# Patient Record
Sex: FEMALE | Race: BLACK OR AFRICAN AMERICAN | NOT HISPANIC OR LATINO
[De-identification: names, ages, dates, MRNs, and addresses within clinical notes are randomized per-mention and may not be internally consistent; named-entity substitution may affect disease eponyms.]

---

## 2017-08-31 ENCOUNTER — APPOINTMENT (OUTPATIENT)
Dept: SPINE | Facility: CLINIC | Age: 72
End: 2017-08-31
Payer: MEDICARE

## 2017-08-31 VITALS — BODY MASS INDEX: 33.27 KG/M2 | WEIGHT: 203 LBS

## 2017-08-31 VITALS — DIASTOLIC BLOOD PRESSURE: 84 MMHG | SYSTOLIC BLOOD PRESSURE: 124 MMHG | HEIGHT: 65.5 IN

## 2017-08-31 DIAGNOSIS — M54.16 RADICULOPATHY, LUMBAR REGION: ICD-10-CM

## 2017-08-31 PROCEDURE — 99213 OFFICE O/P EST LOW 20 MIN: CPT

## 2017-09-06 ENCOUNTER — APPOINTMENT (OUTPATIENT)
Dept: RADIOLOGY | Facility: CLINIC | Age: 72
End: 2017-09-06
Payer: MEDICARE

## 2017-09-06 ENCOUNTER — APPOINTMENT (OUTPATIENT)
Dept: MRI IMAGING | Facility: CLINIC | Age: 72
End: 2017-09-06
Payer: MEDICARE

## 2017-09-06 ENCOUNTER — APPOINTMENT (OUTPATIENT)
Dept: CT IMAGING | Facility: CLINIC | Age: 72
End: 2017-09-06
Payer: MEDICARE

## 2017-09-06 ENCOUNTER — OUTPATIENT (OUTPATIENT)
Dept: OUTPATIENT SERVICES | Facility: HOSPITAL | Age: 72
LOS: 1 days | End: 2017-09-06
Payer: MEDICARE

## 2017-09-06 DIAGNOSIS — Z90.710 ACQUIRED ABSENCE OF BOTH CERVIX AND UTERUS: Chronic | ICD-10-CM

## 2017-09-06 DIAGNOSIS — Z82.8 FAMILY HISTORY OF OTHER DISABILITIES AND CHRONIC DISEASES LEADING TO DISABLEMENT, NOT ELSEWHERE CLASSIFIED: Chronic | ICD-10-CM

## 2017-09-06 DIAGNOSIS — Z96.653 PRESENCE OF ARTIFICIAL KNEE JOINT, BILATERAL: Chronic | ICD-10-CM

## 2017-09-06 DIAGNOSIS — Z00.8 ENCOUNTER FOR OTHER GENERAL EXAMINATION: ICD-10-CM

## 2017-09-06 DIAGNOSIS — M54.16 RADICULOPATHY, LUMBAR REGION: ICD-10-CM

## 2017-09-06 DIAGNOSIS — Z98.89 OTHER SPECIFIED POSTPROCEDURAL STATES: Chronic | ICD-10-CM

## 2017-09-06 PROCEDURE — 72148 MRI LUMBAR SPINE W/O DYE: CPT | Mod: 26

## 2017-09-06 PROCEDURE — 72082 X-RAY EXAM ENTIRE SPI 2/3 VW: CPT | Mod: 26

## 2017-09-06 PROCEDURE — 72082 X-RAY EXAM ENTIRE SPI 2/3 VW: CPT

## 2017-09-06 PROCEDURE — 72131 CT LUMBAR SPINE W/O DYE: CPT

## 2017-09-06 PROCEDURE — 72131 CT LUMBAR SPINE W/O DYE: CPT | Mod: 26

## 2017-09-06 PROCEDURE — 72148 MRI LUMBAR SPINE W/O DYE: CPT

## 2017-09-11 DIAGNOSIS — M51.27 OTHER INTERVERTEBRAL DISC DISPLACEMENT, LUMBOSACRAL REGION: ICD-10-CM

## 2017-09-11 DIAGNOSIS — M51.36 OTHER INTERVERTEBRAL DISC DEGENERATION, LUMBAR REGION: ICD-10-CM

## 2017-09-11 DIAGNOSIS — M51.26 OTHER INTERVERTEBRAL DISC DISPLACEMENT, LUMBAR REGION: ICD-10-CM

## 2017-09-11 DIAGNOSIS — M43.16 SPONDYLOLISTHESIS, LUMBAR REGION: ICD-10-CM

## 2017-09-28 ENCOUNTER — APPOINTMENT (OUTPATIENT)
Dept: SPINE | Facility: CLINIC | Age: 72
End: 2017-09-28
Payer: MEDICARE

## 2017-09-28 VITALS
DIASTOLIC BLOOD PRESSURE: 84 MMHG | SYSTOLIC BLOOD PRESSURE: 122 MMHG | BODY MASS INDEX: 33.42 KG/M2 | HEIGHT: 65.5 IN | WEIGHT: 203 LBS

## 2017-09-28 DIAGNOSIS — M48.00 SPINAL STENOSIS, SITE UNSPECIFIED: ICD-10-CM

## 2017-09-28 PROCEDURE — 99213 OFFICE O/P EST LOW 20 MIN: CPT

## 2017-10-10 ENCOUNTER — RX RENEWAL (OUTPATIENT)
Age: 72
End: 2017-10-10

## 2017-10-27 ENCOUNTER — RX RENEWAL (OUTPATIENT)
Age: 72
End: 2017-10-27

## 2017-11-15 ENCOUNTER — OUTPATIENT (OUTPATIENT)
Dept: OUTPATIENT SERVICES | Facility: HOSPITAL | Age: 72
LOS: 1 days | End: 2017-11-15
Payer: MEDICARE

## 2017-11-15 VITALS
SYSTOLIC BLOOD PRESSURE: 143 MMHG | OXYGEN SATURATION: 97 % | HEART RATE: 65 BPM | TEMPERATURE: 98 F | RESPIRATION RATE: 18 BRPM | HEIGHT: 65 IN | DIASTOLIC BLOOD PRESSURE: 84 MMHG | WEIGHT: 207.01 LBS

## 2017-11-15 DIAGNOSIS — M48.00 SPINAL STENOSIS, SITE UNSPECIFIED: ICD-10-CM

## 2017-11-15 DIAGNOSIS — I10 ESSENTIAL (PRIMARY) HYPERTENSION: ICD-10-CM

## 2017-11-15 DIAGNOSIS — Z53.1 PROCEDURE AND TREATMENT NOT CARRIED OUT BECAUSE OF PATIENT'S DECISION FOR REASONS OF BELIEF AND GROUP PRESSURE: ICD-10-CM

## 2017-11-15 DIAGNOSIS — Z90.710 ACQUIRED ABSENCE OF BOTH CERVIX AND UTERUS: Chronic | ICD-10-CM

## 2017-11-15 DIAGNOSIS — Z01.818 ENCOUNTER FOR OTHER PREPROCEDURAL EXAMINATION: ICD-10-CM

## 2017-11-15 DIAGNOSIS — Z82.8 FAMILY HISTORY OF OTHER DISABILITIES AND CHRONIC DISEASES LEADING TO DISABLEMENT, NOT ELSEWHERE CLASSIFIED: Chronic | ICD-10-CM

## 2017-11-15 DIAGNOSIS — G47.33 OBSTRUCTIVE SLEEP APNEA (ADULT) (PEDIATRIC): ICD-10-CM

## 2017-11-15 DIAGNOSIS — Z96.653 PRESENCE OF ARTIFICIAL KNEE JOINT, BILATERAL: Chronic | ICD-10-CM

## 2017-11-15 DIAGNOSIS — Z98.89 OTHER SPECIFIED POSTPROCEDURAL STATES: Chronic | ICD-10-CM

## 2017-11-15 DIAGNOSIS — E11.9 TYPE 2 DIABETES MELLITUS WITHOUT COMPLICATIONS: ICD-10-CM

## 2017-11-15 LAB
ANION GAP SERPL CALC-SCNC: 19 MMOL/L — HIGH (ref 5–17)
BLD GP AB SCN SERPL QL: NEGATIVE — SIGNIFICANT CHANGE UP
BUN SERPL-MCNC: 14 MG/DL — SIGNIFICANT CHANGE UP (ref 7–23)
CALCIUM SERPL-MCNC: 10.2 MG/DL — SIGNIFICANT CHANGE UP (ref 8.4–10.5)
CHLORIDE SERPL-SCNC: 99 MMOL/L — SIGNIFICANT CHANGE UP (ref 96–108)
CO2 SERPL-SCNC: 18 MMOL/L — LOW (ref 22–31)
CREAT SERPL-MCNC: 0.83 MG/DL — SIGNIFICANT CHANGE UP (ref 0.5–1.3)
GLUCOSE SERPL-MCNC: 62 MG/DL — LOW (ref 70–99)
HBA1C BLD-MCNC: 6.4 % — HIGH (ref 4–5.6)
HCT VFR BLD CALC: 37 % — SIGNIFICANT CHANGE UP (ref 34.5–45)
HGB BLD-MCNC: 12.1 G/DL — SIGNIFICANT CHANGE UP (ref 11.5–15.5)
MCHC RBC-ENTMCNC: 28.9 PG — SIGNIFICANT CHANGE UP (ref 27–34)
MCHC RBC-ENTMCNC: 32.7 GM/DL — SIGNIFICANT CHANGE UP (ref 32–36)
MCV RBC AUTO: 88.5 FL — SIGNIFICANT CHANGE UP (ref 80–100)
PLATELET # BLD AUTO: 323 K/UL — SIGNIFICANT CHANGE UP (ref 150–400)
POTASSIUM SERPL-MCNC: 5.3 MMOL/L — SIGNIFICANT CHANGE UP (ref 3.5–5.3)
POTASSIUM SERPL-SCNC: 5.3 MMOL/L — SIGNIFICANT CHANGE UP (ref 3.5–5.3)
RBC # BLD: 4.18 M/UL — SIGNIFICANT CHANGE UP (ref 3.8–5.2)
RBC # FLD: 14.7 % — HIGH (ref 10.3–14.5)
RH IG SCN BLD-IMP: POSITIVE — SIGNIFICANT CHANGE UP
SODIUM SERPL-SCNC: 136 MMOL/L — SIGNIFICANT CHANGE UP (ref 135–145)
WBC # BLD: 5.45 K/UL — SIGNIFICANT CHANGE UP (ref 3.8–10.5)
WBC # FLD AUTO: 5.45 K/UL — SIGNIFICANT CHANGE UP (ref 3.8–10.5)

## 2017-11-15 PROCEDURE — 80048 BASIC METABOLIC PNL TOTAL CA: CPT

## 2017-11-15 PROCEDURE — 86901 BLOOD TYPING SEROLOGIC RH(D): CPT

## 2017-11-15 PROCEDURE — 86900 BLOOD TYPING SEROLOGIC ABO: CPT

## 2017-11-15 PROCEDURE — 86850 RBC ANTIBODY SCREEN: CPT

## 2017-11-15 PROCEDURE — 83036 HEMOGLOBIN GLYCOSYLATED A1C: CPT

## 2017-11-15 PROCEDURE — G0463: CPT

## 2017-11-15 PROCEDURE — 85027 COMPLETE CBC AUTOMATED: CPT

## 2017-11-15 RX ORDER — SODIUM CHLORIDE 9 MG/ML
3 INJECTION INTRAMUSCULAR; INTRAVENOUS; SUBCUTANEOUS EVERY 8 HOURS
Qty: 0 | Refills: 0 | Status: DISCONTINUED | OUTPATIENT
Start: 2017-11-22 | End: 2017-11-22

## 2017-11-15 RX ORDER — CEFAZOLIN SODIUM 1 G
2000 VIAL (EA) INJECTION ONCE
Qty: 0 | Refills: 0 | Status: DISCONTINUED | OUTPATIENT
Start: 2017-11-22 | End: 2017-11-23

## 2017-11-15 NOTE — H&P PST ADULT - PSH
FH: cholecystectomy    H/O right inguinal hernia repair    H/O total knee replacement, bilateral    H/O: hysterectomy

## 2017-11-15 NOTE — H&P PST ADULT - PROBLEM SELECTOR PLAN 2
Pt. instructed to self administer Humalog 75/25 Four (4) Units subcutaneous night prior to procedure  No insulin or metformin morning of procedure  Stat fingerstick glucose upon arrival to [facility]

## 2017-11-15 NOTE — H&P PST ADULT - HISTORY OF PRESENT ILLNESS
71 year old female presents for Stage I L1-L4 Lumbar Lateral Fusion. Pt. reports a history of progressively worsening lower back pain radiating down her left lower extremity X 2 years.

## 2017-11-15 NOTE — H&P PST ADULT - NSANTHOSAYNRD_GEN_A_CORE
No. BRUCE screening performed.  STOP BANG Legend: 0-2 = LOW Risk; 3-4 = INTERMEDIATE Risk; 5-8 = HIGH Risk

## 2017-11-15 NOTE — H&P PST ADULT - PMH
Diabetes    Diverticulosis    Hypertension    Hypothyroid    Osteoarthritis    Pancreatitis, chronic  last episode > 10 years ago  Spinal stenosis Diabetes    Diverticulosis    Hypertension    Hypothyroid    MRSA (methicillin resistant Staphylococcus aureus) infection  1999, infected knee replacement, required multiple revisions and long term IV antibiotics via central line  Osteoarthritis    Pancreatitis, chronic  last episode > 10 years ago  Spinal stenosis

## 2017-11-15 NOTE — H&P PST ADULT - PROBLEM SELECTOR PLAN 5
Pt. Jehova's Witness, Health Care Proxy on chart. Pt. given documents regarding blood products to bring home and review with her elders.  Pt. reports notifying Dr. Balderas that she is a Jehova's witness and states a conversation occurred regarding blood products. Pt. Jehova's Witness, Health Care Proxy on chart. Pt. given Informed consent for blood avoidance, blood refusal and blood management documents, instructed to review with her elders, and bring with her day of procedure.  Pt. reports notifying Dr. Balderas that she is a Jehova's witness and states a conversation occurred regarding blood products.

## 2017-11-22 ENCOUNTER — APPOINTMENT (OUTPATIENT)
Dept: SPINE | Facility: HOSPITAL | Age: 72
End: 2017-11-22

## 2017-11-22 ENCOUNTER — TRANSCRIPTION ENCOUNTER (OUTPATIENT)
Age: 72
End: 2017-11-22

## 2017-11-22 ENCOUNTER — INPATIENT (INPATIENT)
Facility: HOSPITAL | Age: 72
LOS: 5 days | Discharge: NURSING, REHAB OR LONG-TERM CARE | DRG: 460 | End: 2017-11-28
Attending: NEUROLOGICAL SURGERY | Admitting: NEUROLOGICAL SURGERY
Payer: MEDICARE

## 2017-11-22 VITALS
HEIGHT: 65 IN | HEART RATE: 82 BPM | DIASTOLIC BLOOD PRESSURE: 88 MMHG | WEIGHT: 207.01 LBS | SYSTOLIC BLOOD PRESSURE: 136 MMHG | RESPIRATION RATE: 18 BRPM | TEMPERATURE: 98 F | OXYGEN SATURATION: 98 %

## 2017-11-22 DIAGNOSIS — Z90.710 ACQUIRED ABSENCE OF BOTH CERVIX AND UTERUS: Chronic | ICD-10-CM

## 2017-11-22 DIAGNOSIS — M48.00 SPINAL STENOSIS, SITE UNSPECIFIED: ICD-10-CM

## 2017-11-22 DIAGNOSIS — Z96.653 PRESENCE OF ARTIFICIAL KNEE JOINT, BILATERAL: Chronic | ICD-10-CM

## 2017-11-22 DIAGNOSIS — Z98.89 OTHER SPECIFIED POSTPROCEDURAL STATES: Chronic | ICD-10-CM

## 2017-11-22 DIAGNOSIS — Z82.8 FAMILY HISTORY OF OTHER DISABILITIES AND CHRONIC DISEASES LEADING TO DISABLEMENT, NOT ELSEWHERE CLASSIFIED: Chronic | ICD-10-CM

## 2017-11-22 LAB
ANION GAP SERPL CALC-SCNC: 16 MMOL/L — SIGNIFICANT CHANGE UP (ref 5–17)
BASOPHILS # BLD AUTO: 0 K/UL — SIGNIFICANT CHANGE UP (ref 0–0.2)
BASOPHILS NFR BLD AUTO: 0.4 % — SIGNIFICANT CHANGE UP (ref 0–2)
CHLORIDE SERPL-SCNC: 98 MMOL/L — SIGNIFICANT CHANGE UP (ref 96–108)
CO2 SERPL-SCNC: 24 MMOL/L — SIGNIFICANT CHANGE UP (ref 22–31)
EOSINOPHIL # BLD AUTO: 0.1 K/UL — SIGNIFICANT CHANGE UP (ref 0–0.5)
EOSINOPHIL NFR BLD AUTO: 0.6 % — SIGNIFICANT CHANGE UP (ref 0–6)
GLUCOSE BLDC GLUCOMTR-MCNC: 120 MG/DL — HIGH (ref 70–99)
GLUCOSE BLDC GLUCOMTR-MCNC: 201 MG/DL — HIGH (ref 70–99)
HCT VFR BLD CALC: 30.7 % — LOW (ref 34.5–45)
HGB BLD-MCNC: 9.9 G/DL — LOW (ref 11.5–15.5)
LYMPHOCYTES # BLD AUTO: 2.7 K/UL — SIGNIFICANT CHANGE UP (ref 1–3.3)
LYMPHOCYTES # BLD AUTO: 27 % — SIGNIFICANT CHANGE UP (ref 13–44)
MCHC RBC-ENTMCNC: 29.5 PG — SIGNIFICANT CHANGE UP (ref 27–34)
MCHC RBC-ENTMCNC: 32.3 GM/DL — SIGNIFICANT CHANGE UP (ref 32–36)
MCV RBC AUTO: 91.2 FL — SIGNIFICANT CHANGE UP (ref 80–100)
MONOCYTES # BLD AUTO: 0.7 K/UL — SIGNIFICANT CHANGE UP (ref 0–0.9)
MONOCYTES NFR BLD AUTO: 7.4 % — SIGNIFICANT CHANGE UP (ref 2–14)
NEUTROPHILS # BLD AUTO: 6.4 K/UL — SIGNIFICANT CHANGE UP (ref 1.8–7.4)
NEUTROPHILS NFR BLD AUTO: 64.6 % — SIGNIFICANT CHANGE UP (ref 43–77)
PLATELET # BLD AUTO: 242 K/UL — SIGNIFICANT CHANGE UP (ref 150–400)
POTASSIUM SERPL-MCNC: 4 MMOL/L — SIGNIFICANT CHANGE UP (ref 3.5–5.3)
POTASSIUM SERPL-SCNC: 4 MMOL/L — SIGNIFICANT CHANGE UP (ref 3.5–5.3)
RBC # BLD: 3.37 M/UL — LOW (ref 3.8–5.2)
RBC # FLD: 13.2 % — SIGNIFICANT CHANGE UP (ref 10.3–14.5)
SODIUM SERPL-SCNC: 138 MMOL/L — SIGNIFICANT CHANGE UP (ref 135–145)
WBC # BLD: 9.9 K/UL — SIGNIFICANT CHANGE UP (ref 3.8–10.5)
WBC # FLD AUTO: 9.9 K/UL — SIGNIFICANT CHANGE UP (ref 3.8–10.5)

## 2017-11-22 PROCEDURE — 22633 ARTHRD CMBN 1NTRSPC LUMBAR: CPT | Mod: GC

## 2017-11-22 PROCEDURE — 22585 ARTHRD ANT NTRBD MIN DSC EA: CPT | Mod: GC

## 2017-11-22 PROCEDURE — 22558 ARTHRD ANT NTRBD MIN DSC LUM: CPT | Mod: 59,GC

## 2017-11-22 PROCEDURE — 22842 INSERT SPINE FIXATION DEVICE: CPT | Mod: GC

## 2017-11-22 PROCEDURE — 22614 ARTHRD PST TQ 1NTRSPC EA ADD: CPT | Mod: GC

## 2017-11-22 PROCEDURE — 63047 LAM FACETEC & FORAMOT LUMBAR: CPT | Mod: 59,GC

## 2017-11-22 PROCEDURE — 22853 INSJ BIOMECHANICAL DEVICE: CPT | Mod: 59,GC

## 2017-11-22 RX ORDER — HYDROMORPHONE HYDROCHLORIDE 2 MG/ML
0.4 INJECTION INTRAMUSCULAR; INTRAVENOUS; SUBCUTANEOUS
Qty: 0 | Refills: 0 | Status: DISCONTINUED | OUTPATIENT
Start: 2017-11-22 | End: 2017-11-23

## 2017-11-22 RX ORDER — ACETAMINOPHEN 500 MG
650 TABLET ORAL EVERY 6 HOURS
Qty: 0 | Refills: 0 | Status: DISCONTINUED | OUTPATIENT
Start: 2017-11-22 | End: 2017-11-28

## 2017-11-22 RX ORDER — NALOXONE HYDROCHLORIDE 4 MG/.1ML
0.1 SPRAY NASAL
Qty: 0 | Refills: 0 | Status: DISCONTINUED | OUTPATIENT
Start: 2017-11-22 | End: 2017-11-23

## 2017-11-22 RX ORDER — HYDROMORPHONE HYDROCHLORIDE 2 MG/ML
0.4 INJECTION INTRAMUSCULAR; INTRAVENOUS; SUBCUTANEOUS
Qty: 0 | Refills: 0 | Status: DISCONTINUED | OUTPATIENT
Start: 2017-11-22 | End: 2017-11-22

## 2017-11-22 RX ORDER — LIDOCAINE HCL 20 MG/ML
0.2 VIAL (ML) INJECTION ONCE
Qty: 0 | Refills: 0 | Status: DISCONTINUED | OUTPATIENT
Start: 2017-11-22 | End: 2017-11-22

## 2017-11-22 RX ORDER — INSULIN LISPRO 100/ML
VIAL (ML) SUBCUTANEOUS
Qty: 0 | Refills: 0 | Status: DISCONTINUED | OUTPATIENT
Start: 2017-11-22 | End: 2017-11-23

## 2017-11-22 RX ORDER — DOCUSATE SODIUM 100 MG
100 CAPSULE ORAL THREE TIMES A DAY
Qty: 0 | Refills: 0 | Status: DISCONTINUED | OUTPATIENT
Start: 2017-11-22 | End: 2017-11-28

## 2017-11-22 RX ORDER — METOPROLOL TARTRATE 50 MG
50 TABLET ORAL
Qty: 0 | Refills: 0 | Status: DISCONTINUED | OUTPATIENT
Start: 2017-11-22 | End: 2017-11-28

## 2017-11-22 RX ORDER — ONDANSETRON 8 MG/1
4 TABLET, FILM COATED ORAL EVERY 6 HOURS
Qty: 0 | Refills: 0 | Status: DISCONTINUED | OUTPATIENT
Start: 2017-11-22 | End: 2017-11-28

## 2017-11-22 RX ORDER — SODIUM CHLORIDE 9 MG/ML
1000 INJECTION, SOLUTION INTRAVENOUS
Qty: 0 | Refills: 0 | Status: DISCONTINUED | OUTPATIENT
Start: 2017-11-22 | End: 2017-11-28

## 2017-11-22 RX ORDER — DEXTROSE 50 % IN WATER 50 %
25 SYRINGE (ML) INTRAVENOUS ONCE
Qty: 0 | Refills: 0 | Status: DISCONTINUED | OUTPATIENT
Start: 2017-11-22 | End: 2017-11-28

## 2017-11-22 RX ORDER — SENNA PLUS 8.6 MG/1
2 TABLET ORAL AT BEDTIME
Qty: 0 | Refills: 0 | Status: DISCONTINUED | OUTPATIENT
Start: 2017-11-22 | End: 2017-11-28

## 2017-11-22 RX ORDER — DEXTROSE 50 % IN WATER 50 %
12.5 SYRINGE (ML) INTRAVENOUS ONCE
Qty: 0 | Refills: 0 | Status: DISCONTINUED | OUTPATIENT
Start: 2017-11-22 | End: 2017-11-28

## 2017-11-22 RX ORDER — HYDROMORPHONE HYDROCHLORIDE 2 MG/ML
30 INJECTION INTRAMUSCULAR; INTRAVENOUS; SUBCUTANEOUS
Qty: 0 | Refills: 0 | Status: DISCONTINUED | OUTPATIENT
Start: 2017-11-22 | End: 2017-11-23

## 2017-11-22 RX ORDER — ACETAMINOPHEN 500 MG
650 TABLET ORAL EVERY 6 HOURS
Qty: 0 | Refills: 0 | Status: DISCONTINUED | OUTPATIENT
Start: 2017-11-22 | End: 2017-11-26

## 2017-11-22 RX ORDER — ONDANSETRON 8 MG/1
4 TABLET, FILM COATED ORAL EVERY 6 HOURS
Qty: 0 | Refills: 0 | Status: DISCONTINUED | OUTPATIENT
Start: 2017-11-22 | End: 2017-11-23

## 2017-11-22 RX ORDER — AMLODIPINE BESYLATE 2.5 MG/1
10 TABLET ORAL AT BEDTIME
Qty: 0 | Refills: 0 | Status: DISCONTINUED | OUTPATIENT
Start: 2017-11-22 | End: 2017-11-28

## 2017-11-22 RX ORDER — ONDANSETRON 8 MG/1
4 TABLET, FILM COATED ORAL ONCE
Qty: 0 | Refills: 0 | Status: DISCONTINUED | OUTPATIENT
Start: 2017-11-22 | End: 2017-11-22

## 2017-11-22 RX ORDER — HYDROMORPHONE HYDROCHLORIDE 2 MG/ML
0.8 INJECTION INTRAMUSCULAR; INTRAVENOUS; SUBCUTANEOUS
Qty: 0 | Refills: 0 | Status: DISCONTINUED | OUTPATIENT
Start: 2017-11-22 | End: 2017-11-22

## 2017-11-22 RX ORDER — GLUCAGON INJECTION, SOLUTION 0.5 MG/.1ML
1 INJECTION, SOLUTION SUBCUTANEOUS ONCE
Qty: 0 | Refills: 0 | Status: DISCONTINUED | OUTPATIENT
Start: 2017-11-22 | End: 2017-11-28

## 2017-11-22 RX ORDER — DEXTROSE 50 % IN WATER 50 %
1 SYRINGE (ML) INTRAVENOUS ONCE
Qty: 0 | Refills: 0 | Status: DISCONTINUED | OUTPATIENT
Start: 2017-11-22 | End: 2017-11-28

## 2017-11-22 RX ORDER — DEXTROSE MONOHYDRATE, SODIUM CHLORIDE, AND POTASSIUM CHLORIDE 50; .745; 4.5 G/1000ML; G/1000ML; G/1000ML
1000 INJECTION, SOLUTION INTRAVENOUS
Qty: 0 | Refills: 0 | Status: DISCONTINUED | OUTPATIENT
Start: 2017-11-22 | End: 2017-11-24

## 2017-11-22 RX ORDER — VANCOMYCIN HCL 1 G
1500 VIAL (EA) INTRAVENOUS EVERY 12 HOURS
Qty: 0 | Refills: 0 | Status: COMPLETED | OUTPATIENT
Start: 2017-11-22 | End: 2017-11-23

## 2017-11-22 RX ORDER — PANTOPRAZOLE SODIUM 20 MG/1
40 TABLET, DELAYED RELEASE ORAL DAILY
Qty: 0 | Refills: 0 | Status: DISCONTINUED | OUTPATIENT
Start: 2017-11-22 | End: 2017-11-28

## 2017-11-22 RX ORDER — LEVOTHYROXINE SODIUM 125 MCG
150 TABLET ORAL DAILY
Qty: 0 | Refills: 0 | Status: DISCONTINUED | OUTPATIENT
Start: 2017-11-22 | End: 2017-11-28

## 2017-11-22 RX ADMIN — SODIUM CHLORIDE 3 MILLILITER(S): 9 INJECTION INTRAMUSCULAR; INTRAVENOUS; SUBCUTANEOUS at 06:11

## 2017-11-22 RX ADMIN — AMLODIPINE BESYLATE 10 MILLIGRAM(S): 2.5 TABLET ORAL at 21:29

## 2017-11-22 RX ADMIN — HYDROMORPHONE HYDROCHLORIDE 30 MILLILITER(S): 2 INJECTION INTRAMUSCULAR; INTRAVENOUS; SUBCUTANEOUS at 22:26

## 2017-11-22 RX ADMIN — Medication 50 MILLIGRAM(S): at 18:41

## 2017-11-22 RX ADMIN — HYDROMORPHONE HYDROCHLORIDE 0.4 MILLIGRAM(S): 2 INJECTION INTRAMUSCULAR; INTRAVENOUS; SUBCUTANEOUS at 18:51

## 2017-11-22 RX ADMIN — ONDANSETRON 4 MILLIGRAM(S): 8 TABLET, FILM COATED ORAL at 17:51

## 2017-11-22 RX ADMIN — HYDROMORPHONE HYDROCHLORIDE 0.4 MILLIGRAM(S): 2 INJECTION INTRAMUSCULAR; INTRAVENOUS; SUBCUTANEOUS at 17:08

## 2017-11-22 RX ADMIN — Medication 20 MILLIGRAM(S): at 23:38

## 2017-11-22 RX ADMIN — Medication 300 MILLIGRAM(S): at 21:20

## 2017-11-22 RX ADMIN — DEXTROSE MONOHYDRATE, SODIUM CHLORIDE, AND POTASSIUM CHLORIDE 90 MILLILITER(S): 50; .745; 4.5 INJECTION, SOLUTION INTRAVENOUS at 17:08

## 2017-11-22 RX ADMIN — DEXTROSE MONOHYDRATE, SODIUM CHLORIDE, AND POTASSIUM CHLORIDE 90 MILLILITER(S): 50; .745; 4.5 INJECTION, SOLUTION INTRAVENOUS at 22:28

## 2017-11-22 RX ADMIN — HYDROMORPHONE HYDROCHLORIDE 30 MILLILITER(S): 2 INJECTION INTRAMUSCULAR; INTRAVENOUS; SUBCUTANEOUS at 21:42

## 2017-11-22 RX ADMIN — HYDROMORPHONE HYDROCHLORIDE 30 MILLILITER(S): 2 INJECTION INTRAMUSCULAR; INTRAVENOUS; SUBCUTANEOUS at 16:51

## 2017-11-22 RX ADMIN — Medication 100 MILLIGRAM(S): at 21:31

## 2017-11-22 RX ADMIN — Medication 2: at 18:34

## 2017-11-22 NOTE — PATIENT PROFILE ADULT. - PMH
Diabetes    Diverticulosis    Hypertension    Hypothyroid    MRSA (methicillin resistant Staphylococcus aureus) infection  1999, infected knee replacement, required multiple revisions and long term IV antibiotics via central line  Osteoarthritis    Pancreatitis, chronic  last episode > 10 years ago  Spinal stenosis

## 2017-11-23 LAB
ANION GAP SERPL CALC-SCNC: 16 MMOL/L — SIGNIFICANT CHANGE UP (ref 5–17)
BUN SERPL-MCNC: 7 MG/DL — SIGNIFICANT CHANGE UP (ref 7–23)
CALCIUM SERPL-MCNC: 8.8 MG/DL — SIGNIFICANT CHANGE UP (ref 8.4–10.5)
CHLORIDE SERPL-SCNC: 90 MMOL/L — LOW (ref 96–108)
CO2 SERPL-SCNC: 26 MMOL/L — SIGNIFICANT CHANGE UP (ref 22–31)
CREAT SERPL-MCNC: 0.65 MG/DL — SIGNIFICANT CHANGE UP (ref 0.5–1.3)
GLUCOSE BLDC GLUCOMTR-MCNC: 160 MG/DL — HIGH (ref 70–99)
GLUCOSE BLDC GLUCOMTR-MCNC: 166 MG/DL — HIGH (ref 70–99)
GLUCOSE BLDC GLUCOMTR-MCNC: 176 MG/DL — HIGH (ref 70–99)
GLUCOSE BLDC GLUCOMTR-MCNC: 214 MG/DL — HIGH (ref 70–99)
GLUCOSE BLDC GLUCOMTR-MCNC: 225 MG/DL — HIGH (ref 70–99)
GLUCOSE BLDC GLUCOMTR-MCNC: 271 MG/DL — HIGH (ref 70–99)
GLUCOSE SERPL-MCNC: 253 MG/DL — HIGH (ref 70–99)
HCT VFR BLD CALC: 34.1 % — LOW (ref 34.5–45)
HGB BLD-MCNC: 11.5 G/DL — SIGNIFICANT CHANGE UP (ref 11.5–15.5)
MCHC RBC-ENTMCNC: 30.8 PG — SIGNIFICANT CHANGE UP (ref 27–34)
MCHC RBC-ENTMCNC: 33.7 GM/DL — SIGNIFICANT CHANGE UP (ref 32–36)
MCV RBC AUTO: 91.4 FL — SIGNIFICANT CHANGE UP (ref 80–100)
PLATELET # BLD AUTO: 254 K/UL — SIGNIFICANT CHANGE UP (ref 150–400)
POTASSIUM SERPL-MCNC: 4.1 MMOL/L — SIGNIFICANT CHANGE UP (ref 3.5–5.3)
POTASSIUM SERPL-SCNC: 4.1 MMOL/L — SIGNIFICANT CHANGE UP (ref 3.5–5.3)
RBC # BLD: 3.74 M/UL — LOW (ref 3.8–5.2)
RBC # FLD: 13.3 % — SIGNIFICANT CHANGE UP (ref 10.3–14.5)
SODIUM SERPL-SCNC: 132 MMOL/L — LOW (ref 135–145)
WBC # BLD: 8.1 K/UL — SIGNIFICANT CHANGE UP (ref 3.8–10.5)
WBC # FLD AUTO: 8.1 K/UL — SIGNIFICANT CHANGE UP (ref 3.8–10.5)

## 2017-11-23 RX ORDER — INSULIN LISPRO 100/ML
VIAL (ML) SUBCUTANEOUS AT BEDTIME
Qty: 0 | Refills: 0 | Status: DISCONTINUED | OUTPATIENT
Start: 2017-11-23 | End: 2017-11-28

## 2017-11-23 RX ORDER — METOCLOPRAMIDE HCL 10 MG
10 TABLET ORAL ONCE
Qty: 0 | Refills: 0 | Status: COMPLETED | OUTPATIENT
Start: 2017-11-23 | End: 2017-11-23

## 2017-11-23 RX ORDER — OXYCODONE AND ACETAMINOPHEN 5; 325 MG/1; MG/1
1 TABLET ORAL EVERY 4 HOURS
Qty: 0 | Refills: 0 | Status: DISCONTINUED | OUTPATIENT
Start: 2017-11-23 | End: 2017-11-26

## 2017-11-23 RX ORDER — INSULIN LISPRO 100/ML
VIAL (ML) SUBCUTANEOUS AT BEDTIME
Qty: 0 | Refills: 0 | Status: DISCONTINUED | OUTPATIENT
Start: 2017-11-23 | End: 2017-11-23

## 2017-11-23 RX ORDER — ENOXAPARIN SODIUM 100 MG/ML
40 INJECTION SUBCUTANEOUS
Qty: 0 | Refills: 0 | Status: DISCONTINUED | OUTPATIENT
Start: 2017-11-23 | End: 2017-11-28

## 2017-11-23 RX ORDER — POLYETHYLENE GLYCOL 3350 17 G/17G
17 POWDER, FOR SOLUTION ORAL
Qty: 0 | Refills: 0 | Status: DISCONTINUED | OUTPATIENT
Start: 2017-11-23 | End: 2017-11-28

## 2017-11-23 RX ORDER — INSULIN LISPRO 100/ML
VIAL (ML) SUBCUTANEOUS
Qty: 0 | Refills: 0 | Status: DISCONTINUED | OUTPATIENT
Start: 2017-11-23 | End: 2017-11-28

## 2017-11-23 RX ORDER — ACETAMINOPHEN 500 MG
1000 TABLET ORAL ONCE
Qty: 0 | Refills: 0 | Status: COMPLETED | OUTPATIENT
Start: 2017-11-23 | End: 2017-11-23

## 2017-11-23 RX ORDER — OXYCODONE AND ACETAMINOPHEN 5; 325 MG/1; MG/1
2 TABLET ORAL EVERY 4 HOURS
Qty: 0 | Refills: 0 | Status: DISCONTINUED | OUTPATIENT
Start: 2017-11-23 | End: 2017-11-26

## 2017-11-23 RX ADMIN — Medication 50 MILLIGRAM(S): at 17:45

## 2017-11-23 RX ADMIN — HYDROMORPHONE HYDROCHLORIDE 30 MILLILITER(S): 2 INJECTION INTRAMUSCULAR; INTRAVENOUS; SUBCUTANEOUS at 12:25

## 2017-11-23 RX ADMIN — AMLODIPINE BESYLATE 10 MILLIGRAM(S): 2.5 TABLET ORAL at 21:47

## 2017-11-23 RX ADMIN — HYDROMORPHONE HYDROCHLORIDE 30 MILLILITER(S): 2 INJECTION INTRAMUSCULAR; INTRAVENOUS; SUBCUTANEOUS at 05:43

## 2017-11-23 RX ADMIN — Medication 100 MILLIGRAM(S): at 21:47

## 2017-11-23 RX ADMIN — ONDANSETRON 4 MILLIGRAM(S): 8 TABLET, FILM COATED ORAL at 00:50

## 2017-11-23 RX ADMIN — Medication 50 MILLIGRAM(S): at 05:48

## 2017-11-23 RX ADMIN — OXYCODONE AND ACETAMINOPHEN 2 TABLET(S): 5; 325 TABLET ORAL at 14:33

## 2017-11-23 RX ADMIN — Medication 2: at 17:45

## 2017-11-23 RX ADMIN — HYDROMORPHONE HYDROCHLORIDE 30 MILLILITER(S): 2 INJECTION INTRAMUSCULAR; INTRAVENOUS; SUBCUTANEOUS at 07:31

## 2017-11-23 RX ADMIN — Medication 2: at 08:31

## 2017-11-23 RX ADMIN — Medication 400 MILLIGRAM(S): at 23:26

## 2017-11-23 RX ADMIN — Medication 300 MILLIGRAM(S): at 08:31

## 2017-11-23 RX ADMIN — Medication 100 MILLIGRAM(S): at 05:48

## 2017-11-23 RX ADMIN — ENOXAPARIN SODIUM 40 MILLIGRAM(S): 100 INJECTION SUBCUTANEOUS at 17:45

## 2017-11-23 RX ADMIN — Medication 10 MILLIGRAM(S): at 05:54

## 2017-11-23 RX ADMIN — DEXTROSE MONOHYDRATE, SODIUM CHLORIDE, AND POTASSIUM CHLORIDE 50 MILLILITER(S): 50; .745; 4.5 INJECTION, SOLUTION INTRAVENOUS at 20:24

## 2017-11-23 RX ADMIN — POLYETHYLENE GLYCOL 3350 17 GRAM(S): 17 POWDER, FOR SOLUTION ORAL at 17:45

## 2017-11-23 RX ADMIN — Medication 150 MICROGRAM(S): at 05:48

## 2017-11-23 RX ADMIN — OXYCODONE AND ACETAMINOPHEN 2 TABLET(S): 5; 325 TABLET ORAL at 20:50

## 2017-11-23 RX ADMIN — Medication 1: at 00:59

## 2017-11-23 RX ADMIN — HYDROMORPHONE HYDROCHLORIDE 30 MILLILITER(S): 2 INJECTION INTRAMUSCULAR; INTRAVENOUS; SUBCUTANEOUS at 02:04

## 2017-11-23 RX ADMIN — OXYCODONE AND ACETAMINOPHEN 2 TABLET(S): 5; 325 TABLET ORAL at 20:19

## 2017-11-23 RX ADMIN — Medication 20 MILLIGRAM(S): at 21:47

## 2017-11-23 RX ADMIN — PANTOPRAZOLE SODIUM 40 MILLIGRAM(S): 20 TABLET, DELAYED RELEASE ORAL at 12:23

## 2017-11-23 RX ADMIN — PANTOPRAZOLE SODIUM 40 MILLIGRAM(S): 20 TABLET, DELAYED RELEASE ORAL at 05:49

## 2017-11-23 RX ADMIN — ONDANSETRON 4 MILLIGRAM(S): 8 TABLET, FILM COATED ORAL at 18:09

## 2017-11-23 RX ADMIN — Medication 100 MILLIGRAM(S): at 13:34

## 2017-11-23 RX ADMIN — Medication 2: at 13:33

## 2017-11-23 RX ADMIN — DEXTROSE MONOHYDRATE, SODIUM CHLORIDE, AND POTASSIUM CHLORIDE 90 MILLILITER(S): 50; .745; 4.5 INJECTION, SOLUTION INTRAVENOUS at 05:45

## 2017-11-23 RX ADMIN — Medication 1000 MILLIGRAM(S): at 23:56

## 2017-11-23 RX ADMIN — OXYCODONE AND ACETAMINOPHEN 2 TABLET(S): 5; 325 TABLET ORAL at 15:06

## 2017-11-23 NOTE — PROGRESS NOTE ADULT - ASSESSMENT
71 year old female with PMHx of OA, hypothyroidism, HTN, Diabetes, p/w history of progressively worsening lower back pain radiating down her left lower extremity X 2 years.    POD #1, S/P L1-L5 XLIF, L5-S1 PLIF, L1-S1  screw and john fixation    Neuro: neuro checks, vital signs q 4hours  	maintain HMVS, record output  	continue dilaudid PCA- will d/c in am    Cardiac: HTN, continue amlodipine, enalapril and metoprolol; goal SBP<160    GI: CCD diet    Renal: maintain mujica while on PCA  	continue IVF until  tolerating PO adequately    Endo:  Insulin sliding scale, CCD diet    DVT ppx: will start SQL tonight    Heme/Onc: H/H stable, [employer]    PT/OT-p, encourage OOB 71 year old female with PMHx of OA, hypothyroidism, HTN, Diabetes, p/w history of progressively worsening lower back pain radiating down her left lower extremity X 2 years.    POD #1, S/P L1-L5 XLIF, L5-S1 PLIF, L1-S1  screw and john fixation    Neuro: neuro checks, vital signs q 4hours  	maintain HMVs record output  	continue dilaudid PCA    Cardiac: HTN, continue amlodipine, enalapril and metoprolol; goal SBP<160    GI: CCD diet    Renal: maintain mujica while on PCA  	continue IVF until  tolerating PO adequately  	Na 132, on NS IVF, will rpt in am    Endo:  Insulin sliding scale, CCD diet; continue synthroid    DVT ppx: will start SQL tonight    Heme/Onc: H/H stable, [employer]    PT/OT-p, encourage OOB 71 year old female with PMHx of OA, hypothyroidism, HTN, Diabetes, p/w history of progressively worsening lower back pain radiating down her left lower extremity X 2 years.    POD #1, S/P L1-L5 XLIF, L5-S1 PLIF, L1-S1  screw and john fixation    Neuro: neuro checks, vital signs q 4hours  	maintain HMVs record output  	continue dilaudid PCA    Cardiac: HTN, continue amlodipine, enalapril and metoprolol; goal SBP<160    GI: CCD diet    Renal: maintain mujica while on PCA  	continue IVF until  tolerating PO adequately  	Na 132, on NS IVF, will rpt in am    Endo:  Insulin sliding scale, CCD diet; continue synthroid; Endocrine consult called    DVT ppx: will start SQL tonight    Heme/Onc: H/H stable, Jew    PT/OT-p, encourage OOB

## 2017-11-23 NOTE — PROGRESS NOTE ADULT - SUBJECTIVE AND OBJECTIVE BOX
71 year old female p/w history of progressively worsening lower back pain radiating down her left lower extremity X 2 years.    POD #1, S/P L1-L5 XLIF, L5-S1 PLIF, L1-S1  screw and john fixation  	    FAMILY HISTORY:  No pertinent family history in first degree relatives  PAST MEDICAL & SURGICAL HISTORY:  MRSA (methicillin resistant Staphylococcus aureus) infection: 1999, infected knee replacement, required multiple revisions and long term IV antibiotics via central line  Spinal stenosis  Osteoarthritis  Diverticulosis  Pancreatitis, chronic: last episode &gt; 10 years ago  Hypothyroid  Hypertension  Diabetes  H/O right inguinal hernia repair  H/O total knee replacement, bilateral  FH: cholecystectomy  H/O: hysterectomy    SUBJECTIVE: Patient lying in bed, sleepy.  Complains of pain.    CHIEF COMPLAINT: presented w/ LBP    OVERNIGHT EVENTS: transferred from PACU to floor and placed on PCA      Vital Signs Last 24 Hrs  T(C): 37.7 (23 Nov 2017 05:42), Max: 37.7 (23 Nov 2017 05:42)  T(F): 99.9 (23 Nov 2017 05:42), Max: 99.9 (23 Nov 2017 05:42)  HR: 98 (23 Nov 2017 05:42) (72 - 98)  BP: 160/84 (23 Nov 2017 05:42) (131/81 - 179/91)  BP(mean): 118 (22 Nov 2017 21:30) (102 - 126)  RR: 18 (23 Nov 2017 05:42) (14 - 18)  SpO2: 94% (23 Nov 2017 05:42) (90% - 100%)    DRAINS:    PHYSICAL EXAM:    Constitutional: No Acute Distress     Neurological: AOx3, Following Commands, Moving all Extremities     Motor exam:          Upper extremity                         Delt     Bicep     Tricep    HG                                                 R         5/5        5/5        5/5       5/5                                               L          5/5        5/5        5/5       5/5          Lower extremity                        HF         KF        KE       DF         PF                                                  R        3/5        4/5        4/5       4/5         5/5                                               L         4/5        5/5       4/5       5/5          5/5                                                 Sensation: [x] intact to light touch  [] decreased:     Pulmonary: Clear to Auscultation, No rales, No rhonchi, No wheezes     Cardiovascular: S1, S2, Regular rate and rhythm     Gastrointestinal: Soft, Non-tender, Non-distended     Extremities: No calf tenderness     Incision: clean and dry, +HMV (r) -160cc; +HMV (l) -200cc    LABS:                        11.5   8.1   )-----------( 254      ( 23 Nov 2017 06:22 )             34.1    11-23    132<L>  |  90<L>  |  7   ----------------------------<  253<H>  4.1   |  26  |  0.65    Ca    8.8      23 Nov 2017 06:22        MEDICATIONS:  Antibiotics:  ceFAZolin   IVPB 2000 milliGRAM(s) IV Intermittent once    Neuro:  acetaminophen   Tablet 650 milliGRAM(s) Oral every 6 hours PRN For Temp greater than 38 C (100.4 F)  acetaminophen   Tablet. 650 milliGRAM(s) Oral every 6 hours PRN Mild Pain (1 - 3)  diazepam  Injectable 2 milliGRAM(s) IV Push every 6 hours PRN spasm  HYDROmorphone PCA (1 mG/mL) 30 milliLiter(s) PCA Continuous PCA Continuous  HYDROmorphone PCA (1 mG/mL) Rescue Clinician Bolus 0.4 milliGRAM(s) IV Push every 15 minutes PRN for Pain Scale GREATER THAN 6  ondansetron Injectable 4 milliGRAM(s) IV Push every 6 hours PRN Nausea  ondansetron Injectable 4 milliGRAM(s) IV Push every 6 hours PRN Nausea and/or Vomiting    Cardiac:  amLODIPine   Tablet 10 milliGRAM(s) Oral at bedtime  enalapril 20 milliGRAM(s) Oral at bedtime  metoprolol     tartrate 50 milliGRAM(s) Oral two times a day    Pulm:    GI/:  docusate sodium 100 milliGRAM(s) Oral three times a day  pantoprazole    Tablet 40 milliGRAM(s) Oral daily  senna 2 Tablet(s) Oral at bedtime PRN Constipation    Other:   dextrose 5%. 1000 milliLiter(s) IV Continuous <Continuous>  dextrose 50% Injectable 12.5 Gram(s) IV Push once  dextrose 50% Injectable 25 Gram(s) IV Push once  dextrose 50% Injectable 25 Gram(s) IV Push once  dextrose Gel 1 Dose(s) Oral once PRN Blood Glucose LESS THAN 70 milliGRAM(s)/deciliter  glucagon  Injectable 1 milliGRAM(s) IntraMuscular once PRN Glucose LESS THAN 70 milligrams/deciliter  insulin lispro (HumaLOG) corrective regimen sliding scale   SubCutaneous three times a day before meals  insulin lispro (HumaLOG) corrective regimen sliding scale   SubCutaneous at bedtime  levothyroxine 150 MICROGram(s) Oral daily  naloxone Injectable 0.1 milliGRAM(s) IV Push every 3 minutes PRN For ANY of the following changes in patient status:  A. RR LESS THAN 10 breaths per minute, B. Oxygen saturation LESS THAN 90%, C. Sedation score of 6  sodium chloride 0.9% with potassium chloride 20 mEq/L 1000 milliLiter(s) IV Continuous <Continuous>    DIET: [] Regular [] CCD [] Renal [] Puree [] Dysphagia [] Tube Feeds:     IMAGING:

## 2017-11-23 NOTE — PROGRESS NOTE ADULT - SUBJECTIVE AND OBJECTIVE BOX
subjective:  Vital Signs Last 24 Hrs  T(C): 37.7 (23 Nov 2017 05:42), Max: 37.7 (23 Nov 2017 05:42)  T(F): 99.9 (23 Nov 2017 05:42), Max: 99.9 (23 Nov 2017 05:42)  HR: 98 (23 Nov 2017 05:42) (72 - 98)  BP: 160/84 (23 Nov 2017 05:42) (131/81 - 179/91)  BP(mean): 118 (22 Nov 2017 21:30) (102 - 126)  RR: 18 (23 Nov 2017 05:42) (14 - 18)  SpO2: 94% (23 Nov 2017 05:42) (90% - 100%)  PHYSICAL EXAM:    Constitutional: c/o back pain    Neurological:     Motor exam:          Upper extremity                         Delt     Bicep     Tricep    HG                                                 R         5/5        5/5        5/5       5/5                                               L          5/5        5/5        5/5       5/5          Lower extremity                        HF         KF        KE       DF         PF                                                  R        5/5        5/5        5/5       5/5         5/5                                               L         5/5        5/5       5/5       5/5          5/5                                                        [x] warm well perfused; capillary refill <3 seconds     Sensation: [x] intact to light touch  [] decrease    11-22 @ 07:01  -  11-23 @ 07:00  --------------------------------------------------------  IN:    IV PiggyBack: 500 mL    sodium chloride 0.9% with potassium chloride 20 mEq/L: 1350 mL  Total IN: 1850 mL    OUT:    Accordian: 200 mL    Accordian: 160 mL    Indwelling Catheter - Urethral: 3000 mL  Total OUT: 3360 mL    Total NET: -1510 mL          Incision: Dressings clean and dry    Misc:

## 2017-11-23 NOTE — CHART NOTE - NSCHARTNOTEFT_GEN_A_CORE
Day 2 of Anesthesia Pain Management Service    SUBJECTIVE: Patient is not tolerating IV PCA very well. She does not use it often and continues to fall asleep and wake up in pain. Will dc IV PCA, and change to PO analgesics.    Pain Scale Score:	[X] Refer to charted pain scores    THERAPY:    [ ] IV PCA Morphine		[ ] 5 mg/mL	[ ] 1 mg/mL  [X] IV PCA Hydromorphone	[ ] 5 mg/mL	[X] 1 mg/mL  [ ] IV PCA Fentanyl		[ ] 50 micrograms/mL    Demand dose: 0.2 mg     Lockout: 6 minutes   Continuous Rate: 0 mg/hr  4 Hour Limit: 4 mg    MEDICATIONS  (STANDING):  amLODIPine   Tablet 10 milliGRAM(s) Oral at bedtime  dextrose 5%. 1000 milliLiter(s) (50 mL/Hr) IV Continuous <Continuous>  dextrose 50% Injectable 12.5 Gram(s) IV Push once  dextrose 50% Injectable 25 Gram(s) IV Push once  dextrose 50% Injectable 25 Gram(s) IV Push once  docusate sodium 100 milliGRAM(s) Oral three times a day  enalapril 20 milliGRAM(s) Oral at bedtime  enoxaparin Injectable 40 milliGRAM(s) SubCutaneous <User Schedule>  HYDROmorphone PCA (1 mG/mL) 30 milliLiter(s) PCA Continuous PCA Continuous  insulin lispro (HumaLOG) corrective regimen sliding scale   SubCutaneous three times a day before meals  levothyroxine 150 MICROGram(s) Oral daily  metoprolol     tartrate 50 milliGRAM(s) Oral two times a day  pantoprazole    Tablet 40 milliGRAM(s) Oral daily  sodium chloride 0.9% with potassium chloride 20 mEq/L 1000 milliLiter(s) (90 mL/Hr) IV Continuous <Continuous>    MEDICATIONS  (PRN):  acetaminophen   Tablet 650 milliGRAM(s) Oral every 6 hours PRN For Temp greater than 38 C (100.4 F)  acetaminophen   Tablet. 650 milliGRAM(s) Oral every 6 hours PRN Mild Pain (1 - 3)  dextrose Gel 1 Dose(s) Oral once PRN Blood Glucose LESS THAN 70 milliGRAM(s)/deciliter  diazepam  Injectable 2 milliGRAM(s) IV Push every 6 hours PRN spasm  glucagon  Injectable 1 milliGRAM(s) IntraMuscular once PRN Glucose LESS THAN 70 milligrams/deciliter  HYDROmorphone PCA (1 mG/mL) Rescue Clinician Bolus 0.4 milliGRAM(s) IV Push every 15 minutes PRN for Pain Scale GREATER THAN 6  naloxone Injectable 0.1 milliGRAM(s) IV Push every 3 minutes PRN For ANY of the following changes in patient status:  A. RR LESS THAN 10 breaths per minute, B. Oxygen saturation LESS THAN 90%, C. Sedation score of 6  ondansetron Injectable 4 milliGRAM(s) IV Push every 6 hours PRN Nausea  ondansetron Injectable 4 milliGRAM(s) IV Push every 6 hours PRN Nausea and/or Vomiting  senna 2 Tablet(s) Oral at bedtime PRN Constipation      OBJECTIVE:    Sedation Score:	[ X] Alert	[ ] Drowsy 	[ ] Arousable	[ ] Asleep	[ ] Unresponsive    Side Effects:	[X ] None	[ ] Nausea	[ ] Vomiting	[ ] Pruritus  		[ ] Other:    Vital Signs Last 24 Hrs  T(C): 37.7 (23 Nov 2017 05:42), Max: 37.7 (23 Nov 2017 05:42)  T(F): 99.9 (23 Nov 2017 05:42), Max: 99.9 (23 Nov 2017 05:42)  HR: 98 (23 Nov 2017 05:42) (72 - 98)  BP: 160/84 (23 Nov 2017 05:42) (131/81 - 179/91)  BP(mean): 118 (22 Nov 2017 21:30) (102 - 126)  RR: 18 (23 Nov 2017 05:42) (14 - 18)  SpO2: 94% (23 Nov 2017 05:42) (90% - 100%)    ASSESSMENT/ PLAN    Therapy to  be:               [] Continued   [ ] Discontinued   [X] Changed to PRN PO Analgesics    Documentation and Verification of current medications:   [X] Done	[ ] Not done, not eligible    Comments: Will dc IV PCA and change to PO percocet PRN

## 2017-11-23 NOTE — PHYSICAL THERAPY INITIAL EVALUATION ADULT - ADDITIONAL COMMENTS
PTA pt lived in pvt home alone, +flight to enter with HR, lives main level only, was independent ADLs.

## 2017-11-24 DIAGNOSIS — E11.9 TYPE 2 DIABETES MELLITUS WITHOUT COMPLICATIONS: ICD-10-CM

## 2017-11-24 DIAGNOSIS — I10 ESSENTIAL (PRIMARY) HYPERTENSION: ICD-10-CM

## 2017-11-24 DIAGNOSIS — R10.31 RIGHT LOWER QUADRANT PAIN: ICD-10-CM

## 2017-11-24 DIAGNOSIS — E03.9 HYPOTHYROIDISM, UNSPECIFIED: ICD-10-CM

## 2017-11-24 LAB
ALBUMIN SERPL ELPH-MCNC: 3.2 G/DL — LOW (ref 3.3–5)
ALP SERPL-CCNC: 76 U/L — SIGNIFICANT CHANGE UP (ref 40–120)
ALT FLD-CCNC: 13 U/L RC — SIGNIFICANT CHANGE UP (ref 10–45)
ANION GAP SERPL CALC-SCNC: 16 MMOL/L — SIGNIFICANT CHANGE UP (ref 5–17)
AST SERPL-CCNC: 43 U/L — HIGH (ref 10–40)
BILIRUB SERPL-MCNC: 0.6 MG/DL — SIGNIFICANT CHANGE UP (ref 0.2–1.2)
BUN SERPL-MCNC: 7 MG/DL — SIGNIFICANT CHANGE UP (ref 7–23)
CALCIUM SERPL-MCNC: 8.8 MG/DL — SIGNIFICANT CHANGE UP (ref 8.4–10.5)
CHLORIDE SERPL-SCNC: 99 MMOL/L — SIGNIFICANT CHANGE UP (ref 96–108)
CO2 SERPL-SCNC: 20 MMOL/L — LOW (ref 22–31)
CREAT SERPL-MCNC: 0.64 MG/DL — SIGNIFICANT CHANGE UP (ref 0.5–1.3)
GLUCOSE BLDC GLUCOMTR-MCNC: 136 MG/DL — HIGH (ref 70–99)
GLUCOSE BLDC GLUCOMTR-MCNC: 148 MG/DL — HIGH (ref 70–99)
GLUCOSE BLDC GLUCOMTR-MCNC: 166 MG/DL — HIGH (ref 70–99)
GLUCOSE BLDC GLUCOMTR-MCNC: 187 MG/DL — HIGH (ref 70–99)
GLUCOSE SERPL-MCNC: 152 MG/DL — HIGH (ref 70–99)
POTASSIUM SERPL-MCNC: 4.5 MMOL/L — SIGNIFICANT CHANGE UP (ref 3.5–5.3)
POTASSIUM SERPL-SCNC: 4.5 MMOL/L — SIGNIFICANT CHANGE UP (ref 3.5–5.3)
PROT SERPL-MCNC: 6.8 G/DL — SIGNIFICANT CHANGE UP (ref 6–8.3)
SODIUM SERPL-SCNC: 135 MMOL/L — SIGNIFICANT CHANGE UP (ref 135–145)

## 2017-11-24 PROCEDURE — 74177 CT ABD & PELVIS W/CONTRAST: CPT | Mod: 26

## 2017-11-24 PROCEDURE — 99222 1ST HOSP IP/OBS MODERATE 55: CPT

## 2017-11-24 PROCEDURE — 74000: CPT | Mod: 26

## 2017-11-24 PROCEDURE — 99223 1ST HOSP IP/OBS HIGH 75: CPT

## 2017-11-24 RX ORDER — DEXTROSE MONOHYDRATE, SODIUM CHLORIDE, AND POTASSIUM CHLORIDE 50; .745; 4.5 G/1000ML; G/1000ML; G/1000ML
1000 INJECTION, SOLUTION INTRAVENOUS
Qty: 0 | Refills: 0 | Status: DISCONTINUED | OUTPATIENT
Start: 2017-11-24 | End: 2017-11-25

## 2017-11-24 RX ORDER — ACETAMINOPHEN 500 MG
1000 TABLET ORAL ONCE
Qty: 0 | Refills: 0 | Status: COMPLETED | OUTPATIENT
Start: 2017-11-24 | End: 2017-11-24

## 2017-11-24 RX ORDER — HYDROMORPHONE HYDROCHLORIDE 2 MG/ML
0.5 INJECTION INTRAMUSCULAR; INTRAVENOUS; SUBCUTANEOUS
Qty: 0 | Refills: 0 | Status: DISCONTINUED | OUTPATIENT
Start: 2017-11-24 | End: 2017-11-25

## 2017-11-24 RX ORDER — MULTIVIT WITH MIN/MFOLATE/K2 340-15/3 G
300 POWDER (GRAM) ORAL ONCE
Qty: 0 | Refills: 0 | Status: COMPLETED | OUTPATIENT
Start: 2017-11-24 | End: 2017-11-24

## 2017-11-24 RX ADMIN — DEXTROSE MONOHYDRATE, SODIUM CHLORIDE, AND POTASSIUM CHLORIDE 75 MILLILITER(S): 50; .745; 4.5 INJECTION, SOLUTION INTRAVENOUS at 11:43

## 2017-11-24 RX ADMIN — HYDROMORPHONE HYDROCHLORIDE 0.5 MILLIGRAM(S): 2 INJECTION INTRAMUSCULAR; INTRAVENOUS; SUBCUTANEOUS at 15:06

## 2017-11-24 RX ADMIN — Medication 150 MICROGRAM(S): at 06:14

## 2017-11-24 RX ADMIN — OXYCODONE AND ACETAMINOPHEN 2 TABLET(S): 5; 325 TABLET ORAL at 19:31

## 2017-11-24 RX ADMIN — Medication 50 MILLIGRAM(S): at 17:17

## 2017-11-24 RX ADMIN — Medication 100 MILLIGRAM(S): at 06:14

## 2017-11-24 RX ADMIN — ONDANSETRON 4 MILLIGRAM(S): 8 TABLET, FILM COATED ORAL at 15:11

## 2017-11-24 RX ADMIN — ENOXAPARIN SODIUM 40 MILLIGRAM(S): 100 INJECTION SUBCUTANEOUS at 17:17

## 2017-11-24 RX ADMIN — DEXTROSE MONOHYDRATE, SODIUM CHLORIDE, AND POTASSIUM CHLORIDE 75 MILLILITER(S): 50; .745; 4.5 INJECTION, SOLUTION INTRAVENOUS at 21:23

## 2017-11-24 RX ADMIN — PANTOPRAZOLE SODIUM 40 MILLIGRAM(S): 20 TABLET, DELAYED RELEASE ORAL at 06:14

## 2017-11-24 RX ADMIN — POLYETHYLENE GLYCOL 3350 17 GRAM(S): 17 POWDER, FOR SOLUTION ORAL at 06:14

## 2017-11-24 RX ADMIN — Medication 1 ENEMA: at 08:56

## 2017-11-24 RX ADMIN — Medication 300 MILLILITER(S): at 21:23

## 2017-11-24 RX ADMIN — OXYCODONE AND ACETAMINOPHEN 2 TABLET(S): 5; 325 TABLET ORAL at 02:43

## 2017-11-24 RX ADMIN — Medication 20 MILLIGRAM(S): at 21:23

## 2017-11-24 RX ADMIN — OXYCODONE AND ACETAMINOPHEN 2 TABLET(S): 5; 325 TABLET ORAL at 19:03

## 2017-11-24 RX ADMIN — Medication 50 MILLIGRAM(S): at 06:14

## 2017-11-24 RX ADMIN — OXYCODONE AND ACETAMINOPHEN 2 TABLET(S): 5; 325 TABLET ORAL at 02:13

## 2017-11-24 RX ADMIN — HYDROMORPHONE HYDROCHLORIDE 0.5 MILLIGRAM(S): 2 INJECTION INTRAMUSCULAR; INTRAVENOUS; SUBCUTANEOUS at 15:20

## 2017-11-24 RX ADMIN — OXYCODONE AND ACETAMINOPHEN 2 TABLET(S): 5; 325 TABLET ORAL at 23:37

## 2017-11-24 RX ADMIN — ONDANSETRON 4 MILLIGRAM(S): 8 TABLET, FILM COATED ORAL at 06:21

## 2017-11-24 RX ADMIN — Medication 400 MILLIGRAM(S): at 11:43

## 2017-11-24 RX ADMIN — Medication 2: at 08:59

## 2017-11-24 RX ADMIN — Medication 100 MILLIGRAM(S): at 21:23

## 2017-11-24 RX ADMIN — Medication 1000 MILLIGRAM(S): at 12:00

## 2017-11-24 RX ADMIN — AMLODIPINE BESYLATE 10 MILLIGRAM(S): 2.5 TABLET ORAL at 21:23

## 2017-11-24 RX ADMIN — OXYCODONE AND ACETAMINOPHEN 2 TABLET(S): 5; 325 TABLET ORAL at 23:07

## 2017-11-24 NOTE — PROGRESS NOTE ADULT - SUBJECTIVE AND OBJECTIVE BOX
SUBJECTIVE: C/O RLQ Abd pain (Hx chronic abd pain). Vomited x1 last PM, nausea this AM. Decrease PO intake. +Flatus.    OVERNIGHT EVENTS: RLQ Abd pain    Vital Signs Last 24 Hrs  T(C): 37 (24 Nov 2017 05:11), Max: 37.7 (23 Nov 2017 11:45)  T(F): 98.6 (24 Nov 2017 05:11), Max: 99.8 (23 Nov 2017 11:45)  HR: 91 (24 Nov 2017 05:11) (88 - 97)  BP: 155/81 (24 Nov 2017 05:11) (149/79 - 167/99)  BP(mean): --  RR: 18 (24 Nov 2017 05:11) (18 - 18)  SpO2: 98% (24 Nov 2017 05:11) (94% - 100%)  IVF: [ ] IVL [X ] NS 50  DIET: [ ] Regular [X ] CCD [ ] Renal [ ] Puree [ ] Dysphagia [ ] Tube Feeds:   PCA: [ ] YES [X ] NO   DAUGHERTY: [X ] YES [ ] NO [ ] VOID   BM: [ ] YES [X ] NO     DRAINS: [ ] ISIAH (cc/24h) [X ] HMV Rt/LT (180/170cc/24h)    PHYSICAL EXAM:    Constitutional: No Acute Distress     Neurological: Appears Comfortable, but with abd pain. AOx3, Following Commands, Moving all Extremities     Motor exam:          Upper extremity                         Delt     Bicep     Tricep    HG                                                 R         5/5        5/5        5/5       5/5                                               L          5/5        5/5        5/5       5/5          Lower extremity                        HF         KF        KE       DF         PF                                                  R        5/5        5/5        5/5       5/5         5/5                                               L         5/5        5/5       5/5       5/5          5/5                                                 Sensation: [X] intact to light touch  [] decreased:     Pulmonary: Clear to Auscultation, No rales, No rhonchi, No wheezes     Cardiovascular: S1, S2, Regular rate and rhythm     Gastrointestinal: Soft, Non-tender, Non-distended     Extremities: No calf tenderness     Incision: HMV X2 active, CDI. Flat    LABS:                        11.5   8.1   )-----------( 254      ( 23 Nov 2017 06:22 )             34.1    11-23    132<L>  |  90<L>  |  7   ----------------------------<  253<H>  4.1   |  26  |  0.65    Ca    8.8      23 Nov 2017 06:22      IMAGING: < from: Xray Abdomen 1 View PORTABLE -Urgent (11.24.17 @ 05:25) >  Nonobstructive bowel gas pattern    MEDICATIONS  (STANDING):  amLODIPine   Tablet 10 milliGRAM(s) Oral at bedtime  dextrose 5%. 1000 milliLiter(s) (50 mL/Hr) IV Continuous <Continuous>  dextrose 50% Injectable 12.5 Gram(s) IV Push once  dextrose 50% Injectable 25 Gram(s) IV Push once  dextrose 50% Injectable 25 Gram(s) IV Push once  docusate sodium 100 milliGRAM(s) Oral three times a day  enalapril 20 milliGRAM(s) Oral at bedtime  enoxaparin Injectable 40 milliGRAM(s) SubCutaneous <User Schedule>  insulin lispro (HumaLOG) corrective regimen sliding scale   SubCutaneous three times a day before meals  insulin lispro (HumaLOG) corrective regimen sliding scale   SubCutaneous at bedtime  levothyroxine 150 MICROGram(s) Oral daily  metoprolol     tartrate 50 milliGRAM(s) Oral two times a day  pantoprazole    Tablet 40 milliGRAM(s) Oral daily  polyethylene glycol 3350 17 Gram(s) Oral two times a day  sodium chloride 0.9% with potassium chloride 20 mEq/L 1000 milliLiter(s) (50 mL/Hr) IV Continuous <Continuous>    MEDICATIONS  (PRN):  acetaminophen   Tablet 650 milliGRAM(s) Oral every 6 hours PRN For Temp greater than 38 C (100.4 F)  acetaminophen   Tablet. 650 milliGRAM(s) Oral every 6 hours PRN Mild Pain (1 - 3)  dextrose Gel 1 Dose(s) Oral once PRN Blood Glucose LESS THAN 70 milliGRAM(s)/deciliter  diazepam  Injectable 2 milliGRAM(s) IV Push every 6 hours PRN spasm  glucagon  Injectable 1 milliGRAM(s) IntraMuscular once PRN Glucose LESS THAN 70 milligrams/deciliter  ondansetron Injectable 4 milliGRAM(s) IV Push every 6 hours PRN Nausea and/or Vomiting  oxyCODONE    5 mG/acetaminophen 325 mG 1 Tablet(s) Oral every 4 hours PRN Moderate Pain (4 - 6)  oxyCODONE    5 mG/acetaminophen 325 mG 2 Tablet(s) Oral every 4 hours PRN Severe Pain (7 - 10)  senna 2 Tablet(s) Oral at bedtime PRN Constipation SUBJECTIVE: C/O RLQ Abd pain (Hx pancreatitis/chronic abd pain). Vomited x1 last PM, nausea this AM. Decrease PO intake. +Flatus.    OVERNIGHT EVENTS: RLQ Abd pain    Vital Signs Last 24 Hrs  T(C): 37 (24 Nov 2017 05:11), Max: 37.7 (23 Nov 2017 11:45)  T(F): 98.6 (24 Nov 2017 05:11), Max: 99.8 (23 Nov 2017 11:45)  HR: 91 (24 Nov 2017 05:11) (88 - 97)  BP: 155/81 (24 Nov 2017 05:11) (149/79 - 167/99)  BP(mean): --  RR: 18 (24 Nov 2017 05:11) (18 - 18)  SpO2: 98% (24 Nov 2017 05:11) (94% - 100%)  IVF: [ ] IVL [X ] NS 50  DIET: [ ] Regular [X ] CCD [ ] Renal [ ] Puree [ ] Dysphagia [ ] Tube Feeds:   PCA: [ ] YES [X ] NO   DAUGHERTY: [X ] YES [ ] NO [ ] VOID   BM: [ ] YES [X ] NO     DRAINS: [ ] ISIAH (cc/24h) [X ] HMV Rt/LT (180/170cc/24h)    PHYSICAL EXAM:    Constitutional: No Acute Distress     Neurological: Appears Comfortable, but with abd pain. AOx3, Following Commands, Moving all Extremities     Motor exam:          Upper extremity                         Delt     Bicep     Tricep    HG                                                 R         5/5        5/5        5/5       5/5                                               L          5/5        5/5        5/5       5/5          Lower extremity                        HF         KF        KE       DF         PF                                                  R        5/5        5/5        5/5       5/5         5/5                                               L         5/5        5/5       5/5       5/5          5/5                                                 Sensation: [X] intact to light touch  [] decreased:     Pulmonary: Clear to Auscultation, No rales, No rhonchi, No wheezes     Cardiovascular: S1, S2, Regular rate and rhythm     Gastrointestinal: Soft, Non-tender, Non-distended     Extremities: No calf tenderness     Incision: HMV X2 active, CDI. Flat    LABS:                        11.5   8.1   )-----------( 254      ( 23 Nov 2017 06:22 )             34.1    11-23    132<L>  |  90<L>  |  7   ----------------------------<  253<H>  4.1   |  26  |  0.65    Ca    8.8      23 Nov 2017 06:22      IMAGING: < from: Xray Abdomen 1 View PORTABLE -Urgent (11.24.17 @ 05:25) >  Nonobstructive bowel gas pattern    MEDICATIONS  (STANDING):  amLODIPine   Tablet 10 milliGRAM(s) Oral at bedtime  dextrose 5%. 1000 milliLiter(s) (50 mL/Hr) IV Continuous <Continuous>  dextrose 50% Injectable 12.5 Gram(s) IV Push once  dextrose 50% Injectable 25 Gram(s) IV Push once  dextrose 50% Injectable 25 Gram(s) IV Push once  docusate sodium 100 milliGRAM(s) Oral three times a day  enalapril 20 milliGRAM(s) Oral at bedtime  enoxaparin Injectable 40 milliGRAM(s) SubCutaneous <User Schedule>  insulin lispro (HumaLOG) corrective regimen sliding scale   SubCutaneous three times a day before meals  insulin lispro (HumaLOG) corrective regimen sliding scale   SubCutaneous at bedtime  levothyroxine 150 MICROGram(s) Oral daily  metoprolol     tartrate 50 milliGRAM(s) Oral two times a day  pantoprazole    Tablet 40 milliGRAM(s) Oral daily  polyethylene glycol 3350 17 Gram(s) Oral two times a day  sodium chloride 0.9% with potassium chloride 20 mEq/L 1000 milliLiter(s) (50 mL/Hr) IV Continuous <Continuous>    MEDICATIONS  (PRN):  acetaminophen   Tablet 650 milliGRAM(s) Oral every 6 hours PRN For Temp greater than 38 C (100.4 F)  acetaminophen   Tablet. 650 milliGRAM(s) Oral every 6 hours PRN Mild Pain (1 - 3)  dextrose Gel 1 Dose(s) Oral once PRN Blood Glucose LESS THAN 70 milliGRAM(s)/deciliter  diazepam  Injectable 2 milliGRAM(s) IV Push every 6 hours PRN spasm  glucagon  Injectable 1 milliGRAM(s) IntraMuscular once PRN Glucose LESS THAN 70 milligrams/deciliter  ondansetron Injectable 4 milliGRAM(s) IV Push every 6 hours PRN Nausea and/or Vomiting  oxyCODONE    5 mG/acetaminophen 325 mG 1 Tablet(s) Oral every 4 hours PRN Moderate Pain (4 - 6)  oxyCODONE    5 mG/acetaminophen 325 mG 2 Tablet(s) Oral every 4 hours PRN Severe Pain (7 - 10)  senna 2 Tablet(s) Oral at bedtime PRN Constipation

## 2017-11-24 NOTE — CONSULT NOTE ADULT - ASSESSMENT
71 year old female presents for Stage I L1-L4 Lumbar Lateral Fusion. Pt. reports a history of progressively worsening lower back pain radiating down her left lower extremity X 2 years. POD #1, S/P L1-L5 XLIF, L5-S1 PLIF, L1-S1  screw and john fixation. No noted complications during procedure. Now consulted for abdominal pain.    1)Chronic abdominal pain (IBS vs.     -f/u CTAP  -pain management consult 71 year old female presents for Stage I L1-L4 Lumbar Lateral Fusion. Pt. reports a history of progressively worsening lower back pain radiating down her left lower extremity X 2 years. POD #1, S/P L1-L5 XLIF, L5-S1 PLIF, L1-S1  screw and john fixation. No noted complications during procedure. Now consulted for abdominal pain.    1)Chronic abdominal pain most likely functional abdominal pain/ IBS    -f/u CTAP  -pain management consult  -can start amitriptyline 25mg hs now  -will need f/u with PMD and pain management for titration of TCA as outpatient   -please call back with additional questions or concerns    We will sign off 71 year old female presents for Stage I L1-L4 Lumbar Lateral Fusion. Pt. reports a history of progressively worsening lower back pain radiating down her left lower extremity X 2 years. POD #1, S/P L1-L5 XLIF, L5-S1 PLIF, L1-S1  screw and john fixation. No noted complications during procedure. Now consulted for abdominal pain.    1)Chronic abdominal pain with no change from her baseline disease     -f/u CTAP  - consider pain management consult given chronic pain issues  -will need f/u with PMD and pain management for titration of TCA as outpatient   -please call back with additional questions or concerns    We will sign off

## 2017-11-24 NOTE — CONSULT NOTE ADULT - PROBLEM SELECTOR RECOMMENDATION 9
patient with acute on chronic abdominal pain  -unable to get best history as patietn states chronic pain, unchagned, however states was able to perform ADL, currently writhign in pain in bed.   -patient with cholecystecotmy, appendectomy and hysterectomy (per patient total with ovaries removed as well), with appendix out, not appendicitis.  -per patient no workup outpatient ,but saw GI doctor, had colonoscopy, would doubt no workup performed for chronic abdominal pain with patient just being prescribed opioid therapy.  Called PMD office, unable to get in touch.  -XRay reviewed, per my eye no calcifications in pancreas for chronic pancreatitis  -DDX include ileus vs. obstruction (at risk for adhesions given multiple prior surgery), given pain relatively out of proportion of exam cannot r/o mesenteric ischemia.  -AG=16, stable.  -would forego ordered US and obtain CT abdomen/pelvis with IV contrast to asses for obstruction and signs of ischemia, though susipcion not high enough ot get CTA.   -GI called by neurosurgery, await their input as well.  -increase bowel regimen since no BM since last thursday.  -would make NPO until CT scan.

## 2017-11-24 NOTE — PROVIDER CONTACT NOTE (OTHER) - ASSESSMENT
Pt c/o RUQ pain, moaning and groining, pt unable to recall date of last BM. Hypoactive bowel sounds noted. Abd rounded, soft to touch. Pt stated she has experienced pain preop. No vomiting. Periods of nausea reported at times

## 2017-11-24 NOTE — CONSULT NOTE ADULT - SUBJECTIVE AND OBJECTIVE BOX
Chief Complaint:  Patient is a 71y old  Female who presents with a chief complaint of "I am having back surgery" (22 Nov 2017 05:41)      HPI:71 year old female presents for Stage I L1-L4 Lumbar Lateral Fusion. Pt. reports a history of progressively worsening lower back pain radiating down her left lower extremity X 2 years. POD #1, S/P L1-L5 XLIF, L5-S1 PLIF, L1-S1  screw and john fixation. No noted complications during procedure. Now consulted for abdominal pain.    Allergies:  No Known Allergies      Home Medications:    Hospital Medications:  acetaminophen   Tablet 650 milliGRAM(s) Oral every 6 hours PRN  acetaminophen   Tablet. 650 milliGRAM(s) Oral every 6 hours PRN  amLODIPine   Tablet 10 milliGRAM(s) Oral at bedtime  dextrose 5%. 1000 milliLiter(s) IV Continuous <Continuous>  dextrose 50% Injectable 12.5 Gram(s) IV Push once  dextrose 50% Injectable 25 Gram(s) IV Push once  dextrose 50% Injectable 25 Gram(s) IV Push once  dextrose Gel 1 Dose(s) Oral once PRN  diazepam  Injectable 2 milliGRAM(s) IV Push every 6 hours PRN  docusate sodium 100 milliGRAM(s) Oral three times a day  enalapril 20 milliGRAM(s) Oral at bedtime  enoxaparin Injectable 40 milliGRAM(s) SubCutaneous <User Schedule>  glucagon  Injectable 1 milliGRAM(s) IntraMuscular once PRN  insulin lispro (HumaLOG) corrective regimen sliding scale   SubCutaneous three times a day before meals  insulin lispro (HumaLOG) corrective regimen sliding scale   SubCutaneous at bedtime  levothyroxine 150 MICROGram(s) Oral daily  metoprolol     tartrate 50 milliGRAM(s) Oral two times a day  ondansetron Injectable 4 milliGRAM(s) IV Push every 6 hours PRN  oxyCODONE    5 mG/acetaminophen 325 mG 1 Tablet(s) Oral every 4 hours PRN  oxyCODONE    5 mG/acetaminophen 325 mG 2 Tablet(s) Oral every 4 hours PRN  pantoprazole    Tablet 40 milliGRAM(s) Oral daily  polyethylene glycol 3350 17 Gram(s) Oral two times a day  senna 2 Tablet(s) Oral at bedtime PRN  sodium chloride 0.9% with potassium chloride 20 mEq/L 1000 milliLiter(s) IV Continuous <Continuous>      PMHX/PSHX:  MRSA (methicillin resistant Staphylococcus aureus) infection  Spinal stenosis  Osteoarthritis  Diverticulosis  Pancreatitis, chronic  Hypothyroid  Hypertension  Diabetes  H/O right inguinal hernia repair  H/O total knee replacement, bilateral  FH: cholecystectomy  H/O: hysterectomy      Family history:  No pertinent family history in first degree relatives  No pertinent family history in first degree relatives      Social History:     ROS:     General:  No wt loss, fevers, chills, night sweats, fatigue,   Eyes:  Good vision, no reported pain  ENT:  No sore throat, pain, runny nose, dysphagia  CV:  No pain, palpitations, hypo/hypertension  Resp:  No dyspnea, cough, tachypnea, wheezing  GI:  See HPI  :  No pain, bleeding, incontinence, nocturia  Muscle:  No pain, weakness  Neuro:  No weakness, tingling, memory problems  Psych:  No fatigue, insomnia, mood problems, depression  Endocrine:  No polyuria, polydipsia, cold/heat intolerance  Heme:  No petechiae, ecchymosis, easy bruisability  Skin:  No rash, edema      PHYSICAL EXAM:     GENERAL:  Appears stated age, well-groomed, well-nourished, no distress  HEENT:  NC/AT,  conjunctivae clear and pink,  no JVD  CHEST:  Full & symmetric excursion, no increased effort, breath sounds clear  HEART:  Regular rhythm, S1, S2, no murmur/rub/S3/S4, no abdominal bruit, no edema  ABDOMEN:  Soft, non-tender, non-distended, normoactive bowel sounds,  no masses ,  EXTREMITIES:  no cyanosis,clubbing or edema  SKIN:  No rash/erythema/ecchymoses/petechiae/wounds/abscess/warm/dry  NEURO:  Alert, oriented    Vital Signs:  Vital Signs Last 24 Hrs  T(C): 36.7 (24 Nov 2017 09:31), Max: 37.4 (23 Nov 2017 13:56)  T(F): 98 (24 Nov 2017 09:31), Max: 99.3 (23 Nov 2017 13:56)  HR: 82 (24 Nov 2017 09:31) (82 - 95)  BP: 164/72 (24 Nov 2017 09:31) (149/79 - 167/99)  BP(mean): --  RR: 18 (24 Nov 2017 09:31) (18 - 18)  SpO2: 100% (24 Nov 2017 09:31) (94% - 100%)  Daily     Daily     LABS:                        11.5   8.1   )-----------( 254      ( 23 Nov 2017 06:22 )             34.1     11-24    135  |  99  |  7   ----------------------------<  152<H>  4.5   |  20<L>  |  0.64    Ca    8.8      24 Nov 2017 10:05    TPro  6.8  /  Alb  3.2<L>  /  TBili  0.6  /  DBili  x   /  AST  43<H>  /  ALT  13  /  AlkPhos  76  11-24    LIVER FUNCTIONS - ( 24 Nov 2017 10:05 )  Alb: 3.2 g/dL / Pro: 6.8 g/dL / ALK PHOS: 76 U/L / ALT: 13 U/L RC / AST: 43 U/L / GGT: x                   Imaging: Chief Complaint:  Patient is a 71y old  Female who presents with a chief complaint of "I am having back surgery" (22 Nov 2017 05:41)      HPI:71 year old female presents for Stage I L1-L4 Lumbar Lateral Fusion. Pt. reports a history of progressively worsening lower back pain radiating down her left lower extremity X 2 years. POD #1, S/P L1-L5 XLIF, L5-S1 PLIF, L1-S1  screw and john fixation. No noted complications during procedure. Now consulted for abdominal pain.    As per patient, she has had RLQ abdominal pain for many years. Constant pain only relieved with oxycodone. Deneis any association with food or defecation. Crampy sensation. Sometimes associated with nausea and vomitting when pain is severe. Denies any fevers, chills, melena, BRBpR, diarrhea, constipation. Tolerating meals ok. Last colonoscopy was 1 year ago which she reports had polyps which were removed. No prior EGD. Today she feels that her pain is no different than her chronic abdominal pain. CTAP ordered.    Allergies:  No Known Allergies      Home Medications:    Hospital Medications:  acetaminophen   Tablet 650 milliGRAM(s) Oral every 6 hours PRN  acetaminophen   Tablet. 650 milliGRAM(s) Oral every 6 hours PRN  amLODIPine   Tablet 10 milliGRAM(s) Oral at bedtime  dextrose 5%. 1000 milliLiter(s) IV Continuous <Continuous>  dextrose 50% Injectable 12.5 Gram(s) IV Push once  dextrose 50% Injectable 25 Gram(s) IV Push once  dextrose 50% Injectable 25 Gram(s) IV Push once  dextrose Gel 1 Dose(s) Oral once PRN  diazepam  Injectable 2 milliGRAM(s) IV Push every 6 hours PRN  docusate sodium 100 milliGRAM(s) Oral three times a day  enalapril 20 milliGRAM(s) Oral at bedtime  enoxaparin Injectable 40 milliGRAM(s) SubCutaneous <User Schedule>  glucagon  Injectable 1 milliGRAM(s) IntraMuscular once PRN  insulin lispro (HumaLOG) corrective regimen sliding scale   SubCutaneous three times a day before meals  insulin lispro (HumaLOG) corrective regimen sliding scale   SubCutaneous at bedtime  levothyroxine 150 MICROGram(s) Oral daily  metoprolol     tartrate 50 milliGRAM(s) Oral two times a day  ondansetron Injectable 4 milliGRAM(s) IV Push every 6 hours PRN  oxyCODONE    5 mG/acetaminophen 325 mG 1 Tablet(s) Oral every 4 hours PRN  oxyCODONE    5 mG/acetaminophen 325 mG 2 Tablet(s) Oral every 4 hours PRN  pantoprazole    Tablet 40 milliGRAM(s) Oral daily  polyethylene glycol 3350 17 Gram(s) Oral two times a day  senna 2 Tablet(s) Oral at bedtime PRN  sodium chloride 0.9% with potassium chloride 20 mEq/L 1000 milliLiter(s) IV Continuous <Continuous>      PMHX/PSHX:  MRSA (methicillin resistant Staphylococcus aureus) infection  Spinal stenosis  Osteoarthritis  Diverticulosis  Pancreatitis, chronic  Hypothyroid  Hypertension  Diabetes  H/O right inguinal hernia repair  H/O total knee replacement, bilateral  FH: cholecystectomy  H/O: hysterectomy      Family history:  No pertinent family history in first degree relatives  No pertinent family history in first degree relatives      Social History:     ROS:     General:  No wt loss, fevers, chills, night sweats, fatigue,   Eyes:  Good vision, no reported pain  ENT:  No sore throat, pain, runny nose, dysphagia  CV:  No pain, palpitations, hypo/hypertension  Resp:  No dyspnea, cough, tachypnea, wheezing  GI:  See HPI  :  No pain, bleeding, incontinence, nocturia  Muscle:  No pain, weakness  Neuro:  No weakness, tingling, memory problems  Psych:  No fatigue, insomnia, mood problems, depression  Endocrine:  No polyuria, polydipsia, cold/heat intolerance  Heme:  No petechiae, ecchymosis, easy bruisability  Skin:  No rash, edema      PHYSICAL EXAM:     GENERAL:  NAD  HEENT:  NC/AT,  conjunctivae clear and pink,  no JVD  CHEST:  no increased WOB  HEART:  Regular rhythm, S1, S2, no murmur/rub/S3/S4, no abdominal bruit, no edema  ABDOMEN:  Soft, mildly tender in RLQ, non-distended, normoactive bowel sounds,  no masses ,  EXTREMITIES:  no cyanosis,clubbing or edema  SKIN:  No rash/erythema/ecchymoses/petechiae/wounds/abscess/warm/dry  NEURO:  Alert, oriented    Vital Signs:  Vital Signs Last 24 Hrs  T(C): 36.7 (24 Nov 2017 09:31), Max: 37.4 (23 Nov 2017 13:56)  T(F): 98 (24 Nov 2017 09:31), Max: 99.3 (23 Nov 2017 13:56)  HR: 82 (24 Nov 2017 09:31) (82 - 95)  BP: 164/72 (24 Nov 2017 09:31) (149/79 - 167/99)  BP(mean): --  RR: 18 (24 Nov 2017 09:31) (18 - 18)  SpO2: 100% (24 Nov 2017 09:31) (94% - 100%)  Daily     Daily     LABS:                        11.5   8.1   )-----------( 254      ( 23 Nov 2017 06:22 )             34.1     11-24    135  |  99  |  7   ----------------------------<  152<H>  4.5   |  20<L>  |  0.64    Ca    8.8      24 Nov 2017 10:05    TPro  6.8  /  Alb  3.2<L>  /  TBili  0.6  /  DBili  x   /  AST  43<H>  /  ALT  13  /  AlkPhos  76  11-24    LIVER FUNCTIONS - ( 24 Nov 2017 10:05 )  Alb: 3.2 g/dL / Pro: 6.8 g/dL / ALK PHOS: 76 U/L / ALT: 13 U/L RC / AST: 43 U/L / GGT: x                   Imaging:  < from: Xray Abdomen 1 View PORTABLE -Urgent (11.24.17 @ 05:25) >    INTERPRETATION:  Clinical information: Abdominal pain. Evaluate for ileus.    Single supine view the abdomen was obtained. Comparison is made to CT   abdomen 9/10/2014.    Patient is status post spinal surgery with lower midline skin staples and   extensive orthopedic hardware noted at the level of the lumbar spine.   There is a nonobstructive bowel gas pattern.    Impression:    Nonobstructive bowel gas pattern    < end of copied text >

## 2017-11-24 NOTE — CONSULT NOTE ADULT - ASSESSMENT
70 yo female PMHX DM type 2, HTN ,.Hypothyroidism ,admitted for Laminectomy consulted for abdominal pain

## 2017-11-24 NOTE — CONSULT NOTE ADULT - SUBJECTIVE AND OBJECTIVE BOX
HPI:  71 year old female presents for Stage I L1-L4 Lumbar Lateral Fusion. Pt. reports a history of progressively worsening lower back pain radiating down her left lower extremity X 2 years. Patient s/p lateral fusion on 11/22, no complications of surgery.  Sicen then patient with persistent abdominal pain, medicine consulted for further evaluation.  Patient states has been having abdominal pain for "years", chronic and persistent in nature, located mostly in RLQ.  Patient states PMD aware, does not know why she is having pain and states she is treated with oxycodone 10mg q8 hours.  States did not have imaging to evaluate pain. states saw gastroenterologist  in remote past, had colonoscopy approximately 4 years ago ,which per patient was normal.  PMD's office is closed for holiday at this time.  Patient states her abdominal pain is no worse than normal, however patient states was able to perform ADL's as outpatient, currently writhing in pain.  States also having left leg pain, new since surgery, but no weakness.  No fever, chill, cghest pain, SOB.  2 episodes of bilious emesis since OR.  No recent travel or sick contacts.       PAST MEDICAL & SURGICAL HISTORY:  MRSA (methicillin resistant Staphylococcus aureus) infection: 1999, infected knee replacement, required multiple revisions and long term IV antibiotics via central line  Spinal stenosis  Osteoarthritis  Diverticulosis  Pancreatitis, chronic: last episode &gt; 10 years ago  Hypothyroid  Hypertension  Diabetes  H/O right inguinal hernia repair  H/O total knee replacement, bilateral  FH: cholecystectomy  H/O: hysterectomy      Review of Systems:   All 13 point ROS negative except for that noted in above HPI    Allergies    No Known Allergies    Intolerances        Social History: denies smoker drinker.  Jehovah Witness    FAMILY HISTORY:  No pertinent family history in first degree relatives    HOME MEDS: ASA 81mg   Enalapril 20mg  Lopressor 50mg BID  Amlodipine 10mg daily  prilosec  synthroid 150mcg daily  NPH 75/25 10 units QAH, 5 units QPM    MEDICATIONS  (STANDING):  amLODIPine   Tablet 10 milliGRAM(s) Oral at bedtime  dextrose 5%. 1000 milliLiter(s) (50 mL/Hr) IV Continuous <Continuous>  dextrose 50% Injectable 12.5 Gram(s) IV Push once  dextrose 50% Injectable 25 Gram(s) IV Push once  dextrose 50% Injectable 25 Gram(s) IV Push once  docusate sodium 100 milliGRAM(s) Oral three times a day  enalapril 20 milliGRAM(s) Oral at bedtime  enoxaparin Injectable 40 milliGRAM(s) SubCutaneous <User Schedule>  insulin lispro (HumaLOG) corrective regimen sliding scale   SubCutaneous three times a day before meals  insulin lispro (HumaLOG) corrective regimen sliding scale   SubCutaneous at bedtime  levothyroxine 150 MICROGram(s) Oral daily  metoprolol     tartrate 50 milliGRAM(s) Oral two times a day  pantoprazole    Tablet 40 milliGRAM(s) Oral daily  polyethylene glycol 3350 17 Gram(s) Oral two times a day  sodium chloride 0.9% with potassium chloride 20 mEq/L 1000 milliLiter(s) (75 mL/Hr) IV Continuous <Continuous>    MEDICATIONS  (PRN):  acetaminophen   Tablet 650 milliGRAM(s) Oral every 6 hours PRN For Temp greater than 38 C (100.4 F)  acetaminophen   Tablet. 650 milliGRAM(s) Oral every 6 hours PRN Mild Pain (1 - 3)  dextrose Gel 1 Dose(s) Oral once PRN Blood Glucose LESS THAN 70 milliGRAM(s)/deciliter  diazepam  Injectable 2 milliGRAM(s) IV Push every 6 hours PRN spasm  glucagon  Injectable 1 milliGRAM(s) IntraMuscular once PRN Glucose LESS THAN 70 milligrams/deciliter  ondansetron Injectable 4 milliGRAM(s) IV Push every 6 hours PRN Nausea and/or Vomiting  oxyCODONE    5 mG/acetaminophen 325 mG 1 Tablet(s) Oral every 4 hours PRN Moderate Pain (4 - 6)  oxyCODONE    5 mG/acetaminophen 325 mG 2 Tablet(s) Oral every 4 hours PRN Severe Pain (7 - 10)  senna 2 Tablet(s) Oral at bedtime PRN Constipation      T(C): 36.7 (11-24-17 @ 09:31), Max: 37.4 (11-23-17 @ 13:56)  HR: 82 (11-24-17 @ 09:31) (82 - 95)  BP: 164/72 (11-24-17 @ 09:31) (149/79 - 167/99)  RR: 18 (11-24-17 @ 09:31) (18 - 18)  SpO2: 100% (11-24-17 @ 09:31) (94% - 100%)  CAPILLARY BLOOD GLUCOSE      POCT Blood Glucose.: 166 mg/dL (24 Nov 2017 07:42)  POCT Blood Glucose.: 166 mg/dL (23 Nov 2017 21:49)  POCT Blood Glucose.: 160 mg/dL (23 Nov 2017 17:36)  POCT Blood Glucose.: 176 mg/dL (23 Nov 2017 12:43)    I&O's Summary    23 Nov 2017 07:01  -  24 Nov 2017 07:00  --------------------------------------------------------  IN: 2730 mL / OUT: 5200 mL / NET: -2470 mL    24 Nov 2017 07:01  -  24 Nov 2017 11:46  --------------------------------------------------------  IN: 0 mL / OUT: 480 mL / NET: -480 mL        PHYSICAL EXAM:  GENERAL: distress 2/2 pain  HEAD:  Atraumatic, Normocephalic  EYES: EOMI, PERRLA, conjunctiva and sclera clear  NECK: Supple, No JVD  CHEST/LUNG: Clear to auscultation bilaterally; No wheeze  HEART: Regular rate and rhythm; No murmurs, rubs, or gallops  ABDOMEN: Soft, Nontender, Nondistended; Bowel sounds present  EXTREMITIES:  2+ Peripheral Pulses, No clubbing, cyanosis, or edema  PSYCH: AAOx3  NEUROLOGY: non-focal  SKIN:vertical scars anterior abdomen, no erythema. Drain from fresh incision site.     LABS:                        11.5   8.1   )-----------( 254      ( 23 Nov 2017 06:22 )             34.1     11-24    135  |  99  |  7   ----------------------------<  152<H>  4.5   |  20<L>  |  0.64    Ca    8.8      24 Nov 2017 10:05    TPro  6.8  /  Alb  3.2<L>  /  TBili  0.6  /  DBili  x   /  AST  43<H>  /  ALT  13  /  AlkPhos  76  11-24              RADIOLOGY & ADDITIONAL TESTS:    Imaging Personally Reviewed:    Consultant(s) Notes Reviewed:      Care Discussed with Consultants/Other Providers: HPI:  71 year old female PMHX DM type 2, HTN, Hypothyroidism  presents for Stage I L1-L4 Lumbar Lateral Fusion. Pt. reports a history of progressively worsening lower back pain radiating down her left lower extremity X 2 years. Patient s/p lateral fusion on 11/22, no complications of surgery.  Sicen then patient with persistent abdominal pain, medicine consulted for further evaluation.  Patient states has been having abdominal pain for "years", chronic and persistent in nature, located mostly in RLQ.  Patient states PMD aware, does not know why she is having pain and states she is treated with oxycodone 10mg q8 hours.  States did not have imaging to evaluate pain. states saw gastroenterologist  in remote past, had colonoscopy approximately 4 years ago ,which per patient was normal.  PMD's office is closed for holiday at this time.  Patient states her abdominal pain is no worse than normal, however patient states was able to perform ADL's as outpatient, currently writhing in pain.  States also having left leg pain, new since surgery, but no weakness.  No fever, chill, cghest pain, SOB.  2 episodes of bilious emesis since OR.  No recent travel or sick contacts.       PAST MEDICAL & SURGICAL HISTORY:  MRSA (methicillin resistant Staphylococcus aureus) infection: 1999, infected knee replacement, required multiple revisions and long term IV antibiotics via central line  Spinal stenosis  Osteoarthritis  Diverticulosis  Pancreatitis, chronic: last episode &gt; 10 years ago  Hypothyroid  Hypertension  Diabetes  H/O right inguinal hernia repair  H/O total knee replacement, bilateral  FH: cholecystectomy  H/O: hysterectomy      Review of Systems:   All 13 point ROS negative except for that noted in above HPI    Allergies    No Known Allergies    Intolerances        Social History: denies smoker drinker.  Jehovah Witness    FAMILY HISTORY:  No pertinent family history in first degree relatives    HOME MEDS: ASA 81mg   Enalapril 20mg  Lopressor 50mg BID  Amlodipine 10mg daily  prilosec  synthroid 150mcg daily  NPH 75/25 10 units QAH, 5 units QPM    MEDICATIONS  (STANDING):  amLODIPine   Tablet 10 milliGRAM(s) Oral at bedtime  dextrose 5%. 1000 milliLiter(s) (50 mL/Hr) IV Continuous <Continuous>  dextrose 50% Injectable 12.5 Gram(s) IV Push once  dextrose 50% Injectable 25 Gram(s) IV Push once  dextrose 50% Injectable 25 Gram(s) IV Push once  docusate sodium 100 milliGRAM(s) Oral three times a day  enalapril 20 milliGRAM(s) Oral at bedtime  enoxaparin Injectable 40 milliGRAM(s) SubCutaneous <User Schedule>  insulin lispro (HumaLOG) corrective regimen sliding scale   SubCutaneous three times a day before meals  insulin lispro (HumaLOG) corrective regimen sliding scale   SubCutaneous at bedtime  levothyroxine 150 MICROGram(s) Oral daily  metoprolol     tartrate 50 milliGRAM(s) Oral two times a day  pantoprazole    Tablet 40 milliGRAM(s) Oral daily  polyethylene glycol 3350 17 Gram(s) Oral two times a day  sodium chloride 0.9% with potassium chloride 20 mEq/L 1000 milliLiter(s) (75 mL/Hr) IV Continuous <Continuous>    MEDICATIONS  (PRN):  acetaminophen   Tablet 650 milliGRAM(s) Oral every 6 hours PRN For Temp greater than 38 C (100.4 F)  acetaminophen   Tablet. 650 milliGRAM(s) Oral every 6 hours PRN Mild Pain (1 - 3)  dextrose Gel 1 Dose(s) Oral once PRN Blood Glucose LESS THAN 70 milliGRAM(s)/deciliter  diazepam  Injectable 2 milliGRAM(s) IV Push every 6 hours PRN spasm  glucagon  Injectable 1 milliGRAM(s) IntraMuscular once PRN Glucose LESS THAN 70 milligrams/deciliter  ondansetron Injectable 4 milliGRAM(s) IV Push every 6 hours PRN Nausea and/or Vomiting  oxyCODONE    5 mG/acetaminophen 325 mG 1 Tablet(s) Oral every 4 hours PRN Moderate Pain (4 - 6)  oxyCODONE    5 mG/acetaminophen 325 mG 2 Tablet(s) Oral every 4 hours PRN Severe Pain (7 - 10)  senna 2 Tablet(s) Oral at bedtime PRN Constipation      T(C): 36.7 (11-24-17 @ 09:31), Max: 37.4 (11-23-17 @ 13:56)  HR: 82 (11-24-17 @ 09:31) (82 - 95)  BP: 164/72 (11-24-17 @ 09:31) (149/79 - 167/99)  RR: 18 (11-24-17 @ 09:31) (18 - 18)  SpO2: 100% (11-24-17 @ 09:31) (94% - 100%)  CAPILLARY BLOOD GLUCOSE      POCT Blood Glucose.: 166 mg/dL (24 Nov 2017 07:42)  POCT Blood Glucose.: 166 mg/dL (23 Nov 2017 21:49)  POCT Blood Glucose.: 160 mg/dL (23 Nov 2017 17:36)  POCT Blood Glucose.: 176 mg/dL (23 Nov 2017 12:43)    I&O's Summary    23 Nov 2017 07:01  -  24 Nov 2017 07:00  --------------------------------------------------------  IN: 2730 mL / OUT: 5200 mL / NET: -2470 mL    24 Nov 2017 07:01  -  24 Nov 2017 11:46  --------------------------------------------------------  IN: 0 mL / OUT: 480 mL / NET: -480 mL        PHYSICAL EXAM:  GENERAL: distress 2/2 pain  HEAD:  Atraumatic, Normocephalic  EYES: EOMI, PERRLA, conjunctiva and sclera clear  NECK: Supple, No JVD  CHEST/LUNG: Clear to auscultation bilaterally; No wheeze  HEART: Regular rate and rhythm; No murmurs, rubs, or gallops  ABDOMEN: obese.  Soft.  diffuse TTP, worse in RLQ.  No overt masses felt.  Negative zimmerman sign .  EXTREMITIES:  2+ Peripheral Pulses, No clubbing, cyanosis, or edema  PSYCH: AAOx3  NEUROLOGY: non-focal  SKIN:vertical scars anterior abdomen, no erythema. Drain from fresh incision site.  ISIAH drain from back    LABS:                        11.5   8.1   )-----------( 254      ( 23 Nov 2017 06:22 )             34.1     11-24    135  |  99  |  7   ----------------------------<  152<H>  4.5   |  20<L>  |  0.64    Ca    8.8      24 Nov 2017 10:05    TPro  6.8  /  Alb  3.2<L>  /  TBili  0.6  /  DBili  x   /  AST  43<H>  /  ALT  13  /  AlkPhos  76  11-24              RADIOLOGY & ADDITIONAL TESTS:    Imaging Personally Reviewed:  < from: Xray Abdomen 1 View PORTABLE -Urgent (11.24.17 @ 05:25) >  Nonobstructive bowel gas pattern    < end of copied text >      Consultant(s) Notes Reviewed:      Care Discussed with Consultants/Other Providers:  Neurosurgery

## 2017-11-24 NOTE — CONSULT NOTE ADULT - PROBLEM SELECTOR RECOMMENDATION 3
patient currently on HISS, within goal, but not eating.  -patient on 75:25 at home, would add low dose lantus currently of 3 untis, continue HISS.

## 2017-11-24 NOTE — PROGRESS NOTE ADULT - ASSESSMENT
71 year old female presents for Stage I L1-L4 Lumbar Lateral Fusion. Pt. reports a history of progressively worsening lower back pain radiating down her left lower extremity X 2 years. (15 Nov 2017 08:45)    PROCEDURE: Adm 11/22Spinal fusion     POD#    PLAN:  Neuro:   Respiratory: Patient instructed to use incentive spirometer [ ] YES [ ] NO  CV:  Endocrine:   Heme/Onc:               DVT ppx: [ ] SQL [ ] SQH and Venodynes [ ] Left [ ] Right [ ] Bilateral  Renal:   ID:   GI:   Discharge planning: 71 year old female presents for Stage I L1-L4 Lumbar Lateral Fusion. Pt. reports a history of progressively worsening lower back pain radiating down her left lower extremity X 2 years. (15 Nov 2017 08:45)    PROCEDURE: Adm 11/22 Stage 1 L1-5 LLIF, L5-S1 Plif, L1-S1 Fusion   POD#2    PLAN:  Neuro: Cont HMV x 2. Enema this AM-no results yet.  NPO.  Increase IVF. Abd US at 11am today.  House -6010 and Hosp Med Cons called-FU.  ?GI if need CT Abd/Pelvis w/contrast-should have low threshold for performing CT.  Limit narcotics, Inc activity/OOB.    Respiratory: Patient instructed to use incentive spirometer [ X] YES [ ] NO                DVT ppx: [X ] SQL [ ] SQH and Venodynes [ ] Left [ ] Right [ X] Bilateral    Discharge planning: [facility] once medically cleared and drains DC.

## 2017-11-24 NOTE — CONSULT NOTE ADULT - ATTENDING COMMENTS
72 yo F presenting for elective lumbar surgery, now POD #1 with abdominal discomfort.  Patient has been having similar RLQ abdominal pain x 4 years and is chronically managed with opioids by primary GI team.  Currently - there is no current change in her chronic abdominal pain and there are no other GI sx - she denies n/v/early satiety/change in bowels/incontinence.  We had extended discussion today about long term management of chronic abdominal pain, and how opioids are not the ideal medication.  Ultimately she will consider non-opioid pain relief with SSRI/SNRI or amitriptyline as outpatient, but not at this time.  No endoscopic evaluation warranted at this time.    Impression:  1) Chronic abdominal pain managed on oxycodone by her primary doctor  2) POD #1    Plan:  1) No role for GI testing currently as this is her chronic discomfort which is managed by pain services  2) Can consider agents like amitriptyline for non-opioid pain control, but would do so in conjunction with her outpatient team given chronicity  3) GI will s/o, please call with any questions

## 2017-11-24 NOTE — PROVIDER CONTACT NOTE (OTHER) - ACTION/TREATMENT ORDERED:
MD came to assess pt. No interventions ordered at this time. Pt given pain meds as ordered. MD stated she will discuss findings with team and determine plan of care for pt's abd distress.  Pradeep avilez.

## 2017-11-25 DIAGNOSIS — Z29.9 ENCOUNTER FOR PROPHYLACTIC MEASURES, UNSPECIFIED: ICD-10-CM

## 2017-11-25 LAB
ALBUMIN SERPL ELPH-MCNC: 3.4 G/DL — SIGNIFICANT CHANGE UP (ref 3.3–5)
ALP SERPL-CCNC: 64 U/L — SIGNIFICANT CHANGE UP (ref 40–120)
ALT FLD-CCNC: 10 U/L RC — SIGNIFICANT CHANGE UP (ref 10–45)
AMYLASE P1 CFR SERPL: 74 U/L — SIGNIFICANT CHANGE UP (ref 25–125)
ANION GAP SERPL CALC-SCNC: 10 MMOL/L — SIGNIFICANT CHANGE UP (ref 5–17)
AST SERPL-CCNC: 29 U/L — SIGNIFICANT CHANGE UP (ref 10–40)
BASOPHILS # BLD AUTO: 0 K/UL — SIGNIFICANT CHANGE UP (ref 0–0.2)
BASOPHILS NFR BLD AUTO: 0.3 % — SIGNIFICANT CHANGE UP (ref 0–2)
BILIRUB SERPL-MCNC: 0.5 MG/DL — SIGNIFICANT CHANGE UP (ref 0.2–1.2)
BUN SERPL-MCNC: 11 MG/DL — SIGNIFICANT CHANGE UP (ref 7–23)
CALCIUM SERPL-MCNC: 8.9 MG/DL — SIGNIFICANT CHANGE UP (ref 8.4–10.5)
CHLORIDE SERPL-SCNC: 101 MMOL/L — SIGNIFICANT CHANGE UP (ref 96–108)
CO2 SERPL-SCNC: 28 MMOL/L — SIGNIFICANT CHANGE UP (ref 22–31)
CREAT SERPL-MCNC: 0.73 MG/DL — SIGNIFICANT CHANGE UP (ref 0.5–1.3)
EOSINOPHIL # BLD AUTO: 0.1 K/UL — SIGNIFICANT CHANGE UP (ref 0–0.5)
EOSINOPHIL NFR BLD AUTO: 0.9 % — SIGNIFICANT CHANGE UP (ref 0–6)
GLUCOSE BLDC GLUCOMTR-MCNC: 137 MG/DL — HIGH (ref 70–99)
GLUCOSE BLDC GLUCOMTR-MCNC: 155 MG/DL — HIGH (ref 70–99)
GLUCOSE BLDC GLUCOMTR-MCNC: 172 MG/DL — HIGH (ref 70–99)
GLUCOSE BLDC GLUCOMTR-MCNC: 92 MG/DL — SIGNIFICANT CHANGE UP (ref 70–99)
GLUCOSE SERPL-MCNC: 131 MG/DL — HIGH (ref 70–99)
HCT VFR BLD CALC: 29.7 % — LOW (ref 34.5–45)
HGB BLD-MCNC: 9.6 G/DL — LOW (ref 11.5–15.5)
LIDOCAIN IGE QN: 16 U/L — SIGNIFICANT CHANGE UP (ref 7–60)
LYMPHOCYTES # BLD AUTO: 1.5 K/UL — SIGNIFICANT CHANGE UP (ref 1–3.3)
LYMPHOCYTES # BLD AUTO: 21.4 % — SIGNIFICANT CHANGE UP (ref 13–44)
MCHC RBC-ENTMCNC: 29.9 PG — SIGNIFICANT CHANGE UP (ref 27–34)
MCHC RBC-ENTMCNC: 32.2 GM/DL — SIGNIFICANT CHANGE UP (ref 32–36)
MCV RBC AUTO: 92.9 FL — SIGNIFICANT CHANGE UP (ref 80–100)
MONOCYTES # BLD AUTO: 0.6 K/UL — SIGNIFICANT CHANGE UP (ref 0–0.9)
MONOCYTES NFR BLD AUTO: 8.8 % — SIGNIFICANT CHANGE UP (ref 2–14)
NEUTROPHILS # BLD AUTO: 4.7 K/UL — SIGNIFICANT CHANGE UP (ref 1.8–7.4)
NEUTROPHILS NFR BLD AUTO: 68.7 % — SIGNIFICANT CHANGE UP (ref 43–77)
PLATELET # BLD AUTO: 184 K/UL — SIGNIFICANT CHANGE UP (ref 150–400)
POTASSIUM SERPL-MCNC: 3.7 MMOL/L — SIGNIFICANT CHANGE UP (ref 3.5–5.3)
POTASSIUM SERPL-SCNC: 3.7 MMOL/L — SIGNIFICANT CHANGE UP (ref 3.5–5.3)
PROT SERPL-MCNC: 6.5 G/DL — SIGNIFICANT CHANGE UP (ref 6–8.3)
RBC # BLD: 3.19 M/UL — LOW (ref 3.8–5.2)
RBC # FLD: 13.1 % — SIGNIFICANT CHANGE UP (ref 10.3–14.5)
SODIUM SERPL-SCNC: 139 MMOL/L — SIGNIFICANT CHANGE UP (ref 135–145)
WBC # BLD: 6.9 K/UL — SIGNIFICANT CHANGE UP (ref 3.8–10.5)
WBC # FLD AUTO: 6.9 K/UL — SIGNIFICANT CHANGE UP (ref 3.8–10.5)

## 2017-11-25 PROCEDURE — 99233 SBSQ HOSP IP/OBS HIGH 50: CPT

## 2017-11-25 RX ORDER — DIAZEPAM 5 MG
5 TABLET ORAL EVERY 8 HOURS
Qty: 0 | Refills: 0 | Status: DISCONTINUED | OUTPATIENT
Start: 2017-11-25 | End: 2017-11-28

## 2017-11-25 RX ADMIN — Medication 100 MILLIGRAM(S): at 06:23

## 2017-11-25 RX ADMIN — Medication 150 MICROGRAM(S): at 06:23

## 2017-11-25 RX ADMIN — OXYCODONE AND ACETAMINOPHEN 2 TABLET(S): 5; 325 TABLET ORAL at 10:30

## 2017-11-25 RX ADMIN — Medication 50 MILLIGRAM(S): at 06:28

## 2017-11-25 RX ADMIN — OXYCODONE AND ACETAMINOPHEN 2 TABLET(S): 5; 325 TABLET ORAL at 04:00

## 2017-11-25 RX ADMIN — POLYETHYLENE GLYCOL 3350 17 GRAM(S): 17 POWDER, FOR SOLUTION ORAL at 06:23

## 2017-11-25 RX ADMIN — Medication 650 MILLIGRAM(S): at 22:40

## 2017-11-25 RX ADMIN — OXYCODONE AND ACETAMINOPHEN 2 TABLET(S): 5; 325 TABLET ORAL at 23:18

## 2017-11-25 RX ADMIN — OXYCODONE AND ACETAMINOPHEN 2 TABLET(S): 5; 325 TABLET ORAL at 14:19

## 2017-11-25 RX ADMIN — OXYCODONE AND ACETAMINOPHEN 2 TABLET(S): 5; 325 TABLET ORAL at 20:00

## 2017-11-25 RX ADMIN — OXYCODONE AND ACETAMINOPHEN 2 TABLET(S): 5; 325 TABLET ORAL at 09:55

## 2017-11-25 RX ADMIN — Medication 650 MILLIGRAM(S): at 22:17

## 2017-11-25 RX ADMIN — PANTOPRAZOLE SODIUM 40 MILLIGRAM(S): 20 TABLET, DELAYED RELEASE ORAL at 06:23

## 2017-11-25 RX ADMIN — OXYCODONE AND ACETAMINOPHEN 2 TABLET(S): 5; 325 TABLET ORAL at 23:50

## 2017-11-25 RX ADMIN — Medication 20 MILLIGRAM(S): at 22:17

## 2017-11-25 RX ADMIN — OXYCODONE AND ACETAMINOPHEN 2 TABLET(S): 5; 325 TABLET ORAL at 03:25

## 2017-11-25 RX ADMIN — Medication 50 MILLIGRAM(S): at 17:07

## 2017-11-25 RX ADMIN — POLYETHYLENE GLYCOL 3350 17 GRAM(S): 17 POWDER, FOR SOLUTION ORAL at 17:07

## 2017-11-25 RX ADMIN — Medication 2: at 13:08

## 2017-11-25 RX ADMIN — OXYCODONE AND ACETAMINOPHEN 2 TABLET(S): 5; 325 TABLET ORAL at 15:00

## 2017-11-25 RX ADMIN — Medication 100 MILLIGRAM(S): at 13:08

## 2017-11-25 RX ADMIN — AMLODIPINE BESYLATE 10 MILLIGRAM(S): 2.5 TABLET ORAL at 22:17

## 2017-11-25 RX ADMIN — OXYCODONE AND ACETAMINOPHEN 2 TABLET(S): 5; 325 TABLET ORAL at 19:30

## 2017-11-25 RX ADMIN — ENOXAPARIN SODIUM 40 MILLIGRAM(S): 100 INJECTION SUBCUTANEOUS at 17:07

## 2017-11-25 NOTE — PROGRESS NOTE ADULT - ASSESSMENT
71 year old female presents for Stage I L1-L4 Lumbar Lateral Fusion. Pt. reports a history of progressively worsening lower back pain radiating down her left lower extremity X 2 years. (15 Nov 2017 08:45)    PAST MEDICAL & SURGICAL HISTORY:  MRSA (methicillin resistant Staphylococcus aureus) infection: 1999, infected knee replacement, required multiple revisions and long term IV antibiotics via central line  Spinal stenosis  Osteoarthritis  Diverticulosis  Pancreatitis, chronic: last episode &gt; 10 years ago  Hypothyroid  Hypertension  Diabetes  H/O right inguinal hernia repair  H/O total knee replacement, bilateral  FH: cholecystectomy  H/O: hysterectomy    PROCEDURE: 11/22/17 s/p L1-L5 XLIF, L5-S1 PLIF, L1-S1 screw and john fixation, for spinal stenosis	    PLAN:  Neuro: cont PO meds,   Respiratory: patient instructed on incentive spirometer  CV: cont meds as is for HTN  Endocrine: cont CCD and HISS for type 2 DM, cont levothyroxine for hypothyroid             DVT ppx: [] SQH [x] SQL and venodynes bilaterally  GI: pain resolved today, GI saw and signed off, amylase/lipase WNL  PT/OT: JONNY upon d/c  Hospitalist Medicine following    Will discuss with Dr Andrey Caballero # [id number]

## 2017-11-25 NOTE — PROGRESS NOTE ADULT - SUBJECTIVE AND OBJECTIVE BOX
CHIEF COMPLAINT: uncomfortable in bed, pain controlled with oxycodone; needs off IV pain meds for rehab, she is aware, she reports no abdominal pain today, denies n/v  c/o persistent bilat lateral thigh numbness (this is likely due to positioning as it doesn't go below the knee)    Vital Signs Last 24 Hrs  T(C): 36.7 (26 Nov 2017 07:45), Max: 37 (25 Nov 2017 14:04)  T(F): 98.1 (26 Nov 2017 07:45), Max: 98.6 (25 Nov 2017 14:04)  HR: 79 (26 Nov 2017 07:45) (78 - 85)  BP: 130/75 (26 Nov 2017 07:45) (130/75 - 141/79)  BP(mean): --  RR: 18 (26 Nov 2017 07:45) (18 - 18)  SpO2: 93% (26 Nov 2017 07:45) (93% - 95%)    DRAINS: [] ISIAH (cc/24h) [x] HMV (75cc/24h) Left; (61cc/24h) right    PHYSICAL EXAM:    Constitutional: No Acute Distress     Neurological: AOx3, Following Commands, Moving all Extremities, pain limits her at times    Motor exam:          Upper extremity- 5/5 throughout bilat                                Lower extremity- prox 4-/5, distal 5/5 (left LE slightly weaker/poor effort due to pain)                                                                Sensation: [x] intact to light touch  [] decreased: except bilat lateral thighs    Pulmonary: Clear to Auscultation, No rales, No rhonchi, No wheezes     Cardiovascular: S1, S2, Regular rate and rhythm     Gastrointestinal: Soft, Non-tender, Non-distended, + Bowel sounds    Extremities: No calf tenderness     Incision: + staples posterior and left flank; right HMVC removed without difficulty, left HMVC remaining      LABS:                        9.6    6.9   )-----------( 184      ( 25 Nov 2017 09:00 )             29.7    11-25    139  |  101  |  11  ----------------------------<  131<H>  3.7   |  28  |  0.73    Ca    8.9      25 Nov 2017 09:00    TPro  6.5  /  Alb  3.4  /  TBili  0.5  /  DBili  x   /  AST  29  /  ALT  10  /  AlkPhos  64  11-25  Amylase, Serum Total (11.25.17 @ 09:00)    Amylase, Serum Total: 74 U/L  Lipase, Serum (11.25.17 @ 09:00)    Lipase, Serum: 16 U/L    MEDICATIONS:  Neuro:  acetaminophen   Tablet 650 milliGRAM(s) Oral every 6 hours PRN For Temp greater than 38 C (100.4 F)  acetaminophen   Tablet. 650 milliGRAM(s) Oral every 6 hours PRN Mild Pain (1 - 3)  diazepam    Tablet 5 milliGRAM(s) Oral every 8 hours PRN spasms  ondansetron Injectable 4 milliGRAM(s) IV Push every 6 hours PRN Nausea and/or Vomiting  oxyCODONE    5 mG/acetaminophen 325 mG 1 Tablet(s) Oral every 4 hours PRN Moderate Pain (4 - 6)  oxyCODONE    5 mG/acetaminophen 325 mG 2 Tablet(s) Oral every 4 hours PRN Severe Pain (7 - 10)    Cardiac:  amLODIPine   Tablet 10 milliGRAM(s) Oral at bedtime  enalapril 20 milliGRAM(s) Oral at bedtime  metoprolol     tartrate 50 milliGRAM(s) Oral two times a day    GI/:  docusate sodium 100 milliGRAM(s) Oral three times a day  pantoprazole    Tablet 40 milliGRAM(s) Oral daily  polyethylene glycol 3350 17 Gram(s) Oral two times a day  senna 2 Tablet(s) Oral at bedtime PRN Constipation    Other:   dextrose 5%. 1000 milliLiter(s) IV Continuous <Continuous>  dextrose 50% Injectable 12.5 Gram(s) IV Push once  dextrose 50% Injectable 25 Gram(s) IV Push once  dextrose 50% Injectable 25 Gram(s) IV Push once  dextrose Gel 1 Dose(s) Oral once PRN Blood Glucose LESS THAN 70 milliGRAM(s)/deciliter  enoxaparin Injectable 40 milliGRAM(s) SubCutaneous <User Schedule>  glucagon  Injectable 1 milliGRAM(s) IntraMuscular once PRN Glucose LESS THAN 70 milligrams/deciliter  insulin lispro (HumaLOG) corrective regimen sliding scale   SubCutaneous three times a day before meals  insulin lispro (HumaLOG) corrective regimen sliding scale   SubCutaneous at bedtime  levothyroxine 150 MICROGram(s) Oral daily    DIET: [] Regular [x] CCD [] Renal [] Puree [] Dysphagia [] Tube Feeds:

## 2017-11-25 NOTE — PROGRESS NOTE ADULT - SUBJECTIVE AND OBJECTIVE BOX
CHIEF COMPLAINT: comfortable in bed, patient reports no abdominal pain today, denies n/v  reports percocet working well for her pain overall, she's aware IV meds discontinued in anticipation of rehab.  c/o bilat lateral thigh numbness (this is likely due to positioning as it doesn't go below the knee)  + mujica, + IVF    Vital Signs Last 24 Hrs  T(C): 36.8 (25 Nov 2017 07:57), Max: 37.2 (24 Nov 2017 12:30)  T(F): 98.3 (25 Nov 2017 07:57), Max: 99 (24 Nov 2017 14:09)  HR: 71 (25 Nov 2017 07:57) (65 - 98)  BP: 119/75 (25 Nov 2017 07:57) (115/65 - 176/91)  BP(mean): --  RR: 18 (25 Nov 2017 07:57) (18 - 18)  SpO2: 98% (25 Nov 2017 07:57) (93% - 98%)    DRAINS: [] ISIAH (cc/24h) [x] HMV (61cc/24h) Right, (75cc/24h) Left    PHYSICAL EXAM:    Constitutional: No Acute Distress     Neurological: AOx3, Following Commands, Moving all Extremities, pain limits her at times    Motor exam:          Upper extremity- 5/5 throughout bilat                                Lower extremity- prox 4-/5, distal 5/5- left LE slightly weaker/poor effort due to pain)                                                                Sensation: [x] intact to light touch  [] decreased: except bilat lateral thighs    Pulmonary: Clear to Auscultation, No rales, No rhonchi, No wheezes     Cardiovascular: S1, S2, Regular rate and rhythm     Gastrointestinal: Soft, Non-tender, Non-distended, + Bowel sounds    Extremities: No calf tenderness     Incision: + staples, right HMVC removed, left HMVC remains, new dressing placed.      LABS:                        9.6    6.9   )-----------( 184      ( 25 Nov 2017 09:00 )             29.7    11-25    139  |  101  |  11  ----------------------------<  131<H>  3.7   |  28  |  0.73    Ca    8.9      25 Nov 2017 09:00    TPro  6.5  /  Alb  3.4  /  TBili  0.5  /  DBili  x   /  AST  29  /  ALT  10  /  AlkPhos  64  11-25  Amylase, Serum Total (11.25.17 @ 09:00)    Amylase, Serum Total: 74 U/L  Lipase, Serum (11.25.17 @ 09:00)    Lipase, Serum: 16 U/L    MEDICATIONS:  Neuro:  acetaminophen   Tablet 650 milliGRAM(s) Oral every 6 hours PRN For Temp greater than 38 C (100.4 F)  acetaminophen   Tablet. 650 milliGRAM(s) Oral every 6 hours PRN Mild Pain (1 - 3)  diazepam    Tablet 5 milliGRAM(s) Oral every 8 hours PRN spasms  ondansetron Injectable 4 milliGRAM(s) IV Push every 6 hours PRN Nausea and/or Vomiting  oxyCODONE    5 mG/acetaminophen 325 mG 1 Tablet(s) Oral every 4 hours PRN Moderate Pain (4 - 6)  oxyCODONE    5 mG/acetaminophen 325 mG 2 Tablet(s) Oral every 4 hours PRN Severe Pain (7 - 10)    Cardiac:  amLODIPine   Tablet 10 milliGRAM(s) Oral at bedtime  enalapril 20 milliGRAM(s) Oral at bedtime  metoprolol     tartrate 50 milliGRAM(s) Oral two times a day    GI/:  docusate sodium 100 milliGRAM(s) Oral three times a day  pantoprazole    Tablet 40 milliGRAM(s) Oral daily  polyethylene glycol 3350 17 Gram(s) Oral two times a day  senna 2 Tablet(s) Oral at bedtime PRN Constipation    Other:   dextrose 5%. 1000 milliLiter(s) IV Continuous <Continuous>  dextrose 50% Injectable 12.5 Gram(s) IV Push once  dextrose 50% Injectable 25 Gram(s) IV Push once  dextrose 50% Injectable 25 Gram(s) IV Push once  dextrose Gel 1 Dose(s) Oral once PRN Blood Glucose LESS THAN 70 milliGRAM(s)/deciliter  enoxaparin Injectable 40 milliGRAM(s) SubCutaneous <User Schedule>  glucagon  Injectable 1 milliGRAM(s) IntraMuscular once PRN Glucose LESS THAN 70 milligrams/deciliter  insulin lispro (HumaLOG) corrective regimen sliding scale   SubCutaneous three times a day before meals  insulin lispro (HumaLOG) corrective regimen sliding scale   SubCutaneous at bedtime  levothyroxine 150 MICROGram(s) Oral daily    DIET: [] Regular [x] CCD [] Renal [] Puree [] Dysphagia [] Tube Feeds: CHIEF COMPLAINT: uncomfortable in bed, feels she needs more oxycodone; she reports no abdominal pain today, denies n/v  c/o persistent bilat lateral thigh numbness (this is likely due to positioning as it doesn't go below the knee)    Vital Signs Last 24 Hrs  T(C): 36.7 (26 Nov 2017 07:45), Max: 37 (25 Nov 2017 14:04)  T(F): 98.1 (26 Nov 2017 07:45), Max: 98.6 (25 Nov 2017 14:04)  HR: 79 (26 Nov 2017 07:45) (78 - 85)  BP: 130/75 (26 Nov 2017 07:45) (130/75 - 141/79)  BP(mean): --  RR: 18 (26 Nov 2017 07:45) (18 - 18)  SpO2: 93% (26 Nov 2017 07:45) (93% - 95%)    DRAINS: [] ISIAH (cc/24h) [x] HMV (5cc/24h) Left    PHYSICAL EXAM:    Constitutional: No Acute Distress     Neurological: AOx3, Following Commands, Moving all Extremities, pain limits her at times    Motor exam:          Upper extremity- 5/5 throughout bilat                                Lower extremity- prox 4-/5, distal 5/5 (left LE slightly weaker/poor effort due to pain)                                                                Sensation: [x] intact to light touch  [] decreased: except bilat lateral thighs    Pulmonary: Clear to Auscultation, No rales, No rhonchi, No wheezes     Cardiovascular: S1, S2, Regular rate and rhythm     Gastrointestinal: Soft, Non-tender, Non-distended, + Bowel sounds    Extremities: No calf tenderness     Incision: + staples posterior and left flank; left HMVC removed without difficulty.       LABS:                        9.6    6.9   )-----------( 184      ( 25 Nov 2017 09:00 )             29.7    11-25    139  |  101  |  11  ----------------------------<  131<H>  3.7   |  28  |  0.73    Ca    8.9      25 Nov 2017 09:00    TPro  6.5  /  Alb  3.4  /  TBili  0.5  /  DBili  x   /  AST  29  /  ALT  10  /  AlkPhos  64  11-25  Amylase, Serum Total (11.25.17 @ 09:00)    Amylase, Serum Total: 74 U/L  Lipase, Serum (11.25.17 @ 09:00)    Lipase, Serum: 16 U/L    MEDICATIONS:  Neuro:  acetaminophen   Tablet 650 milliGRAM(s) Oral every 6 hours PRN For Temp greater than 38 C (100.4 F)  acetaminophen   Tablet. 650 milliGRAM(s) Oral every 6 hours PRN Mild Pain (1 - 3)  diazepam    Tablet 5 milliGRAM(s) Oral every 8 hours PRN spasms  ondansetron Injectable 4 milliGRAM(s) IV Push every 6 hours PRN Nausea and/or Vomiting  oxyCODONE    5 mG/acetaminophen 325 mG 1 Tablet(s) Oral every 4 hours PRN Moderate Pain (4 - 6)  oxyCODONE    5 mG/acetaminophen 325 mG 2 Tablet(s) Oral every 4 hours PRN Severe Pain (7 - 10)    Cardiac:  amLODIPine   Tablet 10 milliGRAM(s) Oral at bedtime  enalapril 20 milliGRAM(s) Oral at bedtime  metoprolol     tartrate 50 milliGRAM(s) Oral two times a day    GI/:  docusate sodium 100 milliGRAM(s) Oral three times a day  pantoprazole    Tablet 40 milliGRAM(s) Oral daily  polyethylene glycol 3350 17 Gram(s) Oral two times a day  senna 2 Tablet(s) Oral at bedtime PRN Constipation    Other:   dextrose 5%. 1000 milliLiter(s) IV Continuous <Continuous>  dextrose 50% Injectable 12.5 Gram(s) IV Push once  dextrose 50% Injectable 25 Gram(s) IV Push once  dextrose 50% Injectable 25 Gram(s) IV Push once  dextrose Gel 1 Dose(s) Oral once PRN Blood Glucose LESS THAN 70 milliGRAM(s)/deciliter  enoxaparin Injectable 40 milliGRAM(s) SubCutaneous <User Schedule>  glucagon  Injectable 1 milliGRAM(s) IntraMuscular once PRN Glucose LESS THAN 70 milligrams/deciliter  insulin lispro (HumaLOG) corrective regimen sliding scale   SubCutaneous three times a day before meals  insulin lispro (HumaLOG) corrective regimen sliding scale   SubCutaneous at bedtime  levothyroxine 150 MICROGram(s) Oral daily    DIET: [] Regular [x] CCD [] Renal [] Puree [] Dysphagia [] Tube Feeds:

## 2017-11-25 NOTE — PROGRESS NOTE ADULT - ASSESSMENT
72 yo female PMHX DM type 2, HTN ,.Hypothyroidism ,admitted for Laminectomy consulted for abdominal pain

## 2017-11-25 NOTE — PROGRESS NOTE ADULT - SUBJECTIVE AND OBJECTIVE BOX
Patient is a 71y old  Female who presents with a chief complaint of "I am having back surgery" (22 Nov 2017 05:41)        SUBJECTIVE / OVERNIGHT EVENTS:      MEDICATIONS  (STANDING):  amLODIPine   Tablet 10 milliGRAM(s) Oral at bedtime  dextrose 5%. 1000 milliLiter(s) (50 mL/Hr) IV Continuous <Continuous>  dextrose 50% Injectable 12.5 Gram(s) IV Push once  dextrose 50% Injectable 25 Gram(s) IV Push once  dextrose 50% Injectable 25 Gram(s) IV Push once  docusate sodium 100 milliGRAM(s) Oral three times a day  enalapril 20 milliGRAM(s) Oral at bedtime  enoxaparin Injectable 40 milliGRAM(s) SubCutaneous <User Schedule>  insulin lispro (HumaLOG) corrective regimen sliding scale   SubCutaneous three times a day before meals  insulin lispro (HumaLOG) corrective regimen sliding scale   SubCutaneous at bedtime  levothyroxine 150 MICROGram(s) Oral daily  metoprolol     tartrate 50 milliGRAM(s) Oral two times a day  pantoprazole    Tablet 40 milliGRAM(s) Oral daily  polyethylene glycol 3350 17 Gram(s) Oral two times a day  sodium chloride 0.9% with potassium chloride 20 mEq/L 1000 milliLiter(s) (75 mL/Hr) IV Continuous <Continuous>    MEDICATIONS  (PRN):  acetaminophen   Tablet 650 milliGRAM(s) Oral every 6 hours PRN For Temp greater than 38 C (100.4 F)  acetaminophen   Tablet. 650 milliGRAM(s) Oral every 6 hours PRN Mild Pain (1 - 3)  dextrose Gel 1 Dose(s) Oral once PRN Blood Glucose LESS THAN 70 milliGRAM(s)/deciliter  diazepam  Injectable 2 milliGRAM(s) IV Push every 6 hours PRN spasm  glucagon  Injectable 1 milliGRAM(s) IntraMuscular once PRN Glucose LESS THAN 70 milligrams/deciliter  HYDROmorphone  Injectable 0.5 milliGRAM(s) IV Push every 3 hours PRN breakthrough pain  ondansetron Injectable 4 milliGRAM(s) IV Push every 6 hours PRN Nausea and/or Vomiting  oxyCODONE    5 mG/acetaminophen 325 mG 1 Tablet(s) Oral every 4 hours PRN Moderate Pain (4 - 6)  oxyCODONE    5 mG/acetaminophen 325 mG 2 Tablet(s) Oral every 4 hours PRN Severe Pain (7 - 10)  senna 2 Tablet(s) Oral at bedtime PRN Constipation      Vital Signs Last 24 Hrs  T(C): 36.8 (25 Nov 2017 07:57), Max: 37.2 (24 Nov 2017 12:30)  T(F): 98.3 (25 Nov 2017 07:57), Max: 99 (24 Nov 2017 14:09)  HR: 71 (25 Nov 2017 07:57) (65 - 98)  BP: 119/75 (25 Nov 2017 07:57) (115/65 - 176/91)  BP(mean): --  RR: 18 (25 Nov 2017 07:57) (18 - 18)  SpO2: 98% (25 Nov 2017 07:57) (93% - 98%)  CAPILLARY BLOOD GLUCOSE      POCT Blood Glucose.: 137 mg/dL (25 Nov 2017 07:57)  POCT Blood Glucose.: 187 mg/dL (24 Nov 2017 21:03)  POCT Blood Glucose.: 148 mg/dL (24 Nov 2017 18:02)  POCT Blood Glucose.: 136 mg/dL (24 Nov 2017 12:25)    I&O's Summary    24 Nov 2017 07:01  -  25 Nov 2017 07:00  --------------------------------------------------------  IN: 2475 mL / OUT: 2636 mL / NET: -161 mL    25 Nov 2017 07:01  -  25 Nov 2017 09:43  --------------------------------------------------------  IN: 0 mL / OUT: 6 mL / NET: -6 mL        PHYSICAL EXAM:  GENERAL: distress 2/2 pain  HEAD:  Atraumatic, Normocephalic  EYES: EOMI, PERRLA, conjunctiva and sclera clear  NECK: Supple, No JVD  CHEST/LUNG: Clear to auscultation bilaterally; No wheeze  HEART: Regular rate and rhythm; No murmurs, rubs, or gallops  ABDOMEN: obese.  Soft.  diffuse TTP, worse in RLQ.  No overt masses felt.  Negative zimmerman sign .  EXTREMITIES:  2+ Peripheral Pulses, No clubbing, cyanosis, or edema  PSYCH: AAOx3  NEUROLOGY: non-focal  SKIN:vertical scars anterior abdomen, no erythema. Drain from fresh incision site.  ISIAH drain from back      LABS:                        9.6    6.9   )-----------( 184      ( 25 Nov 2017 09:00 )             29.7     11-25    139  |  101  |  11  ----------------------------<  131<H>  3.7   |  28  |  0.73    Ca    8.9      25 Nov 2017 09:00    TPro  6.5  /  Alb  3.4  /  TBili  0.5  /  DBili  x   /  AST  29  /  ALT  10  /  AlkPhos  64  11-25              RADIOLOGY & ADDITIONAL TESTS:    Imaging Personally Reviewed: CT a/p reviewed - Normal appendix. No bowel obstruction or ileus. No bowel inflammation.    Status post spinal fusion of L1-L5 with normal expected post operative   changes     Consultant(s) Notes Reviewed: GI  Care Discussed with Consultants/Other Providers: d/w Neurosurgery regarding plan of care Patient is a 71y old  Female who presents with a chief complaint of "I am having back surgery" (22 Nov 2017 05:41)        SUBJECTIVE / OVERNIGHT EVENTS: abdominal pain improved      MEDICATIONS  (STANDING):  amLODIPine   Tablet 10 milliGRAM(s) Oral at bedtime  dextrose 5%. 1000 milliLiter(s) (50 mL/Hr) IV Continuous <Continuous>  dextrose 50% Injectable 12.5 Gram(s) IV Push once  dextrose 50% Injectable 25 Gram(s) IV Push once  dextrose 50% Injectable 25 Gram(s) IV Push once  docusate sodium 100 milliGRAM(s) Oral three times a day  enalapril 20 milliGRAM(s) Oral at bedtime  enoxaparin Injectable 40 milliGRAM(s) SubCutaneous <User Schedule>  insulin lispro (HumaLOG) corrective regimen sliding scale   SubCutaneous three times a day before meals  insulin lispro (HumaLOG) corrective regimen sliding scale   SubCutaneous at bedtime  levothyroxine 150 MICROGram(s) Oral daily  metoprolol     tartrate 50 milliGRAM(s) Oral two times a day  pantoprazole    Tablet 40 milliGRAM(s) Oral daily  polyethylene glycol 3350 17 Gram(s) Oral two times a day  sodium chloride 0.9% with potassium chloride 20 mEq/L 1000 milliLiter(s) (75 mL/Hr) IV Continuous <Continuous>    MEDICATIONS  (PRN):  acetaminophen   Tablet 650 milliGRAM(s) Oral every 6 hours PRN For Temp greater than 38 C (100.4 F)  acetaminophen   Tablet. 650 milliGRAM(s) Oral every 6 hours PRN Mild Pain (1 - 3)  dextrose Gel 1 Dose(s) Oral once PRN Blood Glucose LESS THAN 70 milliGRAM(s)/deciliter  diazepam  Injectable 2 milliGRAM(s) IV Push every 6 hours PRN spasm  glucagon  Injectable 1 milliGRAM(s) IntraMuscular once PRN Glucose LESS THAN 70 milligrams/deciliter  HYDROmorphone  Injectable 0.5 milliGRAM(s) IV Push every 3 hours PRN breakthrough pain  ondansetron Injectable 4 milliGRAM(s) IV Push every 6 hours PRN Nausea and/or Vomiting  oxyCODONE    5 mG/acetaminophen 325 mG 1 Tablet(s) Oral every 4 hours PRN Moderate Pain (4 - 6)  oxyCODONE    5 mG/acetaminophen 325 mG 2 Tablet(s) Oral every 4 hours PRN Severe Pain (7 - 10)  senna 2 Tablet(s) Oral at bedtime PRN Constipation      Vital Signs Last 24 Hrs  T(C): 36.8 (25 Nov 2017 07:57), Max: 37.2 (24 Nov 2017 12:30)  T(F): 98.3 (25 Nov 2017 07:57), Max: 99 (24 Nov 2017 14:09)  HR: 71 (25 Nov 2017 07:57) (65 - 98)  BP: 119/75 (25 Nov 2017 07:57) (115/65 - 176/91)  BP(mean): --  RR: 18 (25 Nov 2017 07:57) (18 - 18)  SpO2: 98% (25 Nov 2017 07:57) (93% - 98%)  CAPILLARY BLOOD GLUCOSE      POCT Blood Glucose.: 137 mg/dL (25 Nov 2017 07:57)  POCT Blood Glucose.: 187 mg/dL (24 Nov 2017 21:03)  POCT Blood Glucose.: 148 mg/dL (24 Nov 2017 18:02)  POCT Blood Glucose.: 136 mg/dL (24 Nov 2017 12:25)    I&O's Summary    24 Nov 2017 07:01  -  25 Nov 2017 07:00  --------------------------------------------------------  IN: 2475 mL / OUT: 2636 mL / NET: -161 mL    25 Nov 2017 07:01  -  25 Nov 2017 09:43  --------------------------------------------------------  IN: 0 mL / OUT: 6 mL / NET: -6 mL        PHYSICAL EXAM:  GENERAL: distress 2/2 pain  HEAD:  Atraumatic, Normocephalic  EYES: EOMI, PERRLA, conjunctiva and sclera clear  NECK: Supple, No JVD  CHEST/LUNG: Clear to auscultation bilaterally; No wheeze  HEART: Regular rate and rhythm; No murmurs, rubs, or gallops  ABDOMEN: obese.  Soft.  diffuse TTP, worse in RLQ.  No overt masses felt.  Negative zimmerman sign .  EXTREMITIES:  2+ Peripheral Pulses, No clubbing, cyanosis, or edema  PSYCH: AAOx3  NEUROLOGY: non-focal  SKIN:vertical scars anterior abdomen, no erythema. Drain from fresh incision site.  ISIAH drain from back      LABS:                        9.6    6.9   )-----------( 184      ( 25 Nov 2017 09:00 )             29.7     11-25    139  |  101  |  11  ----------------------------<  131<H>  3.7   |  28  |  0.73    Ca    8.9      25 Nov 2017 09:00    TPro  6.5  /  Alb  3.4  /  TBili  0.5  /  DBili  x   /  AST  29  /  ALT  10  /  AlkPhos  64  11-25              RADIOLOGY & ADDITIONAL TESTS:    Imaging Personally Reviewed: CT a/p reviewed - Normal appendix. No bowel obstruction or ileus. No bowel inflammation.    Status post spinal fusion of L1-L5 with normal expected post operative   changes     Consultant(s) Notes Reviewed: GI  Care Discussed with Consultants/Other Providers: d/w Neurosurgery PA - Carola regarding plan of care

## 2017-11-25 NOTE — PROGRESS NOTE ADULT - ASSESSMENT
71 year old female presents for Stage I L1-L4 Lumbar Lateral Fusion. Pt. reports a history of progressively worsening lower back pain radiating down her left lower extremity X 2 years. (15 Nov 2017 08:45)    PAST MEDICAL & SURGICAL HISTORY:  MRSA (methicillin resistant Staphylococcus aureus) infection: 1999, infected knee replacement, required multiple revisions and long term IV antibiotics via central line  Spinal stenosis  Osteoarthritis  Diverticulosis  Pancreatitis, chronic: last episode &gt; 10 years ago  Hypothyroid  Hypertension  Diabetes  H/O right inguinal hernia repair  H/O total knee replacement, bilateral  FH: cholecystectomy  H/O: hysterectomy    PROCEDURE: 11/22/17 s/p L1-L5 XLIF, L5-S1 PLIF, L1-S1 screw and john fixation, for spinal stenosis	    PLAN:  Neuro: cont PO meds, may need to increase, no IV pain meds; maintain left HMVC and record output  Respiratory: patient instructed on incentive spirometer  CV: cont meds as is for HTN  Endocrine: cont CCD and HISS for type 2 DM, cont levothyroxine for hpothyroid             DVT ppx: [] SQH [x] SQL and venodynes bilaterally  Renal: TOV today, IVL  GI: pain resolved today, GI saw and signed off, amylase/lipase WNL  PT/OT: JONNY upon d/c  Hospitalist Medicine following    Will discuss with Dr Andrey Caballero # [id number] 71 year old female presents for Stage I L1-L4 Lumbar Lateral Fusion. Pt. reports a history of progressively worsening lower back pain radiating down her left lower extremity X 2 years. (15 Nov 2017 08:45)    PAST MEDICAL & SURGICAL HISTORY:  MRSA (methicillin resistant Staphylococcus aureus) infection: 1999, infected knee replacement, required multiple revisions and long term IV antibiotics via central line  Spinal stenosis  Osteoarthritis  Diverticulosis  Pancreatitis, chronic: last episode &gt; 10 years ago  Hypothyroid  Hypertension  Diabetes  H/O right inguinal hernia repair  H/O total knee replacement, bilateral  FH: cholecystectomy  H/O: hysterectomy    PROCEDURE: 11/22/17 s/p L1-L5 XLIF, L5-S1 PLIF, L1-S1 screw and john fixation, for spinal stenosis	    PLAN:  Neuro: cont PO meds, increase oxycodone to 10mg and 15mg prn as she has been on this for awhile, reminded her she can use tylenol also  Respiratory: patient instructed on incentive spirometer  CV: cont meds as is for HTN  Endocrine: cont CCD and HISS for type 2 DM, cont levothyroxine for hpothyroid             DVT ppx: [] SQH [x] SQL and venodynes bilaterally  GI: pain resolved today, GI saw and signed off, amylase/lipase WNL  PT/OT: JONNY upon d/c  Hospitalist Medicine following    Will discuss with Dr Andrey Caballero # [id number] 71 year old female presents for Stage I L1-L4 Lumbar Lateral Fusion. Pt. reports a history of progressively worsening lower back pain radiating down her left lower extremity X 2 years. (15 Nov 2017 08:45)    PAST MEDICAL & SURGICAL HISTORY:  MRSA (methicillin resistant Staphylococcus aureus) infection: 1999, infected knee replacement, required multiple revisions and long term IV antibiotics via central line  Spinal stenosis  Osteoarthritis  Diverticulosis  Pancreatitis, chronic: last episode &gt; 10 years ago  Hypothyroid  Hypertension  Diabetes  H/O right inguinal hernia repair  H/O total knee replacement, bilateral  FH: cholecystectomy  H/O: hysterectomy    PROCEDURE: 11/22/17 s/p L1-L5 XLIF, L5-S1 PLIF, L1-S1 screw and john fixation, for spinal stenosis	    PLAN:  Neuro: cont PO meds, increase oxycodone to 10mg and 15mg prn as she has been on this for awhile, reminded her she can use tylenol also  Respiratory: patient instructed on incentive spirometer  CV: cont meds as is for HTN  Endocrine: cont CCD and HISS for type 2 DM, cont levothyroxine for hypothyroid             DVT ppx: [] SQH [x] SQL and venodynes bilaterally  GI: pain resolved today, GI saw and signed off, amylase/lipase WNL  PT/OT: JONNY upon d/c  Hospitalist Medicine following    Will discuss with Dr Andrey Caballero # [id number]

## 2017-11-25 NOTE — PROGRESS NOTE ADULT - PROBLEM SELECTOR PLAN 1
?chronic - being given dilaudid IV prn, oxycodone prn - pain better controlled  CT a/p without acute cause of abdominal pain and AG wnl   GI consult appreciated  would try to titrate off IV pain medications- consider pain management consult

## 2017-11-26 LAB
GLUCOSE BLDC GLUCOMTR-MCNC: 115 MG/DL — HIGH (ref 70–99)
GLUCOSE BLDC GLUCOMTR-MCNC: 150 MG/DL — HIGH (ref 70–99)
GLUCOSE BLDC GLUCOMTR-MCNC: 160 MG/DL — HIGH (ref 70–99)
GLUCOSE BLDC GLUCOMTR-MCNC: 179 MG/DL — HIGH (ref 70–99)
LACTATE SERPL-SCNC: 1.2 MMOL/L — SIGNIFICANT CHANGE UP (ref 0.7–2)
TSH SERPL-MCNC: 1.9 UIU/ML — SIGNIFICANT CHANGE UP (ref 0.27–4.2)

## 2017-11-26 RX ORDER — ACETAMINOPHEN 500 MG
650 TABLET ORAL EVERY 6 HOURS
Qty: 0 | Refills: 0 | Status: DISCONTINUED | OUTPATIENT
Start: 2017-11-26 | End: 2017-11-28

## 2017-11-26 RX ORDER — ACETAMINOPHEN 500 MG
650 TABLET ORAL EVERY 6 HOURS
Qty: 0 | Refills: 0 | Status: DISCONTINUED | OUTPATIENT
Start: 2017-11-26 | End: 2017-11-26

## 2017-11-26 RX ORDER — OXYCODONE HYDROCHLORIDE 5 MG/1
15 TABLET ORAL EVERY 4 HOURS
Qty: 0 | Refills: 0 | Status: DISCONTINUED | OUTPATIENT
Start: 2017-11-26 | End: 2017-11-28

## 2017-11-26 RX ORDER — OXYCODONE HYDROCHLORIDE 5 MG/1
10 TABLET ORAL EVERY 4 HOURS
Qty: 0 | Refills: 0 | Status: DISCONTINUED | OUTPATIENT
Start: 2017-11-26 | End: 2017-11-28

## 2017-11-26 RX ADMIN — Medication 50 MILLIGRAM(S): at 17:06

## 2017-11-26 RX ADMIN — Medication 2: at 08:32

## 2017-11-26 RX ADMIN — OXYCODONE HYDROCHLORIDE 15 MILLIGRAM(S): 5 TABLET ORAL at 19:54

## 2017-11-26 RX ADMIN — Medication 100 MILLIGRAM(S): at 22:05

## 2017-11-26 RX ADMIN — OXYCODONE AND ACETAMINOPHEN 2 TABLET(S): 5; 325 TABLET ORAL at 09:00

## 2017-11-26 RX ADMIN — Medication 100 MILLIGRAM(S): at 11:54

## 2017-11-26 RX ADMIN — OXYCODONE AND ACETAMINOPHEN 2 TABLET(S): 5; 325 TABLET ORAL at 08:30

## 2017-11-26 RX ADMIN — OXYCODONE HYDROCHLORIDE 15 MILLIGRAM(S): 5 TABLET ORAL at 16:34

## 2017-11-26 RX ADMIN — ENOXAPARIN SODIUM 40 MILLIGRAM(S): 100 INJECTION SUBCUTANEOUS at 17:06

## 2017-11-26 RX ADMIN — OXYCODONE HYDROCHLORIDE 15 MILLIGRAM(S): 5 TABLET ORAL at 11:55

## 2017-11-26 RX ADMIN — POLYETHYLENE GLYCOL 3350 17 GRAM(S): 17 POWDER, FOR SOLUTION ORAL at 17:06

## 2017-11-26 RX ADMIN — Medication 5 MILLIGRAM(S): at 22:05

## 2017-11-26 RX ADMIN — Medication 50 MILLIGRAM(S): at 05:47

## 2017-11-26 RX ADMIN — OXYCODONE HYDROCHLORIDE 15 MILLIGRAM(S): 5 TABLET ORAL at 12:30

## 2017-11-26 RX ADMIN — Medication 2: at 17:07

## 2017-11-26 RX ADMIN — Medication 150 MICROGRAM(S): at 05:47

## 2017-11-26 RX ADMIN — OXYCODONE AND ACETAMINOPHEN 2 TABLET(S): 5; 325 TABLET ORAL at 04:17

## 2017-11-26 RX ADMIN — Medication 5 MILLIGRAM(S): at 07:35

## 2017-11-26 RX ADMIN — OXYCODONE HYDROCHLORIDE 15 MILLIGRAM(S): 5 TABLET ORAL at 20:30

## 2017-11-26 RX ADMIN — Medication 20 MILLIGRAM(S): at 22:05

## 2017-11-26 RX ADMIN — OXYCODONE HYDROCHLORIDE 15 MILLIGRAM(S): 5 TABLET ORAL at 15:55

## 2017-11-26 RX ADMIN — PANTOPRAZOLE SODIUM 40 MILLIGRAM(S): 20 TABLET, DELAYED RELEASE ORAL at 05:47

## 2017-11-26 RX ADMIN — OXYCODONE AND ACETAMINOPHEN 2 TABLET(S): 5; 325 TABLET ORAL at 04:50

## 2017-11-26 RX ADMIN — AMLODIPINE BESYLATE 10 MILLIGRAM(S): 2.5 TABLET ORAL at 22:05

## 2017-11-27 DIAGNOSIS — M79.89 OTHER SPECIFIED SOFT TISSUE DISORDERS: ICD-10-CM

## 2017-11-27 LAB
GLUCOSE BLDC GLUCOMTR-MCNC: 179 MG/DL — HIGH (ref 70–99)
GLUCOSE BLDC GLUCOMTR-MCNC: 192 MG/DL — HIGH (ref 70–99)
GLUCOSE BLDC GLUCOMTR-MCNC: 199 MG/DL — HIGH (ref 70–99)
GLUCOSE BLDC GLUCOMTR-MCNC: 322 MG/DL — HIGH (ref 70–99)

## 2017-11-27 PROCEDURE — 99232 SBSQ HOSP IP/OBS MODERATE 35: CPT

## 2017-11-27 RX ORDER — GABAPENTIN 400 MG/1
300 CAPSULE ORAL THREE TIMES A DAY
Qty: 0 | Refills: 0 | Status: DISCONTINUED | OUTPATIENT
Start: 2017-11-27 | End: 2017-11-28

## 2017-11-27 RX ADMIN — OXYCODONE HYDROCHLORIDE 15 MILLIGRAM(S): 5 TABLET ORAL at 08:42

## 2017-11-27 RX ADMIN — Medication 5 MILLIGRAM(S): at 06:24

## 2017-11-27 RX ADMIN — ENOXAPARIN SODIUM 40 MILLIGRAM(S): 100 INJECTION SUBCUTANEOUS at 18:07

## 2017-11-27 RX ADMIN — OXYCODONE HYDROCHLORIDE 15 MILLIGRAM(S): 5 TABLET ORAL at 21:30

## 2017-11-27 RX ADMIN — OXYCODONE HYDROCHLORIDE 15 MILLIGRAM(S): 5 TABLET ORAL at 20:54

## 2017-11-27 RX ADMIN — PANTOPRAZOLE SODIUM 40 MILLIGRAM(S): 20 TABLET, DELAYED RELEASE ORAL at 05:19

## 2017-11-27 RX ADMIN — AMLODIPINE BESYLATE 10 MILLIGRAM(S): 2.5 TABLET ORAL at 23:07

## 2017-11-27 RX ADMIN — OXYCODONE HYDROCHLORIDE 15 MILLIGRAM(S): 5 TABLET ORAL at 07:47

## 2017-11-27 RX ADMIN — Medication 2: at 13:54

## 2017-11-27 RX ADMIN — Medication 20 MILLIGRAM(S): at 23:07

## 2017-11-27 RX ADMIN — Medication 8: at 18:06

## 2017-11-27 RX ADMIN — Medication 5 MILLIGRAM(S): at 18:21

## 2017-11-27 RX ADMIN — Medication 150 MICROGRAM(S): at 05:19

## 2017-11-27 RX ADMIN — OXYCODONE HYDROCHLORIDE 15 MILLIGRAM(S): 5 TABLET ORAL at 03:38

## 2017-11-27 RX ADMIN — OXYCODONE HYDROCHLORIDE 15 MILLIGRAM(S): 5 TABLET ORAL at 00:35

## 2017-11-27 RX ADMIN — Medication 50 MILLIGRAM(S): at 05:19

## 2017-11-27 RX ADMIN — OXYCODONE HYDROCHLORIDE 15 MILLIGRAM(S): 5 TABLET ORAL at 16:10

## 2017-11-27 RX ADMIN — OXYCODONE HYDROCHLORIDE 15 MILLIGRAM(S): 5 TABLET ORAL at 16:30

## 2017-11-27 RX ADMIN — OXYCODONE HYDROCHLORIDE 15 MILLIGRAM(S): 5 TABLET ORAL at 12:48

## 2017-11-27 RX ADMIN — Medication 50 MILLIGRAM(S): at 18:06

## 2017-11-27 RX ADMIN — Medication 2: at 09:07

## 2017-11-27 RX ADMIN — OXYCODONE HYDROCHLORIDE 15 MILLIGRAM(S): 5 TABLET ORAL at 12:01

## 2017-11-27 RX ADMIN — GABAPENTIN 300 MILLIGRAM(S): 400 CAPSULE ORAL at 23:07

## 2017-11-27 RX ADMIN — POLYETHYLENE GLYCOL 3350 17 GRAM(S): 17 POWDER, FOR SOLUTION ORAL at 05:20

## 2017-11-27 RX ADMIN — OXYCODONE HYDROCHLORIDE 15 MILLIGRAM(S): 5 TABLET ORAL at 04:08

## 2017-11-27 RX ADMIN — OXYCODONE HYDROCHLORIDE 15 MILLIGRAM(S): 5 TABLET ORAL at 00:05

## 2017-11-27 RX ADMIN — Medication 100 MILLIGRAM(S): at 05:19

## 2017-11-27 NOTE — PROGRESS NOTE ADULT - PROBLEM SELECTOR PLAN 1
?chronic - being given oxycodone prn - pain better controlled  CT a/p without acute cause of abdominal pain and AG wnl   GI consult appreciated resolved on oxycodone  CT a/p without acute cause of abdominal pain and AG wnl   GI consult appreciated

## 2017-11-27 NOTE — PROGRESS NOTE ADULT - SUBJECTIVE AND OBJECTIVE BOX
CHIEF COMPLAINT: Patient seen and examined at bedside earlier this morning along with Dr Sutton and Dr. Mulligan. She complains of incisional pain as well as persistent bilat lateral thigh numbness.      Vital Signs Last 24 Hrs  T(C): 36.9 (27 Nov 2017 09:43), Max: 37.7 (26 Nov 2017 21:53)  T(F): 98.4 (27 Nov 2017 09:43), Max: 99.8 (26 Nov 2017 21:53)  HR: 73 (27 Nov 2017 09:43) (73 - 103)  BP: 120/62 (27 Nov 2017 09:43) (116/68 - 142/79)  BP(mean): --  RR: 18 (27 Nov 2017 09:43) (18 - 18)  SpO2: 96% (27 Nov 2017 09:43) (94% - 96%)      PHYSICAL EXAM:    Constitutional: No Acute Distress     Neurological: AOx3, Following Commands, Moving all Extremities, pain limits her at times    Motor exam:          Upper extremity- 5/5 throughout bilat                                Lower extremity- very pain limited: LT HF 3/5, KF 4/5, DF/PF 4/5, RT  HF 4/5, KF 4/5, DF/PF 4/5  (left LE slightly weaker/poor effort      due to pain)                                                                Sensation: decreased sensation to lateral thigh b/l     Pulmonary: Clear to Auscultation, No rales, No rhonchi, No wheezes     Cardiovascular: S1, S2, Regular rate and rhythm     Gastrointestinal: Soft, Non-tender, Non-distended, + Bowel sounds    Extremities: No calf tenderness     Incision: + staples posterior and left flank; left HMVC removed without difficulty.       LABS:                        9.6    6.9   )-----------( 184      ( 25 Nov 2017 09:00 )             29.7    11-25    139  |  101  |  11  ----------------------------<  131<H>  3.7   |  28  |  0.73    Ca    8.9      25 Nov 2017 09:00    TPro  6.5  /  Alb  3.4  /  TBili  0.5  /  DBili  x   /  AST  29  /  ALT  10  /  AlkPhos  64  11-25  Amylase, Serum Total (11.25.17 @ 09:00)    Amylase, Serum Total: 74 U/L  Lipase, Serum (11.25.17 @ 09:00)    Lipase, Serum: 16 U/L    MEDICATIONS:  Neuro:  acetaminophen   Tablet 650 milliGRAM(s) Oral every 6 hours PRN For Temp greater than 38 C (100.4 F)  acetaminophen   Tablet. 650 milliGRAM(s) Oral every 6 hours PRN Mild Pain (1 - 3)  diazepam    Tablet 5 milliGRAM(s) Oral every 8 hours PRN spasms  ondansetron Injectable 4 milliGRAM(s) IV Push every 6 hours PRN Nausea and/or Vomiting  oxyCODONE    5 mG/acetaminophen 325 mG 1 Tablet(s) Oral every 4 hours PRN Moderate Pain (4 - 6)  oxyCODONE    5 mG/acetaminophen 325 mG 2 Tablet(s) Oral every 4 hours PRN Severe Pain (7 - 10)    Cardiac:  amLODIPine   Tablet 10 milliGRAM(s) Oral at bedtime  enalapril 20 milliGRAM(s) Oral at bedtime  metoprolol     tartrate 50 milliGRAM(s) Oral two times a day    GI/:  docusate sodium 100 milliGRAM(s) Oral three times a day  pantoprazole    Tablet 40 milliGRAM(s) Oral daily  polyethylene glycol 3350 17 Gram(s) Oral two times a day  senna 2 Tablet(s) Oral at bedtime PRN Constipation    Other:   dextrose 5%. 1000 milliLiter(s) IV Continuous <Continuous>  dextrose 50% Injectable 12.5 Gram(s) IV Push once  dextrose 50% Injectable 25 Gram(s) IV Push once  dextrose 50% Injectable 25 Gram(s) IV Push once  dextrose Gel 1 Dose(s) Oral once PRN Blood Glucose LESS THAN 70 milliGRAM(s)/deciliter  enoxaparin Injectable 40 milliGRAM(s) SubCutaneous <User Schedule>  glucagon  Injectable 1 milliGRAM(s) IntraMuscular once PRN Glucose LESS THAN 70 milligrams/deciliter  insulin lispro (HumaLOG) corrective regimen sliding scale   SubCutaneous three times a day before meals  insulin lispro (HumaLOG) corrective regimen sliding scale   SubCutaneous at bedtime  levothyroxine 150 MICROGram(s) Oral daily    DIET: ccd CHIEF COMPLAINT: Patient seen and examined at bedside earlier this morning along with Dr Sutton and Dr. Mulligan. She complains of incisional pain as well as persistent bilat lateral thigh numbness.      Vital Signs Last 24 Hrs  T(C): 36.9 (27 Nov 2017 09:43), Max: 37.7 (26 Nov 2017 21:53)  T(F): 98.4 (27 Nov 2017 09:43), Max: 99.8 (26 Nov 2017 21:53)  HR: 73 (27 Nov 2017 09:43) (73 - 103)  BP: 120/62 (27 Nov 2017 09:43) (116/68 - 142/79)  BP(mean): --  RR: 18 (27 Nov 2017 09:43) (18 - 18)  SpO2: 96% (27 Nov 2017 09:43) (94% - 96%)      PHYSICAL EXAM:    Constitutional: No Acute Distress     Neurological: AOx3, Following Commands, Moving all Extremities, pain limits her at times    Motor exam:          Upper extremity- 5/5 throughout bilat                                Lower extremity- very pain limited: LT HF 3/5, KF 4/5, DF/PF 4/5, RT  HF 4/5, KF 4/5, DF/PF 4/5  (left LE slightly weaker/poor effort      due to pain)                                                                Sensation: decreased sensation to lateral thigh b/l     Pulmonary: Clear to Auscultation, No rales, No rhonchi, No wheezes     Cardiovascular: S1, S2, Regular rate and rhythm     Gastrointestinal: Soft, Non-tender, Non-distended, + Bowel sounds    Extremities: No calf tenderness     Incision: + staples; c/d/i      LABS:                        9.6    6.9   )-----------( 184      ( 25 Nov 2017 09:00 )             29.7    11-25    139  |  101  |  11  ----------------------------<  131<H>  3.7   |  28  |  0.73    Ca    8.9      25 Nov 2017 09:00    TPro  6.5  /  Alb  3.4  /  TBili  0.5  /  DBili  x   /  AST  29  /  ALT  10  /  AlkPhos  64  11-25  Amylase, Serum Total (11.25.17 @ 09:00)    Amylase, Serum Total: 74 U/L  Lipase, Serum (11.25.17 @ 09:00)    Lipase, Serum: 16 U/L    MEDICATIONS:  Neuro:  acetaminophen   Tablet 650 milliGRAM(s) Oral every 6 hours PRN For Temp greater than 38 C (100.4 F)  acetaminophen   Tablet. 650 milliGRAM(s) Oral every 6 hours PRN Mild Pain (1 - 3)  diazepam    Tablet 5 milliGRAM(s) Oral every 8 hours PRN spasms  ondansetron Injectable 4 milliGRAM(s) IV Push every 6 hours PRN Nausea and/or Vomiting  oxyCODONE    5 mG/acetaminophen 325 mG 1 Tablet(s) Oral every 4 hours PRN Moderate Pain (4 - 6)  oxyCODONE    5 mG/acetaminophen 325 mG 2 Tablet(s) Oral every 4 hours PRN Severe Pain (7 - 10)    Cardiac:  amLODIPine   Tablet 10 milliGRAM(s) Oral at bedtime  enalapril 20 milliGRAM(s) Oral at bedtime  metoprolol     tartrate 50 milliGRAM(s) Oral two times a day    GI/:  docusate sodium 100 milliGRAM(s) Oral three times a day  pantoprazole    Tablet 40 milliGRAM(s) Oral daily  polyethylene glycol 3350 17 Gram(s) Oral two times a day  senna 2 Tablet(s) Oral at bedtime PRN Constipation    Other:   dextrose 5%. 1000 milliLiter(s) IV Continuous <Continuous>  dextrose 50% Injectable 12.5 Gram(s) IV Push once  dextrose 50% Injectable 25 Gram(s) IV Push once  dextrose 50% Injectable 25 Gram(s) IV Push once  dextrose Gel 1 Dose(s) Oral once PRN Blood Glucose LESS THAN 70 milliGRAM(s)/deciliter  enoxaparin Injectable 40 milliGRAM(s) SubCutaneous <User Schedule>  glucagon  Injectable 1 milliGRAM(s) IntraMuscular once PRN Glucose LESS THAN 70 milligrams/deciliter  insulin lispro (HumaLOG) corrective regimen sliding scale   SubCutaneous three times a day before meals  insulin lispro (HumaLOG) corrective regimen sliding scale   SubCutaneous at bedtime  levothyroxine 150 MICROGram(s) Oral daily    DIET: ccd

## 2017-11-27 NOTE — PROGRESS NOTE ADULT - SUBJECTIVE AND OBJECTIVE BOX
Authored by Dr Oswald Loaiza [phone]     Patient is a 72y old  Female who presents with a chief complaint of "I am having back surgery" (22 Nov 2017 05:41)    SUBJECTIVE / OVERNIGHT EVENTS:    MEDICATIONS  (STANDING):  amLODIPine   Tablet 10 milliGRAM(s) Oral at bedtime  dextrose 5%. 1000 milliLiter(s) (50 mL/Hr) IV Continuous <Continuous>  dextrose 50% Injectable 12.5 Gram(s) IV Push once  dextrose 50% Injectable 25 Gram(s) IV Push once  dextrose 50% Injectable 25 Gram(s) IV Push once  docusate sodium 100 milliGRAM(s) Oral three times a day  enalapril 20 milliGRAM(s) Oral at bedtime  enoxaparin Injectable 40 milliGRAM(s) SubCutaneous <User Schedule>  insulin lispro (HumaLOG) corrective regimen sliding scale   SubCutaneous three times a day before meals  insulin lispro (HumaLOG) corrective regimen sliding scale   SubCutaneous at bedtime  levothyroxine 150 MICROGram(s) Oral daily  metoprolol     tartrate 50 milliGRAM(s) Oral two times a day  pantoprazole    Tablet 40 milliGRAM(s) Oral daily  polyethylene glycol 3350 17 Gram(s) Oral two times a day    MEDICATIONS  (PRN):  acetaminophen   Tablet 650 milliGRAM(s) Oral every 6 hours PRN For Temp greater than 38 C (100.4 F)  acetaminophen   Tablet. 650 milliGRAM(s) Oral every 6 hours PRN Mild Pain (1 - 3)  dextrose Gel 1 Dose(s) Oral once PRN Blood Glucose LESS THAN 70 milliGRAM(s)/deciliter  diazepam    Tablet 5 milliGRAM(s) Oral every 8 hours PRN spasms  glucagon  Injectable 1 milliGRAM(s) IntraMuscular once PRN Glucose LESS THAN 70 milligrams/deciliter  ondansetron Injectable 4 milliGRAM(s) IV Push every 6 hours PRN Nausea and/or Vomiting  oxyCODONE    IR 10 milliGRAM(s) Oral every 4 hours PRN Moderate Pain (4 - 6)  oxyCODONE    IR 15 milliGRAM(s) Oral every 4 hours PRN Severe Pain (7 - 10)  senna 2 Tablet(s) Oral at bedtime PRN Constipation      Vital Signs Last 24 Hrs  T(C): 36.9 (27 Nov 2017 09:43), Max: 37.7 (26 Nov 2017 21:53)  T(F): 98.4 (27 Nov 2017 09:43), Max: 99.8 (26 Nov 2017 21:53)  HR: 73 (27 Nov 2017 09:43) (73 - 103)  BP: 120/62 (27 Nov 2017 09:43) (116/68 - 142/79)  BP(mean): --  RR: 18 (27 Nov 2017 09:43) (18 - 18)  SpO2: 96% (27 Nov 2017 09:43) (94% - 96%)  CAPILLARY BLOOD GLUCOSE      POCT Blood Glucose.: 192 mg/dL (27 Nov 2017 12:10)  POCT Blood Glucose.: 179 mg/dL (27 Nov 2017 08:55)  POCT Blood Glucose.: 150 mg/dL (26 Nov 2017 21:50)  POCT Blood Glucose.: 179 mg/dL (26 Nov 2017 17:07)    I&O's Summary    26 Nov 2017 07:01  -  27 Nov 2017 07:00  --------------------------------------------------------  IN: 1040 mL / OUT: 2240 mL / NET: -1200 mL        PHYSICAL EXAM  GENERAL: distress 2/2 pain  HEAD:  Atraumatic, Normocephalic  EYES: EOMI, PERRLA, conjunctiva and sclera clear  NECK: Supple, No JVD  CHEST/LUNG: Clear to auscultation bilaterally; No wheeze  HEART: Regular rate and rhythm; No murmurs, rubs, or gallops  ABDOMEN: obese.  Soft.  diffuse TTP, worse in RLQ.  No overt masses felt.  Negative zimmerman sign .  EXTREMITIES:  2+ Peripheral Pulses, No clubbing, cyanosis, or edema  PSYCH: AAOx3  NEUROLOGY: non-focal  SKIN:vertical scars anterior abdomen, no erythema. Drain from fresh incision site.  ISIAH drain from back    LABS:      RADIOLOGY & ADDITIONAL TESTS:    Imaging Personally Reviewed:  Consultant(s) Notes Reviewed:    Care Discussed with Consultants/Other Providers: Authored by Dr Oswald Loaiza [phone]     Patient is a 72y old  Female who presents with a chief complaint of "I am having back surgery" (22 Nov 2017 05:41)    SUBJECTIVE / OVERNIGHT EVENTS: no ab pain, eating well, had bowel movement. ambulating w/walker. notes some leg swelling. has numbness of anterior thighs b/l. otherwise functional status is improved per pt from prior to surgery. no n/v/d.     MEDICATIONS  (STANDING):  amLODIPine   Tablet 10 milliGRAM(s) Oral at bedtime  dextrose 5%. 1000 milliLiter(s) (50 mL/Hr) IV Continuous <Continuous>  dextrose 50% Injectable 12.5 Gram(s) IV Push once  dextrose 50% Injectable 25 Gram(s) IV Push once  dextrose 50% Injectable 25 Gram(s) IV Push once  docusate sodium 100 milliGRAM(s) Oral three times a day  enalapril 20 milliGRAM(s) Oral at bedtime  enoxaparin Injectable 40 milliGRAM(s) SubCutaneous <User Schedule>  insulin lispro (HumaLOG) corrective regimen sliding scale   SubCutaneous three times a day before meals  insulin lispro (HumaLOG) corrective regimen sliding scale   SubCutaneous at bedtime  levothyroxine 150 MICROGram(s) Oral daily  metoprolol     tartrate 50 milliGRAM(s) Oral two times a day  pantoprazole    Tablet 40 milliGRAM(s) Oral daily  polyethylene glycol 3350 17 Gram(s) Oral two times a day    MEDICATIONS  (PRN):  acetaminophen   Tablet 650 milliGRAM(s) Oral every 6 hours PRN For Temp greater than 38 C (100.4 F)  acetaminophen   Tablet. 650 milliGRAM(s) Oral every 6 hours PRN Mild Pain (1 - 3)  dextrose Gel 1 Dose(s) Oral once PRN Blood Glucose LESS THAN 70 milliGRAM(s)/deciliter  diazepam    Tablet 5 milliGRAM(s) Oral every 8 hours PRN spasms  glucagon  Injectable 1 milliGRAM(s) IntraMuscular once PRN Glucose LESS THAN 70 milligrams/deciliter  ondansetron Injectable 4 milliGRAM(s) IV Push every 6 hours PRN Nausea and/or Vomiting  oxyCODONE    IR 10 milliGRAM(s) Oral every 4 hours PRN Moderate Pain (4 - 6)  oxyCODONE    IR 15 milliGRAM(s) Oral every 4 hours PRN Severe Pain (7 - 10)  senna 2 Tablet(s) Oral at bedtime PRN Constipation      Vital Signs Last 24 Hrs  T(C): 36.9 (27 Nov 2017 09:43), Max: 37.7 (26 Nov 2017 21:53)  T(F): 98.4 (27 Nov 2017 09:43), Max: 99.8 (26 Nov 2017 21:53)  HR: 73 (27 Nov 2017 09:43) (73 - 103)  BP: 120/62 (27 Nov 2017 09:43) (116/68 - 142/79)  BP(mean): --  RR: 18 (27 Nov 2017 09:43) (18 - 18)  SpO2: 96% (27 Nov 2017 09:43) (94% - 96%)  CAPILLARY BLOOD GLUCOSE      POCT Blood Glucose.: 192 mg/dL (27 Nov 2017 12:10)  POCT Blood Glucose.: 179 mg/dL (27 Nov 2017 08:55)  POCT Blood Glucose.: 150 mg/dL (26 Nov 2017 21:50)  POCT Blood Glucose.: 179 mg/dL (26 Nov 2017 17:07)    I&O's Summary    26 Nov 2017 07:01  -  27 Nov 2017 07:00  --------------------------------------------------------  IN: 1040 mL / OUT: 2240 mL / NET: -1200 mL        PHYSICAL EXAM  GENERAL: NAD  EYES: EOMI, PERRLA, conjunctiva and sclera clear  NECK: Supple, No JVD  CHEST/LUNG: Clear to auscultation bilaterally; No wheeze  HEART: Regular rate and rhythm; No murmurs, rubs, or gallops  ABDOMEN: obese.  Soft. NT ND BS+  EXTREMITIES:  2+ Peripheral Pulses,  NEUROLOGY: non-focal AOx3 sitting forward limited by pain  SKIN:vertical scars anterior abdomen, no erythema. no appreciable LE edema on my exam.   LABS:      RADIOLOGY & ADDITIONAL TESTS:    Imaging Personally Reviewed:  Consultant(s) Notes Reviewed:    Care Discussed with Consultants/Other Providers:

## 2017-11-27 NOTE — PROGRESS NOTE ADULT - ASSESSMENT
71 year old female presents for Stage I L1-L4 Lumbar Lateral Fusion. Pt. reports a history of progressively worsening lower back pain radiating down her left lower extremity X 2 years. (15 Nov 2017 08:45)    PROCEDURE: 11/22/17 s/p L1-L5 XLIF, L5-S1 PLIF, L1-S1 screw and john fixation, for spinal stenosis POD#5 	    PLAN:  -Neuro: continue oxy IR for pain; valium for muscle spams   -Encourage incentive spirometer  -CV: vasotec and lopresor for HTN  -Endocrine: cont CCD and HISS for type 2 DM, cont levothyroxine for hypothyroid             -DVT ppx: SQL and venodynes bilaterally  -GI: no more abdominal pain. GI had seen her and signed off, amylase/lipase WNL  -PT/OT: JONNY pending choices       Will discuss with Dr Andrey Caballero # [phone] 71 year old female presents for Stage I L1-L4 Lumbar Lateral Fusion. Pt. reports a history of progressively worsening lower back pain radiating down her left lower extremity X 2 years. (15 Nov 2017 08:45)    PROCEDURE: 11/22/17 s/p L1-L5 XLIF, L5-S1 PLIF, L1-S1 screw and john fixation, for spinal stenosis POD#5 	    PLAN:  -Neuro: continue oxy IR for pain; valium for muscle spams; added gabapentin   -Encourage incentive spirometer  -CV: vasotec and lopresor for HTN  -Endocrine: cont CCD and HISS for type 2 DM, cont levothyroxine for hypothyroid             -DVT ppx: SQL and venodynes bilaterally  -GI: no more abdominal pain. GI had seen her and signed off, amylase/lipase WNL  -PT/OT: JONNY pending choices       Will discuss with Dr Andrey Caballero # [phone]

## 2017-11-27 NOTE — PROGRESS NOTE ADULT - PROBLEM SELECTOR PLAN 5
dvt proph - lovenox per nsgy no swelling on my exam - no respiratory symptoms - if this is changes would do VTE w/u but right now would continue to monitor. c/w dvt ppx as below. ambulate PRN

## 2017-11-28 ENCOUNTER — TRANSCRIPTION ENCOUNTER (OUTPATIENT)
Age: 72
End: 2017-11-28

## 2017-11-28 VITALS
SYSTOLIC BLOOD PRESSURE: 126 MMHG | DIASTOLIC BLOOD PRESSURE: 79 MMHG | HEART RATE: 48 BPM | TEMPERATURE: 99 F | RESPIRATION RATE: 18 BRPM | OXYGEN SATURATION: 95 %

## 2017-11-28 DIAGNOSIS — E11.65 TYPE 2 DIABETES MELLITUS WITH HYPERGLYCEMIA: ICD-10-CM

## 2017-11-28 DIAGNOSIS — N30.00 ACUTE CYSTITIS WITHOUT HEMATURIA: ICD-10-CM

## 2017-11-28 LAB
APPEARANCE UR: CLEAR — SIGNIFICANT CHANGE UP
BILIRUB UR-MCNC: NEGATIVE — SIGNIFICANT CHANGE UP
COLOR SPEC: SIGNIFICANT CHANGE UP
DIFF PNL FLD: NEGATIVE — SIGNIFICANT CHANGE UP
GLUCOSE BLDC GLUCOMTR-MCNC: 204 MG/DL — HIGH (ref 70–99)
GLUCOSE BLDC GLUCOMTR-MCNC: 325 MG/DL — HIGH (ref 70–99)
GLUCOSE UR QL: NEGATIVE — SIGNIFICANT CHANGE UP
KETONES UR-MCNC: NEGATIVE — SIGNIFICANT CHANGE UP
LEUKOCYTE ESTERASE UR-ACNC: ABNORMAL
NITRITE UR-MCNC: NEGATIVE — SIGNIFICANT CHANGE UP
PH UR: 6.5 — SIGNIFICANT CHANGE UP (ref 5–8)
PROT UR-MCNC: NEGATIVE — SIGNIFICANT CHANGE UP
SP GR SPEC: 1.01 — LOW (ref 1.01–1.02)
UROBILINOGEN FLD QL: NEGATIVE — SIGNIFICANT CHANGE UP

## 2017-11-28 PROCEDURE — 99233 SBSQ HOSP IP/OBS HIGH 50: CPT

## 2017-11-28 RX ORDER — DIAZEPAM 5 MG
1 TABLET ORAL
Qty: 0 | Refills: 0 | COMMUNITY
Start: 2017-11-28

## 2017-11-28 RX ORDER — DOCUSATE SODIUM 100 MG
1 CAPSULE ORAL
Qty: 0 | Refills: 0 | COMMUNITY
Start: 2017-11-28

## 2017-11-28 RX ORDER — NITROFURANTOIN MACROCRYSTAL 50 MG
1 CAPSULE ORAL
Qty: 0 | Refills: 0 | COMMUNITY
Start: 2017-11-28

## 2017-11-28 RX ORDER — LEVOTHYROXINE SODIUM 125 MCG
1 TABLET ORAL
Qty: 0 | Refills: 0 | COMMUNITY

## 2017-11-28 RX ORDER — ASPIRIN/CALCIUM CARB/MAGNESIUM 324 MG
0 TABLET ORAL
Qty: 0 | Refills: 0 | COMMUNITY
Start: 2017-11-28

## 2017-11-28 RX ORDER — NITROFURANTOIN MACROCRYSTAL 50 MG
100 CAPSULE ORAL
Qty: 0 | Refills: 0 | Status: DISCONTINUED | OUTPATIENT
Start: 2017-11-28 | End: 2017-11-28

## 2017-11-28 RX ORDER — ACETAMINOPHEN 500 MG
2 TABLET ORAL
Qty: 0 | Refills: 0 | COMMUNITY
Start: 2017-11-28

## 2017-11-28 RX ORDER — ENOXAPARIN SODIUM 100 MG/ML
40 INJECTION SUBCUTANEOUS
Qty: 0 | Refills: 0 | COMMUNITY
Start: 2017-11-28

## 2017-11-28 RX ORDER — METOPROLOL TARTRATE 50 MG
1 TABLET ORAL
Qty: 0 | Refills: 0 | COMMUNITY
Start: 2017-11-28

## 2017-11-28 RX ORDER — GABAPENTIN 400 MG/1
1 CAPSULE ORAL
Qty: 0 | Refills: 0 | COMMUNITY
Start: 2017-11-28

## 2017-11-28 RX ORDER — POLYETHYLENE GLYCOL 3350 17 G/17G
17 POWDER, FOR SOLUTION ORAL
Qty: 0 | Refills: 0 | DISCHARGE
Start: 2017-11-28

## 2017-11-28 RX ORDER — AMLODIPINE BESYLATE 2.5 MG/1
1 TABLET ORAL
Qty: 0 | Refills: 0 | COMMUNITY
Start: 2017-11-28

## 2017-11-28 RX ORDER — LEVOTHYROXINE SODIUM 125 MCG
1 TABLET ORAL
Qty: 0 | Refills: 0 | COMMUNITY
Start: 2017-11-28

## 2017-11-28 RX ORDER — OXYCODONE HYDROCHLORIDE 5 MG/1
1 TABLET ORAL
Qty: 0 | Refills: 0 | COMMUNITY
Start: 2017-11-28

## 2017-11-28 RX ORDER — AMLODIPINE BESYLATE 2.5 MG/1
1 TABLET ORAL
Qty: 0 | Refills: 0 | COMMUNITY

## 2017-11-28 RX ORDER — SENNA PLUS 8.6 MG/1
2 TABLET ORAL
Qty: 0 | Refills: 0 | COMMUNITY
Start: 2017-11-28

## 2017-11-28 RX ADMIN — OXYCODONE HYDROCHLORIDE 15 MILLIGRAM(S): 5 TABLET ORAL at 05:58

## 2017-11-28 RX ADMIN — Medication 5 MILLIGRAM(S): at 07:50

## 2017-11-28 RX ADMIN — Medication 8: at 12:01

## 2017-11-28 RX ADMIN — Medication 4: at 09:02

## 2017-11-28 RX ADMIN — GABAPENTIN 300 MILLIGRAM(S): 400 CAPSULE ORAL at 05:57

## 2017-11-28 RX ADMIN — OXYCODONE HYDROCHLORIDE 15 MILLIGRAM(S): 5 TABLET ORAL at 10:45

## 2017-11-28 RX ADMIN — GABAPENTIN 300 MILLIGRAM(S): 400 CAPSULE ORAL at 13:07

## 2017-11-28 RX ADMIN — OXYCODONE HYDROCHLORIDE 15 MILLIGRAM(S): 5 TABLET ORAL at 01:55

## 2017-11-28 RX ADMIN — OXYCODONE HYDROCHLORIDE 15 MILLIGRAM(S): 5 TABLET ORAL at 10:13

## 2017-11-28 RX ADMIN — PANTOPRAZOLE SODIUM 40 MILLIGRAM(S): 20 TABLET, DELAYED RELEASE ORAL at 05:57

## 2017-11-28 RX ADMIN — OXYCODONE HYDROCHLORIDE 15 MILLIGRAM(S): 5 TABLET ORAL at 06:30

## 2017-11-28 RX ADMIN — Medication 50 MILLIGRAM(S): at 05:57

## 2017-11-28 RX ADMIN — Medication 150 MICROGRAM(S): at 05:57

## 2017-11-28 RX ADMIN — OXYCODONE HYDROCHLORIDE 15 MILLIGRAM(S): 5 TABLET ORAL at 01:25

## 2017-11-28 NOTE — DISCHARGE NOTE ADULT - NS AS DC STROKE ED MATERIALS
Stroke Education Booklet/Stroke Warning Signs and Symptoms/Call 911 for Stroke/Need for Followup After Discharge/Prescribed Medications/Risk Factors for Stroke

## 2017-11-28 NOTE — PROGRESS NOTE ADULT - PROBLEM SELECTOR PROBLEM 2
Abdominal pain, acute, right lower quadrant
Hypertension, unspecified type
Hypertension, unspecified type

## 2017-11-28 NOTE — DISCHARGE NOTE ADULT - MEDICATION SUMMARY - MEDICATIONS TO TAKE
I will START or STAY ON the medications listed below when I get home from the hospital:    acetaminophen 325 mg oral tablet  -- 2 tab(s) by mouth every 6 hours, As needed, Mild Pain (1 - 3)  -- Indication: For Mild pain    acetaminophen 325 mg oral tablet  -- 2 tab(s) by mouth every 6 hours, As needed, For Temp greater than 38 C (100.4 F)  -- Indication: For Fever    oxyCODONE 10 mg oral tablet  -- 1 tab(s) by mouth every 4 hours, As needed, Moderate Pain (4 - 6)  -- Indication: For Moderate pain    oxyCODONE 15 mg oral tablet  -- 1 tab(s) by mouth every 4 hours, As needed, Severe Pain (7 - 10)  -- Indication: For Severe painpain    enalapril 20 mg oral tablet  -- 1 tab(s) by mouth once a day (at bedtime)  -- Indication: For Hypertension, unspecified type    enoxaparin  -- 40 milligram(s) subcutaneous once a day (at bedtime)  -- Indication: For DVT prophylaxis    gabapentin 300 mg oral capsule  -- 1 cap(s) by mouth 3 times a day  -- Indication: For Neuropathic pain    diazePAM 5 mg oral tablet  -- 1 tab(s) by mouth every 8 hours, As needed, spasms  -- Indication: For Muscles spasm    metFORMIN 1000 mg oral tablet  -- 1 tab(s) by mouth 2 times a day  -- Indication: For Type 2 diabetes mellitus without complication, with long-term current use of insulin    HumaLOG Mix 75/25 KwikPen subcutaneous suspension  -- 10 unit(s) subcutaneous once a day in am  -- Indication: For Type 2 diabetes mellitus without complication, with long-term current use of insulin    HumaLOG Mix 75/25 subcutaneous suspension  -- 5 unit(s) subcutaneous once a day (at bedtime)  -- Indication: For Type 2 diabetes mellitus without complication, with long-term current use of insulin    metoprolol tartrate 50 mg oral tablet  -- 1 tab(s) by mouth 2 times a day  -- Indication: For Hypertension, unspecified type    amLODIPine 10 mg oral tablet  -- 1 tab(s) by mouth once a day (at bedtime)  -- Indication: For Hypertension, unspecified type    docusate sodium 100 mg oral capsule  -- 1 cap(s) by mouth 3 times a day  -- Indication: For Stool softener    polyethylene glycol 3350 oral powder for reconstitution  -- 17 gram(s) by mouth 2 times a day  -- Indication: For Stool softener    senna oral tablet  -- 2 tab(s) by mouth once a day (at bedtime), As needed, Constipation  -- Indication: For Stool softener    PriLOSEC OTC 20 mg oral delayed release tablet  -- 1 tab(s) by mouth once a day  -- Indication: For Gastritis    levothyroxine 150 mcg (0.15 mg) oral tablet  -- 1 tab(s) by mouth once a day  -- Indication: For Hypothyroidism, unspecified type I will START or STAY ON the medications listed below when I get home from the hospital:    acetaminophen 325 mg oral tablet  -- 2 tab(s) by mouth every 6 hours, As needed, Mild Pain (1 - 3)  -- Indication: For Mild pain    acetaminophen 325 mg oral tablet  -- 2 tab(s) by mouth every 6 hours, As needed, For Temp greater than 38 C (100.4 F)  -- Indication: For Fever    oxyCODONE 10 mg oral tablet  -- 1 tab(s) by mouth every 4 hours, As needed, Moderate Pain (4 - 6)  -- Indication: For Moderate pain    oxyCODONE 15 mg oral tablet  -- 1 tab(s) by mouth every 4 hours, As needed, Severe Pain (7 - 10)  -- Indication: For Severe painpain    aspirin  -- once a day  -- Indication: For Prophylactic measure    enalapril 20 mg oral tablet  -- 1 tab(s) by mouth once a day (at bedtime)  -- Indication: For Hypertension, unspecified type    enoxaparin  -- 40 milligram(s) subcutaneous once a day (at bedtime)  -- Indication: For DVT prophylaxis    gabapentin 300 mg oral capsule  -- 1 cap(s) by mouth 3 times a day  -- Indication: For Neuropathic pain    diazePAM 5 mg oral tablet  -- 1 tab(s) by mouth every 8 hours, As needed, spasms  -- Indication: For Muscles spasm    metFORMIN 1000 mg oral tablet  -- 1 tab(s) by mouth 2 times a day  -- Indication: For Type 2 diabetes mellitus without complication, with long-term current use of insulin    HumaLOG Mix 75/25 KwikPen subcutaneous suspension  -- 10 unit(s) subcutaneous once a day in am  -- Indication: For Type 2 diabetes mellitus without complication, with long-term current use of insulin    HumaLOG Mix 75/25 subcutaneous suspension  -- 5 unit(s) subcutaneous once a day (at bedtime)  -- Indication: For Type 2 diabetes mellitus without complication, with long-term current use of insulin    metoprolol tartrate 50 mg oral tablet  -- 1 tab(s) by mouth 2 times a day  -- Indication: For Hypertension, unspecified type    amLODIPine 10 mg oral tablet  -- 1 tab(s) by mouth once a day (at bedtime)  -- Indication: For Hypertension, unspecified type    docusate sodium 100 mg oral capsule  -- 1 cap(s) by mouth 3 times a day  -- Indication: For Stool softener    polyethylene glycol 3350 oral powder for reconstitution  -- 17 gram(s) by mouth 2 times a day  -- Indication: For Stool softener    senna oral tablet  -- 2 tab(s) by mouth once a day (at bedtime), As needed, Constipation  -- Indication: For Stool softener    PriLOSEC OTC 20 mg oral delayed release tablet  -- 1 tab(s) by mouth once a day  -- Indication: For Gastritis    levothyroxine 150 mcg (0.15 mg) oral tablet  -- 1 tab(s) by mouth once a day  -- Indication: For Hypothyroidism, unspecified type I will START or STAY ON the medications listed below when I get home from the hospital:    acetaminophen 325 mg oral tablet  -- 2 tab(s) by mouth every 6 hours, As needed, Mild Pain (1 - 3)  -- Indication: For Mild pain    acetaminophen 325 mg oral tablet  -- 2 tab(s) by mouth every 6 hours, As needed, For Temp greater than 38 C (100.4 F)  -- Indication: For Fever    oxyCODONE 10 mg oral tablet  -- 1 tab(s) by mouth every 4 hours, As needed, Moderate Pain (4 - 6)  -- Indication: For Moderate pain    oxyCODONE 15 mg oral tablet  -- 1 tab(s) by mouth every 4 hours, As needed, Severe Pain (7 - 10)  -- Indication: For Severe painpain    aspirin  -- once a day  -- Indication: For Prophylactic measure    enalapril 20 mg oral tablet  -- 1 tab(s) by mouth once a day (at bedtime)  -- Indication: For Hypertension, unspecified type    enoxaparin  -- 40 milligram(s) subcutaneous once a day (at bedtime)  -- Indication: For DVT prophylaxis    gabapentin 300 mg oral capsule  -- 1 cap(s) by mouth 3 times a day  -- Indication: For Neuropathic pain    diazePAM 5 mg oral tablet  -- 1 tab(s) by mouth every 8 hours, As needed, spasms  -- Indication: For Muscles spasm    metFORMIN 1000 mg oral tablet  -- 1 tab(s) by mouth 2 times a day  -- Indication: For Type 2 diabetes mellitus without complication, with long-term current use of insulin    HumaLOG Mix 75/25 KwikPen subcutaneous suspension  -- 10 unit(s) subcutaneous once a day in am  -- Indication: For Type 2 diabetes mellitus without complication, with long-term current use of insulin    HumaLOG Mix 75/25 subcutaneous suspension  -- 5 unit(s) subcutaneous once a day (at bedtime)  -- Indication: For Type 2 diabetes mellitus without complication, with long-term current use of insulin    metoprolol tartrate 50 mg oral tablet  -- 1 tab(s) by mouth 2 times a day  -- Indication: For Hypertension, unspecified type    amLODIPine 10 mg oral tablet  -- 1 tab(s) by mouth once a day (at bedtime)  -- Indication: For Hypertension, unspecified type    docusate sodium 100 mg oral capsule  -- 1 cap(s) by mouth 3 times a day  -- Indication: For Stool softener    polyethylene glycol 3350 oral powder for reconstitution  -- 17 gram(s) by mouth 2 times a day  -- Indication: For Stool softener    senna oral tablet  -- 2 tab(s) by mouth once a day (at bedtime), As needed, Constipation  -- Indication: For Stool softener    PriLOSEC OTC 20 mg oral delayed release tablet  -- 1 tab(s) by mouth once a day  -- Indication: For Gastritis    levothyroxine 150 mcg (0.15 mg) oral tablet  -- 1 tab(s) by mouth once a day  -- Indication: For Hypothyroidism, unspecified type    nitrofurantoin macrocrystals-monohydrate 100 mg oral capsule  -- 1 cap(s) by mouth 2 times a day (with meals) for 7 days  -- Indication: For UTI

## 2017-11-28 NOTE — DISCHARGE NOTE ADULT - PATIENT PORTAL LINK FT
“You can access the [employer] Patient Portal, offered by [facility], by registering with the following website: [email or web address]”

## 2017-11-28 NOTE — PROGRESS NOTE ADULT - PROBLEM SELECTOR PLAN 3
A1c controlled, c/w diabetic diet and resume home meds upon d/c including insulin
monitor FS  AM   c/w ISS for coverage  may need low dose basal insulin   hba1c 6.4
monitor FS - acceptable at present  c/w ISS for coverage  hba1c 6.4

## 2017-11-28 NOTE — DISCHARGE NOTE ADULT - CARE PROVIDER_API CALL
Hamzah Balderas (MD), Neurological Surgery  [address] [place] [address] [address]  [place] [address]  Phone: [phone]  Fax: [phone]

## 2017-11-28 NOTE — PROGRESS NOTE ADULT - PROBLEM SELECTOR PROBLEM 3
Type 2 diabetes mellitus with hyperglycemia, with long-term current use of insulin
Type 2 diabetes mellitus without complication, with long-term current use of insulin
Type 2 diabetes mellitus without complication, with long-term current use of insulin

## 2017-11-28 NOTE — DISCHARGE NOTE ADULT - CARE PLAN
Principal Discharge DX:	Spinal stenosis  Goal:	Lumbar fusion  Instructions for follow-up, activity and diet:	Consistent carbohydrates diet  Activity as tolerated

## 2017-11-28 NOTE — PROGRESS NOTE ADULT - SUBJECTIVE AND OBJECTIVE BOX
HPI:  71 year old female presents for Stage I L1-L4 Lumbar Lateral Fusion. Pt. reports a history of progressively worsening lower back pain radiating down her left lower extremity X 2 years. (15 Nov 2017 08:45)    Post-op day # 6 s/p LI-5 LLIF, L5-S1 PLIF, L1-S1 FUSION    Overnight event: None, LLE improving    Vital Signs Last 24 Hrs  T(C): 37.1 (28 Nov 2017 08:22), Max: 37.6 (27 Nov 2017 20:20)  T(F): 98.8 (28 Nov 2017 08:22), Max: 99.7 (27 Nov 2017 20:20)  HR: 48 (28 Nov 2017 08:22) (48 - 99)  BP: 126/79 (28 Nov 2017 08:22) (118/61 - 149/83)  BP(mean): --  RR: 18 (28 Nov 2017 08:22) (18 - 18)  SpO2: 95% (28 Nov 2017 08:22) (93% - 95%)     Stroke Core Measures    Hemoglobin A1C, Whole Blood: 6.4 % (11-15 @ 10:55)    DRAIN OUTPUT:     NEUROIMAGING:     PHYSICAL EXAM:    General: No Acute Distress     Neurological: Awake, alert oriented x3, Following Commands, PERRL, EOMI, Face Symmetrical, Speech Fluent, Moving all extremities, Muscle Strength normal in both upper extremities, LE'S : proximally 3/5, distally 5/5    Pulmonary: Clear to Auscultation, No Rales, No Rhonchi, No Wheezes     Cardiovascular: S1, S2, Regular Rate and Rhythm     Gastrointestinal: Soft, Nontender, Nondistended     Extremities: No calf tenderness     Incision: clen and intact all staples in place    MEDICATIONS:   Antibiotics:    Neuro:  acetaminophen   Tablet 650 milliGRAM(s) Oral every 6 hours PRN For Temp greater than 38 C (100.4 F)  acetaminophen   Tablet. 650 milliGRAM(s) Oral every 6 hours PRN Mild Pain (1 - 3)  diazepam    Tablet 5 milliGRAM(s) Oral every 8 hours PRN spasms  gabapentin 300 milliGRAM(s) Oral three times a day  ondansetron Injectable 4 milliGRAM(s) IV Push every 6 hours PRN Nausea and/or Vomiting  oxyCODONE    IR 10 milliGRAM(s) Oral every 4 hours PRN Moderate Pain (4 - 6)  oxyCODONE    IR 15 milliGRAM(s) Oral every 4 hours PRN Severe Pain (7 - 10)    Anticoagulation:  enoxaparin Injectable 40 milliGRAM(s) SubCutaneous <User Schedule>    Cardiology:  amLODIPine   Tablet 10 milliGRAM(s) Oral at bedtime  enalapril 20 milliGRAM(s) Oral at bedtime  metoprolol     tartrate 50 milliGRAM(s) Oral two times a day    Endo:   dextrose 50% Injectable 12.5 Gram(s) IV Push once  dextrose 50% Injectable 25 Gram(s) IV Push once  dextrose 50% Injectable 25 Gram(s) IV Push once  dextrose Gel 1 Dose(s) Oral once PRN  glucagon  Injectable 1 milliGRAM(s) IntraMuscular once PRN  insulin lispro (HumaLOG) corrective regimen sliding scale   SubCutaneous three times a day before meals  insulin lispro (HumaLOG) corrective regimen sliding scale   SubCutaneous at bedtime  levothyroxine 150 MICROGram(s) Oral daily    Pulm:    GI/:  docusate sodium 100 milliGRAM(s) Oral three times a day  pantoprazole    Tablet 40 milliGRAM(s) Oral daily  polyethylene glycol 3350 17 Gram(s) Oral two times a day  senna 2 Tablet(s) Oral at bedtime PRN    Other:  dextrose 5%. 1000 milliLiter(s) IV Continuous <Continuous> HPI:  71 year old female presents for Stage I L1-L4 Lumbar Lateral Fusion. Pt. reports a history of progressively worsening lower back pain radiating down her left lower extremity X 2 years. (15 Nov 2017 08:45)    Post-op day # 6 s/p LI-5 LLIF, L5-S1 PLIF, L1-S1 FUSION    Overnight event: None, LLE improving    Vital Signs Last 24 Hrs  T(C): 37.1 (28 Nov 2017 08:22), Max: 37.6 (27 Nov 2017 20:20)  T(F): 98.8 (28 Nov 2017 08:22), Max: 99.7 (27 Nov 2017 20:20)  HR: 48 (28 Nov 2017 08:22) (48 - 99)  BP: 126/79 (28 Nov 2017 08:22) (118/61 - 149/83)  BP(mean): --  RR: 18 (28 Nov 2017 08:22) (18 - 18)  SpO2: 95% (28 Nov 2017 08:22) (93% - 95%)     Stroke Core Measures    Hemoglobin A1C, Whole Blood: 6.4 % (11-15 @ 10:55)    DRAIN OUTPUT:     NEUROIMAGING:     PHYSICAL EXAM:    General: No Acute Distress     Neurological: Awake, alert oriented x3, Following Commands, Moving all extremities, Muscle Strength normal in both upper extremities, LE'S : proximally 3/5, distally 5/5    Pulmonary: Clear to Auscultation, No Rales, No Rhonchi, No Wheezes     Cardiovascular: S1, S2, Regular Rate and Rhythm     Gastrointestinal: Soft, Nontender, Nondistended     Extremities: No calf tenderness     Incision: clen and intact all staples in place    MEDICATIONS:   Antibiotics:    Neuro:  acetaminophen   Tablet 650 milliGRAM(s) Oral every 6 hours PRN For Temp greater than 38 C (100.4 F)  acetaminophen   Tablet. 650 milliGRAM(s) Oral every 6 hours PRN Mild Pain (1 - 3)  diazepam    Tablet 5 milliGRAM(s) Oral every 8 hours PRN spasms  gabapentin 300 milliGRAM(s) Oral three times a day  ondansetron Injectable 4 milliGRAM(s) IV Push every 6 hours PRN Nausea and/or Vomiting  oxyCODONE    IR 10 milliGRAM(s) Oral every 4 hours PRN Moderate Pain (4 - 6)  oxyCODONE    IR 15 milliGRAM(s) Oral every 4 hours PRN Severe Pain (7 - 10)    Anticoagulation:  enoxaparin Injectable 40 milliGRAM(s) SubCutaneous <User Schedule>    Cardiology:  amLODIPine   Tablet 10 milliGRAM(s) Oral at bedtime  enalapril 20 milliGRAM(s) Oral at bedtime  metoprolol     tartrate 50 milliGRAM(s) Oral two times a day    Endo:   dextrose 50% Injectable 12.5 Gram(s) IV Push once  dextrose 50% Injectable 25 Gram(s) IV Push once  dextrose 50% Injectable 25 Gram(s) IV Push once  dextrose Gel 1 Dose(s) Oral once PRN  glucagon  Injectable 1 milliGRAM(s) IntraMuscular once PRN  insulin lispro (HumaLOG) corrective regimen sliding scale   SubCutaneous three times a day before meals  insulin lispro (HumaLOG) corrective regimen sliding scale   SubCutaneous at bedtime  levothyroxine 150 MICROGram(s) Oral daily    Pulm:    GI/:  docusate sodium 100 milliGRAM(s) Oral three times a day  pantoprazole    Tablet 40 milliGRAM(s) Oral daily  polyethylene glycol 3350 17 Gram(s) Oral two times a day  senna 2 Tablet(s) Oral at bedtime PRN    Other:  dextrose 5%. 1000 milliLiter(s) IV Continuous <Continuous>

## 2017-11-28 NOTE — PROGRESS NOTE ADULT - ATTENDING COMMENTS
stable
[facility]  [phone]
no medical objection to discharge - will try to reach out to PMD today to update

## 2017-11-28 NOTE — DISCHARGE NOTE ADULT - MEDICATION SUMMARY - MEDICATIONS TO STOP TAKING
I will STOP taking the medications listed below when I get home from the hospital:    aspirin 81 mg oral tablet  -- 1 tab(s) by mouth once a day, last dose > 11/1/17 I will STOP taking the medications listed below when I get home from the hospital:    oxyCODONE-acetaminophen 10 mg-325 mg oral tablet  -- 1 tab(s) by mouth every 6 hours, As Needed

## 2017-11-28 NOTE — PROGRESS NOTE ADULT - SUBJECTIVE AND OBJECTIVE BOX
Authored by Dr Oswald Loaiza [phone]     Patient is a 72y old  Female who presents with a chief complaint of 71 year old female presents for Stage I L1-L4 Lumbar Lateral Fusion. Pt. reports a history of progressively worsening lower back pain radiating down her left lower extremity X 2 years. (2017 10:18)    SUBJECTIVE / OVERNIGHT EVENTS: c/o dysuria, b/l lower ab pain. no n/v/d. no cp/sob. LE swelling she had complained about yesterday is better today per pt.     MEDICATIONS  (STANDING):  amLODIPine   Tablet 10 milliGRAM(s) Oral at bedtime  dextrose 5%. 1000 milliLiter(s) (50 mL/Hr) IV Continuous <Continuous>  dextrose 50% Injectable 12.5 Gram(s) IV Push once  dextrose 50% Injectable 25 Gram(s) IV Push once  dextrose 50% Injectable 25 Gram(s) IV Push once  docusate sodium 100 milliGRAM(s) Oral three times a day  enalapril 20 milliGRAM(s) Oral at bedtime  enoxaparin Injectable 40 milliGRAM(s) SubCutaneous <User Schedule>  gabapentin 300 milliGRAM(s) Oral three times a day  insulin lispro (HumaLOG) corrective regimen sliding scale   SubCutaneous three times a day before meals  insulin lispro (HumaLOG) corrective regimen sliding scale   SubCutaneous at bedtime  levothyroxine 150 MICROGram(s) Oral daily  metoprolol     tartrate 50 milliGRAM(s) Oral two times a day  pantoprazole    Tablet 40 milliGRAM(s) Oral daily  polyethylene glycol 3350 17 Gram(s) Oral two times a day    MEDICATIONS  (PRN):  acetaminophen   Tablet 650 milliGRAM(s) Oral every 6 hours PRN For Temp greater than 38 C (100.4 F)  acetaminophen   Tablet. 650 milliGRAM(s) Oral every 6 hours PRN Mild Pain (1 - 3)  dextrose Gel 1 Dose(s) Oral once PRN Blood Glucose LESS THAN 70 milliGRAM(s)/deciliter  diazepam    Tablet 5 milliGRAM(s) Oral every 8 hours PRN spasms  glucagon  Injectable 1 milliGRAM(s) IntraMuscular once PRN Glucose LESS THAN 70 milligrams/deciliter  ondansetron Injectable 4 milliGRAM(s) IV Push every 6 hours PRN Nausea and/or Vomiting  oxyCODONE    IR 10 milliGRAM(s) Oral every 4 hours PRN Moderate Pain (4 - 6)  oxyCODONE    IR 15 milliGRAM(s) Oral every 4 hours PRN Severe Pain (7 - 10)  senna 2 Tablet(s) Oral at bedtime PRN Constipation      Vital Signs Last 24 Hrs  T(C): 37.1 (2017 08:22), Max: 37.6 (2017 20:20)  T(F): 98.8 (2017 08:22), Max: 99.7 (2017 20:20)  HR: 48 (2017 08:22) (48 - 99)  BP: 126/79 (2017 08:22) (118/61 - 149/83)  BP(mean): --  RR: 18 (2017 08:22) (18 - 18)  SpO2: 95% (2017 08:22) (93% - 95%)  CAPILLARY BLOOD GLUCOSE      POCT Blood Glucose.: 325 mg/dL (2017 11:41)  POCT Blood Glucose.: 204 mg/dL (2017 07:56)  POCT Blood Glucose.: 199 mg/dL (2017 21:46)  POCT Blood Glucose.: 322 mg/dL (2017 17:10)    I&O's Summary    2017 07:  -  2017 07:00  --------------------------------------------------------  IN: 1620 mL / OUT: 500 mL / NET: 1120 mL    2017 07:01  -  2017 12:26  --------------------------------------------------------  IN: 420 mL / OUT: 350 mL / NET: 70 mL        PHYSICAL EXAM  GENERAL: NAD  EYES: EOMI, PERRLA, conjunctiva and sclera clear  NECK: Supple, No JVD  CHEST/LUNG: Clear to auscultation bilaterally; No wheeze  HEART: Regular rate and rhythm; No murmurs, rubs, or gallops  ABDOMEN: obese.  Soft. mildly tender lower ab ND BS+  EXTREMITIES:  2+ Peripheral Pulses,  NEUROLOGY: non-focal AOx3 sitting forward limited by pain  SKIN:vertical scars anterior abdomen, no erythema. no appreciable LE edema    LABS:    Urinalysis Basic - ( 2017 01:42 )    Color: PL Yellow / Appearance: Clear / S.007 / pH: x  Gluc: x / Ketone: Negative  / Bili: Negative / Urobili: Negative   Blood: x / Protein: Negative / Nitrite: Negative   Leuk Esterase: Large / RBC: 0-2 /HPF / WBC 11-25 /HPF   Sq Epi: x / Non Sq Epi: OCC /HPF / Bacteria: Few /HPF          RADIOLOGY & ADDITIONAL TESTS:    Imaging Personally Reviewed:  Consultant(s) Notes Reviewed:    Care Discussed with Consultants/Other Providers: SLY Gomez re: UTI

## 2017-11-28 NOTE — DISCHARGE NOTE ADULT - HOSPITAL COURSE
Patient was admitted with lumbar spinal stenosis. She had a spinal fusion on 11/22. She c/o post surgical pain but is doing better. She was seen by physical therapy and and subacute rehab was recommended. There has not been any complications post-op. She is stable for transfer to rehab

## 2017-11-28 NOTE — PROGRESS NOTE ADULT - PROBLEM SELECTOR PLAN 2
resolved on oxycodone    possibly related to UTI?  f/u w/PMD after tx of UTI
c/w amlodipine, enalapril, metoprolol  BP acceptable
c/w amlodipine, enalapril, metoprolol  monitor bp

## 2017-11-28 NOTE — PROGRESS NOTE ADULT - PROVIDER SPECIALTY LIST ADULT
Hospitalist
Hospitalist
Neurosurgery
Hospitalist

## 2017-11-28 NOTE — DISCHARGE NOTE ADULT - NS AS ACTIVITY OBS
Showering allowed/Bathing allowed/Walking-Indoors allowed/No Heavy lifting/straining/Keep wound dry/Walking-Outdoors allowed/Do not drive or operate machinery/Stairs allowed

## 2017-11-28 NOTE — PROGRESS NOTE ADULT - PROBLEM SELECTOR PLAN 1
Pt w/dysuria, +UA. Otherwise nontoxic, VSS. Recommend Macrobid 100mg BID PO x 7d to be completed at rehab. No need to observe inpatient or delay dispo planning. Rehab or PMD should f/u w/pt re: resolution of UTI at conclusion of abx course.

## 2017-11-28 NOTE — PROGRESS NOTE ADULT - PROBLEM SELECTOR PROBLEM 1
Acute cystitis without hematuria
Abdominal pain, acute, right lower quadrant
Abdominal pain, acute, right lower quadrant
Spinal stenosis

## 2017-11-28 NOTE — PROGRESS NOTE ADULT - ASSESSMENT
Post-op day # 6 s/p LI-5 LLIF, L5-S1 PLIF, L1-S1 FUSION  Neurologically stable  Analgesic for pain  DVT prophylaxis  Will transfer to rehab today

## 2017-12-19 PROCEDURE — 83605 ASSAY OF LACTIC ACID: CPT

## 2017-12-19 PROCEDURE — 97110 THERAPEUTIC EXERCISES: CPT

## 2017-12-19 PROCEDURE — 81001 URINALYSIS AUTO W/SCOPE: CPT

## 2017-12-19 PROCEDURE — 97116 GAIT TRAINING THERAPY: CPT

## 2017-12-19 PROCEDURE — 83690 ASSAY OF LIPASE: CPT

## 2017-12-19 PROCEDURE — 80053 COMPREHEN METABOLIC PANEL: CPT

## 2017-12-19 PROCEDURE — 82962 GLUCOSE BLOOD TEST: CPT

## 2017-12-19 PROCEDURE — C1889: CPT

## 2017-12-19 PROCEDURE — 85027 COMPLETE CBC AUTOMATED: CPT

## 2017-12-19 PROCEDURE — 74177 CT ABD & PELVIS W/CONTRAST: CPT

## 2017-12-19 PROCEDURE — 82150 ASSAY OF AMYLASE: CPT

## 2017-12-19 PROCEDURE — 80048 BASIC METABOLIC PNL TOTAL CA: CPT

## 2017-12-19 PROCEDURE — 84443 ASSAY THYROID STIM HORMONE: CPT

## 2017-12-19 PROCEDURE — C1769: CPT

## 2017-12-19 PROCEDURE — 80051 ELECTROLYTE PANEL: CPT

## 2017-12-19 PROCEDURE — 76000 FLUOROSCOPY <1 HR PHYS/QHP: CPT

## 2017-12-19 PROCEDURE — C1713: CPT

## 2017-12-19 PROCEDURE — 97162 PT EVAL MOD COMPLEX 30 MIN: CPT

## 2017-12-19 PROCEDURE — 74018 RADEX ABDOMEN 1 VIEW: CPT

## 2017-12-22 ENCOUNTER — INPATIENT (INPATIENT)
Facility: HOSPITAL | Age: 72
LOS: 11 days | Discharge: NURSING, REHAB OR LONG-TERM CARE | DRG: 915 | End: 2018-01-03
Attending: STUDENT IN AN ORGANIZED HEALTH CARE EDUCATION/TRAINING PROGRAM | Admitting: INTERNAL MEDICINE
Payer: MEDICARE

## 2017-12-22 VITALS
HEART RATE: 131 BPM | SYSTOLIC BLOOD PRESSURE: 190 MMHG | DIASTOLIC BLOOD PRESSURE: 96 MMHG | RESPIRATION RATE: 20 BRPM | OXYGEN SATURATION: 100 %

## 2017-12-22 DIAGNOSIS — Z82.8 FAMILY HISTORY OF OTHER DISABILITIES AND CHRONIC DISEASES LEADING TO DISABLEMENT, NOT ELSEWHERE CLASSIFIED: Chronic | ICD-10-CM

## 2017-12-22 DIAGNOSIS — Z90.710 ACQUIRED ABSENCE OF BOTH CERVIX AND UTERUS: Chronic | ICD-10-CM

## 2017-12-22 DIAGNOSIS — T78.3XXA ANGIONEUROTIC EDEMA, INITIAL ENCOUNTER: ICD-10-CM

## 2017-12-22 DIAGNOSIS — Z98.89 OTHER SPECIFIED POSTPROCEDURAL STATES: Chronic | ICD-10-CM

## 2017-12-22 DIAGNOSIS — Z96.653 PRESENCE OF ARTIFICIAL KNEE JOINT, BILATERAL: Chronic | ICD-10-CM

## 2017-12-22 LAB
ALBUMIN SERPL ELPH-MCNC: 3.1 G/DL — LOW (ref 3.3–5)
ALBUMIN SERPL ELPH-MCNC: 3.6 G/DL — SIGNIFICANT CHANGE UP (ref 3.3–5)
ALP SERPL-CCNC: 123 U/L — HIGH (ref 40–120)
ALP SERPL-CCNC: 99 U/L — SIGNIFICANT CHANGE UP (ref 40–120)
ALT FLD-CCNC: 8 U/L RC — LOW (ref 10–45)
ALT FLD-CCNC: 8 U/L RC — LOW (ref 10–45)
ANION GAP SERPL CALC-SCNC: 11 MMOL/L — SIGNIFICANT CHANGE UP (ref 5–17)
ANION GAP SERPL CALC-SCNC: 15 MMOL/L — SIGNIFICANT CHANGE UP (ref 5–17)
APTT BLD: 30.5 SEC — SIGNIFICANT CHANGE UP (ref 27.5–37.4)
AST SERPL-CCNC: 14 U/L — SIGNIFICANT CHANGE UP (ref 10–40)
AST SERPL-CCNC: 16 U/L — SIGNIFICANT CHANGE UP (ref 10–40)
BASE EXCESS BLDV CALC-SCNC: -0.5 MMOL/L — SIGNIFICANT CHANGE UP (ref -2–2)
BASOPHILS # BLD AUTO: 0.1 K/UL — SIGNIFICANT CHANGE UP (ref 0–0.2)
BASOPHILS NFR BLD AUTO: 0.7 % — SIGNIFICANT CHANGE UP (ref 0–2)
BILIRUB SERPL-MCNC: 0.1 MG/DL — LOW (ref 0.2–1.2)
BILIRUB SERPL-MCNC: 0.2 MG/DL — SIGNIFICANT CHANGE UP (ref 0.2–1.2)
BLD GP AB SCN SERPL QL: NEGATIVE — SIGNIFICANT CHANGE UP
BUN SERPL-MCNC: 10 MG/DL — SIGNIFICANT CHANGE UP (ref 7–23)
BUN SERPL-MCNC: 11 MG/DL — SIGNIFICANT CHANGE UP (ref 7–23)
CA-I SERPL-SCNC: 1.22 MMOL/L — SIGNIFICANT CHANGE UP (ref 1.12–1.3)
CALCIUM SERPL-MCNC: 8.6 MG/DL — SIGNIFICANT CHANGE UP (ref 8.4–10.5)
CALCIUM SERPL-MCNC: 9.4 MG/DL — SIGNIFICANT CHANGE UP (ref 8.4–10.5)
CHLORIDE BLDV-SCNC: 99 MMOL/L — SIGNIFICANT CHANGE UP (ref 96–108)
CHLORIDE SERPL-SCNC: 100 MMOL/L — SIGNIFICANT CHANGE UP (ref 96–108)
CHLORIDE SERPL-SCNC: 95 MMOL/L — LOW (ref 96–108)
CO2 BLDV-SCNC: 29 MMOL/L — SIGNIFICANT CHANGE UP (ref 22–30)
CO2 SERPL-SCNC: 23 MMOL/L — SIGNIFICANT CHANGE UP (ref 22–31)
CO2 SERPL-SCNC: 25 MMOL/L — SIGNIFICANT CHANGE UP (ref 22–31)
CREAT SERPL-MCNC: 0.62 MG/DL — SIGNIFICANT CHANGE UP (ref 0.5–1.3)
CREAT SERPL-MCNC: 0.64 MG/DL — SIGNIFICANT CHANGE UP (ref 0.5–1.3)
EOSINOPHIL # BLD AUTO: 0.1 K/UL — SIGNIFICANT CHANGE UP (ref 0–0.5)
EOSINOPHIL NFR BLD AUTO: 0.8 % — SIGNIFICANT CHANGE UP (ref 0–6)
GAS PNL BLDA: SIGNIFICANT CHANGE UP
GAS PNL BLDA: SIGNIFICANT CHANGE UP
GAS PNL BLDV: 135 MMOL/L — LOW (ref 136–145)
GAS PNL BLDV: SIGNIFICANT CHANGE UP
GAS PNL BLDV: SIGNIFICANT CHANGE UP
GLUCOSE BLDC GLUCOMTR-MCNC: 133 MG/DL — HIGH (ref 70–99)
GLUCOSE BLDC GLUCOMTR-MCNC: 150 MG/DL — HIGH (ref 70–99)
GLUCOSE BLDC GLUCOMTR-MCNC: 164 MG/DL — HIGH (ref 70–99)
GLUCOSE BLDC GLUCOMTR-MCNC: 200 MG/DL — HIGH (ref 70–99)
GLUCOSE BLDV-MCNC: 197 MG/DL — HIGH (ref 70–99)
GLUCOSE SERPL-MCNC: 146 MG/DL — HIGH (ref 70–99)
GLUCOSE SERPL-MCNC: 206 MG/DL — HIGH (ref 70–99)
HCO3 BLDV-SCNC: 27 MMOL/L — SIGNIFICANT CHANGE UP (ref 21–29)
HCT VFR BLD CALC: 29.6 % — LOW (ref 34.5–45)
HCT VFR BLDA CALC: 42 % — SIGNIFICANT CHANGE UP (ref 39–50)
HGB BLD CALC-MCNC: 13.8 G/DL — SIGNIFICANT CHANGE UP (ref 11.5–15.5)
HGB BLD-MCNC: 9.3 G/DL — LOW (ref 11.5–15.5)
INR BLD: 1.11 RATIO — SIGNIFICANT CHANGE UP (ref 0.88–1.16)
LACTATE BLDV-MCNC: 1.9 MMOL/L — SIGNIFICANT CHANGE UP (ref 0.7–2)
LYMPHOCYTES # BLD AUTO: 3.5 K/UL — HIGH (ref 1–3.3)
LYMPHOCYTES # BLD AUTO: 41 % — SIGNIFICANT CHANGE UP (ref 13–44)
MAGNESIUM SERPL-MCNC: 1.8 MG/DL — SIGNIFICANT CHANGE UP (ref 1.6–2.6)
MAGNESIUM SERPL-MCNC: 1.9 MG/DL — SIGNIFICANT CHANGE UP (ref 1.6–2.6)
MCHC RBC-ENTMCNC: 27.9 PG — SIGNIFICANT CHANGE UP (ref 27–34)
MCHC RBC-ENTMCNC: 31.3 GM/DL — LOW (ref 32–36)
MCV RBC AUTO: 88.9 FL — SIGNIFICANT CHANGE UP (ref 80–100)
MONOCYTES # BLD AUTO: 0.8 K/UL — SIGNIFICANT CHANGE UP (ref 0–0.9)
MONOCYTES NFR BLD AUTO: 9.8 % — SIGNIFICANT CHANGE UP (ref 2–14)
NEUTROPHILS # BLD AUTO: 4.1 K/UL — SIGNIFICANT CHANGE UP (ref 1.8–7.4)
NEUTROPHILS NFR BLD AUTO: 47.6 % — SIGNIFICANT CHANGE UP (ref 43–77)
PCO2 BLDV: 61 MMHG — HIGH (ref 35–50)
PH BLDV: 7.27 — LOW (ref 7.35–7.45)
PHOSPHATE SERPL-MCNC: 3.6 MG/DL — SIGNIFICANT CHANGE UP (ref 2.5–4.5)
PHOSPHATE SERPL-MCNC: 3.7 MG/DL — SIGNIFICANT CHANGE UP (ref 2.5–4.5)
PLATELET # BLD AUTO: 709 K/UL — HIGH (ref 150–400)
PO2 BLDV: 33 MMHG — SIGNIFICANT CHANGE UP (ref 25–45)
POTASSIUM BLDV-SCNC: 3.9 MMOL/L — SIGNIFICANT CHANGE UP (ref 3.5–5)
POTASSIUM SERPL-MCNC: 4.1 MMOL/L — SIGNIFICANT CHANGE UP (ref 3.5–5.3)
POTASSIUM SERPL-MCNC: 4.7 MMOL/L — SIGNIFICANT CHANGE UP (ref 3.5–5.3)
POTASSIUM SERPL-SCNC: 4.1 MMOL/L — SIGNIFICANT CHANGE UP (ref 3.5–5.3)
POTASSIUM SERPL-SCNC: 4.7 MMOL/L — SIGNIFICANT CHANGE UP (ref 3.5–5.3)
PROT SERPL-MCNC: 6.8 G/DL — SIGNIFICANT CHANGE UP (ref 6–8.3)
PROT SERPL-MCNC: 7.9 G/DL — SIGNIFICANT CHANGE UP (ref 6–8.3)
PROTHROM AB SERPL-ACNC: 12 SEC — SIGNIFICANT CHANGE UP (ref 9.8–12.7)
RBC # BLD: 3.33 M/UL — LOW (ref 3.8–5.2)
RBC # FLD: 15.1 % — HIGH (ref 10.3–14.5)
RH IG SCN BLD-IMP: POSITIVE — SIGNIFICANT CHANGE UP
SAO2 % BLDV: 50 % — LOW (ref 67–88)
SODIUM SERPL-SCNC: 134 MMOL/L — LOW (ref 135–145)
SODIUM SERPL-SCNC: 135 MMOL/L — SIGNIFICANT CHANGE UP (ref 135–145)
T3 SERPL-MCNC: 78 NG/DL — LOW (ref 80–200)
T4 AB SER-ACNC: 8.8 UG/DL — SIGNIFICANT CHANGE UP (ref 4.6–12)
TSH SERPL-MCNC: 8.68 UIU/ML — HIGH (ref 0.27–4.2)
WBC # BLD: 8.5 K/UL — SIGNIFICANT CHANGE UP (ref 3.8–10.5)
WBC # FLD AUTO: 8.5 K/UL — SIGNIFICANT CHANGE UP (ref 3.8–10.5)

## 2017-12-22 PROCEDURE — 93010 ELECTROCARDIOGRAM REPORT: CPT | Mod: 59

## 2017-12-22 PROCEDURE — 31526 DX LARYNGOSCOPY W/OPER SCOPE: CPT

## 2017-12-22 PROCEDURE — 99222 1ST HOSP IP/OBS MODERATE 55: CPT | Mod: 25

## 2017-12-22 PROCEDURE — 71010: CPT | Mod: 26

## 2017-12-22 PROCEDURE — 99291 CRITICAL CARE FIRST HOUR: CPT | Mod: 25

## 2017-12-22 PROCEDURE — 36680 INSERT NEEDLE BONE CAVITY: CPT

## 2017-12-22 RX ORDER — PROPOFOL 10 MG/ML
40 INJECTION, EMULSION INTRAVENOUS ONCE
Qty: 0 | Refills: 0 | Status: COMPLETED | OUTPATIENT
Start: 2017-12-22 | End: 2017-12-22

## 2017-12-22 RX ORDER — FENTANYL CITRATE 50 UG/ML
1 INJECTION INTRAVENOUS
Qty: 5000 | Refills: 0 | Status: DISCONTINUED | OUTPATIENT
Start: 2017-12-22 | End: 2017-12-25

## 2017-12-22 RX ORDER — INSULIN LISPRO 100/ML
VIAL (ML) SUBCUTANEOUS EVERY 4 HOURS
Qty: 0 | Refills: 0 | Status: DISCONTINUED | OUTPATIENT
Start: 2017-12-22 | End: 2017-12-22

## 2017-12-22 RX ORDER — PROPOFOL 10 MG/ML
20 INJECTION, EMULSION INTRAVENOUS
Qty: 500 | Refills: 0 | Status: DISCONTINUED | OUTPATIENT
Start: 2017-12-22 | End: 2017-12-27

## 2017-12-22 RX ORDER — FENTANYL CITRATE 50 UG/ML
50 INJECTION INTRAVENOUS ONCE
Qty: 0 | Refills: 0 | Status: DISCONTINUED | OUTPATIENT
Start: 2017-12-22 | End: 2017-12-22

## 2017-12-22 RX ORDER — INSULIN LISPRO 100/ML
VIAL (ML) SUBCUTANEOUS EVERY 4 HOURS
Qty: 0 | Refills: 0 | Status: DISCONTINUED | OUTPATIENT
Start: 2017-12-22 | End: 2017-12-28

## 2017-12-22 RX ORDER — HEPARIN SODIUM 5000 [USP'U]/ML
5000 INJECTION INTRAVENOUS; SUBCUTANEOUS EVERY 8 HOURS
Qty: 0 | Refills: 0 | Status: DISCONTINUED | OUTPATIENT
Start: 2017-12-22 | End: 2017-12-28

## 2017-12-22 RX ORDER — METOPROLOL TARTRATE 50 MG
5 TABLET ORAL EVERY 6 HOURS
Qty: 0 | Refills: 0 | Status: DISCONTINUED | OUTPATIENT
Start: 2017-12-22 | End: 2017-12-22

## 2017-12-22 RX ORDER — SODIUM CHLORIDE 9 MG/ML
1000 INJECTION INTRAMUSCULAR; INTRAVENOUS; SUBCUTANEOUS ONCE
Qty: 0 | Refills: 0 | Status: COMPLETED | OUTPATIENT
Start: 2017-12-22 | End: 2017-12-22

## 2017-12-22 RX ORDER — INSULIN GLARGINE 100 [IU]/ML
20 INJECTION, SOLUTION SUBCUTANEOUS AT BEDTIME
Qty: 0 | Refills: 0 | Status: DISCONTINUED | OUTPATIENT
Start: 2017-12-22 | End: 2018-01-03

## 2017-12-22 RX ORDER — INSULIN LISPRO 100/ML
6 VIAL (ML) SUBCUTANEOUS ONCE
Qty: 0 | Refills: 0 | Status: COMPLETED | OUTPATIENT
Start: 2017-12-22 | End: 2017-12-22

## 2017-12-22 RX ORDER — FAMOTIDINE 10 MG/ML
20 INJECTION INTRAVENOUS EVERY 12 HOURS
Qty: 0 | Refills: 0 | Status: DISCONTINUED | OUTPATIENT
Start: 2017-12-22 | End: 2017-12-27

## 2017-12-22 RX ORDER — EPINEPHRINE 0.3 MG/.3ML
0.3 INJECTION INTRAMUSCULAR; SUBCUTANEOUS ONCE
Qty: 0 | Refills: 0 | Status: COMPLETED | OUTPATIENT
Start: 2017-12-22 | End: 2017-12-22

## 2017-12-22 RX ORDER — PROPOFOL 10 MG/ML
50 INJECTION, EMULSION INTRAVENOUS ONCE
Qty: 0 | Refills: 0 | Status: COMPLETED | OUTPATIENT
Start: 2017-12-22 | End: 2017-12-22

## 2017-12-22 RX ORDER — DEXAMETHASONE 0.5 MG/5ML
10 ELIXIR ORAL EVERY 8 HOURS
Qty: 0 | Refills: 0 | Status: DISCONTINUED | OUTPATIENT
Start: 2017-12-22 | End: 2017-12-27

## 2017-12-22 RX ORDER — METOPROLOL TARTRATE 50 MG
5 TABLET ORAL EVERY 6 HOURS
Qty: 0 | Refills: 0 | Status: DISCONTINUED | OUTPATIENT
Start: 2017-12-22 | End: 2017-12-23

## 2017-12-22 RX ORDER — PROPOFOL 10 MG/ML
30 INJECTION, EMULSION INTRAVENOUS ONCE
Qty: 0 | Refills: 0 | Status: COMPLETED | OUTPATIENT
Start: 2017-12-22 | End: 2017-12-22

## 2017-12-22 RX ADMIN — PROPOFOL 50 MILLIGRAM(S): 10 INJECTION, EMULSION INTRAVENOUS at 11:17

## 2017-12-22 RX ADMIN — Medication 102 MILLIGRAM(S): at 21:49

## 2017-12-22 RX ADMIN — SODIUM CHLORIDE 4000 MILLILITER(S): 9 INJECTION INTRAMUSCULAR; INTRAVENOUS; SUBCUTANEOUS at 16:50

## 2017-12-22 RX ADMIN — PROPOFOL 30 MILLIGRAM(S): 10 INJECTION, EMULSION INTRAVENOUS at 10:48

## 2017-12-22 RX ADMIN — INSULIN GLARGINE 20 UNIT(S): 100 INJECTION, SOLUTION SUBCUTANEOUS at 21:49

## 2017-12-22 RX ADMIN — Medication 6: at 13:42

## 2017-12-22 RX ADMIN — FAMOTIDINE 20 MILLIGRAM(S): 10 INJECTION INTRAVENOUS at 18:42

## 2017-12-22 RX ADMIN — EPINEPHRINE 0.3 MILLIGRAM(S): 0.3 INJECTION INTRAMUSCULAR; SUBCUTANEOUS at 10:28

## 2017-12-22 RX ADMIN — Medication 102 MILLIGRAM(S): at 14:05

## 2017-12-22 RX ADMIN — Medication 4: at 21:49

## 2017-12-22 RX ADMIN — FENTANYL CITRATE 50 MICROGRAM(S): 50 INJECTION INTRAVENOUS at 12:45

## 2017-12-22 RX ADMIN — Medication 6 UNIT(S): at 16:30

## 2017-12-22 RX ADMIN — Medication 2 MILLIGRAM(S): at 12:00

## 2017-12-22 RX ADMIN — PROPOFOL 50 MILLIGRAM(S): 10 INJECTION, EMULSION INTRAVENOUS at 10:46

## 2017-12-22 RX ADMIN — FENTANYL CITRATE 5 MICROGRAM(S)/KG/HR: 50 INJECTION INTRAVENOUS at 13:42

## 2017-12-22 RX ADMIN — PROPOFOL 12 MICROGRAM(S)/KG/MIN: 10 INJECTION, EMULSION INTRAVENOUS at 12:03

## 2017-12-22 RX ADMIN — HEPARIN SODIUM 5000 UNIT(S): 5000 INJECTION INTRAVENOUS; SUBCUTANEOUS at 13:42

## 2017-12-22 RX ADMIN — PROPOFOL 40 MILLIGRAM(S): 10 INJECTION, EMULSION INTRAVENOUS at 11:02

## 2017-12-22 RX ADMIN — FENTANYL CITRATE 50 MICROGRAM(S): 50 INJECTION INTRAVENOUS at 12:30

## 2017-12-22 RX ADMIN — HEPARIN SODIUM 5000 UNIT(S): 5000 INJECTION INTRAVENOUS; SUBCUTANEOUS at 21:49

## 2017-12-22 NOTE — H&P ADULT - ASSESSMENT
Plan:  #Neuro:  Neuro checks q 2 hrs and prn for changes  place on propofol for vent synchronization and sedation titrate to RASS of 0 to -2  s/p L1 - L4 fusion, pain control with dilauded 0.5 q 8 hrs prn     #Pulm:  nasally intubated  mechanical vent therapy to maintain ph 7.35 - 7.45; PCO2 35 - 45; SPO2 >/= 92%  HOB >/= 30 degree angle  bronchodilators with duoneb therapy q 6 hrs prn wheezing/sob    #CV:  Hold ACE inhibitors (pt has been on enalapril)  continue with Ca++ channel blocker - amlodipine 10 mg qd ngt  change Beta blocker to metoprolol 25 mg NGT q 12 hrs  ecg now and q day x 3    #GI/:  place ngt Plan:  #Neuro:  Neuro checks q 2 hrs and prn for changes  place on propofol for vent synchronization and sedation titrate to RASS of 0 to -2  s/p L1 - L4 fusion, pain control with fentanyl 25 ug q 3 hrs prn    #Pulm:  nasally intubated  mechanical vent therapy to maintain ph 7.35 - 7.45; PCO2 35 - 45; SPO2 >/= 92%  HOB >/= 30 degree angle  bronchodilators with duoneb therapy q 6 hrs prn wheezing/sob  ENT consult to eval and trend extent of angioedema    #CV:  Hold ACE inhibitors (pt has been on enalapril)  continue with Ca++ channel blocker - amlodipine 10 mg qd ngt  change Beta blocker to metoprolol 25 mg NGT q 12 hrs  ecg now and q day x 3    #GI/:  place ngt  place [place]  strict I & O's keep even    #I.D.:  due to increased secretion secondary to angioedema will place on place on zosyn and vanco for aspiration coverage    #FEN/ENDO/HEME:  Solumedrol 40 mg IV q 8 hrs  Benedryl 50 mg q 6 hrs  famotidine 20 mg q 6 hrs  consider epinephrine gtt   ISS with moderate dose sliding scale with POC glucose q 4 hrs to maintain glucose 140 to 160  due to steroid therapy - if glucoses > 180 after 3 checks place on Insulin gtt with q 1 hr POC glucose checks  can consider FFP transfusions to cover for C1 esterase deficiency  send lytes/cbc/coags/T&CM/TSH/T4/T3  change levothyroxine to 75 mg IV qd Plan:  #Neuro:  Neuro checks q 2 hrs and prn for changes  place on propofol for vent synchronization and sedation titrate to RASS of 0 to -2  s/p L1 - L4 fusion, pain control with fentanyl 25 ug q 3 hrs prn    #Pulm:  nasally intubated  mechanical vent therapy to maintain ph 7.35 - 7.45; PCO2 35 - 45; SPO2 >/= 92%  HOB >/= 30 degree angle  bronchodilators with duoneb therapy q 6 hrs prn wheezing/sob  ENT consult to eval and trend extent of angioedema    #CV:  Hold ACE inhibitors (pt has been on enalapril)  continue with Ca++ channel blocker - amlodipine 10 mg qd ngt  change Beta blocker to metoprolol 25 mg NGT q 12 hrs  ecg now and q day x 3    #GI/:  place ngt  place [place]  strict I & O's keep even    #I.D.:  due to increased secretion secondary to angioedema will place on place on zosyn and vanco for aspiration coverage    #FEN/ENDO/HEME:  Solumedrol 40 mg IV q 8 hrs  Benedryl 50 mg q 6 hrs  famotidine 20 mg q 6 hrs  consider epinephrine gtt   ISS with moderate dose sliding scale with POC glucose q 4 hrs to maintain glucose 140 to 160  due to steroid therapy - if glucoses > 180 after 3 checks place on Insulin gtt with q 1 hr POC glucose checks  can consider FFP transfusions to cover for C1 esterase deficiency  send C1 esterase level  send lytes/cbc/coags/T&CM/TSH/T4/T3  change levothyroxine to 75 mg IV qd

## 2017-12-22 NOTE — CONSULT NOTE ADULT - SUBJECTIVE AND OBJECTIVE BOX
CC: Angioedema and difficulty breathing    HPI: Patient is a 72y old Female who presents with a chief complaint of swelling of tongue (22 Dec 2017 11:16). We are asked to evaluate the patient in MICU for angioedema and possible airway obstruction. Pt recently underwent L1-L4 lumbar fusion (11/2017) which was complicated by UTI requiring antibiotic therapy who presented to ED after developing swelling and protrusion of tongue with difficulty swallowing. In ED pt was bronchoscopically nasally intubated with 7 fr tube, received 1 mg epi I.M., racemic epi Nebulizer. I.O placed in right tibial area and received atropine for secretions. Pt last was well last night and was able to eat dinner. Pt denies eating new types of food or using new medication. Pt has no known allergies.  Pt was taking ACE inhibitors (enalapril), which are currently being held. (Modifying factors. Fevers/chills. Dysphagia/odynophagia/hemoptysis/unintentional weight loss. Any other relevant history/symptoms.)    PAST MEDICAL & SURGICAL HISTORY:  MRSA (methicillin resistant Staphylococcus aureus) infection: 1999, infected knee replacement, required multiple revisions and long term IV antibiotics via central line  Spinal stenosis  Osteoarthritis  Diverticulosis  Pancreatitis, chronic: last episode &gt; 10 years ago  Hypothyroid  Hypertension  Diabetes  H/O right inguinal hernia repair  H/O total knee replacement, bilateral  FH: cholecystectomy  H/O: hysterectomy    Allergies: No Known Allergies or intolerances      MEDICATIONS  (STANDING):  dexamethasone  IVPB 10 milliGRAM(s) IV Intermittent every 8 hours  famotidine Injectable 20 milliGRAM(s) IV Push every 12 hours  fentaNYL   Infusion 1 MICROgram(s)/kG/Hr (5 mL/Hr) IV Continuous <Continuous>  heparin  Injectable 5000 Unit(s) SubCutaneous every 8 hours  insulin glargine Injectable (LANTUS) 20 Unit(s) SubCutaneous at bedtime  insulin lispro (HumaLOG) corrective regimen sliding scale   SubCutaneous every 4 hours  metoprolol    tartrate Injectable 5 milliGRAM(s) IV Push every 6 hours  propofol Infusion 20 MICROgram(s)/kG/Min (12 mL/Hr) IV Continuous <Continuous>      Social History: **??**    ROS: ENT, GI, , CV, Pulm, Neuro, Psych, MS, Heme, Endo, Constitutional; all negative except as noted in HPI    Vital Signs Last 24 Hrs  T(C): 37.5 (22 Dec 2017 12:30), Max: 38.1 (22 Dec 2017 11:06)  T(F): 99.5 (22 Dec 2017 12:30), Max: 100.5 (22 Dec 2017 11:06)  HR: 112 (22 Dec 2017 14:00) (112 - 156)  BP: 141/64 (22 Dec 2017 14:00) (120/62 - 190/96)  BP(mean): 92 (22 Dec 2017 14:00) (85 - 105)  RR: 14 (22 Dec 2017 14:00) (14 - 34)  SpO2: 100% (22 Dec 2017 14:00) (60% - 100%)                          9.3    8.5   )-----------( 709      ( 22 Dec 2017 11:16 )             29.6    12-22    135  |  95<L>  |  10  ----------------------------<  206<H>  4.1   |  25  |  0.62    Ca    9.4      22 Dec 2017 11:16  Phos  3.6     12-22  Mg     1.9     12-22    TPro  7.9  /  Alb  3.6  /  TBili  0.2  /  DBili  x   /  AST  16  /  ALT  8<L>  /  AlkPhos  123<H>  12-22   PT/INR - ( 22 Dec 2017 11:16 )   PT: 12.0 sec;   INR: 1.11 ratio         PTT - ( 22 Dec 2017 11:16 )  PTT:30.5 sec    PHYSICAL EXAM:  Gen: NAD, well-developed  Head: Normocephalic, Atraumatic  Face: no edema/erythema/fluctuance, parotid glands soft without mass  Eyes: no scleral injection  Nose: Nares bilaterally patent, no discharge  Mouth: Swollen lips and tongue  Neck: Flat, supple, no lymphadenopathy, trachea midline, no masses  Resp: breathing easily, no stridor  CV: no peripheral edema/cyanosis    Fiberoptic Indirect laryngoscopy:  (With Scope #2) CC: Angioedema and difficulty breathing    HPI: Patient is a 72y old Female who presents with a chief complaint of swelling of tongue. We are asked to evaluate the patient in MICU for angioedema and possible airway obstruction. Pt recently underwent L1-L4 lumbar fusion (11/2017) which was complicated by UTI requiring antibiotic therapy who presented to ED after developing swelling and protrusion of tongue with difficulty swallowing. In ED pt was bronchoscopically nasally intubated with 7 fr tube, received 1 mg epi I.M., racemic epi Nebulizer. I.O placed in right tibial area and received atropine for secretions. Pt last was well last night and was able to eat dinner. Pt denies eating new types of food or using new medication. Pt has no known allergies.  Pt was taking ACE inhibitors (enalapril), which are currently being held.     PAST MEDICAL & SURGICAL HISTORY:  MRSA (methicillin resistant Staphylococcus aureus) infection: 1999, infected knee replacement, required multiple revisions and long term IV antibiotics via central line  Spinal stenosis  Osteoarthritis  Diverticulosis  Pancreatitis, chronic: last episode &gt; 10 years ago  Hypothyroid  Hypertension  Diabetes  H/O right inguinal hernia repair  H/O total knee replacement, bilateral  FH: cholecystectomy  H/O: hysterectomy    Allergies: No Known Allergies or intolerances      MEDICATIONS  (STANDING):  dexamethasone  IVPB 10 milliGRAM(s) IV Intermittent every 8 hours  famotidine Injectable 20 milliGRAM(s) IV Push every 12 hours  fentaNYL   Infusion 1 MICROgram(s)/kG/Hr (5 mL/Hr) IV Continuous <Continuous>  heparin  Injectable 5000 Unit(s) SubCutaneous every 8 hours  insulin glargine Injectable (LANTUS) 20 Unit(s) SubCutaneous at bedtime  insulin lispro (HumaLOG) corrective regimen sliding scale   SubCutaneous every 4 hours  metoprolol    tartrate Injectable 5 milliGRAM(s) IV Push every 6 hours  propofol Infusion 20 MICROgram(s)/kG/Min (12 mL/Hr) IV Continuous <Continuous>      Social History: unknown     ROS: ENT- angioedema , GI, , CV, Pulm, Neuro, Psych, MS, Heme, Endo, Constitutional; all negative except as noted in HPI    Vital Signs Last 24 Hrs  T(C): 37.5 (22 Dec 2017 12:30), Max: 38.1 (22 Dec 2017 11:06)  T(F): 99.5 (22 Dec 2017 12:30), Max: 100.5 (22 Dec 2017 11:06)  HR: 112 (22 Dec 2017 14:00) (112 - 156)  BP: 141/64 (22 Dec 2017 14:00) (120/62 - 190/96)  BP(mean): 92 (22 Dec 2017 14:00) (85 - 105)  RR: 14 (22 Dec 2017 14:00) (14 - 34)  SpO2: 100% (22 Dec 2017 14:00) (60% - 100%)                          9.3    8.5   )-----------( 709      ( 22 Dec 2017 11:16 )             29.6    12-22    135  |  95<L>  |  10  ----------------------------<  206<H>  4.1   |  25  |  0.62    Ca    9.4      22 Dec 2017 11:16  Phos  3.6     12-22  Mg     1.9     12-22    TPro  7.9  /  Alb  3.6  /  TBili  0.2  /  DBili  x   /  AST  16  /  ALT  8<L>  /  AlkPhos  123<H>  12-22   PT/INR - ( 22 Dec 2017 11:16 )   PT: 12.0 sec;   INR: 1.11 ratio         PTT - ( 22 Dec 2017 11:16 )  PTT:30.5 sec    PHYSICAL EXAM:  Gen: NAD, well-developed  Head: Normocephalic, Atraumatic  Face: no edema/erythema/fluctuance, parotid glands soft without mass  Eyes: no scleral injection  Nose: right nasal intubation, no discharge  Mouth: Swollen lips and tongue  Neck: Flat, supple, no lymphadenopathy, trachea midline, no masses  Resp: breathing easily, no stridor  CV: no peripheral edema/cyanosis    Fiberoptic Indirect laryngoscopy:  (With Scope #2) un able to visualize   glideoscope: angioedema of anterior tongue and uvula, right over left pharyngeal wall b/l, Anterior VC appear normal, with cuff leak but has good ventilation.

## 2017-12-22 NOTE — ED PROVIDER NOTE - CRITICAL CARE PROVIDED
consultation with other physicians/additional history taking/direct patient care (not related to procedure)

## 2017-12-22 NOTE — H&P ADULT - MENTAL STATUS
although intubated , awake oriented x 4, shakes head yes and no appropriately to questions, follows commands, denies pain, LONG well 5/5

## 2017-12-22 NOTE — CONSULT NOTE ADULT - PROBLEM SELECTOR RECOMMENDATION 9
- decadron recommended   - as well as pepcid and benadryl which ICU team also deferring  - continue care as per MICU   - will re-eval pt once extubated

## 2017-12-22 NOTE — H&P ADULT - ATTENDING COMMENTS
1. Acute hypoxemic resp. failure due to angioedema of tongue and larynx.   Pt nasal intubated in ED by anesthesia.   Pt has been taking ACE inhibitor for many years. ENT  input appreciated. Decadron and Pepcid even though ? efficacy.   Continue currents AC vent settings.  2. DVT  prophylaxis with heparin    cc time 35 min

## 2017-12-22 NOTE — H&P ADULT - HISTORY OF PRESENT ILLNESS
72 yr old female with PMHx of DM2 on insulin therapy, HTN on ACE-I, Hypothyroidism, 72 yr old female with PMHx of DM2 on insulin therapy, HTN on ACE-I, Hypothyroidism, s/p Left knee replacement '99 c/b MRSA infection, osteoarthritis, chronic pancreatitis (last episode > 10 yrs ago), spinal stenosis, recent d/c from hospital end of Novemeber s/p L1-L4 lumbar fusion who now presents this a.m. from home BIBEMS after developing swelling and protrusion of tongue with difficulty swallowing. Received solumedrol 125 mg, bendryl 50 mg IM by EMS. In E.D. pt bronchoscopically nasally intubated with 7 fr tube, received 1 mg epi I.M., racemic epi Nebulizer. I.O placed in right tibial area and received atropine for secretions. Pt last was well last night and was able to eat dinner, last meds last night. Consult and admission to MICU for angioedema 72 yr old female with PMHx of DM2 on insulin therapy, HTN on ACE-I, Hypothyroidism, s/p Left knee replacement '99 c/b MRSA infection, osteoarthritis, chronic pancreatitis (last episode > 10 yrs ago), spinal stenosis, recent d/c from hospital end of Novemeber s/p L1-L4 lumbar fusion which was c/b by UTI requiring antibx therapy who now presents this a.m. from home BIBEMS after developing swelling and protrusion of tongue with difficulty swallowing. Received solumedrol 125 mg, bendryl 50 mg IM by EMS. In E.D. pt bronchoscopically nasally intubated with 7 fr tube, received 1 mg epi I.M., racemic epi Nebulizer. I.O placed in right tibial area and received atropine for secretions. Pt last was well last night and was able to eat dinner, last meds last night. Consult and admission to MICU for angioedema  pt denies new meds/food

## 2017-12-22 NOTE — CONSULT NOTE ADULT - ASSESSMENT
Patient is a 72y old Female who presents with significant anterior tongue and uvula, right over left pharyngeal wall b/l angioedema. with cuff leak- ICU team deferred change at this time

## 2017-12-22 NOTE — ED PROVIDER NOTE - PHYSICAL EXAMINATION
Attending note. Patient is alert and drooling from the mouth.  There is severe edema of the tongue. There is no stridor. Lungs are clear and equal bilaterally. Heart regular rate rhythm and tachycardic. Abdomen is obese with abdominal scars. There is no peripheral edema or rash. Neurologic exam is grossly intact.

## 2017-12-22 NOTE — ED PROVIDER NOTE - MEDICAL DECISION MAKING DETAILS
Attending note-acute angioedema of the tongue with potential for airway loss. Patient was prophylactically intubated by anesthesia nasally with the fiberoptic scope. Interosseus line was accessed and the right tibia. Admit to ICU. Angioedema most likely secondary to ACE inhibitor-enalapril

## 2017-12-22 NOTE — ED PROVIDER NOTE - OBJECTIVE STATEMENT
Attending note. Patient was seen in critical room B.  patient was brought in by EMS with a keel the swollen tongue. Patient is not able to provide history. EMS reports patient takes enalapril for many years. No other history is available from the patient.

## 2017-12-22 NOTE — CHART NOTE - NSCHARTNOTEFT_GEN_A_CORE
Called to ER to emergently intubate this 73 yo female with angioedema.  Upon arrival, patient with 100% O2 mask and Sat = %. Patient breathing spontaneously  and BP, HR stable. After #26-30 nasal trumpet dilators with 2% Lido jelly, awake nasal fiberoptic intubation with #7.0 ETT.  Position confirmed with fiberoptic, +ETCO2, + CO2 by capnography.  Tube secured. CXR, vent settings,  and further management by ER staff. BP and HR stable with Sat = 100%.

## 2017-12-22 NOTE — ED ADULT NURSE NOTE - OBJECTIVE STATEMENT
72 y.o F presents to the ED from [facility] with angioedema. EMS states that "patient's tongue became swollen after taking an ace inhibitor (Enalapril)" and that "the tongue started swelling 30 minutes prior to ED arrival" (patient took medication yesterday, not today). EMS states that they gave the patient 50mg Benadryl IM, and 125 mg Solumedrol IM; EMS states they "could not obtain IV access". Patient presents alert and awake, appears anxious and tongue visibly swollen and protruding out of mouth; patient drooling-blood tinged secretions. Anesthesia called STAT upon patient arrival to ED for nasal intubation and to protect airway. Patient did not eat or drink anything this morning prior to ED arrival and verified that she has no known allergies. Cardiac monitoring initiated- Sinus Tachycardia on the monitor- .

## 2017-12-23 LAB
ALBUMIN SERPL ELPH-MCNC: 3.1 G/DL — LOW (ref 3.3–5)
ALP SERPL-CCNC: 98 U/L — SIGNIFICANT CHANGE UP (ref 40–120)
ALT FLD-CCNC: 7 U/L RC — LOW (ref 10–45)
ANION GAP SERPL CALC-SCNC: 11 MMOL/L — SIGNIFICANT CHANGE UP (ref 5–17)
ANION GAP SERPL CALC-SCNC: 12 MMOL/L — SIGNIFICANT CHANGE UP (ref 5–17)
APTT BLD: 28.1 SEC — SIGNIFICANT CHANGE UP (ref 27.5–37.4)
AST SERPL-CCNC: 11 U/L — SIGNIFICANT CHANGE UP (ref 10–40)
BASOPHILS # BLD AUTO: 0 K/UL — SIGNIFICANT CHANGE UP (ref 0–0.2)
BASOPHILS NFR BLD AUTO: 0 % — SIGNIFICANT CHANGE UP (ref 0–2)
BILIRUB SERPL-MCNC: 0.1 MG/DL — LOW (ref 0.2–1.2)
BUN SERPL-MCNC: 13 MG/DL — SIGNIFICANT CHANGE UP (ref 7–23)
BUN SERPL-MCNC: 16 MG/DL — SIGNIFICANT CHANGE UP (ref 7–23)
CALCIUM SERPL-MCNC: 8.8 MG/DL — SIGNIFICANT CHANGE UP (ref 8.4–10.5)
CALCIUM SERPL-MCNC: 9 MG/DL — SIGNIFICANT CHANGE UP (ref 8.4–10.5)
CHLORIDE SERPL-SCNC: 100 MMOL/L — SIGNIFICANT CHANGE UP (ref 96–108)
CHLORIDE SERPL-SCNC: 100 MMOL/L — SIGNIFICANT CHANGE UP (ref 96–108)
CO2 SERPL-SCNC: 22 MMOL/L — SIGNIFICANT CHANGE UP (ref 22–31)
CO2 SERPL-SCNC: 24 MMOL/L — SIGNIFICANT CHANGE UP (ref 22–31)
CREAT SERPL-MCNC: 0.63 MG/DL — SIGNIFICANT CHANGE UP (ref 0.5–1.3)
CREAT SERPL-MCNC: 0.68 MG/DL — SIGNIFICANT CHANGE UP (ref 0.5–1.3)
EOSINOPHIL # BLD AUTO: 0 K/UL — SIGNIFICANT CHANGE UP (ref 0–0.5)
EOSINOPHIL NFR BLD AUTO: 0 % — SIGNIFICANT CHANGE UP (ref 0–6)
GAS PNL BLDA: SIGNIFICANT CHANGE UP
GLUCOSE BLDC GLUCOMTR-MCNC: 134 MG/DL — HIGH (ref 70–99)
GLUCOSE BLDC GLUCOMTR-MCNC: 143 MG/DL — HIGH (ref 70–99)
GLUCOSE BLDC GLUCOMTR-MCNC: 166 MG/DL — HIGH (ref 70–99)
GLUCOSE BLDC GLUCOMTR-MCNC: 171 MG/DL — HIGH (ref 70–99)
GLUCOSE BLDC GLUCOMTR-MCNC: 186 MG/DL — HIGH (ref 70–99)
GLUCOSE BLDC GLUCOMTR-MCNC: 206 MG/DL — HIGH (ref 70–99)
GLUCOSE SERPL-MCNC: 166 MG/DL — HIGH (ref 70–99)
GLUCOSE SERPL-MCNC: 172 MG/DL — HIGH (ref 70–99)
HCT VFR BLD CALC: 23.7 % — LOW (ref 34.5–45)
HCT VFR BLD CALC: 25.1 % — LOW (ref 34.5–45)
HGB BLD-MCNC: 7.8 G/DL — LOW (ref 11.5–15.5)
HGB BLD-MCNC: 8.2 G/DL — LOW (ref 11.5–15.5)
INR BLD: 1.11 RATIO — SIGNIFICANT CHANGE UP (ref 0.88–1.16)
LYMPHOCYTES # BLD AUTO: 0.7 K/UL — LOW (ref 1–3.3)
LYMPHOCYTES # BLD AUTO: 9.6 % — LOW (ref 13–44)
MAGNESIUM SERPL-MCNC: 1.9 MG/DL — SIGNIFICANT CHANGE UP (ref 1.6–2.6)
MAGNESIUM SERPL-MCNC: 2 MG/DL — SIGNIFICANT CHANGE UP (ref 1.6–2.6)
MCHC RBC-ENTMCNC: 28.5 PG — SIGNIFICANT CHANGE UP (ref 27–34)
MCHC RBC-ENTMCNC: 29.2 PG — SIGNIFICANT CHANGE UP (ref 27–34)
MCHC RBC-ENTMCNC: 32.6 GM/DL — SIGNIFICANT CHANGE UP (ref 32–36)
MCHC RBC-ENTMCNC: 33.1 GM/DL — SIGNIFICANT CHANGE UP (ref 32–36)
MCV RBC AUTO: 87.4 FL — SIGNIFICANT CHANGE UP (ref 80–100)
MCV RBC AUTO: 88.2 FL — SIGNIFICANT CHANGE UP (ref 80–100)
MONOCYTES # BLD AUTO: 0.2 K/UL — SIGNIFICANT CHANGE UP (ref 0–0.9)
MONOCYTES NFR BLD AUTO: 2.1 % — SIGNIFICANT CHANGE UP (ref 2–14)
NEUTROPHILS # BLD AUTO: 6.3 K/UL — SIGNIFICANT CHANGE UP (ref 1.8–7.4)
NEUTROPHILS NFR BLD AUTO: 88.3 % — HIGH (ref 43–77)
PHOSPHATE SERPL-MCNC: 3.6 MG/DL — SIGNIFICANT CHANGE UP (ref 2.5–4.5)
PHOSPHATE SERPL-MCNC: 4.2 MG/DL — SIGNIFICANT CHANGE UP (ref 2.5–4.5)
PLATELET # BLD AUTO: 534 K/UL — HIGH (ref 150–400)
PLATELET # BLD AUTO: 568 K/UL — HIGH (ref 150–400)
POTASSIUM SERPL-MCNC: 4.9 MMOL/L — SIGNIFICANT CHANGE UP (ref 3.5–5.3)
POTASSIUM SERPL-MCNC: 5 MMOL/L — SIGNIFICANT CHANGE UP (ref 3.5–5.3)
POTASSIUM SERPL-SCNC: 4.9 MMOL/L — SIGNIFICANT CHANGE UP (ref 3.5–5.3)
POTASSIUM SERPL-SCNC: 5 MMOL/L — SIGNIFICANT CHANGE UP (ref 3.5–5.3)
PROT SERPL-MCNC: 6.8 G/DL — SIGNIFICANT CHANGE UP (ref 6–8.3)
PROTHROM AB SERPL-ACNC: 12.1 SEC — SIGNIFICANT CHANGE UP (ref 9.8–12.7)
RBC # BLD: 2.68 M/UL — LOW (ref 3.8–5.2)
RBC # BLD: 2.87 M/UL — LOW (ref 3.8–5.2)
RBC # FLD: 14.9 % — HIGH (ref 10.3–14.5)
RBC # FLD: 14.9 % — HIGH (ref 10.3–14.5)
SODIUM SERPL-SCNC: 134 MMOL/L — LOW (ref 135–145)
SODIUM SERPL-SCNC: 135 MMOL/L — SIGNIFICANT CHANGE UP (ref 135–145)
WBC # BLD: 7.1 K/UL — SIGNIFICANT CHANGE UP (ref 3.8–10.5)
WBC # BLD: 7.7 K/UL — SIGNIFICANT CHANGE UP (ref 3.8–10.5)
WBC # FLD AUTO: 7.1 K/UL — SIGNIFICANT CHANGE UP (ref 3.8–10.5)
WBC # FLD AUTO: 7.7 K/UL — SIGNIFICANT CHANGE UP (ref 3.8–10.5)

## 2017-12-23 PROCEDURE — 71010: CPT | Mod: 26

## 2017-12-23 PROCEDURE — 99291 CRITICAL CARE FIRST HOUR: CPT

## 2017-12-23 RX ORDER — METOPROLOL TARTRATE 50 MG
5 TABLET ORAL EVERY 6 HOURS
Qty: 0 | Refills: 0 | Status: DISCONTINUED | OUTPATIENT
Start: 2017-12-23 | End: 2017-12-24

## 2017-12-23 RX ORDER — DIPHENHYDRAMINE HCL 50 MG
50 CAPSULE ORAL EVERY 6 HOURS
Qty: 0 | Refills: 0 | Status: DISCONTINUED | OUTPATIENT
Start: 2017-12-23 | End: 2017-12-23

## 2017-12-23 RX ORDER — DIPHENHYDRAMINE HCL 50 MG
25 CAPSULE ORAL EVERY 8 HOURS
Qty: 0 | Refills: 0 | Status: DISCONTINUED | OUTPATIENT
Start: 2017-12-23 | End: 2017-12-27

## 2017-12-23 RX ORDER — DIAZEPAM 5 MG
2 TABLET ORAL EVERY 8 HOURS
Qty: 0 | Refills: 0 | Status: DISCONTINUED | OUTPATIENT
Start: 2017-12-23 | End: 2017-12-28

## 2017-12-23 RX ADMIN — Medication 2 MILLIGRAM(S): at 13:03

## 2017-12-23 RX ADMIN — Medication 25 MILLIGRAM(S): at 13:03

## 2017-12-23 RX ADMIN — HEPARIN SODIUM 5000 UNIT(S): 5000 INJECTION INTRAVENOUS; SUBCUTANEOUS at 22:44

## 2017-12-23 RX ADMIN — HEPARIN SODIUM 5000 UNIT(S): 5000 INJECTION INTRAVENOUS; SUBCUTANEOUS at 05:17

## 2017-12-23 RX ADMIN — Medication 1 MILLIGRAM(S): at 16:20

## 2017-12-23 RX ADMIN — Medication 25 MILLIGRAM(S): at 22:45

## 2017-12-23 RX ADMIN — HEPARIN SODIUM 5000 UNIT(S): 5000 INJECTION INTRAVENOUS; SUBCUTANEOUS at 13:03

## 2017-12-23 RX ADMIN — FAMOTIDINE 20 MILLIGRAM(S): 10 INJECTION INTRAVENOUS at 18:13

## 2017-12-23 RX ADMIN — FAMOTIDINE 20 MILLIGRAM(S): 10 INJECTION INTRAVENOUS at 05:17

## 2017-12-23 RX ADMIN — Medication 2 MILLIGRAM(S): at 22:45

## 2017-12-23 RX ADMIN — Medication 102 MILLIGRAM(S): at 13:02

## 2017-12-23 RX ADMIN — Medication 102 MILLIGRAM(S): at 05:16

## 2017-12-23 RX ADMIN — PROPOFOL 12 MICROGRAM(S)/KG/MIN: 10 INJECTION, EMULSION INTRAVENOUS at 18:13

## 2017-12-23 RX ADMIN — FENTANYL CITRATE 5 MICROGRAM(S)/KG/HR: 50 INJECTION INTRAVENOUS at 11:46

## 2017-12-23 RX ADMIN — Medication 4: at 22:46

## 2017-12-23 RX ADMIN — Medication 102 MILLIGRAM(S): at 22:41

## 2017-12-23 RX ADMIN — Medication 4: at 09:49

## 2017-12-23 RX ADMIN — Medication 5 MILLIGRAM(S): at 06:11

## 2017-12-23 RX ADMIN — Medication: at 02:39

## 2017-12-23 RX ADMIN — Medication 6: at 18:13

## 2017-12-23 RX ADMIN — PROPOFOL 12 MICROGRAM(S)/KG/MIN: 10 INJECTION, EMULSION INTRAVENOUS at 11:47

## 2017-12-23 RX ADMIN — INSULIN GLARGINE 20 UNIT(S): 100 INJECTION, SOLUTION SUBCUTANEOUS at 22:48

## 2017-12-23 NOTE — PROGRESS NOTE ADULT - SUBJECTIVE AND OBJECTIVE BOX
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 from home BIBEMS after developing swelling and protrusion of tongue with difficulty swallowing. Received solumedrol 125 mg, bendryl 50 mg IM by EMS. In E.D. pt bronchoscopically nasally intubated with 7 fr tube, received 1 mg epi I.M., racemic epi Nebulizer. I.O placed in right tibial area and received atropine for secretions. Pt last was well last night and was able to eat dinner, last meds last night. Consult and admission to MICU for angioedema  pt denies new meds/food        24 hour events: ***        REVIEW OF SYSTEMS:    [ ] Unable to assess ROS because ________  REVIEW OF SYSTEMS  Constitutional: No fever, chills, fatigue  Neuro: No headache, numbness, weakness  Resp: No cough, wheezing, shortness of breath  CVS: No chest pain, palpitations, leg swelling  GI: No abdominal pain, nausea, vomiting, diarrhea   : No dysuria, frequency, incontinence  Skin: No itching, burning, rashes, or lesions   Msk: No joint pain or swelling  Psych: No depression, anxiety, mood swings      OBJECTIVE:  ICU Vital Signs Last 24 Hrs  T(C): 35.6 (23 Dec 2017 04:00), Max: 38.1 (22 Dec 2017 11:06)  T(F): 96.1 (23 Dec 2017 04:00), Max: 100.5 (22 Dec 2017 11:06)  HR: 68 (23 Dec 2017 05:11) (50 - 156)  BP: 175/78 (23 Dec 2017 05:00) (102/53 - 190/96)  BP(mean): 112 (23 Dec 2017 05:00) (73 - 126)  ABP: --  ABP(mean): --  RR: 15 (23 Dec 2017 05:00) (14 - 34)  SpO2: 100% (23 Dec 2017 05:11) (60% - 100%)    Mode: AC/ CMV (Assist Control/ Continuous Mandatory Ventilation), RR (machine): 14, TV (machine): 450, FiO2: 50, PEEP: 5, ITime: 1, MAP: 10, PIP: 22    12-22 @ 07:01  -  12-23 @ 06:08  --------------------------------------------------------  IN: 528.5 mL / OUT: 445 mL / NET: 83.5 mL      CAPILLARY BLOOD GLUCOSE      POCT Blood Glucose.: 143 mg/dL (23 Dec 2017 05:15)        PHYSICAL EXAM:  GENERAL: NAD, well-developed  HEAD:  Atraumatic, Normocephalic  EYES: EOMI, PERRLA, conjunctiva and sclera clear  NECK: Supple, No JVD, Thyroid not palpable, non tender, Trachea midline  CHEST/LUNG: ( )ETT in place, ( )Tracheostomy in place, ( )no chest deformity,  ( )  Normal expansion/effort/palpation,  ( )Normal percussion/auscultation,  Clear to auscultation bilaterally; No wheeze  HEART: Regular rate and rhythm; No murmurs, rubs, or gallops, ( ) No JVD, ( )Normal Pulses, ( )Edema   ABDOMEN: Soft, Nontender, Nondistended; Bowel sounds presen, ( ) No Masses, (  ) No organomegaly,  (  ) Non-tender normal BS   : Daugherty in Place, Voiding freely  Musculoskeletal/EXTREMITIES:(  ) Normal strength, movement, and tone, (  ) No focal atropy, (  ) Normal ROM, (  ) Normal digits and nails,  2+ Peripheral Pulses, No clubbing, cyanosis, or edema  PSYCH: AAOx3, (  ) Normal mood/affect/judgment/insight, (  ) Intact memory,   NEUROLOGY: non-focal, exam  SKIN: No rashes or lesions      HOSPITAL MEDICATIONS:  MEDICATIONS  (STANDING):  dexamethasone  IVPB 10 milliGRAM(s) IV Intermittent every 8 hours  famotidine Injectable 20 milliGRAM(s) IV Push every 12 hours  fentaNYL   Infusion 1 MICROgram(s)/kG/Hr (5 mL/Hr) IV Continuous <Continuous>  heparin  Injectable 5000 Unit(s) SubCutaneous every 8 hours  insulin glargine Injectable (LANTUS) 20 Unit(s) SubCutaneous at bedtime  insulin lispro (HumaLOG) corrective regimen sliding scale   SubCutaneous every 4 hours  metoprolol    tartrate Injectable 5 milliGRAM(s) IV Push every 6 hours  propofol Infusion 20 MICROgram(s)/kG/Min (12 mL/Hr) IV Continuous <Continuous>    MEDICATIONS  (PRN):      LABS:                        7.8    7.1   )-----------( 534      ( 23 Dec 2017 02:44 )             23.7       12-23    134<L>  |  100  |  13  ----------------------------<  172<H>  5.0   |  22  |  0.63    Ca    8.8      23 Dec 2017 02:44  Phos  4.2     12-23  Mg     1.9     12-23    TPro  6.8  /  Alb  3.1<L>  /  TBili  0.1<L>  /  DBili  x   /  AST  11  /  ALT  7<L>  /  AlkPhos  98  12-23          PT/INR - ( 23 Dec 2017 02:44 )   PT: 12.1 sec;   INR: 1.11 ratio         PTT - ( 23 Dec 2017 02:44 )  PTT:28.1 sec            MICROBIOLOGY:     Radiology: ***    Bedside lung ultrasound: ***    Bedside ECHO: ***    EKG:    CENTRAL LINE: Y/N          DATE INSERTED:              REMOVE: Y/N    DAUGHERTY: Y/N                        DATE INSERTED:              REMOVE: Y/N    A-LINE: Y/N                       DATE INSERTED:              REMOVE: Y/N    GLOBAL ISSUE/BEST PRACTICE:  Analgesia:  Sedation:  HOB elevation: yes  Stress ulcer prophylaxis:  VTE prophylaxis:  Glycemic control:  Nutrition:    CODE STATUS: ***  [facility] discussion: Y Patient is a 72y old  Female who presents with a chief complaint of swelling of tongue (22 Dec 2017 11:16)  72 yr old female with PMHx of DM2 on insulin therapy, HTN on ACE-I, Hypothyroidism, s/p Left knee replacement '99 c/b MRSA infection, osteoarthritis, chronic pancreatitis (last episode > 10 yrs ago), spinal stenosis, recent d/c from hospital end of Novemeber s/p L1-L4 lumbar fusion which was c/b by UTI requiring antibx therapy who presented to ED 12/22/17 from home after developing swelling and protrusion of tongue with difficulty swallowing. Received solumedrol 125 mg, bendryl 50 mg IM by EMS. In E.D. pt was nasally intubated with 7 fr tube, received 1 mg epi I.M., racemic epi Nebulizer. I.O placed in right tibial area and received atropine for secretions. Pt last was well last night and was able to eat dinner, last meds last night.       24 hour events: No acute events, remains nasally intubated and sedated        REVIEW OF SYSTEMS:    Unable to assess ROS because sedated on Fentanyl and Propofol     OBJECTIVE:  ICU Vital Signs Last 24 Hrs  T(C): 35.6 (23 Dec 2017 04:00), Max: 38.1 (22 Dec 2017 11:06)  T(F): 96.1 (23 Dec 2017 04:00), Max: 100.5 (22 Dec 2017 11:06)  HR: 68 (23 Dec 2017 05:11) (50 - 156)  BP: 175/78 (23 Dec 2017 05:00) (102/53 - 190/96)  BP(mean): 112 (23 Dec 2017 05:00) (73 - 126)  ABP: --  ABP(mean): --  RR: 15 (23 Dec 2017 05:00) (14 - 34)  SpO2: 100% (23 Dec 2017 05:11) (60% - 100%)    Mode: AC/ CMV (Assist Control/ Continuous Mandatory Ventilation), RR (machine): 14, TV (machine): 450, FiO2: 50, PEEP: 5, ITime: 1, MAP: 10, PIP: 22    12-22 @ 07:01  -  12-23 @ 06:08  --------------------------------------------------------  IN: 528.5 mL / OUT: 445 mL / NET: 83.5 ml       POCT Blood Glucose.: 143 mg/dL (23 Dec 2017 05:15)    Physical Exam:  · Constitutional	detailed exam	  · Constitutional Details	well-developed; well-groomed	  · Respiratory Distress	Passive on ventilator 	  · Eyes	EOMI; PERRL; no drainage or redness	  · ENMT	No oral lesions; no gross abnormalities	  · Neck	No bruits; no thyromegaly or nodules	  · Breasts	No masses; no nipple discharge	  · Back	No deformity or limitation of movement	  · Respiratory Details	no wheezes; rhonchi BL Upper and lower lung fields 	  · Cardiovascular	regular rate and rhythm, positive S1; positive S2		  · Gastrointestinal	Soft, non-tender, no hepatosplenomegaly, normal bowel BS 	  · Genitourinary	Normal genitalia; no lesions; no discharge	  · Rectal	deferred 	  · Extremities	No cyanosis, clubbing or edema	  · Vascular	Equal and normal pulses carotid, femoral, dorsalis pedis	  · Mental Status	Off sedation arousable and floows commands, denies pain, LONG, Remains intubated and sedated	  · Cranial Nerve	intact	  · Skin	No lesions; no rash	  · Lymph Nodes	No lymphadedenopathy	  · Musculoskeletal	No joint pain, swelling or deformity; no limitation of movement	          HOSPITAL MEDICATIONS:  MEDICATIONS  (STANDING):  dexamethasone  IVPB 10 milliGRAM(s) IV Intermittent every 8 hours  famotidine Injectable 20 milliGRAM(s) IV Push every 12 hours  fentaNYL   Infusion 1 MICROgram(s)/kG/Hr (5 mL/Hr) IV Continuous <Continuous>  heparin  Injectable 5000 Unit(s) SubCutaneous every 8 hours  insulin glargine Injectable (LANTUS) 20 Unit(s) SubCutaneous at bedtime  insulin lispro (HumaLOG) corrective regimen sliding scale   SubCutaneous every 4 hours  metoprolol    tartrate Injectable 5 milliGRAM(s) IV Push every 6 hours  propofol Infusion 20 MICROgram(s)/kG/Min (12 mL/Hr) IV Continuous <Continuous>      LABS:                        7.8    7.1   )-----------( 534      ( 23 Dec 2017 02:44 )             23.7       12-23    134<L>  |  100  |  13  ----------------------------<  172<H>  5.0   |  22  |  0.63    Ca    8.8      23 Dec 2017 02:44  Phos  4.2     12-23  Mg     1.9     12-23    TPro  6.8  /  Alb  3.1<L>  /  TBili  0.1<L>  /  DBili  x   /  AST  11  /  ALT  7<L>  /  AlkPhos  98  12-23          PT/INR - ( 23 Dec 2017 02:44 )   PT: 12.1 sec;   INR: 1.11 ratio         PTT - ( 23 Dec 2017 02:44 )  PTT:28.1 sec      CENTRAL LINE: Y/N          DATE INSERTED:              REMOVE: Y/N    DAUGHERTY: Y/N                        DATE INSERTED:              REMOVE: Y/N    A-LINE: Y/N                       DATE INSERTED:              REMOVE: Y/N    GLOBAL ISSUE/BEST PRACTICE:  Analgesia: Fentanyl   Sedation: Propofol   HOB elevation: yes  Stress ulcer prophylaxis: Pepcid  VTE prophylaxis: Heparin SQ  Glycemic control: Insulin   Nutrition: Tube feeds, tolerating well     CODE STATUS: Full Code Patient is a 72y old  Female who presents with a chief complaint of swelling of tongue (22 Dec 2017 11:16)  72 yr old female with PMHx of DM2 on insulin therapy, HTN on ACE-I, Hypothyroidism, s/p Left knee replacement '99 c/b MRSA infection, osteoarthritis, chronic pancreatitis (last episode > 10 yrs ago), spinal stenosis, recent d/c from hospital end of Novemeber s/p L1-L4 lumbar fusion which was c/b by UTI requiring antibx therapy who presented to ED 12/22/17 from home after developing swelling and protrusion of tongue with difficulty swallowing. Received solumedrol 125 mg, bendryl 50 mg IM by EMS. In E.D. pt was nasally intubated with 7 fr tube, received 1 mg epi I.M., racemic epi Nebulizer. I.O placed in right tibial area and received atropine for secretions. Pt last was well last night and was able to eat dinner, last meds last night.       24 hour events: No acute events, remains nasally intubated and sedated        REVIEW OF SYSTEMS:    Unable to assess ROS because sedated on Fentanyl and Propofol     OBJECTIVE:  ICU Vital Signs Last 24 Hrs  T(C): 35.6 (23 Dec 2017 04:00), Max: 38.1 (22 Dec 2017 11:06)  T(F): 96.1 (23 Dec 2017 04:00), Max: 100.5 (22 Dec 2017 11:06)  HR: 68 (23 Dec 2017 05:11) (50 - 156)  BP: 175/78 (23 Dec 2017 05:00) (102/53 - 190/96)  BP(mean): 112 (23 Dec 2017 05:00) (73 - 126)  ABP: --  ABP(mean): --  RR: 15 (23 Dec 2017 05:00) (14 - 34)  SpO2: 100% (23 Dec 2017 05:11) (60% - 100%)    Mode: AC/ CMV (Assist Control/ Continuous Mandatory Ventilation), RR (machine): 14, TV (machine): 450, FiO2: 50, PEEP: 5, ITime: 1, MAP: 10, PIP: 22    12-22 @ 07:01  -  12-23 @ 06:08  --------------------------------------------------------  IN: 528.5 mL / OUT: 445 mL / NET: 83.5 ml       POCT Blood Glucose.: 143 mg/dL (23 Dec 2017 05:15)    Physical Exam:  · Constitutional	detailed exam	  · Constitutional Details	well-developed; well-groomed	  · Respiratory Distress	Passive on ventilator 	  · Eyes	EOMI; PERRL; no drainage or redness	  · ENMT	No oral lesions; no gross abnormalities	  · Neck	No bruits; no thyromegaly or nodules	  · Breasts	No masses; no nipple discharge	  · Back	No deformity or limitation of movement	  · Respiratory Details	no wheezes; rhonchi BL Upper and lower lung fields 	  · Cardiovascular	regular rate and rhythm, positive S1; positive S2		  · Gastrointestinal	Soft, non-tender, no hepatosplenomegaly, normal bowel BS 	  · Genitourinary	Normal genitalia; no lesions; no discharge	  · Rectal	deferred 	  · Extremities	No cyanosis, clubbing or edema	  · Vascular	Equal and normal pulses carotid, femoral, dorsalis pedis	  · Mental Status	Off sedation arousable and floows commands, denies pain, LONG, Remains intubated and sedated	  · Cranial Nerve	intact	  · Skin	No lesions; no rash	  · Lymph Nodes	No lymphadedenopathy	  · Musculoskeletal	No joint pain, swelling or deformity; no limitation of movement	          HOSPITAL MEDICATIONS:  MEDICATIONS  (STANDING):  dexamethasone  IVPB 10 milliGRAM(s) IV Intermittent every 8 hours  famotidine Injectable 20 milliGRAM(s) IV Push every 12 hours  fentaNYL   Infusion 1 MICROgram(s)/kG/Hr (5 mL/Hr) IV Continuous <Continuous>  heparin  Injectable 5000 Unit(s) SubCutaneous every 8 hours  insulin glargine Injectable (LANTUS) 20 Unit(s) SubCutaneous at bedtime  insulin lispro (HumaLOG) corrective regimen sliding scale   SubCutaneous every 4 hours  metoprolol    tartrate Injectable 5 milliGRAM(s) IV Push every 6 hours  propofol Infusion 20 MICROgram(s)/kG/Min (12 mL/Hr) IV Continuous <Continuous>      LABS:                        7.8    7.1   )-----------( 534      ( 23 Dec 2017 02:44 )             23.7       12-23    134<L>  |  100  |  13  ----------------------------<  172<H>  5.0   |  22  |  0.63    Ca    8.8      23 Dec 2017 02:44  Phos  4.2     12-23  Mg     1.9     12-23    TPro  6.8  /  Alb  3.1<L>  /  TBili  0.1<L>  /  DBili  x   /  AST  11  /  ALT  7<L>  /  AlkPhos  98  12-23          PT/INR - ( 23 Dec 2017 02:44 )   PT: 12.1 sec;   INR: 1.11 ratio         PTT - ( 23 Dec 2017 02:44 )  PTT:28.1 sec      CENTRAL LINE: N, PIVLs         DAUGHERTY: Y                      DATE INSERTED:  12/22           REMOVE:  N      GLOBAL ISSUE/BEST PRACTICE:  Analgesia: Fentanyl   Sedation: Propofol   HOB elevation: yes  Stress ulcer prophylaxis: Pepcid  VTE prophylaxis: Heparin SQ  Glycemic control: Insulin   Nutrition: Yuan feed to be placed this am, start tube feed as tolerated    CODE STATUS: Full Code

## 2017-12-23 NOTE — PROGRESS NOTE ADULT - SUBJECTIVE AND OBJECTIVE BOX
POD/STATUS POST/ENT ISSUE: Angioedema    INTERVAL HPI: pt s/p fiberoptic nasal intubation yesterday in ED for angioedema pt seen & examined no events overnight  12/22: Fiberoptic Indirect laryngoscopy:  (With Scope #2) un able to visualize   glideoscope: angioedema of anterior tongue and uvula, right over left pharyngeal wall b/l, Anterior VC appear normal, with cuff leak but has good ventilation.     Vital Signs Last 24 Hrs  T(C): 36.7 (23 Dec 2017 16:00), Max: 37.7 (23 Dec 2017 00:00)  T(F): 98.1 (23 Dec 2017 16:00), Max: 99.9 (23 Dec 2017 00:00)  HR: 64 (23 Dec 2017 17:05) (50 - 96)  BP: 127/59 (23 Dec 2017 16:00) (102/53 - 190/89)  BP(mean): 85 (23 Dec 2017 16:00) (73 - 126)  RR: 14 (23 Dec 2017 16:00) (14 - 24)  SpO2: 96% (23 Dec 2017 17:05) (96% - 100%)    PHYSICAL EXAM:  Gen: NAD, nasally intubated sedated  Head: Normocephalic, Atraumatic  Eyes: PERRL, EOMI, no scleral injection  Nose: Nares bilaterally patent, no discharge  Mouth: normal contour of lips FOM soft Mucosa moist, tongue fits in mouth no protruding midline, oropharynx clear  Neck: Flat, supple, no lymphadenopathy, trachea midline no anterior neck swelling, no masses  Resp: breathing easily, no stridor  CV: no peripheral edema/cyanosis    LABS:                        8.2    7.7   )-----------( 568      ( 23 Dec 2017 16:37 )             25.1

## 2017-12-23 NOTE — PROGRESS NOTE ADULT - ASSESSMENT
#Neuro:  Neuro checks q 2 hrs and prn for changes  place on propofol for vent synchronization and sedation titrate to RASS of 0 to -2  s/p L1 - L4 fusion, pain control with fentanyl 25 ug q 3 hrs prn    #Pulm:  nasally intubated  mechanical vent therapy to maintain ph 7.35 - 7.45; PCO2 35 - 45; SPO2 >/= 92%  HOB >/= 30 degree angle  bronchodilators with duoneb therapy q 6 hrs prn wheezing/sob  ENT consult to eval and trend extent of angioedema    #CV:  Hold ACE inhibitors (pt has been on enalapril)  continue with Ca++ channel blocker - amlodipine 10 mg qd ngt  change Beta blocker to metoprolol 25 mg NGT q 12 hrs  ecg now and q day x 3    #GI/:  place ngt  place [place]  strict I & O's keep even    #I.D.:  due to increased secretion secondary to angioedema will place on place on zosyn and vanco for aspiration coverage    #FEN/ENDO/HEME:  Solumedrol 40 mg IV q 8 hrs  Benedryl 50 mg q 6 hrs  famotidine 20 mg q 6 hrs  consider epinephrine gtt   ISS with moderate dose sliding scale with POC glucose q 4 hrs to maintain glucose 140 to 160  due to steroid therapy - if glucoses > 180 after 3 checks place on Insulin gtt with q 1 hr POC glucose checks  can consider FFP transfusions to cover for C1 esterase deficiency  send C1 esterase level  send lytes/cbc/coags/T&CM/TSH/T4/T3  change levothyroxine to 75 mg IV qd #Neuro:  Neuro checks q 2 hrs and prn   propofol for sedation vent synchronization and fentanyl gtt for pain     #Pulm:  nasally intubated  mechanical vent   HOB >/= 30 degree angle  bronchodilators with duoneb therapy q 6 hrs prn   ENT eval and trend extent of angioedema    #CV:  Hold ACE inhibitors (pt has been on enalapril)  Ca++ channel blocker - amlodipine 10 mg qd ngt  Beta blocker to metoprolol 25 mg NGT q 12 hrs  ecg qd x 3    #GI/:  place Kaofeed  place [place]  strict I & O's keep even    #I.D.:  Low suspicion for infection, WBC 7.1, Afebrile, No ABT currently     #FEN/ENDO/HEME:  Solumedrol 40 mg IV q 8 hrs  Benedryl 50 mg q 6 hrs  famotidine 20 mg q 6 hrs  DM- and steroids strict glucise control   Daily electrolytes/cbc/coags

## 2017-12-24 LAB
ALBUMIN SERPL ELPH-MCNC: 3.6 G/DL — SIGNIFICANT CHANGE UP (ref 3.3–5)
ALP SERPL-CCNC: 108 U/L — SIGNIFICANT CHANGE UP (ref 40–120)
ALT FLD-CCNC: <5 U/L RC — LOW (ref 10–45)
ANION GAP SERPL CALC-SCNC: 12 MMOL/L — SIGNIFICANT CHANGE UP (ref 5–17)
APTT BLD: 25.1 SEC — LOW (ref 27.5–37.4)
AST SERPL-CCNC: 10 U/L — SIGNIFICANT CHANGE UP (ref 10–40)
BASOPHILS # BLD AUTO: 0 K/UL — SIGNIFICANT CHANGE UP (ref 0–0.2)
BASOPHILS NFR BLD AUTO: 0 % — SIGNIFICANT CHANGE UP (ref 0–2)
BILIRUB SERPL-MCNC: 0.1 MG/DL — LOW (ref 0.2–1.2)
BUN SERPL-MCNC: 20 MG/DL — SIGNIFICANT CHANGE UP (ref 7–23)
CALCIUM SERPL-MCNC: 8.9 MG/DL — SIGNIFICANT CHANGE UP (ref 8.4–10.5)
CHLORIDE SERPL-SCNC: 98 MMOL/L — SIGNIFICANT CHANGE UP (ref 96–108)
CO2 SERPL-SCNC: 23 MMOL/L — SIGNIFICANT CHANGE UP (ref 22–31)
CREAT SERPL-MCNC: 0.72 MG/DL — SIGNIFICANT CHANGE UP (ref 0.5–1.3)
EOSINOPHIL # BLD AUTO: 0 K/UL — SIGNIFICANT CHANGE UP (ref 0–0.5)
EOSINOPHIL NFR BLD AUTO: 0.2 % — SIGNIFICANT CHANGE UP (ref 0–6)
GAS PNL BLDA: SIGNIFICANT CHANGE UP
GLUCOSE BLDC GLUCOMTR-MCNC: 131 MG/DL — HIGH (ref 70–99)
GLUCOSE BLDC GLUCOMTR-MCNC: 170 MG/DL — HIGH (ref 70–99)
GLUCOSE BLDC GLUCOMTR-MCNC: 180 MG/DL — HIGH (ref 70–99)
GLUCOSE BLDC GLUCOMTR-MCNC: 196 MG/DL — HIGH (ref 70–99)
GLUCOSE BLDC GLUCOMTR-MCNC: 199 MG/DL — HIGH (ref 70–99)
GLUCOSE BLDC GLUCOMTR-MCNC: 220 MG/DL — HIGH (ref 70–99)
GLUCOSE SERPL-MCNC: 227 MG/DL — HIGH (ref 70–99)
HCT VFR BLD CALC: 25.2 % — LOW (ref 34.5–45)
HGB BLD-MCNC: 8.4 G/DL — LOW (ref 11.5–15.5)
INR BLD: 1.04 RATIO — SIGNIFICANT CHANGE UP (ref 0.88–1.16)
LYMPHOCYTES # BLD AUTO: 0.5 K/UL — LOW (ref 1–3.3)
LYMPHOCYTES # BLD AUTO: 6.6 % — LOW (ref 13–44)
MAGNESIUM SERPL-MCNC: 2.2 MG/DL — SIGNIFICANT CHANGE UP (ref 1.6–2.6)
MCHC RBC-ENTMCNC: 29 PG — SIGNIFICANT CHANGE UP (ref 27–34)
MCHC RBC-ENTMCNC: 33.4 GM/DL — SIGNIFICANT CHANGE UP (ref 32–36)
MCV RBC AUTO: 86.7 FL — SIGNIFICANT CHANGE UP (ref 80–100)
MONOCYTES # BLD AUTO: 0.3 K/UL — SIGNIFICANT CHANGE UP (ref 0–0.9)
MONOCYTES NFR BLD AUTO: 4.1 % — SIGNIFICANT CHANGE UP (ref 2–14)
NEUTROPHILS # BLD AUTO: 7.1 K/UL — SIGNIFICANT CHANGE UP (ref 1.8–7.4)
NEUTROPHILS NFR BLD AUTO: 89.1 % — HIGH (ref 43–77)
PHOSPHATE SERPL-MCNC: 2.4 MG/DL — LOW (ref 2.5–4.5)
PLATELET # BLD AUTO: 608 K/UL — HIGH (ref 150–400)
POTASSIUM SERPL-MCNC: 4.8 MMOL/L — SIGNIFICANT CHANGE UP (ref 3.5–5.3)
POTASSIUM SERPL-SCNC: 4.8 MMOL/L — SIGNIFICANT CHANGE UP (ref 3.5–5.3)
PROT SERPL-MCNC: 7.4 G/DL — SIGNIFICANT CHANGE UP (ref 6–8.3)
PROTHROM AB SERPL-ACNC: 11.2 SEC — SIGNIFICANT CHANGE UP (ref 9.8–12.7)
RBC # BLD: 2.91 M/UL — LOW (ref 3.8–5.2)
RBC # FLD: 15.1 % — HIGH (ref 10.3–14.5)
SODIUM SERPL-SCNC: 133 MMOL/L — LOW (ref 135–145)
WBC # BLD: 8 K/UL — SIGNIFICANT CHANGE UP (ref 3.8–10.5)
WBC # FLD AUTO: 8 K/UL — SIGNIFICANT CHANGE UP (ref 3.8–10.5)

## 2017-12-24 PROCEDURE — 95819 EEG AWAKE AND ASLEEP: CPT | Mod: 26

## 2017-12-24 PROCEDURE — 99291 CRITICAL CARE FIRST HOUR: CPT

## 2017-12-24 RX ORDER — ASPIRIN/CALCIUM CARB/MAGNESIUM 324 MG
81 TABLET ORAL DAILY
Qty: 0 | Refills: 0 | Status: DISCONTINUED | OUTPATIENT
Start: 2017-12-24 | End: 2018-01-03

## 2017-12-24 RX ORDER — DOCUSATE SODIUM 100 MG
100 CAPSULE ORAL EVERY 8 HOURS
Qty: 0 | Refills: 0 | Status: DISCONTINUED | OUTPATIENT
Start: 2017-12-24 | End: 2017-12-24

## 2017-12-24 RX ORDER — SENNA PLUS 8.6 MG/1
2 TABLET ORAL AT BEDTIME
Qty: 0 | Refills: 0 | Status: DISCONTINUED | OUTPATIENT
Start: 2017-12-24 | End: 2017-12-28

## 2017-12-24 RX ORDER — LEVOTHYROXINE SODIUM 125 MCG
75 TABLET ORAL
Qty: 0 | Refills: 0 | Status: DISCONTINUED | OUTPATIENT
Start: 2017-12-24 | End: 2017-12-29

## 2017-12-24 RX ORDER — METOPROLOL TARTRATE 50 MG
50 TABLET ORAL EVERY 12 HOURS
Qty: 0 | Refills: 0 | Status: DISCONTINUED | OUTPATIENT
Start: 2017-12-24 | End: 2017-12-28

## 2017-12-24 RX ORDER — ASPIRIN/CALCIUM CARB/MAGNESIUM 324 MG
81 TABLET ORAL DAILY
Qty: 0 | Refills: 0 | Status: DISCONTINUED | OUTPATIENT
Start: 2017-12-24 | End: 2017-12-24

## 2017-12-24 RX ORDER — AMLODIPINE BESYLATE 2.5 MG/1
10 TABLET ORAL AT BEDTIME
Qty: 0 | Refills: 0 | Status: DISCONTINUED | OUTPATIENT
Start: 2017-12-24 | End: 2017-12-28

## 2017-12-24 RX ORDER — FENTANYL CITRATE 50 UG/ML
1 INJECTION INTRAVENOUS
Qty: 0 | Refills: 0 | Status: DISCONTINUED | OUTPATIENT
Start: 2017-12-24 | End: 2017-12-28

## 2017-12-24 RX ORDER — GABAPENTIN 400 MG/1
300 CAPSULE ORAL EVERY 8 HOURS
Qty: 0 | Refills: 0 | Status: DISCONTINUED | OUTPATIENT
Start: 2017-12-24 | End: 2017-12-28

## 2017-12-24 RX ORDER — DOCUSATE SODIUM 100 MG
100 CAPSULE ORAL EVERY 8 HOURS
Qty: 0 | Refills: 0 | Status: DISCONTINUED | OUTPATIENT
Start: 2017-12-24 | End: 2017-12-28

## 2017-12-24 RX ADMIN — Medication 2 MILLIGRAM(S): at 21:47

## 2017-12-24 RX ADMIN — GABAPENTIN 300 MILLIGRAM(S): 400 CAPSULE ORAL at 14:07

## 2017-12-24 RX ADMIN — FAMOTIDINE 20 MILLIGRAM(S): 10 INJECTION INTRAVENOUS at 17:01

## 2017-12-24 RX ADMIN — HEPARIN SODIUM 5000 UNIT(S): 5000 INJECTION INTRAVENOUS; SUBCUTANEOUS at 05:43

## 2017-12-24 RX ADMIN — Medication 4: at 05:50

## 2017-12-24 RX ADMIN — Medication 102 MILLIGRAM(S): at 21:46

## 2017-12-24 RX ADMIN — Medication 4: at 01:03

## 2017-12-24 RX ADMIN — Medication 4: at 14:06

## 2017-12-24 RX ADMIN — PROPOFOL 12 MICROGRAM(S)/KG/MIN: 10 INJECTION, EMULSION INTRAVENOUS at 17:05

## 2017-12-24 RX ADMIN — SENNA PLUS 2 TABLET(S): 8.6 TABLET ORAL at 21:47

## 2017-12-24 RX ADMIN — Medication 5 MILLIGRAM(S): at 05:42

## 2017-12-24 RX ADMIN — FAMOTIDINE 20 MILLIGRAM(S): 10 INJECTION INTRAVENOUS at 05:42

## 2017-12-24 RX ADMIN — Medication 100 MILLIGRAM(S): at 16:56

## 2017-12-24 RX ADMIN — Medication 2 MILLIGRAM(S): at 05:42

## 2017-12-24 RX ADMIN — Medication 25 MILLIGRAM(S): at 14:06

## 2017-12-24 RX ADMIN — GABAPENTIN 300 MILLIGRAM(S): 400 CAPSULE ORAL at 21:56

## 2017-12-24 RX ADMIN — HEPARIN SODIUM 5000 UNIT(S): 5000 INJECTION INTRAVENOUS; SUBCUTANEOUS at 14:06

## 2017-12-24 RX ADMIN — Medication 4: at 17:04

## 2017-12-24 RX ADMIN — Medication 62.5 MILLIMOLE(S): at 05:42

## 2017-12-24 RX ADMIN — AMLODIPINE BESYLATE 10 MILLIGRAM(S): 2.5 TABLET ORAL at 22:21

## 2017-12-24 RX ADMIN — Medication 25 MILLIGRAM(S): at 21:56

## 2017-12-24 RX ADMIN — FENTANYL CITRATE 1 PATCH: 50 INJECTION INTRAVENOUS at 12:04

## 2017-12-24 RX ADMIN — PROPOFOL 12 MICROGRAM(S)/KG/MIN: 10 INJECTION, EMULSION INTRAVENOUS at 01:04

## 2017-12-24 RX ADMIN — PROPOFOL 12 MICROGRAM(S)/KG/MIN: 10 INJECTION, EMULSION INTRAVENOUS at 11:18

## 2017-12-24 RX ADMIN — Medication 50 MILLIGRAM(S): at 12:04

## 2017-12-24 RX ADMIN — Medication 25 MILLIGRAM(S): at 05:42

## 2017-12-24 RX ADMIN — Medication 75 MICROGRAM(S): at 11:18

## 2017-12-24 RX ADMIN — Medication 5 MILLIGRAM(S): at 01:04

## 2017-12-24 RX ADMIN — INSULIN GLARGINE 20 UNIT(S): 100 INJECTION, SOLUTION SUBCUTANEOUS at 21:46

## 2017-12-24 RX ADMIN — Medication 2 MILLIGRAM(S): at 14:06

## 2017-12-24 RX ADMIN — Medication 102 MILLIGRAM(S): at 05:41

## 2017-12-24 RX ADMIN — HEPARIN SODIUM 5000 UNIT(S): 5000 INJECTION INTRAVENOUS; SUBCUTANEOUS at 21:46

## 2017-12-24 RX ADMIN — Medication 100 MILLIGRAM(S): at 21:54

## 2017-12-24 RX ADMIN — Medication 81 MILLIGRAM(S): at 12:04

## 2017-12-24 RX ADMIN — Medication 6: at 21:46

## 2017-12-24 RX ADMIN — Medication 102 MILLIGRAM(S): at 14:25

## 2017-12-24 NOTE — PROGRESS NOTE ADULT - ASSESSMENT
72yoF with Angioedema requiring nasal fiberoptic intubation, possible plan for extubation in AM by MICU team

## 2017-12-24 NOTE — PROGRESS NOTE ADULT - SUBJECTIVE AND OBJECTIVE BOX
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 from home BIBEMS after developing swelling and protrusion of tongue with difficulty swallowing. Admitted for angioedema requiring fiberoptic nasal intubation. Receiving decadron, benedrly, started benzos yesterday      Interval Events:  no over night events, electrolytes replenished    REVIEW OF SYSTEMS:  Constitutional: [ ] fevers [ ] chills [ ] weight loss [ ] weight gain  HEENT: [ ] dry eyes [ ] eye irritation [ ] postnasal drip [ ] nasal congestion  CV: [ ] chest pain [ ] orthopnea [ ] palpitations [ ] murmur  Resp: [ ] cough [ ] shortness of breath [ ] dyspnea [ ] wheezing [ ] sputum [ ] hemoptysis  GI: [ ] nausea [ ] vomiting [ ] diarrhea [ ] constipation [ ] abd pain [ ] dysphagia   : [ ] dysuria [ ] nocturia [ ] hematuria [ ] increased urinary frequency  Musculoskeletal: [ ] back pain [ ] myalgias [ ] arthralgias [ ] fracture  Skin: [ ] rash [ ] itch  Neurological: [ ] headache [ ] dizziness [ ] syncope [ ] weakness [ ] numbness  Psychiatric: [ ] anxiety [ ] depression  Endocrine: [ ] diabetes [ ] thyroid problem  Hematologic/Lymphatic: [ ] anemia [ ] bleeding problem  Allergic/Immunologic: [ ] itchy eyes [ ] nasal discharge [ ] hives [ ] angioedema  [ ] All other systems negative  [ ] Unable to assess ROS because ________    OBJECTIVE:  ICU Vital Signs Last 24 Hrs  T(C): 37.8 (24 Dec 2017 04:00), Max: 37.8 (24 Dec 2017 04:00)  T(F): 100 (24 Dec 2017 04:00), Max: 100 (24 Dec 2017 04:00)  HR: 84 (24 Dec 2017 04:00) (55 - 91)  BP: 156/65 (24 Dec 2017 04:00) (107/58 - 181/81)  BP(mean): 93 (24 Dec 2017 04:00) (78 - 118)  ABP: --  ABP(mean): --  RR: 17 (24 Dec 2017 04:00) (14 - 31)  SpO2: 100% (24 Dec 2017 04:00) (96% - 100%)    Mode: AC/ CMV (Assist Control/ Continuous Mandatory Ventilation), RR (machine): 14, TV (machine): 450, FiO2: 30, PEEP: 5, ITime: 1, MAP: 9, T-Low: , PIP: 22    12-22 @ 07:01  -  12-23 @ 07:00  --------------------------------------------------------  IN: 573.3 mL / OUT: 515 mL / NET: 58.3 mL    12-23 @ 07:01  -  12-24 @ 05:33  --------------------------------------------------------  IN: 1576.8 mL / OUT: 635 mL / NET: 941.8 mL      CAPILLARY BLOOD GLUCOSE      POCT Blood Glucose.: 199 mg/dL (24 Dec 2017 01:02)      PHYSICAL EXAM:  Neuro:    Pulm:    C/V:    GI/:    Lymph:    Neck:    LINES:    HOSPITAL MEDICATIONS:  MEDICATIONS  (STANDING):  dexamethasone  IVPB 10 milliGRAM(s) IV Intermittent every 8 hours  diazepam    Tablet 2 milliGRAM(s) Oral every 8 hours  diphenhydrAMINE   Injectable 25 milliGRAM(s) IV Push every 8 hours  famotidine Injectable 20 milliGRAM(s) IV Push every 12 hours  fentaNYL   Infusion 1 MICROgram(s)/kG/Hr (5 mL/Hr) IV Continuous <Continuous>  heparin  Injectable 5000 Unit(s) SubCutaneous every 8 hours  insulin glargine Injectable (LANTUS) 20 Unit(s) SubCutaneous at bedtime  insulin lispro (HumaLOG) corrective regimen sliding scale   SubCutaneous every 4 hours  metoprolol    tartrate Injectable 5 milliGRAM(s) IV Push every 6 hours  propofol Infusion 20 MICROgram(s)/kG/Min (12 mL/Hr) IV Continuous <Continuous>  sodium phosphate IVPB 15 milliMole(s) IV Intermittent once    MEDICATIONS  (PRN):      LABS:                        8.4    8.0   )-----------( 608      ( 24 Dec 2017 03:26 )             25.2     Hgb Trend: 8.4<--, 8.2<--, 7.8<--, 9.3<--, 7.9<--  12-24    133<L>  |  98  |  20  ----------------------------<  227<H>  4.8   |  23  |  0.72    Ca    8.9      24 Dec 2017 03:26  Phos  2.4     12-24  Mg     2.2     12-24    TPro  7.4  /  Alb  3.6  /  TBili  0.1<L>  /  DBili  x   /  AST  10  /  ALT  <5<L>  /  AlkPhos  108  12-24    LIVER FUNCTIONS - ( 24 Dec 2017 03:26 )  Alb: 3.6 g/dL / Pro: 7.4 g/dL / ALK PHOS: 108 U/L / ALT: <5 U/L RC / AST: 10 U/L / GGT: x           Creatinine Trend: 0.72<--, 0.68<--, 0.63<--, 0.64<--, 0.62<--, 0.73<--  PT/INR - ( 24 Dec 2017 03:26 )   PT: 11.2 sec;   INR: 1.04 ratio         PTT - ( 24 Dec 2017 03:26 )  PTT:25.1 sec    Arterial Blood Gas:  12-23 @ 02:44  7.34/45/118/24/99/-1.2  ABG lactate: --  Arterial Blood Gas:  12-22 @ 18:40  7.37/42/144/24/99/-.9  ABG lactate: --  Arterial Blood Gas:  12-22 @ 12:40  7.37/41/156/23/99/-1.6  ABG lactate: --    Venous Blood Gas:  12-22 @ 11:14  7.27/61/33/27/50  VBG Lactate: 1.9      MICROBIOLOGY:     RADIOLOGY:  [ ] Reviewed and interpreted by me    EKG:      Chriss [facility] (ext [phone]) CHIEF COMPLAINT:  Patient is a 72y old  Female who presents with a chief complaint of swelling of tongue (22 Dec 2017 11:16)    HPI:  72 yr old female with PMHx of DM2 on insulin therapy, HTN on ACE-I, Hypothyroidism, s/p Left knee replacement '99 c/b MRSA infection, osteoarthritis, chronic pancreatitis (last episode > 10 yrs ago), spinal stenosis, recent d/c from hospital end of Novemeber s/p L1-L4 lumbar fusion which was c/b by UTI requiring antibx therapy who now presents this a.m. from home BIBEMS after developing swelling and protrusion of tongue with difficulty swallowing. Admitted for angioedema requiring fiberoptic nasal intubation. Receiving decadron, benedrly, started benzos yesterday      Interval Events:  no over night events, electrolytes replenished    REVIEW OF SYSTEMS:    [xxxx ] All other systems negative  .    OBJECTIVE:  ICU Vital Signs Last 24 Hrs  T(C): 37.8 (24 Dec 2017 04:00), Max: 37.8 (24 Dec 2017 04:00)  T(F): 100 (24 Dec 2017 04:00), Max: 100 (24 Dec 2017 04:00)  HR: 84 (24 Dec 2017 04:00) (55 - 91)  BP: 156/65 (24 Dec 2017 04:00) (107/58 - 181/81)  BP(mean): 93 (24 Dec 2017 04:00) (78 - 118)  ABP: --  ABP(mean): --  RR: 17 (24 Dec 2017 04:00) (14 - 31)  SpO2: 100% (24 Dec 2017 04:00) (96% - 100%)    Mode: AC/ CMV (Assist Control/ Continuous Mandatory Ventilation), RR (machine): 14, TV (machine): 450, FiO2: 30, PEEP: 5, ITime: 1, MAP: 9, T-Low: , PIP: 22    12-22 @ 07:01  -  12-23 @ 07:00  --------------------------------------------------------  IN: 573.3 mL / OUT: 515 mL / NET: 58.3 mL    12-23 @ 07:01 - 12-24 @ 05:33  --------------------------------------------------------  IN: 1576.8 mL / OUT: 635 mL / NET: 941.8 mL      CAPILLARY BLOOD GLUCOSE      POCT Blood Glucose.: 199 mg/dL (24 Dec 2017 01:02)      PHYSICAL EXAM:  Neuro:    Pulm:    C/V:    GI/:    Lymph:    Neck:    LINES:    HOSPITAL MEDICATIONS:  MEDICATIONS  (STANDING):  dexamethasone  IVPB 10 milliGRAM(s) IV Intermittent every 8 hours  diazepam    Tablet 2 milliGRAM(s) Oral every 8 hours  diphenhydrAMINE   Injectable 25 milliGRAM(s) IV Push every 8 hours  famotidine Injectable 20 milliGRAM(s) IV Push every 12 hours  fentaNYL   Infusion 1 MICROgram(s)/kG/Hr (5 mL/Hr) IV Continuous <Continuous>  heparin  Injectable 5000 Unit(s) SubCutaneous every 8 hours  insulin glargine Injectable (LANTUS) 20 Unit(s) SubCutaneous at bedtime  insulin lispro (HumaLOG) corrective regimen sliding scale   SubCutaneous every 4 hours  metoprolol    tartrate Injectable 5 milliGRAM(s) IV Push every 6 hours  propofol Infusion 20 MICROgram(s)/kG/Min (12 mL/Hr) IV Continuous <Continuous>  sodium phosphate IVPB 15 milliMole(s) IV Intermittent once    MEDICATIONS  (PRN):      LABS:                        8.4    8.0   )-----------( 608      ( 24 Dec 2017 03:26 )             25.2     Hgb Trend: 8.4<--, 8.2<--, 7.8<--, 9.3<--, 7.9<--  12-24    133<L>  |  98  |  20  ----------------------------<  227<H>  4.8   |  23  |  0.72    Ca    8.9      24 Dec 2017 03:26  Phos  2.4     12-24  Mg     2.2     12-24    TPro  7.4  /  Alb  3.6  /  TBili  0.1<L>  /  DBili  x   /  AST  10  /  ALT  <5<L>  /  AlkPhos  108  12-24    LIVER FUNCTIONS - ( 24 Dec 2017 03:26 )  Alb: 3.6 g/dL / Pro: 7.4 g/dL / ALK PHOS: 108 U/L / ALT: <5 U/L RC / AST: 10 U/L / GGT: x           Creatinine Trend: 0.72<--, 0.68<--, 0.63<--, 0.64<--, 0.62<--, 0.73<--  PT/INR - ( 24 Dec 2017 03:26 )   PT: 11.2 sec;   INR: 1.04 ratio         PTT - ( 24 Dec 2017 03:26 )  PTT:25.1 sec    Arterial Blood Gas:  12-23 @ 02:44  7.34/45/118/24/99/-1.2  ABG lactate: --  Arterial Blood Gas:  12-22 @ 18:40  7.37/42/144/24/99/-.9  ABG lactate: --  Arterial Blood Gas:  12-22 @ 12:40  7.37/41/156/23/99/-1.6  ABG lactate: --    Venous Blood Gas:  12-22 @ 11:14  7.27/61/33/27/50  VBG Lactate: 1.9      MICROBIOLOGY:     RADIOLOGY:  [ ] Reviewed and interpreted by me    EKG:      Chriss [facility] (ext [phone]) CHIEF COMPLAINT:  Patient is a 72y old  Female who presents with a chief complaint of swelling of tongue (22 Dec 2017 11:16)    HPI:  72 yr old female with PMHx of DM2 on insulin therapy, HTN on ACE-I, Hypothyroidism, s/p Left knee replacement '99 c/b MRSA infection, osteoarthritis, chronic pancreatitis (last episode > 10 yrs ago), spinal stenosis, recent d/c from hospital end of Novemeber s/p L1-L4 lumbar fusion which was c/b by UTI requiring antibx therapy who now presents this a.m. from home BIBEMS after developing swelling and protrusion of tongue with difficulty swallowing. Admitted for angioedema requiring fiberoptic nasal intubation. Receiving decadron, benedrly, started benzos yesterday.       Interval Events:  no over night events, electrolytes replenished    REVIEW OF SYSTEMS:    [xxxx ] All other systems negative  .    OBJECTIVE:  ICU Vital Signs Last 24 Hrs  T(C): 37.8 (24 Dec 2017 04:00), Max: 37.8 (24 Dec 2017 04:00)  T(F): 100 (24 Dec 2017 04:00), Max: 100 (24 Dec 2017 04:00)  HR: 84 (24 Dec 2017 04:00) (55 - 91)  BP: 156/65 (24 Dec 2017 04:00) (107/58 - 181/81)  BP(mean): 93 (24 Dec 2017 04:00) (78 - 118)  ABP: --  ABP(mean): --  RR: 17 (24 Dec 2017 04:00) (14 - 31)  SpO2: 100% (24 Dec 2017 04:00) (96% - 100%)    Mode: AC/ CMV (Assist Control/ Continuous Mandatory Ventilation), RR (machine): 14, TV (machine): 450, FiO2: 30, PEEP: 5, ITime: 1, MAP: 9, T-Low: , PIP: 22    12-22 @ 07:01  -  12-23 @ 07:00  --------------------------------------------------------  IN: 573.3 mL / OUT: 515 mL / NET: 58.3 mL    12-23 @ 07:01  -  12-24 @ 05:33  --------------------------------------------------------  IN: 1576.8 mL / OUT: 635 mL / NET: 941.8 mL      CAPILLARY BLOOD GLUCOSE      POCT Blood Glucose.: 199 mg/dL (24 Dec 2017 01:02)      PHYSICAL EXAM:  Neuro:    Pulm:    C/V:    GI/:    Lymph:    Neck:    LINES:    HOSPITAL MEDICATIONS:  MEDICATIONS  (STANDING):  dexamethasone  IVPB 10 milliGRAM(s) IV Intermittent every 8 hours  diazepam    Tablet 2 milliGRAM(s) Oral every 8 hours  diphenhydrAMINE   Injectable 25 milliGRAM(s) IV Push every 8 hours  famotidine Injectable 20 milliGRAM(s) IV Push every 12 hours  fentaNYL   Infusion 1 MICROgram(s)/kG/Hr (5 mL/Hr) IV Continuous <Continuous>  heparin  Injectable 5000 Unit(s) SubCutaneous every 8 hours  insulin glargine Injectable (LANTUS) 20 Unit(s) SubCutaneous at bedtime  insulin lispro (HumaLOG) corrective regimen sliding scale   SubCutaneous every 4 hours  metoprolol    tartrate Injectable 5 milliGRAM(s) IV Push every 6 hours  propofol Infusion 20 MICROgram(s)/kG/Min (12 mL/Hr) IV Continuous <Continuous>  sodium phosphate IVPB 15 milliMole(s) IV Intermittent once    MEDICATIONS  (PRN):      LABS:                        8.4    8.0   )-----------( 608      ( 24 Dec 2017 03:26 )             25.2     Hgb Trend: 8.4<--, 8.2<--, 7.8<--, 9.3<--, 7.9<--  12-24    133<L>  |  98  |  20  ----------------------------<  227<H>  4.8   |  23  |  0.72    Ca    8.9      24 Dec 2017 03:26  Phos  2.4     12-24  Mg     2.2     12-24    TPro  7.4  /  Alb  3.6  /  TBili  0.1<L>  /  DBili  x   /  AST  10  /  ALT  <5<L>  /  AlkPhos  108  12-24    LIVER FUNCTIONS - ( 24 Dec 2017 03:26 )  Alb: 3.6 g/dL / Pro: 7.4 g/dL / ALK PHOS: 108 U/L / ALT: <5 U/L RC / AST: 10 U/L / GGT: x           Creatinine Trend: 0.72<--, 0.68<--, 0.63<--, 0.64<--, 0.62<--, 0.73<--  PT/INR - ( 24 Dec 2017 03:26 )   PT: 11.2 sec;   INR: 1.04 ratio         PTT - ( 24 Dec 2017 03:26 )  PTT:25.1 sec    Arterial Blood Gas:  12-23 @ 02:44  7.34/45/118/24/99/-1.2  ABG lactate: --  Arterial Blood Gas:  12-22 @ 18:40  7.37/42/144/24/99/-.9  ABG lactate: --  Arterial Blood Gas:  12-22 @ 12:40  7.37/41/156/23/99/-1.6  ABG lactate: --    Venous Blood Gas:  12-22 @ 11:14  7.27/61/33/27/50  VBG Lactate: 1.9      MICROBIOLOGY:     RADIOLOGY:  [ ] Reviewed and interpreted by me    EKG:      Chriss [facility] (ext [phone]) CHIEF COMPLAINT:  Patient is a 72y old  Female who presents with a chief complaint of swelling of tongue (22 Dec 2017 11:16)    HPI:  72 yr old female with PMHx of DM2 on insulin therapy, HTN on ACE-I, Hypothyroidism, s/p Left knee replacement '99 c/b MRSA infection, osteoarthritis, chronic pancreatitis (last episode > 10 yrs ago), spinal stenosis, recent d/c from hospital end of Novemeber s/p L1-L4 lumbar fusion which was c/b by UTI requiring antibx therapy who now presents this a.m. from home BIBEMS after developing swelling and protrusion of tongue with difficulty swallowing. Admitted for angioedema requiring fiberoptic nasal intubation. Receiving decadron, benedrly, started benzos yesterday.       Interval Events:  no over night events, electrolytes replenished    REVIEW OF SYSTEMS:    [xxxx ] All other systems negative  .    OBJECTIVE:  ICU Vital Signs Last 24 Hrs  T(C): 37.8 (24 Dec 2017 04:00), Max: 37.8 (24 Dec 2017 04:00)  T(F): 100 (24 Dec 2017 04:00), Max: 100 (24 Dec 2017 04:00)  HR: 84 (24 Dec 2017 04:00) (55 - 91)  BP: 156/65 (24 Dec 2017 04:00) (107/58 - 181/81)  BP(mean): 93 (24 Dec 2017 04:00) (78 - 118)  ABP: --  ABP(mean): --  RR: 17 (24 Dec 2017 04:00) (14 - 31)  SpO2: 100% (24 Dec 2017 04:00) (96% - 100%)    Mode: AC/ CMV (Assist Control/ Continuous Mandatory Ventilation), RR (machine): 14, TV (machine): 450, FiO2: 30, PEEP: 5, ITime: 1, MAP: 9, T-Low: , PIP: 22    12-22 @ 07:01  -  12-23 @ 07:00  --------------------------------------------------------  IN: 573.3 mL / OUT: 515 mL / NET: 58.3 mL    12-23 @ 07:01  -  12-24 @ 05:33  --------------------------------------------------------  IN: 1576.8 mL / OUT: 635 mL / NET: 941.8 mL      CAPILLARY BLOOD GLUCOSE      POCT Blood Glucose.: 199 mg/dL (24 Dec 2017 01:02)      PHYSICAL EXAM:  Neuro:  sedated, however responsive to verbal stimuli, attempts to focus on examiner, follows simple commands, LONG well 4/4. CN intact    Pulm:  orally intubated, breathsounds bilaterally with diminished breath sounds bilat to 1/4, few scattered rhonchi that clear to suction. POCUS with A line predominance, small right pleural effusion noted    C/V:  cardiac monitor with NSR without ectopy  GI/:    Lymph:    Neck:    LINES:    HOSPITAL MEDICATIONS:  MEDICATIONS  (STANDING):  dexamethasone  IVPB 10 milliGRAM(s) IV Intermittent every 8 hours  diazepam    Tablet 2 milliGRAM(s) Oral every 8 hours  diphenhydrAMINE   Injectable 25 milliGRAM(s) IV Push every 8 hours  famotidine Injectable 20 milliGRAM(s) IV Push every 12 hours  fentaNYL   Infusion 1 MICROgram(s)/kG/Hr (5 mL/Hr) IV Continuous <Continuous>  heparin  Injectable 5000 Unit(s) SubCutaneous every 8 hours  insulin glargine Injectable (LANTUS) 20 Unit(s) SubCutaneous at bedtime  insulin lispro (HumaLOG) corrective regimen sliding scale   SubCutaneous every 4 hours  metoprolol    tartrate Injectable 5 milliGRAM(s) IV Push every 6 hours  propofol Infusion 20 MICROgram(s)/kG/Min (12 mL/Hr) IV Continuous <Continuous>  sodium phosphate IVPB 15 milliMole(s) IV Intermittent once    MEDICATIONS  (PRN):      LABS:                        8.4    8.0   )-----------( 608      ( 24 Dec 2017 03:26 )             25.2     Hgb Trend: 8.4<--, 8.2<--, 7.8<--, 9.3<--, 7.9<--  12-24    133<L>  |  98  |  20  ----------------------------<  227<H>  4.8   |  23  |  0.72    Ca    8.9      24 Dec 2017 03:26  Phos  2.4     12-24  Mg     2.2     12-24    TPro  7.4  /  Alb  3.6  /  TBili  0.1<L>  /  DBili  x   /  AST  10  /  ALT  <5<L>  /  AlkPhos  108  12-24    LIVER FUNCTIONS - ( 24 Dec 2017 03:26 )  Alb: 3.6 g/dL / Pro: 7.4 g/dL / ALK PHOS: 108 U/L / ALT: <5 U/L RC / AST: 10 U/L / GGT: x           Creatinine Trend: 0.72<--, 0.68<--, 0.63<--, 0.64<--, 0.62<--, 0.73<--  PT/INR - ( 24 Dec 2017 03:26 )   PT: 11.2 sec;   INR: 1.04 ratio         PTT - ( 24 Dec 2017 03:26 )  PTT:25.1 sec    Arterial Blood Gas:  12-23 @ 02:44  7.34/45/118/24/99/-1.2  ABG lactate: --  Arterial Blood Gas:  12-22 @ 18:40  7.37/42/144/24/99/-.9  ABG lactate: --  Arterial Blood Gas:  12-22 @ 12:40  7.37/41/156/23/99/-1.6  ABG lactate: --    Venous Blood Gas:  12-22 @ 11:14  7.27/61/33/27/50  VBG Lactate: 1.9      MICROBIOLOGY:     RADIOLOGY:  [ ] Reviewed and interpreted by me    EKG:      Chriss [facility] (ext [phone]) CHIEF COMPLAINT:  Patient is a 72y old  Female who presents with a chief complaint of swelling of tongue (22 Dec 2017 11:16)    HPI:  72 yr old female with PMHx of DM2 on insulin therapy, HTN on ACE-I, Hypothyroidism, s/p Left knee replacement '99 c/b MRSA infection, osteoarthritis, chronic pancreatitis (last episode > 10 yrs ago), spinal stenosis, recent d/c from hospital end of Novemeber s/p L1-L4 lumbar fusion which was c/b by UTI requiring antibx therapy who now presents this a.m. from home BIBEMS after developing swelling and protrusion of tongue with difficulty swallowing. Admitted for angioedema requiring fiberoptic nasal intubation. Receiving decadron, benedrly, started benzos yesterday.       Interval Events:  no over night events, electrolytes replenished    REVIEW OF SYSTEMS:    [xxxx ] All other systems negative  .    OBJECTIVE:  ICU Vital Signs Last 24 Hrs  T(C): 37.8 (24 Dec 2017 04:00), Max: 37.8 (24 Dec 2017 04:00)  T(F): 100 (24 Dec 2017 04:00), Max: 100 (24 Dec 2017 04:00)  HR: 84 (24 Dec 2017 04:00) (55 - 91)  BP: 156/65 (24 Dec 2017 04:00) (107/58 - 181/81)  BP(mean): 93 (24 Dec 2017 04:00) (78 - 118)  ABP: --  ABP(mean): --  RR: 17 (24 Dec 2017 04:00) (14 - 31)  SpO2: 100% (24 Dec 2017 04:00) (96% - 100%)    Mode: AC/ CMV (Assist Control/ Continuous Mandatory Ventilation), RR (machine): 14, TV (machine): 450, FiO2: 30, PEEP: 5, ITime: 1, MAP: 9, T-Low: , PIP: 22    12-22 @ 07:01  -  12-23 @ 07:00  --------------------------------------------------------  IN: 573.3 mL / OUT: 515 mL / NET: 58.3 mL    12-23 @ 07:01 - 12-24 @ 05:33  --------------------------------------------------------  IN: 1576.8 mL / OUT: 635 mL / NET: 941.8 mL      CAPILLARY BLOOD GLUCOSE      POCT Blood Glucose.: 199 mg/dL (24 Dec 2017 01:02)      PHYSICAL EXAM:  Neuro:  sedated, however responsive to verbal stimuli, attempts to focus on examiner, follows simple commands, LONG well 4/4. CN intact    Pulm:  orally intubated, breathsounds bilaterally with diminished breath sounds bilat to 1/4, few scattered rhonchi that clear to suction. POCUS with A line predominance, small right pleural effusion noted    C/V:  cardiac monitor with NSR without ectopy, s1/12 with I/VI sys murmur noted. peripheral pulses palpable with radials 2+ bilat, dp/pt 2+/1+ bilat, digits warm to touch with good cap refill < 3 sec. lower extremities with 1+ pitting edema.POCUS with slight decreased LVFx VTI 16.9 IVC 1.35    GI/:  tongue with decreased swelling, abd obese, soft non tender + bowel sounds, mujica patent to BSD draining clear yellow urine, bladder non distended, non palpable    Lymph:  non palp    Neck:  supple, without JVD    LINES:    HOSPITAL MEDICATIONS:  MEDICATIONS  (STANDING):  dexamethasone  IVPB 10 milliGRAM(s) IV Intermittent every 8 hours  diazepam    Tablet 2 milliGRAM(s) Oral every 8 hours  diphenhydrAMINE   Injectable 25 milliGRAM(s) IV Push every 8 hours  famotidine Injectable 20 milliGRAM(s) IV Push every 12 hours  fentaNYL   Infusion 1 MICROgram(s)/kG/Hr (5 mL/Hr) IV Continuous <Continuous>  heparin  Injectable 5000 Unit(s) SubCutaneous every 8 hours  insulin glargine Injectable (LANTUS) 20 Unit(s) SubCutaneous at bedtime  insulin lispro (HumaLOG) corrective regimen sliding scale   SubCutaneous every 4 hours  metoprolol    tartrate Injectable 5 milliGRAM(s) IV Push every 6 hours  propofol Infusion 20 MICROgram(s)/kG/Min (12 mL/Hr) IV Continuous <Continuous>  sodium phosphate IVPB 15 milliMole(s) IV Intermittent once    MEDICATIONS  (PRN):      LABS:                        8.4    8.0   )-----------( 608      ( 24 Dec 2017 03:26 )             25.2     Hgb Trend: 8.4<--, 8.2<--, 7.8<--, 9.3<--, 7.9<--  12-24    133<L>  |  98  |  20  ----------------------------<  227<H>  4.8   |  23  |  0.72    Ca    8.9      24 Dec 2017 03:26  Phos  2.4     12-24  Mg     2.2     12-24    TPro  7.4  /  Alb  3.6  /  TBili  0.1<L>  /  DBili  x   /  AST  10  /  ALT  <5<L>  /  AlkPhos  108  12-24    LIVER FUNCTIONS - ( 24 Dec 2017 03:26 )  Alb: 3.6 g/dL / Pro: 7.4 g/dL / ALK PHOS: 108 U/L / ALT: <5 U/L RC / AST: 10 U/L / GGT: x           Creatinine Trend: 0.72<--, 0.68<--, 0.63<--, 0.64<--, 0.62<--, 0.73<--  PT/INR - ( 24 Dec 2017 03:26 )   PT: 11.2 sec;   INR: 1.04 ratio         PTT - ( 24 Dec 2017 03:26 )  PTT:25.1 sec    Arterial Blood Gas:  12-23 @ 02:44  7.34/45/118/24/99/-1.2  ABG lactate: --  Arterial Blood Gas:  12-22 @ 18:40  7.37/42/144/24/99/-.9  ABG lactate: --  Arterial Blood Gas:  12-22 @ 12:40  7.37/41/156/23/99/-1.6  ABG lactate: --    Venous Blood Gas:  12-22 @ 11:14  7.27/61/33/27/50  VBG Lactate: 1.9      MICROBIOLOGY:     RADIOLOGY:  [ ] Reviewed and interpreted by me    EKG:      Chriss [facility]-BC (ext [phone]) CHIEF COMPLAINT:  Patient is a 72y old  Female who presents with a chief complaint of swelling of tongue (22 Dec 2017 11:16)    HPI:  72 yr old female with PMHx of DM2 on insulin therapy, HTN on ACE-I, Hypothyroidism, s/p Left knee replacement '99 c/b MRSA infection, osteoarthritis, chronic pancreatitis (last episode > 10 yrs ago), spinal stenosis, recent d/c from hospital end of Novemeber s/p L1-L4 lumbar fusion which was c/b by UTI requiring antibx therapy who now presents this a.m. from home BIBEMS after developing swelling and protrusion of tongue with difficulty swallowing. Admitted for angioedema requiring fiberoptic nasal intubation. Receiving decadron, benedrly, started benzos yesterday.       Interval Events:  no over night events, electrolytes replenished    REVIEW OF SYSTEMS:    [xxxx ] All other systems negative  .    OBJECTIVE:  ICU Vital Signs Last 24 Hrs  T(C): 37.8 (24 Dec 2017 04:00), Max: 37.8 (24 Dec 2017 04:00)  T(F): 100 (24 Dec 2017 04:00), Max: 100 (24 Dec 2017 04:00)  HR: 84 (24 Dec 2017 04:00) (55 - 91)  BP: 156/65 (24 Dec 2017 04:00) (107/58 - 181/81)  BP(mean): 93 (24 Dec 2017 04:00) (78 - 118)  ABP: --  ABP(mean): --  RR: 17 (24 Dec 2017 04:00) (14 - 31)  SpO2: 100% (24 Dec 2017 04:00) (96% - 100%)    Mode: AC/ CMV (Assist Control/ Continuous Mandatory Ventilation), RR (machine): 14, TV (machine): 450, FiO2: 30, PEEP: 5, ITime: 1, MAP: 9, T-Low: , PIP: 22    12-22 @ 07:01  -  12-23 @ 07:00  --------------------------------------------------------  IN: 573.3 mL / OUT: 515 mL / NET: 58.3 mL    12-23 @ 07:01 - 12-24 @ 05:33  --------------------------------------------------------  IN: 1576.8 mL / OUT: 635 mL / NET: 941.8 mL      CAPILLARY BLOOD GLUCOSE      POCT Blood Glucose.: 199 mg/dL (24 Dec 2017 01:02)      PHYSICAL EXAM:  Neuro:  sedated, however responsive to verbal stimuli, attempts to focus on examiner, follows simple commands, LONG well 4/4. CN intact    Pulm:  orally intubated, breathsounds bilaterally with diminished breath sounds bilat to 1/4, few scattered rhonchi that clear to suction. POCUS with A line predominance, small right pleural effusion noted, without strider noted    C/V:  cardiac monitor with NSR without ectopy, s1/12 with I/VI sys murmur noted. peripheral pulses palpable with radials 2+ bilat, dp/pt 2+/1+ bilat, digits warm to touch with good cap refill < 3 sec. lower extremities with 1+ pitting edema.POCUS with slight decreased LVFx VTI 16.9 IVC 1.35    GI/:  tongue with decreased swelling, abd obese, soft non tender + bowel sounds, mujica patent to BSD draining clear yellow urine, bladder non distended, non palpable    Lymph:  non palp    Neck:  supple, without JVD    LINES:    HOSPITAL MEDICATIONS:  MEDICATIONS  (STANDING):  dexamethasone  IVPB 10 milliGRAM(s) IV Intermittent every 8 hours  diazepam    Tablet 2 milliGRAM(s) Oral every 8 hours  diphenhydrAMINE   Injectable 25 milliGRAM(s) IV Push every 8 hours  famotidine Injectable 20 milliGRAM(s) IV Push every 12 hours  fentaNYL   Infusion 1 MICROgram(s)/kG/Hr (5 mL/Hr) IV Continuous <Continuous>  heparin  Injectable 5000 Unit(s) SubCutaneous every 8 hours  insulin glargine Injectable (LANTUS) 20 Unit(s) SubCutaneous at bedtime  insulin lispro (HumaLOG) corrective regimen sliding scale   SubCutaneous every 4 hours  metoprolol    tartrate Injectable 5 milliGRAM(s) IV Push every 6 hours  propofol Infusion 20 MICROgram(s)/kG/Min (12 mL/Hr) IV Continuous <Continuous>  sodium phosphate IVPB 15 milliMole(s) IV Intermittent once    MEDICATIONS  (PRN):      LABS:                        8.4    8.0   )-----------( 608      ( 24 Dec 2017 03:26 )             25.2     Hgb Trend: 8.4<--, 8.2<--, 7.8<--, 9.3<--, 7.9<--  12-24    133<L>  |  98  |  20  ----------------------------<  227<H>  4.8   |  23  |  0.72    Ca    8.9      24 Dec 2017 03:26  Phos  2.4     12-24  Mg     2.2     12-24    TPro  7.4  /  Alb  3.6  /  TBili  0.1<L>  /  DBili  x   /  AST  10  /  ALT  <5<L>  /  AlkPhos  108  12-24    LIVER FUNCTIONS - ( 24 Dec 2017 03:26 )  Alb: 3.6 g/dL / Pro: 7.4 g/dL / ALK PHOS: 108 U/L / ALT: <5 U/L RC / AST: 10 U/L / GGT: x           Creatinine Trend: 0.72<--, 0.68<--, 0.63<--, 0.64<--, 0.62<--, 0.73<--  PT/INR - ( 24 Dec 2017 03:26 )   PT: 11.2 sec;   INR: 1.04 ratio         PTT - ( 24 Dec 2017 03:26 )  PTT:25.1 sec    Arterial Blood Gas:  12-23 @ 02:44  7.34/45/118/24/99/-1.2  ABG lactate: --  Arterial Blood Gas:  12-22 @ 18:40  7.37/42/144/24/99/-.9  ABG lactate: --  Arterial Blood Gas:  12-22 @ 12:40  7.37/41/156/23/99/-1.6  ABG lactate: --    Venous Blood Gas:  12-22 @ 11:14  7.27/61/33/27/50  VBG Lactate: 1.9      MICROBIOLOGY:     RADIOLOGY:  [ ] Reviewed and interpreted by me    EKG:      [facility] (ext [id number])

## 2017-12-24 NOTE — PROGRESS NOTE ADULT - ASSESSMENT
Plan:    #Neuro:  neuro checks q 4 hrs and prn for changes  decrease sedation as tolerated   f/u eeg ( pt with questionable left arm clonus yesterday )    #Pulm:  as sedation decreased will place on PSV   bronchodilators q 6 hrs prn for sob/wheezes  HOB >/= 30 degree angle  assess minimal cuff leak    #CV:  hold all ACE-I and ARBS  now with NGT will change metoprolol to home dose ( 50 mg BID ) Plan:    #Neuro:  neuro checks q 4 hrs and prn for changes  decrease sedation as tolerated   f/u eeg ( pt with questionable left arm clonus yesterday )    #Pulm:  as sedation decreased will place on PSV   bronchodilators q 6 hrs prn for sob/wheezes  HOB >/= 30 degree angle  assess minimal cuff leak    #CV:  hold all ACE-I and ARBS  now with NGT will change metoprolol to home dose ( 50 mg BID )  reinstitute amlodipine 10 mg via NGT qhs  reinstitute ASA 81 mg qd Plan:    #Neuro:  neuro checks q 4 hrs and prn for changes  decrease sedation as tolerated   f/u eeg ( pt with questionable left arm clonus yesterday )  would not start AED at present    #Pulm:  as sedation decreased will place on PSV   bronchodilators q 6 hrs prn for sob/wheezes  HOB >/= 30 degree angle  assess minimal cuff leak    #CV:  hold all ACE-I and ARBS  now with NGT will change metoprolol to home dose ( 50 mg BID )  reinstitute amlodipine 10 mg via NGT qhs  reinstitute ASA 81 mg qd    #GI/:  strict I & O's ]; keep even  continue tube feeds to goal  add bowel regimen of colace and senna    #I.D.:  WBC not elevated, no fever spikes. Will continue to monitor  if fever occurs and wbc elevated will add vanco and zosyn after culture obtaines    #FEN/HEME/ENDO:  angioedema resolving, will continue with decadron/benedryl/pepcid at present  continue with current lantus dosing and q 4 hr POC glucose with ISS,. Maintain glucose 140 - 160  trend lytes/cbc/coags. treat accordingly

## 2017-12-24 NOTE — PROGRESS NOTE ADULT - SUBJECTIVE AND OBJECTIVE BOX
POD/STATUS POST/ENT ISSUE: Angioedema-improving    INTERVAL HPI:pt s/p fiberoptic nasal intubation 12/22 in ED for angioedema  12/22: glideoscope: angioedema of anterior tongue and uvula, right over left pharyngeal wall b/l, Anterior VC appear normal, with cuff leak  has good ventilation.  pt seen & examined, pt nasally intubated/sedated no events    Vital Signs Last 24 Hrs  T(C): 37.3 (24 Dec 2017 08:00), Max: 37.8 (24 Dec 2017 04:00)  T(F): 99.2 (24 Dec 2017 08:00), Max: 100 (24 Dec 2017 04:00)  HR: 61 (24 Dec 2017 14:00) (56 - 87)  BP: 116/56 (24 Dec 2017 14:00) (112/56 - 180/79)  BP(mean): 80 (24 Dec 2017 14:00) (80 - 114)  RR: 15 (24 Dec 2017 14:00) (14 - 31)  SpO2: 98% (24 Dec 2017 14:00) (96% - 100%)    PHYSICAL EXAM:  Gen: NAD, well-developed sedated  Head: Normocephalic, Atraumatic  Eyes: PERRL, EOMI, no scleral injection  Nose: Pt nasally intubated no drainage ROCCO feed in place  Mouth: normal contour of lips, Mucosa moist, tongue midline, not protruding fits in mouth   Neck: Flat, supple, no lymphadenopathy, trachea midline, no masses no neck swelling, landmarks visible  Resp: breathing easily, no stridor      LABS:                        8.4    8.0   )-----------( 608      ( 24 Dec 2017 03:26 )             25.2

## 2017-12-24 NOTE — EEG REPORT - NS EEG TEXT BOX
[facility]  Report of Routine EEG with Video    [facility]: [address] [place] [address], Phone: [phone]  [facility]: [address] [facility], [place] [address], Phone: [phone]  Office: [address] [facility], [facility] [address], [place] [address], Phone: [phone]    Patient Name: Meche Dowling   Age: 72 y  : 1945  Patient ID: -, MRN #: MR# [id number], Location: [address]   Referring Physician: -    EEG #: [id number]  Study Date: 2017		    Technical Information:					  On Instrument: -  Placement and Labeling of Electrodes:  The EEG was performed utilizing 20 channels referential EEG connections (coronal over temporal over parasagittal montage) using all standard 10-20 electrode placements with EKG.  Recording was at a sampling rate of 256 samples per second per channel.  Time synchronized digital video recording was done simultaneously with EEG recording.  A low light infrared camera was used for low light recording.  Farrukh and seizure detection algorithms were utilized.    History:  h/o HTN, diabetes   p/w swelling of tongue, angioedema, L upper arm twitching     Medication	  Diazepam	  Gabapentin 	    Study Interpretation:    FINDINGS:  The background was continuous and disorganized. It consisted of diffuse theta and polymorphic delta frequencies. There was no discernible posterior dominant rhythm.     Sleep Background:  Drowsiness and Stage II sleep transients were not recorded.    Other Paroxysmal Activity:  None     Interictal Epileptiform Activity:   No epileptiform discharges were present.    Ictal Epileptiform Activity or Events:  No clinical events were recorded.  No seizures were recorded.    Activation Procedures:   -Hyperventilation was not performed.    -Photic stimulation was not performed.    Artifacts:  Intermittent myogenic and movement artifacts were noted.    ECG:  The heart rate on single channel ECG was predominantly between 60-90 BPM.    EEG Classification / Summary:  Abnormal EEG in unspecified state  -Mild to moderate background slowing    Clinical Impression:  Findings are consistent with mild to moderate nonspecific diffuse or multifocal cerebral dysfunction. There were no epileptiform abnormalities recorded.          Radha Rincon  Fellow in Neurophysiology/Epilepsy, [facility]      	  Darius Goel M.D.			    Attending Physician, [facility]

## 2017-12-25 LAB
ALBUMIN SERPL ELPH-MCNC: 3.3 G/DL — SIGNIFICANT CHANGE UP (ref 3.3–5)
ALP SERPL-CCNC: 104 U/L — SIGNIFICANT CHANGE UP (ref 40–120)
ALT FLD-CCNC: 7 U/L RC — LOW (ref 10–45)
ANION GAP SERPL CALC-SCNC: 15 MMOL/L — SIGNIFICANT CHANGE UP (ref 5–17)
ANION GAP SERPL CALC-SCNC: 15 MMOL/L — SIGNIFICANT CHANGE UP (ref 5–17)
APPEARANCE UR: CLEAR — SIGNIFICANT CHANGE UP
APTT BLD: 26 SEC — LOW (ref 27.5–37.4)
AST SERPL-CCNC: 9 U/L — LOW (ref 10–40)
BACTERIA # UR AUTO: ABNORMAL /HPF
BASE EXCESS BLDV CALC-SCNC: 1.8 MMOL/L — SIGNIFICANT CHANGE UP (ref -2–2)
BASE EXCESS BLDV CALC-SCNC: 2.7 MMOL/L — HIGH (ref -2–2)
BASOPHILS # BLD AUTO: 0 K/UL — SIGNIFICANT CHANGE UP (ref 0–0.2)
BASOPHILS NFR BLD AUTO: 0.1 % — SIGNIFICANT CHANGE UP (ref 0–2)
BILIRUB SERPL-MCNC: 0.1 MG/DL — LOW (ref 0.2–1.2)
BILIRUB UR-MCNC: NEGATIVE — SIGNIFICANT CHANGE UP
BUN SERPL-MCNC: 16 MG/DL — SIGNIFICANT CHANGE UP (ref 7–23)
BUN SERPL-MCNC: 20 MG/DL — SIGNIFICANT CHANGE UP (ref 7–23)
CALCIUM SERPL-MCNC: 8.6 MG/DL — SIGNIFICANT CHANGE UP (ref 8.4–10.5)
CALCIUM SERPL-MCNC: 8.9 MG/DL — SIGNIFICANT CHANGE UP (ref 8.4–10.5)
CHLORIDE SERPL-SCNC: 98 MMOL/L — SIGNIFICANT CHANGE UP (ref 96–108)
CHLORIDE SERPL-SCNC: 99 MMOL/L — SIGNIFICANT CHANGE UP (ref 96–108)
CHLORIDE UR-SCNC: 46 MMOL/L — SIGNIFICANT CHANGE UP
CO2 BLDV-SCNC: 27 MMOL/L — SIGNIFICANT CHANGE UP (ref 22–30)
CO2 BLDV-SCNC: 28 MMOL/L — SIGNIFICANT CHANGE UP (ref 22–30)
CO2 SERPL-SCNC: 22 MMOL/L — SIGNIFICANT CHANGE UP (ref 22–31)
CO2 SERPL-SCNC: 24 MMOL/L — SIGNIFICANT CHANGE UP (ref 22–31)
COLOR SPEC: SIGNIFICANT CHANGE UP
CREAT ?TM UR-MCNC: 37 MG/DL — SIGNIFICANT CHANGE UP
CREAT SERPL-MCNC: 0.56 MG/DL — SIGNIFICANT CHANGE UP (ref 0.5–1.3)
CREAT SERPL-MCNC: 0.59 MG/DL — SIGNIFICANT CHANGE UP (ref 0.5–1.3)
DIFF PNL FLD: NEGATIVE — SIGNIFICANT CHANGE UP
EOSINOPHIL # BLD AUTO: 0 K/UL — SIGNIFICANT CHANGE UP (ref 0–0.5)
EOSINOPHIL NFR BLD AUTO: 0.2 % — SIGNIFICANT CHANGE UP (ref 0–6)
EPI CELLS # UR: SIGNIFICANT CHANGE UP /HPF
GAS PNL BLDV: SIGNIFICANT CHANGE UP
GAS PNL BLDV: SIGNIFICANT CHANGE UP
GLUCOSE BLDC GLUCOMTR-MCNC: 172 MG/DL — HIGH (ref 70–99)
GLUCOSE BLDC GLUCOMTR-MCNC: 175 MG/DL — HIGH (ref 70–99)
GLUCOSE BLDC GLUCOMTR-MCNC: 190 MG/DL — HIGH (ref 70–99)
GLUCOSE BLDC GLUCOMTR-MCNC: 199 MG/DL — HIGH (ref 70–99)
GLUCOSE BLDC GLUCOMTR-MCNC: 238 MG/DL — HIGH (ref 70–99)
GLUCOSE BLDC GLUCOMTR-MCNC: 260 MG/DL — HIGH (ref 70–99)
GLUCOSE SERPL-MCNC: 227 MG/DL — HIGH (ref 70–99)
GLUCOSE SERPL-MCNC: 272 MG/DL — HIGH (ref 70–99)
GLUCOSE UR QL: NEGATIVE — SIGNIFICANT CHANGE UP
GRAM STN FLD: SIGNIFICANT CHANGE UP
HCO3 BLDV-SCNC: 25 MMOL/L — SIGNIFICANT CHANGE UP (ref 21–29)
HCO3 BLDV-SCNC: 27 MMOL/L — SIGNIFICANT CHANGE UP (ref 21–29)
HCT VFR BLD CALC: 25.6 % — LOW (ref 34.5–45)
HGB BLD-MCNC: 8.2 G/DL — LOW (ref 11.5–15.5)
INR BLD: 1.03 RATIO — SIGNIFICANT CHANGE UP (ref 0.88–1.16)
KETONES UR-MCNC: NEGATIVE — SIGNIFICANT CHANGE UP
LEUKOCYTE ESTERASE UR-ACNC: ABNORMAL
LYMPHOCYTES # BLD AUTO: 0.6 K/UL — LOW (ref 1–3.3)
LYMPHOCYTES # BLD AUTO: 5.6 % — LOW (ref 13–44)
MAGNESIUM SERPL-MCNC: 2.2 MG/DL — SIGNIFICANT CHANGE UP (ref 1.6–2.6)
MAGNESIUM SERPL-MCNC: 2.3 MG/DL — SIGNIFICANT CHANGE UP (ref 1.6–2.6)
MCHC RBC-ENTMCNC: 27.9 PG — SIGNIFICANT CHANGE UP (ref 27–34)
MCHC RBC-ENTMCNC: 32 GM/DL — SIGNIFICANT CHANGE UP (ref 32–36)
MCV RBC AUTO: 87 FL — SIGNIFICANT CHANGE UP (ref 80–100)
MONOCYTES # BLD AUTO: 0.5 K/UL — SIGNIFICANT CHANGE UP (ref 0–0.9)
MONOCYTES NFR BLD AUTO: 4.6 % — SIGNIFICANT CHANGE UP (ref 2–14)
NEUTROPHILS # BLD AUTO: 9.1 K/UL — HIGH (ref 1.8–7.4)
NEUTROPHILS NFR BLD AUTO: 89.4 % — HIGH (ref 43–77)
NITRITE UR-MCNC: NEGATIVE — SIGNIFICANT CHANGE UP
OSMOLALITY SERPL: 302 MOS/KG — HIGH (ref 275–300)
OSMOLALITY UR: 368 MOS/KG — SIGNIFICANT CHANGE UP (ref 300–900)
PCO2 BLDV: 38 MMHG — SIGNIFICANT CHANGE UP (ref 35–50)
PCO2 BLDV: 43 MMHG — SIGNIFICANT CHANGE UP (ref 35–50)
PH BLDV: 7.41 — SIGNIFICANT CHANGE UP (ref 7.35–7.45)
PH BLDV: 7.44 — SIGNIFICANT CHANGE UP (ref 7.35–7.45)
PH UR: 7 — SIGNIFICANT CHANGE UP (ref 5–8)
PHOSPHATE SERPL-MCNC: 2.3 MG/DL — LOW (ref 2.5–4.5)
PHOSPHATE SERPL-MCNC: 3.4 MG/DL — SIGNIFICANT CHANGE UP (ref 2.5–4.5)
PLATELET # BLD AUTO: 563 K/UL — HIGH (ref 150–400)
PO2 BLDV: 39 MMHG — SIGNIFICANT CHANGE UP (ref 25–45)
PO2 BLDV: 93 MMHG — HIGH (ref 25–45)
POTASSIUM SERPL-MCNC: 4.3 MMOL/L — SIGNIFICANT CHANGE UP (ref 3.5–5.3)
POTASSIUM SERPL-MCNC: 5 MMOL/L — SIGNIFICANT CHANGE UP (ref 3.5–5.3)
POTASSIUM SERPL-SCNC: 4.3 MMOL/L — SIGNIFICANT CHANGE UP (ref 3.5–5.3)
POTASSIUM SERPL-SCNC: 5 MMOL/L — SIGNIFICANT CHANGE UP (ref 3.5–5.3)
POTASSIUM UR-SCNC: 25 MMOL/L — SIGNIFICANT CHANGE UP
PROCALCITONIN SERPL-MCNC: 0.16 NG/ML — HIGH (ref 0–0.04)
PROT SERPL-MCNC: 7.3 G/DL — SIGNIFICANT CHANGE UP (ref 6–8.3)
PROT UR-MCNC: SIGNIFICANT CHANGE UP
PROTHROM AB SERPL-ACNC: 11.2 SEC — SIGNIFICANT CHANGE UP (ref 9.8–12.7)
RBC # BLD: 2.94 M/UL — LOW (ref 3.8–5.2)
RBC # FLD: 15.2 % — HIGH (ref 10.3–14.5)
RBC CASTS # UR COMP ASSIST: SIGNIFICANT CHANGE UP /HPF (ref 0–2)
SAO2 % BLDV: 68 % — SIGNIFICANT CHANGE UP (ref 67–88)
SAO2 % BLDV: 98 % — HIGH (ref 67–88)
SODIUM SERPL-SCNC: 136 MMOL/L — SIGNIFICANT CHANGE UP (ref 135–145)
SODIUM SERPL-SCNC: 137 MMOL/L — SIGNIFICANT CHANGE UP (ref 135–145)
SODIUM UR-SCNC: 70 MMOL/L — SIGNIFICANT CHANGE UP
SP GR SPEC: 1.01 — SIGNIFICANT CHANGE UP (ref 1.01–1.02)
SPECIMEN SOURCE: SIGNIFICANT CHANGE UP
UROBILINOGEN FLD QL: NEGATIVE — SIGNIFICANT CHANGE UP
UUN UR-MCNC: 457 MG/DL — SIGNIFICANT CHANGE UP
WBC # BLD: 10.1 K/UL — SIGNIFICANT CHANGE UP (ref 3.8–10.5)
WBC # FLD AUTO: 10.1 K/UL — SIGNIFICANT CHANGE UP (ref 3.8–10.5)
WBC UR QL: ABNORMAL /HPF (ref 0–5)

## 2017-12-25 PROCEDURE — 71010: CPT | Mod: 26

## 2017-12-25 PROCEDURE — 99291 CRITICAL CARE FIRST HOUR: CPT

## 2017-12-25 PROCEDURE — 95951: CPT | Mod: 26

## 2017-12-25 RX ORDER — PIPERACILLIN AND TAZOBACTAM 4; .5 G/20ML; G/20ML
3.38 INJECTION, POWDER, LYOPHILIZED, FOR SOLUTION INTRAVENOUS EVERY 8 HOURS
Qty: 0 | Refills: 0 | Status: DISCONTINUED | OUTPATIENT
Start: 2017-12-25 | End: 2017-12-28

## 2017-12-25 RX ORDER — VANCOMYCIN HCL 1 G
1000 VIAL (EA) INTRAVENOUS ONCE
Qty: 0 | Refills: 0 | Status: COMPLETED | OUTPATIENT
Start: 2017-12-25 | End: 2017-12-25

## 2017-12-25 RX ORDER — HYDRALAZINE HCL 50 MG
10 TABLET ORAL ONCE
Qty: 0 | Refills: 0 | Status: COMPLETED | OUTPATIENT
Start: 2017-12-25 | End: 2017-12-25

## 2017-12-25 RX ORDER — ACETAMINOPHEN 500 MG
650 TABLET ORAL ONCE
Qty: 0 | Refills: 0 | Status: COMPLETED | OUTPATIENT
Start: 2017-12-25 | End: 2017-12-25

## 2017-12-25 RX ORDER — PIPERACILLIN AND TAZOBACTAM 4; .5 G/20ML; G/20ML
3.38 INJECTION, POWDER, LYOPHILIZED, FOR SOLUTION INTRAVENOUS ONCE
Qty: 0 | Refills: 0 | Status: COMPLETED | OUTPATIENT
Start: 2017-12-25 | End: 2017-12-25

## 2017-12-25 RX ORDER — HYDRALAZINE HCL 50 MG
25 TABLET ORAL THREE TIMES A DAY
Qty: 0 | Refills: 0 | Status: DISCONTINUED | OUTPATIENT
Start: 2017-12-25 | End: 2017-12-25

## 2017-12-25 RX ORDER — LABETALOL HCL 100 MG
5 TABLET ORAL ONCE
Qty: 0 | Refills: 0 | Status: DISCONTINUED | OUTPATIENT
Start: 2017-12-25 | End: 2017-12-25

## 2017-12-25 RX ORDER — LABETALOL HCL 100 MG
10 TABLET ORAL ONCE
Qty: 0 | Refills: 0 | Status: COMPLETED | OUTPATIENT
Start: 2017-12-25 | End: 2017-12-25

## 2017-12-25 RX ORDER — DEXMEDETOMIDINE HYDROCHLORIDE IN 0.9% SODIUM CHLORIDE 4 UG/ML
0.2 INJECTION INTRAVENOUS
Qty: 200 | Refills: 0 | Status: DISCONTINUED | OUTPATIENT
Start: 2017-12-25 | End: 2017-12-27

## 2017-12-25 RX ORDER — HYDROMORPHONE HYDROCHLORIDE 2 MG/ML
0.5 INJECTION INTRAMUSCULAR; INTRAVENOUS; SUBCUTANEOUS EVERY 6 HOURS
Qty: 0 | Refills: 0 | Status: DISCONTINUED | OUTPATIENT
Start: 2017-12-25 | End: 2017-12-26

## 2017-12-25 RX ADMIN — HYDROMORPHONE HYDROCHLORIDE 0.5 MILLIGRAM(S): 2 INJECTION INTRAMUSCULAR; INTRAVENOUS; SUBCUTANEOUS at 17:45

## 2017-12-25 RX ADMIN — Medication 2 MILLIGRAM(S): at 06:30

## 2017-12-25 RX ADMIN — Medication 102 MILLIGRAM(S): at 21:11

## 2017-12-25 RX ADMIN — Medication 10 MILLIGRAM(S): at 11:41

## 2017-12-25 RX ADMIN — HYDROMORPHONE HYDROCHLORIDE 0.5 MILLIGRAM(S): 2 INJECTION INTRAMUSCULAR; INTRAVENOUS; SUBCUTANEOUS at 11:30

## 2017-12-25 RX ADMIN — Medication 81 MILLIGRAM(S): at 11:44

## 2017-12-25 RX ADMIN — Medication 50 MILLIGRAM(S): at 17:29

## 2017-12-25 RX ADMIN — Medication 10 MILLIGRAM(S): at 13:10

## 2017-12-25 RX ADMIN — HYDROMORPHONE HYDROCHLORIDE 0.5 MILLIGRAM(S): 2 INJECTION INTRAMUSCULAR; INTRAVENOUS; SUBCUTANEOUS at 17:29

## 2017-12-25 RX ADMIN — Medication 50 MILLIGRAM(S): at 06:30

## 2017-12-25 RX ADMIN — Medication 650 MILLIGRAM(S): at 06:31

## 2017-12-25 RX ADMIN — HEPARIN SODIUM 5000 UNIT(S): 5000 INJECTION INTRAVENOUS; SUBCUTANEOUS at 06:30

## 2017-12-25 RX ADMIN — INSULIN GLARGINE 20 UNIT(S): 100 INJECTION, SOLUTION SUBCUTANEOUS at 21:23

## 2017-12-25 RX ADMIN — PIPERACILLIN AND TAZOBACTAM 25 GRAM(S): 4; .5 INJECTION, POWDER, LYOPHILIZED, FOR SOLUTION INTRAVENOUS at 20:30

## 2017-12-25 RX ADMIN — Medication 25 MILLIGRAM(S): at 06:30

## 2017-12-25 RX ADMIN — Medication 100 MILLIGRAM(S): at 14:52

## 2017-12-25 RX ADMIN — Medication 25 MILLIGRAM(S): at 14:53

## 2017-12-25 RX ADMIN — SENNA PLUS 2 TABLET(S): 8.6 TABLET ORAL at 21:10

## 2017-12-25 RX ADMIN — Medication 2 MILLIGRAM(S): at 14:52

## 2017-12-25 RX ADMIN — Medication 4: at 10:47

## 2017-12-25 RX ADMIN — Medication 250 MILLIGRAM(S): at 10:47

## 2017-12-25 RX ADMIN — Medication 102 MILLIGRAM(S): at 06:31

## 2017-12-25 RX ADMIN — HEPARIN SODIUM 5000 UNIT(S): 5000 INJECTION INTRAVENOUS; SUBCUTANEOUS at 14:55

## 2017-12-25 RX ADMIN — Medication 102 MILLIGRAM(S): at 14:45

## 2017-12-25 RX ADMIN — Medication 25 MILLIGRAM(S): at 11:46

## 2017-12-25 RX ADMIN — GABAPENTIN 300 MILLIGRAM(S): 400 CAPSULE ORAL at 21:10

## 2017-12-25 RX ADMIN — GABAPENTIN 300 MILLIGRAM(S): 400 CAPSULE ORAL at 14:52

## 2017-12-25 RX ADMIN — Medication 62.5 MILLIMOLE(S): at 09:05

## 2017-12-25 RX ADMIN — Medication 25 MILLIGRAM(S): at 21:10

## 2017-12-25 RX ADMIN — PIPERACILLIN AND TAZOBACTAM 200 GRAM(S): 4; .5 INJECTION, POWDER, LYOPHILIZED, FOR SOLUTION INTRAVENOUS at 11:46

## 2017-12-25 RX ADMIN — Medication 10 MILLIGRAM(S): at 09:19

## 2017-12-25 RX ADMIN — AMLODIPINE BESYLATE 10 MILLIGRAM(S): 2.5 TABLET ORAL at 21:10

## 2017-12-25 RX ADMIN — Medication 100 MILLIGRAM(S): at 21:10

## 2017-12-25 RX ADMIN — Medication 2 MILLIGRAM(S): at 21:10

## 2017-12-25 RX ADMIN — Medication 100 MILLIGRAM(S): at 06:29

## 2017-12-25 RX ADMIN — FAMOTIDINE 20 MILLIGRAM(S): 10 INJECTION INTRAVENOUS at 17:30

## 2017-12-25 RX ADMIN — GABAPENTIN 300 MILLIGRAM(S): 400 CAPSULE ORAL at 06:31

## 2017-12-25 RX ADMIN — FAMOTIDINE 20 MILLIGRAM(S): 10 INJECTION INTRAVENOUS at 06:30

## 2017-12-25 RX ADMIN — HYDROMORPHONE HYDROCHLORIDE 0.5 MILLIGRAM(S): 2 INJECTION INTRAMUSCULAR; INTRAVENOUS; SUBCUTANEOUS at 11:11

## 2017-12-25 RX ADMIN — Medication 8: at 02:20

## 2017-12-25 RX ADMIN — Medication 4: at 06:31

## 2017-12-25 RX ADMIN — Medication 75 MICROGRAM(S): at 10:49

## 2017-12-25 RX ADMIN — Medication 6: at 21:11

## 2017-12-25 RX ADMIN — Medication 4: at 17:30

## 2017-12-25 RX ADMIN — HEPARIN SODIUM 5000 UNIT(S): 5000 INJECTION INTRAVENOUS; SUBCUTANEOUS at 21:10

## 2017-12-25 RX ADMIN — Medication 4: at 14:56

## 2017-12-25 NOTE — PROGRESS NOTE ADULT - ASSESSMENT
72yoF with Angioedema requiring nasal fiberoptic intubation, possible plan for extubation by MICU team

## 2017-12-25 NOTE — PROGRESS NOTE ADULT - SUBJECTIVE AND OBJECTIVE BOX
POD/STATUS POST/ENT ISSUE: Angioedema-improving    INTERVAL HPI:pt s/p fiberoptic nasal intubation 12/22 in ED for angioedema  12/22: glideoscope: angioedema of anterior tongue and uvula, right over left pharyngeal wall b/l, Anterior VC appear normal, with cuff leak  has good ventilation.  pt seen & examined, pt nasally intubated/sedated no events      Vital Signs Last 24 Hrs  T(C): 38.3 (25 Dec 2017 09:00), Max: 38.3 (25 Dec 2017 04:00)  T(F): 101 (25 Dec 2017 09:00), Max: 101 (25 Dec 2017 04:00)  HR: 73 (25 Dec 2017 09:20) (58 - 89)  BP: 174/73 (25 Dec 2017 09:20) (109/53 - 207/88)  BP(mean): 105 (25 Dec 2017 09:20) (76 - 127)  RR: 19 (25 Dec 2017 09:20) (14 - 25)  SpO2: 100% (25 Dec 2017 09:20) (90% - 100%)    PHYSICAL EXAM:  Gen: NAD, well-developed sedated, no facial swelling  Head: Normocephalic, Atraumatic  Eyes: PERRL, EOMI, no scleral injection  Nose:  pt nasally intubated no obvious drainage or bleeding  Mouth: normal contour of lips Mucosa moist, tongue midline no edema, FOM soft   Neck: Flat, supple, no lymphadenopathy, trachea midline, no masses  Resp: breathing easily, no stridor      LABS:                        8.2    10.1  )-----------( 563      ( 25 Dec 2017 02:56 )             25.6

## 2017-12-25 NOTE — PROGRESS NOTE ADULT - SUBJECTIVE AND OBJECTIVE BOX
INTERVAL HPI/OVERNIGHT EVENTS: No acute events over night. UA positive- cx sent.     SUBJECTIVE: Patient seen and examined at bedside.     CONSTITUTIONAL: No weakness, fevers or chills  EYES/ENT: No visual changes;  No vertigo or throat pain   NECK: No pain or stiffness  RESPIRATORY: No cough, wheezing, hemoptysis; No shortness of breath  CARDIOVASCULAR: No chest pain or palpitations  GASTROINTESTINAL: No abdominal or epigastric pain. No nausea, vomiting, or hematemesis; No diarrhea or constipation. No melena or hematochezia.  GENITOURINARY: No dysuria, frequency or hematuria  NEUROLOGICAL: No numbness or weakness  SKIN: No itching, rashes    OBJECTIVE:    VITAL SIGNS:  ICU Vital Signs Last 24 Hrs  T(C): 37.2 (24 Dec 2017 20:00), Max: 37.3 (24 Dec 2017 08:00)  T(F): 99 (24 Dec 2017 20:00), Max: 99.2 (24 Dec 2017 08:00)  HR: 84 (25 Dec 2017 04:21) (58 - 84)  BP: 153/71 (25 Dec 2017 01:00) (109/53 - 164/74)  BP(mean): 102 (25 Dec 2017 01:00) (76 - 107)  ABP: --  ABP(mean): --  RR: 21 (25 Dec 2017 01:00) (14 - 25)  SpO2: 100% (25 Dec 2017 04:21) (97% - 100%)    Mode: AC/ CMV (Assist Control/ Continuous Mandatory Ventilation), RR (machine): 14, TV (machine): 450, FiO2: 30, PEEP: 5, ITime: 0.1, MAP: 9, PIP: 21     @ 07:01  -   @ 07:00  --------------------------------------------------------  IN: 2076.3 mL / OUT: 1565 mL / NET: 511.3 mL      CAPILLARY BLOOD GLUCOSE      POCT Blood Glucose.: 199 mg/dL (25 Dec 2017 06:25)      PHYSICAL EXAM:    General: NAD  HEENT: NC/AT; PERRL, clear conjunctiva  Neck: supple  Respiratory: CTA b/l  Cardiovascular: +S1/S2; RRR  Abdomen: soft, NT/ND; +BS x4  Extremities: WWP, 2+ peripheral pulses b/l; no LE edema  Skin: normal color and turgor; no rash  Neurological:    MEDICATIONS:  MEDICATIONS  (STANDING):  amLODIPine   Tablet 10 milliGRAM(s) Oral at bedtime  aspirin  chewable 81 milliGRAM(s) Oral daily  dexamethasone  IVPB 10 milliGRAM(s) IV Intermittent every 8 hours  dexmedetomidine Infusion 0.2 MICROgram(s)/kG/Hr (4.85 mL/Hr) IV Continuous <Continuous>  diazepam    Tablet 2 milliGRAM(s) Oral every 8 hours  diphenhydrAMINE   Injectable 25 milliGRAM(s) IV Push every 8 hours  docusate sodium Liquid 100 milliGRAM(s) Oral every 8 hours  famotidine Injectable 20 milliGRAM(s) IV Push every 12 hours  fentaNYL   Patch  25 MICROgram(s)/Hr. 1 Patch Transdermal every 48 hours  gabapentin   Solution 300 milliGRAM(s) Oral every 8 hours  heparin  Injectable 5000 Unit(s) SubCutaneous every 8 hours  insulin glargine Injectable (LANTUS) 20 Unit(s) SubCutaneous at bedtime  insulin lispro (HumaLOG) corrective regimen sliding scale   SubCutaneous every 4 hours  levothyroxine Injectable 75 MICROGram(s) IV Push <User Schedule>  metoprolol     tartrate 50 milliGRAM(s) Oral every 12 hours  propofol Infusion 20 MICROgram(s)/kG/Min (12 mL/Hr) IV Continuous <Continuous>  senna 2 Tablet(s) Oral at bedtime  sodium phosphate IVPB 15 milliMole(s) IV Intermittent once    MEDICATIONS  (PRN):      ALLERGIES:  Allergies    No Known Allergies    Intolerances        LABS:                        8.2    10.1  )-----------( 563      ( 25 Dec 2017 02:56 )             25.6     12-25    136  |  99  |  20  ----------------------------<  272<H>  5.0   |  22  |  0.59    Ca    8.6      25 Dec 2017 02:56  Phos  2.3     12-25  Mg     2.3     12-25    TPro  7.3  /  Alb  3.3  /  TBili  0.1<L>  /  DBili  x   /  AST  9<L>  /  ALT  7<L>  /  AlkPhos  104  12-25    PT/INR - ( 25 Dec 2017 02:56 )   PT: 11.2 sec;   INR: 1.03 ratio         PTT - ( 25 Dec 2017 02:56 )  PTT:26.0 sec  Urinalysis Basic - ( 25 Dec 2017 05:32 )    Color: PL Yellow / Appearance: Clear / S.013 / pH: x  Gluc: x / Ketone: Negative  / Bili: Negative / Urobili: Negative   Blood: x / Protein: Trace / Nitrite: Negative   Leuk Esterase: Large / RBC: 0-2 /HPF / WBC 5-10 /HPF   Sq Epi: x / Non Sq Epi: Occasional /HPF / Bacteria: Few /HPF        RADIOLOGY & ADDITIONAL TESTS: Reviewed.

## 2017-12-25 NOTE — PROGRESS NOTE ADULT - ASSESSMENT
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 from home BIBEMS after developing swelling and protrusion of tongue with difficulty swallowing. Admission to MICU for angioedema s/p nasal intubation.      #Neuro:  - neuro checks q 4 hrs and prn for changes  - Decrease sedation as tolerated. Propofol (40ml/hr)   - Fentanyl patch  - Restarted Precedex Valium 2mg q8h   - f/u eeg ( pt with questionable left arm clonus 12/23)      #Pulm:  - as sedation decreased will place on PSV   - HOB >/= 30 degree angle  - assess minimal cuff leak    #CV:  hold all ACE-I and ARBS  now with NGT will change metoprolol to home dose ( 50 mg BID )  restarted amlodipine 10 mg via NGT qhs  restarted ASA 81 mg qd    #GI/:  strict I & O's; keep even  continue tube feeds to goal  c/w senna and colace    #I.D.:  WBC not elevated, no fever spikes. Will continue to monitor  if fever occurs and wbc elevated will add vanco and zosyn after culture obtained    #FEN/HEME/ENDO:  angioedema resolving, will continue with decadron, Benadryl 25 mg q8h/Pepcid 20 mg IV q12h   continue lantus 20 mg qhs and q 4 hr POC glucose with ISS,. Maintain glucose 140 - 160  trend lytes/cbc/coags. treat accordingly

## 2017-12-25 NOTE — PROGRESS NOTE ADULT - PROBLEM SELECTOR PLAN 1
Plan for airway eval when pt extubated  Continue Steroids can taper/H2 blockers/Antihistamines  MICU care

## 2017-12-26 LAB
ALBUMIN SERPL ELPH-MCNC: 3.4 G/DL — SIGNIFICANT CHANGE UP (ref 3.3–5)
ALP SERPL-CCNC: 101 U/L — SIGNIFICANT CHANGE UP (ref 40–120)
ALT FLD-CCNC: 5 U/L RC — LOW (ref 10–45)
ANION GAP SERPL CALC-SCNC: 13 MMOL/L — SIGNIFICANT CHANGE UP (ref 5–17)
APTT BLD: 26.4 SEC — LOW (ref 27.5–37.4)
AST SERPL-CCNC: 8 U/L — LOW (ref 10–40)
BASOPHILS # BLD AUTO: 0 K/UL — SIGNIFICANT CHANGE UP (ref 0–0.2)
BASOPHILS NFR BLD AUTO: 0.1 % — SIGNIFICANT CHANGE UP (ref 0–2)
BILIRUB SERPL-MCNC: 0.1 MG/DL — LOW (ref 0.2–1.2)
BUN SERPL-MCNC: 20 MG/DL — SIGNIFICANT CHANGE UP (ref 7–23)
CALCIUM SERPL-MCNC: 8.8 MG/DL — SIGNIFICANT CHANGE UP (ref 8.4–10.5)
CHLORIDE SERPL-SCNC: 95 MMOL/L — LOW (ref 96–108)
CO2 SERPL-SCNC: 26 MMOL/L — SIGNIFICANT CHANGE UP (ref 22–31)
CREAT SERPL-MCNC: 0.61 MG/DL — SIGNIFICANT CHANGE UP (ref 0.5–1.3)
EOSINOPHIL # BLD AUTO: 0 K/UL — SIGNIFICANT CHANGE UP (ref 0–0.5)
EOSINOPHIL NFR BLD AUTO: 0 % — SIGNIFICANT CHANGE UP (ref 0–6)
GLUCOSE BLDC GLUCOMTR-MCNC: 123 MG/DL — HIGH (ref 70–99)
GLUCOSE BLDC GLUCOMTR-MCNC: 175 MG/DL — HIGH (ref 70–99)
GLUCOSE BLDC GLUCOMTR-MCNC: 175 MG/DL — HIGH (ref 70–99)
GLUCOSE BLDC GLUCOMTR-MCNC: 179 MG/DL — HIGH (ref 70–99)
GLUCOSE BLDC GLUCOMTR-MCNC: 243 MG/DL — HIGH (ref 70–99)
GLUCOSE BLDC GLUCOMTR-MCNC: 251 MG/DL — HIGH (ref 70–99)
GLUCOSE SERPL-MCNC: 279 MG/DL — HIGH (ref 70–99)
HCT VFR BLD CALC: 26.3 % — LOW (ref 34.5–45)
HGB BLD-MCNC: 8.6 G/DL — LOW (ref 11.5–15.5)
INR BLD: 1.07 RATIO — SIGNIFICANT CHANGE UP (ref 0.88–1.16)
LYMPHOCYTES # BLD AUTO: 0.7 K/UL — LOW (ref 1–3.3)
LYMPHOCYTES # BLD AUTO: 7.5 % — LOW (ref 13–44)
MAGNESIUM SERPL-MCNC: 2.4 MG/DL — SIGNIFICANT CHANGE UP (ref 1.6–2.6)
MCHC RBC-ENTMCNC: 28.4 PG — SIGNIFICANT CHANGE UP (ref 27–34)
MCHC RBC-ENTMCNC: 32.7 GM/DL — SIGNIFICANT CHANGE UP (ref 32–36)
MCV RBC AUTO: 86.9 FL — SIGNIFICANT CHANGE UP (ref 80–100)
MONOCYTES # BLD AUTO: 0.6 K/UL — SIGNIFICANT CHANGE UP (ref 0–0.9)
MONOCYTES NFR BLD AUTO: 6.8 % — SIGNIFICANT CHANGE UP (ref 2–14)
NEUTROPHILS # BLD AUTO: 8.1 K/UL — HIGH (ref 1.8–7.4)
NEUTROPHILS NFR BLD AUTO: 85.7 % — HIGH (ref 43–77)
PHOSPHATE SERPL-MCNC: 3 MG/DL — SIGNIFICANT CHANGE UP (ref 2.5–4.5)
PLATELET # BLD AUTO: 603 K/UL — HIGH (ref 150–400)
POTASSIUM SERPL-MCNC: 4.4 MMOL/L — SIGNIFICANT CHANGE UP (ref 3.5–5.3)
POTASSIUM SERPL-SCNC: 4.4 MMOL/L — SIGNIFICANT CHANGE UP (ref 3.5–5.3)
PROT SERPL-MCNC: 7.3 G/DL — SIGNIFICANT CHANGE UP (ref 6–8.3)
PROTHROM AB SERPL-ACNC: 11.6 SEC — SIGNIFICANT CHANGE UP (ref 9.8–12.7)
RBC # BLD: 3.02 M/UL — LOW (ref 3.8–5.2)
RBC # FLD: 15.3 % — HIGH (ref 10.3–14.5)
SODIUM SERPL-SCNC: 134 MMOL/L — LOW (ref 135–145)
WBC # BLD: 9.5 K/UL — SIGNIFICANT CHANGE UP (ref 3.8–10.5)
WBC # FLD AUTO: 9.5 K/UL — SIGNIFICANT CHANGE UP (ref 3.8–10.5)

## 2017-12-26 PROCEDURE — 99291 CRITICAL CARE FIRST HOUR: CPT

## 2017-12-26 RX ORDER — FENTANYL CITRATE 50 UG/ML
100 INJECTION INTRAVENOUS ONCE
Qty: 0 | Refills: 0 | Status: DISCONTINUED | OUTPATIENT
Start: 2017-12-26 | End: 2017-12-26

## 2017-12-26 RX ORDER — CISATRACURIUM BESYLATE 2 MG/ML
20 INJECTION INTRAVENOUS ONCE
Qty: 0 | Refills: 0 | Status: COMPLETED | OUTPATIENT
Start: 2017-12-26 | End: 2017-12-26

## 2017-12-26 RX ORDER — CHLORHEXIDINE GLUCONATE 213 G/1000ML
15 SOLUTION TOPICAL
Qty: 0 | Refills: 0 | Status: DISCONTINUED | OUTPATIENT
Start: 2017-12-26 | End: 2017-12-27

## 2017-12-26 RX ORDER — HYDRALAZINE HCL 50 MG
10 TABLET ORAL ONCE
Qty: 0 | Refills: 0 | Status: COMPLETED | OUTPATIENT
Start: 2017-12-26 | End: 2017-12-26

## 2017-12-26 RX ADMIN — Medication 10 MILLIGRAM(S): at 23:03

## 2017-12-26 RX ADMIN — Medication 102 MILLIGRAM(S): at 21:16

## 2017-12-26 RX ADMIN — Medication 25 MILLIGRAM(S): at 06:26

## 2017-12-26 RX ADMIN — Medication 102 MILLIGRAM(S): at 06:09

## 2017-12-26 RX ADMIN — GABAPENTIN 300 MILLIGRAM(S): 400 CAPSULE ORAL at 15:29

## 2017-12-26 RX ADMIN — Medication 50 MILLIGRAM(S): at 17:36

## 2017-12-26 RX ADMIN — INSULIN GLARGINE 20 UNIT(S): 100 INJECTION, SOLUTION SUBCUTANEOUS at 23:03

## 2017-12-26 RX ADMIN — AMLODIPINE BESYLATE 10 MILLIGRAM(S): 2.5 TABLET ORAL at 21:06

## 2017-12-26 RX ADMIN — PIPERACILLIN AND TAZOBACTAM 25 GRAM(S): 4; .5 INJECTION, POWDER, LYOPHILIZED, FOR SOLUTION INTRAVENOUS at 20:18

## 2017-12-26 RX ADMIN — HYDROMORPHONE HYDROCHLORIDE 0.5 MILLIGRAM(S): 2 INJECTION INTRAMUSCULAR; INTRAVENOUS; SUBCUTANEOUS at 06:09

## 2017-12-26 RX ADMIN — Medication 6: at 01:50

## 2017-12-26 RX ADMIN — Medication 100 MILLIGRAM(S): at 21:05

## 2017-12-26 RX ADMIN — Medication 50 MILLIGRAM(S): at 06:09

## 2017-12-26 RX ADMIN — Medication 81 MILLIGRAM(S): at 11:26

## 2017-12-26 RX ADMIN — Medication 4: at 13:41

## 2017-12-26 RX ADMIN — HYDROMORPHONE HYDROCHLORIDE 0.5 MILLIGRAM(S): 2 INJECTION INTRAMUSCULAR; INTRAVENOUS; SUBCUTANEOUS at 06:20

## 2017-12-26 RX ADMIN — HEPARIN SODIUM 5000 UNIT(S): 5000 INJECTION INTRAVENOUS; SUBCUTANEOUS at 21:04

## 2017-12-26 RX ADMIN — HEPARIN SODIUM 5000 UNIT(S): 5000 INJECTION INTRAVENOUS; SUBCUTANEOUS at 06:11

## 2017-12-26 RX ADMIN — PIPERACILLIN AND TAZOBACTAM 25 GRAM(S): 4; .5 INJECTION, POWDER, LYOPHILIZED, FOR SOLUTION INTRAVENOUS at 12:08

## 2017-12-26 RX ADMIN — SENNA PLUS 2 TABLET(S): 8.6 TABLET ORAL at 23:05

## 2017-12-26 RX ADMIN — Medication 75 MICROGRAM(S): at 09:49

## 2017-12-26 RX ADMIN — Medication 2 MILLIGRAM(S): at 13:39

## 2017-12-26 RX ADMIN — FENTANYL CITRATE 100 MICROGRAM(S): 50 INJECTION INTRAVENOUS at 16:20

## 2017-12-26 RX ADMIN — GABAPENTIN 300 MILLIGRAM(S): 400 CAPSULE ORAL at 06:10

## 2017-12-26 RX ADMIN — CISATRACURIUM BESYLATE 20 MILLIGRAM(S): 2 INJECTION INTRAVENOUS at 16:03

## 2017-12-26 RX ADMIN — Medication 8: at 06:10

## 2017-12-26 RX ADMIN — PROPOFOL 12 MICROGRAM(S)/KG/MIN: 10 INJECTION, EMULSION INTRAVENOUS at 17:32

## 2017-12-26 RX ADMIN — PIPERACILLIN AND TAZOBACTAM 25 GRAM(S): 4; .5 INJECTION, POWDER, LYOPHILIZED, FOR SOLUTION INTRAVENOUS at 03:32

## 2017-12-26 RX ADMIN — FENTANYL CITRATE 1 PATCH: 50 INJECTION INTRAVENOUS at 11:30

## 2017-12-26 RX ADMIN — Medication 4: at 09:50

## 2017-12-26 RX ADMIN — PROPOFOL 12 MICROGRAM(S)/KG/MIN: 10 INJECTION, EMULSION INTRAVENOUS at 20:19

## 2017-12-26 RX ADMIN — CHLORHEXIDINE GLUCONATE 15 MILLILITER(S): 213 SOLUTION TOPICAL at 06:09

## 2017-12-26 RX ADMIN — FENTANYL CITRATE 100 MICROGRAM(S): 50 INJECTION INTRAVENOUS at 16:00

## 2017-12-26 RX ADMIN — Medication 2 MILLIGRAM(S): at 06:25

## 2017-12-26 RX ADMIN — FAMOTIDINE 20 MILLIGRAM(S): 10 INJECTION INTRAVENOUS at 17:32

## 2017-12-26 RX ADMIN — FENTANYL CITRATE 1 PATCH: 50 INJECTION INTRAVENOUS at 11:28

## 2017-12-26 RX ADMIN — Medication 4: at 21:04

## 2017-12-26 RX ADMIN — HEPARIN SODIUM 5000 UNIT(S): 5000 INJECTION INTRAVENOUS; SUBCUTANEOUS at 13:39

## 2017-12-26 RX ADMIN — CHLORHEXIDINE GLUCONATE 15 MILLILITER(S): 213 SOLUTION TOPICAL at 17:32

## 2017-12-26 RX ADMIN — Medication 102 MILLIGRAM(S): at 13:38

## 2017-12-26 RX ADMIN — GABAPENTIN 300 MILLIGRAM(S): 400 CAPSULE ORAL at 21:28

## 2017-12-26 RX ADMIN — Medication 25 MILLIGRAM(S): at 13:42

## 2017-12-26 RX ADMIN — FAMOTIDINE 20 MILLIGRAM(S): 10 INJECTION INTRAVENOUS at 06:27

## 2017-12-26 RX ADMIN — Medication 25 MILLIGRAM(S): at 21:05

## 2017-12-26 NOTE — DIETITIAN INITIAL EVALUATION ADULT. - NS AS NUTRI INTERV ENTERAL NUTRITION
If pt remains intubated recommend Vital 1.2 at 60 cc/hr provides 1296 kcals, 81 gm protein, 876cc free water for 14 kcals/kg dosing wt of 93.4kg, 1.8gm protein/kg IBW of 45.6kg

## 2017-12-26 NOTE — PROGRESS NOTE ADULT - SUBJECTIVE AND OBJECTIVE BOX
INTERVAL HPI/OVERNIGHT EVENTS: No acute events over night.     SUBJECTIVE: Patient seen and examined at bedside. Resting comfortably.    CONSTITUTIONAL: No weakness, fevers or chills  EYES/ENT: No visual changes;  No vertigo or throat pain   NECK: No pain or stiffness  RESPIRATORY: No cough, wheezing, hemoptysis; No shortness of breath  CARDIOVASCULAR: No chest pain or palpitations  GASTROINTESTINAL: No abdominal or epigastric pain. No nausea, vomiting, or hematemesis; No diarrhea or constipation. No melena or hematochezia.  GENITOURINARY: No dysuria, frequency or hematuria  NEUROLOGICAL: No numbness or weakness  SKIN: No itching, rashes    OBJECTIVE:    VITAL SIGNS:  ICU Vital Signs Last 24 Hrs  T(C): 37.8 (26 Dec 2017 04:00), Max: 38.3 (25 Dec 2017 09:00)  T(F): 100 (26 Dec 2017 04:00), Max: 101 (25 Dec 2017 09:00)  HR: 77 (26 Dec 2017 06:00) (65 - 85)  BP: 132/62 (26 Dec 2017 06:00) (107/55 - 207/88)  BP(mean): 89 (26 Dec 2017 06:00) (75 - 131)  ABP: --  ABP(mean): --  RR: 17 (26 Dec 2017 06:00) (16 - 31)  SpO2: 100% (26 Dec 2017 06:00) (85% - 100%)    Mode: AC/ CMV (Assist Control/ Continuous Mandatory Ventilation), RR (machine): 14, TV (machine): 450, FiO2: 30, PEEP: 5, ITime: 1, MAP: 10, PIP: 26     @ 07:01  -   @ 07:00  --------------------------------------------------------  IN: 2416 mL / OUT: 2830 mL / NET: -414 mL      CAPILLARY BLOOD GLUCOSE      POCT Blood Glucose.: 251 mg/dL (26 Dec 2017 05:41)      PHYSICAL EXAM:    General: NAD  HEENT: NC/AT; PERRL, clear conjunctiva  Neck: supple  Respiratory: CTA b/l  Cardiovascular: +S1/S2; RRR  Abdomen: soft, NT/ND; +BS x4  Extremities: WWP, 2+ peripheral pulses b/l; no LE edema  Skin: normal color and turgor; no rash  Neurological:    MEDICATIONS:  MEDICATIONS  (STANDING):  amLODIPine   Tablet 10 milliGRAM(s) Oral at bedtime  aspirin  chewable 81 milliGRAM(s) Oral daily  chlorhexidine 0.12% Liquid 15 milliLiter(s) Swish and Spit two times a day  dexamethasone  IVPB 10 milliGRAM(s) IV Intermittent every 8 hours  dexmedetomidine Infusion 0.2 MICROgram(s)/kG/Hr (4.85 mL/Hr) IV Continuous <Continuous>  diazepam    Tablet 2 milliGRAM(s) Oral every 8 hours  diphenhydrAMINE   Injectable 25 milliGRAM(s) IV Push every 8 hours  docusate sodium Liquid 100 milliGRAM(s) Oral every 8 hours  famotidine Injectable 20 milliGRAM(s) IV Push every 12 hours  fentaNYL   Patch  25 MICROgram(s)/Hr. 1 Patch Transdermal every 48 hours  gabapentin   Solution 300 milliGRAM(s) Oral every 8 hours  heparin  Injectable 5000 Unit(s) SubCutaneous every 8 hours  insulin glargine Injectable (LANTUS) 20 Unit(s) SubCutaneous at bedtime  insulin lispro (HumaLOG) corrective regimen sliding scale   SubCutaneous every 4 hours  levothyroxine Injectable 75 MICROGram(s) IV Push <User Schedule>  metoprolol     tartrate 50 milliGRAM(s) Oral every 12 hours  piperacillin/tazobactam IVPB. 3.375 Gram(s) IV Intermittent every 8 hours  propofol Infusion 20 MICROgram(s)/kG/Min (12 mL/Hr) IV Continuous <Continuous>  senna 2 Tablet(s) Oral at bedtime    MEDICATIONS  (PRN):      ALLERGIES:  Allergies    No Known Allergies    Intolerances        LABS:                        8.6    9.5   )-----------( 603      ( 26 Dec 2017 03:56 )             26.3     12-26    134<L>  |  95<L>  |  20  ----------------------------<  279<H>  4.4   |  26  |  0.61    Ca    8.8      26 Dec 2017 03:56  Phos  3.0     12-  Mg     2.4     -    TPro  7.3  /  Alb  3.4  /  TBili  0.1<L>  /  DBili  x   /  AST  8<L>  /  ALT  5<L>  /  AlkPhos  101  12-26    PT/INR - ( 26 Dec 2017 03:56 )   PT: 11.6 sec;   INR: 1.07 ratio         PTT - ( 26 Dec 2017 03:56 )  PTT:26.4 sec  Urinalysis Basic - ( 25 Dec 2017 05:32 )    Color: PL Yellow / Appearance: Clear / S.013 / pH: x  Gluc: x / Ketone: Negative  / Bili: Negative / Urobili: Negative   Blood: x / Protein: Trace / Nitrite: Negative   Leuk Esterase: Large / RBC: 0-2 /HPF / WBC 5-10 /HPF   Sq Epi: x / Non Sq Epi: Occasional /HPF / Bacteria: Few /HPF        RADIOLOGY & ADDITIONAL TESTS: Reviewed.

## 2017-12-26 NOTE — DIETITIAN INITIAL EVALUATION ADULT. - ENERGY NEEDS
Ht: 60"  Wt: 206  BMI: 40 kg/m2   IBW: 100 (+/-10%)     206% IBW  Edema:  1+ generalized  Skin: no pressure injuries.  Energy needs estimated using 11-14 kcals/kg dosing wt of 93.4kg for 6070-6074 kcals/day.

## 2017-12-26 NOTE — PROGRESS NOTE ADULT - ASSESSMENT
71 y/o female with Angioedema requiring nasal fiberoptic intubation, possible plan for extubation by MICU team today with ENT attending present at bedside. 71 y/o female with Angioedema requiring nasal fiberoptic intubation, glidescope done at bedside today which showed significant improvement. possible plan for extubation tomorrow in the AM

## 2017-12-26 NOTE — CHART NOTE - NSCHARTNOTEFT_GEN_A_CORE
Glideoscope inserted. Larynx examined. Base of tongue and lip much improved. Anterior vocal cords appeared normal. Plan to extubate on 12/27.    ----------------------------------------  Joyce Herr MD PGY-4  Pulmonary/Critical Care Fellow  Pager # [phone] ([place]), [phone] ([facility])

## 2017-12-26 NOTE — PROGRESS NOTE ADULT - PROBLEM SELECTOR PLAN 1
Extubation with ENT attending present at bedside- this will be communicated with attending   Plan for airway eval when pt extubated  Continue Steroids can taper/H2 blockers/Antihistamines  MICU care. Extubation tomorrow in AM   Plan for airway eval when pt extubated  Continue Steroids can taper/H2 blockers/Antihistamines  MICU care.

## 2017-12-26 NOTE — PROGRESS NOTE ADULT - SUBJECTIVE AND OBJECTIVE BOX
POD/STATUS POST/ENT ISSUE:  Angioedema-improving    INTERVAL HPI: pt s/p fiberoptic nasal intubation 12/22 in ED for angioedema 12/22: glideoscope: angioedema of anterior tongue and uvula, right over left pharyngeal wall b/l, Anterior VC appear normal, with cuff leak  has good ventilation.  pt seen & examined, pt nasally intubated/sedated no events    Vital Signs Last 24 Hrs  T(C): 37.3 (26 Dec 2017 08:00), Max: 37.8 (25 Dec 2017 20:00)  T(F): 99.2 (26 Dec 2017 08:00), Max: 100 (25 Dec 2017 20:00)  HR: 68 (26 Dec 2017 11:00) (62 - 85)  BP: 153/67 (26 Dec 2017 11:00) (101/56 - 194/82)  BP(mean): 97 (26 Dec 2017 11:00) (75 - 118)  RR: 24 (26 Dec 2017 11:00) (14 - 31)  SpO2: 100% (26 Dec 2017 11:00) (85% - 100%)    PHYSICAL EXAM:  Gen: NAD, well-developed sedated, no facial swelling  Head: Normocephalic, Atraumatic  Eyes: PERRL, EOMI, no scleral injection  Nose:  pt nasally intubated no obvious drainage or bleeding  Mouth: normal contour of lips Mucosa moist, tongue midline no edema    Neck: Flat, supple, no lymphadenopathy, trachea midline, no masses  Resp: breathing easily, no stridor    LABS:                        8.6    9.5   )-----------( 603      ( 26 Dec 2017 03:56 )             26.3 POD/STATUS POST/ENT ISSUE:  Angioedema-improving    INTERVAL HPI: pt s/p fiberoptic nasal intubation 12/22 in ED for angioedema 12/22: glideoscope: angioedema of anterior tongue and uvula, right over left pharyngeal wall b/l, Anterior VC appear normal, with cuff leak  has good ventilation.  pt seen & examined, pt nasally intubated/sedated. Glidescope done at bedside which showed significant improved.     Vital Signs Last 24 Hrs  T(C): 37.3 (26 Dec 2017 08:00), Max: 37.8 (25 Dec 2017 20:00)  T(F): 99.2 (26 Dec 2017 08:00), Max: 100 (25 Dec 2017 20:00)  HR: 68 (26 Dec 2017 11:00) (62 - 85)  BP: 153/67 (26 Dec 2017 11:00) (101/56 - 194/82)  BP(mean): 97 (26 Dec 2017 11:00) (75 - 118)  RR: 24 (26 Dec 2017 11:00) (14 - 31)  SpO2: 100% (26 Dec 2017 11:00) (85% - 100%)    PHYSICAL EXAM:  Gen: NAD, well-developed sedated, no facial swelling  Head: Normocephalic, Atraumatic  Eyes: PERRL, no scleral injection  Nose:  pt nasally intubated no obvious drainage or bleeding  Mouth: normal contour of lips Mucosa moist, tongue midline no edema    Neck: Flat, supple, no lymphadenopathy, trachea midline, no masses  Resp: breathing easily, no stridor    LABS:                        8.6    9.5   )-----------( 603      ( 26 Dec 2017 03:56 )             26.3

## 2017-12-26 NOTE — DIETITIAN INITIAL EVALUATION ADULT. - OTHER INFO
Pt seen for: MICU length of stay.   Adm dx:  Angioedema, S/P nasal intubation 12/22.   GI issues:   Last BM:     Food allergies:    Vit/supplement PTA: Pt seen for: MICU length of stay.   Adm dx:  Angioedema, S/P nasal intubation 12/22.   GI issues: no V/D/abd distention  Last BM: 12/26     Food allergies: NKFA   Vit/supplement PTA: none noted

## 2017-12-26 NOTE — PROGRESS NOTE ADULT - ASSESSMENT
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 from home BIBEMS after developing swelling and protrusion of tongue with difficulty swallowing. Admission to MICU for angioedema s/p nasal intubation.      #Neuro:  - neuro checks q 4 hrs and prn for changes  - Decrease sedation as tolerated. Propofol (40ml/hr) Precedex (1)  - Fentanyl patch  - Restarted Valium 2mg q8h  - f/u eeg ( pt with questionable left arm clonus 12/23)      #Pulm:  - as sedation decreased will place on PSV   - HOB >/= 30 degree angle  - assess minimal cuff leak    #CV:  hold all ACE-I and ARBS  now with NGT will change metoprolol to home dose ( 50 mg BID )  restarted amlodipine 10 mg via NGT qhs  restarted ASA 81 mg qd    #GI/:  strict I & O's; keep even  continue tube feeds to goal  c/w senna and colace    #I.D.:  +UA- culture sent c/w Zosyn (Day 2)    #FEN/HEME/ENDO:  angioedema resolving, will continue with decadron, Benadryl 25 mg q8h/Pepcid 20 mg IV q12h   continue lantus 20 mg qhs and q 4 hr POC glucose with ISS,. Maintain glucose 140 - 160  trend lytes/cbc/coags.

## 2017-12-27 LAB
ALBUMIN SERPL ELPH-MCNC: 3.3 G/DL — SIGNIFICANT CHANGE UP (ref 3.3–5)
ALP SERPL-CCNC: 97 U/L — SIGNIFICANT CHANGE UP (ref 40–120)
ALT FLD-CCNC: 7 U/L RC — LOW (ref 10–45)
ANION GAP SERPL CALC-SCNC: 17 MMOL/L — SIGNIFICANT CHANGE UP (ref 5–17)
AST SERPL-CCNC: 8 U/L — LOW (ref 10–40)
BASE EXCESS BLDA CALC-SCNC: 4 MMOL/L — HIGH (ref -2–2)
BASOPHILS # BLD AUTO: 0 K/UL — SIGNIFICANT CHANGE UP (ref 0–0.2)
BASOPHILS NFR BLD AUTO: 0.1 % — SIGNIFICANT CHANGE UP (ref 0–2)
BILIRUB SERPL-MCNC: 0.1 MG/DL — LOW (ref 0.2–1.2)
BUN SERPL-MCNC: 22 MG/DL — SIGNIFICANT CHANGE UP (ref 7–23)
C1INH FUNCTIONAL/C1INH TOTAL MFR SERPL: 55 MG/DL — HIGH (ref 21–39)
CALCIUM SERPL-MCNC: 8.9 MG/DL — SIGNIFICANT CHANGE UP (ref 8.4–10.5)
CHLORIDE SERPL-SCNC: 98 MMOL/L — SIGNIFICANT CHANGE UP (ref 96–108)
CO2 BLDA-SCNC: 28 MMOL/L — SIGNIFICANT CHANGE UP (ref 22–30)
CO2 SERPL-SCNC: 23 MMOL/L — SIGNIFICANT CHANGE UP (ref 22–31)
CREAT SERPL-MCNC: 0.58 MG/DL — SIGNIFICANT CHANGE UP (ref 0.5–1.3)
CULTURE RESULTS: NO GROWTH — SIGNIFICANT CHANGE UP
EOSINOPHIL # BLD AUTO: 0 K/UL — SIGNIFICANT CHANGE UP (ref 0–0.5)
EOSINOPHIL NFR BLD AUTO: 0.1 % — SIGNIFICANT CHANGE UP (ref 0–6)
GLUCOSE BLDC GLUCOMTR-MCNC: 125 MG/DL — HIGH (ref 70–99)
GLUCOSE BLDC GLUCOMTR-MCNC: 134 MG/DL — HIGH (ref 70–99)
GLUCOSE BLDC GLUCOMTR-MCNC: 160 MG/DL — HIGH (ref 70–99)
GLUCOSE BLDC GLUCOMTR-MCNC: 187 MG/DL — HIGH (ref 70–99)
GLUCOSE BLDC GLUCOMTR-MCNC: 222 MG/DL — HIGH (ref 70–99)
GLUCOSE BLDC GLUCOMTR-MCNC: 246 MG/DL — HIGH (ref 70–99)
GLUCOSE SERPL-MCNC: 296 MG/DL — HIGH (ref 70–99)
HCO3 BLDA-SCNC: 27 MMOL/L — SIGNIFICANT CHANGE UP (ref 21–29)
HCT VFR BLD CALC: 27.1 % — LOW (ref 34.5–45)
HGB BLD-MCNC: 8.7 G/DL — LOW (ref 11.5–15.5)
HOROWITZ INDEX BLDA+IHG-RTO: 30 — SIGNIFICANT CHANGE UP
INTUBATED: SIGNIFICANT CHANGE UP
LYMPHOCYTES # BLD AUTO: 0.8 K/UL — LOW (ref 1–3.3)
LYMPHOCYTES # BLD AUTO: 10.1 % — LOW (ref 13–44)
MAGNESIUM SERPL-MCNC: 2.4 MG/DL — SIGNIFICANT CHANGE UP (ref 1.6–2.6)
MCHC RBC-ENTMCNC: 28 PG — SIGNIFICANT CHANGE UP (ref 27–34)
MCHC RBC-ENTMCNC: 32.3 GM/DL — SIGNIFICANT CHANGE UP (ref 32–36)
MCV RBC AUTO: 86.8 FL — SIGNIFICANT CHANGE UP (ref 80–100)
MONOCYTES # BLD AUTO: 0.5 K/UL — SIGNIFICANT CHANGE UP (ref 0–0.9)
MONOCYTES NFR BLD AUTO: 6.5 % — SIGNIFICANT CHANGE UP (ref 2–14)
NEUTROPHILS # BLD AUTO: 7 K/UL — SIGNIFICANT CHANGE UP (ref 1.8–7.4)
NEUTROPHILS NFR BLD AUTO: 83.2 % — HIGH (ref 43–77)
PCO2 BLDA: 36 MMHG — SIGNIFICANT CHANGE UP (ref 32–46)
PH BLDA: 7.49 — HIGH (ref 7.35–7.45)
PHOSPHATE SERPL-MCNC: 3 MG/DL — SIGNIFICANT CHANGE UP (ref 2.5–4.5)
PLATELET # BLD AUTO: 647 K/UL — HIGH (ref 150–400)
PO2 BLDA: 286 MMHG — HIGH (ref 74–108)
POTASSIUM SERPL-MCNC: 4.7 MMOL/L — SIGNIFICANT CHANGE UP (ref 3.5–5.3)
POTASSIUM SERPL-SCNC: 4.7 MMOL/L — SIGNIFICANT CHANGE UP (ref 3.5–5.3)
PROT SERPL-MCNC: 7.2 G/DL — SIGNIFICANT CHANGE UP (ref 6–8.3)
RBC # BLD: 3.12 M/UL — LOW (ref 3.8–5.2)
RBC # FLD: 15.4 % — HIGH (ref 10.3–14.5)
SAO2 % BLDA: 100 % — HIGH (ref 92–96)
SODIUM SERPL-SCNC: 138 MMOL/L — SIGNIFICANT CHANGE UP (ref 135–145)
SPECIMEN SOURCE: SIGNIFICANT CHANGE UP
WBC # BLD: 8.4 K/UL — SIGNIFICANT CHANGE UP (ref 3.8–10.5)
WBC # FLD AUTO: 8.4 K/UL — SIGNIFICANT CHANGE UP (ref 3.8–10.5)

## 2017-12-27 PROCEDURE — 99291 CRITICAL CARE FIRST HOUR: CPT

## 2017-12-27 PROCEDURE — 99232 SBSQ HOSP IP/OBS MODERATE 35: CPT | Mod: 25

## 2017-12-27 PROCEDURE — 31575 DIAGNOSTIC LARYNGOSCOPY: CPT

## 2017-12-27 RX ORDER — DEXAMETHASONE 0.5 MG/5ML
5 ELIXIR ORAL EVERY 12 HOURS
Qty: 0 | Refills: 0 | Status: DISCONTINUED | OUTPATIENT
Start: 2017-12-27 | End: 2017-12-29

## 2017-12-27 RX ORDER — FAMOTIDINE 10 MG/ML
20 INJECTION INTRAVENOUS DAILY
Qty: 0 | Refills: 0 | Status: DISCONTINUED | OUTPATIENT
Start: 2017-12-27 | End: 2017-12-28

## 2017-12-27 RX ORDER — ACETAMINOPHEN 500 MG
650 TABLET ORAL EVERY 6 HOURS
Qty: 0 | Refills: 0 | Status: DISCONTINUED | OUTPATIENT
Start: 2017-12-27 | End: 2017-12-29

## 2017-12-27 RX ORDER — OXYCODONE HYDROCHLORIDE 5 MG/1
10 TABLET ORAL ONCE
Qty: 0 | Refills: 0 | Status: DISCONTINUED | OUTPATIENT
Start: 2017-12-27 | End: 2017-12-27

## 2017-12-27 RX ORDER — DEXAMETHASONE 0.5 MG/5ML
5 ELIXIR ORAL EVERY 8 HOURS
Qty: 0 | Refills: 0 | Status: DISCONTINUED | OUTPATIENT
Start: 2017-12-27 | End: 2017-12-27

## 2017-12-27 RX ORDER — ACETAMINOPHEN 500 MG
650 TABLET ORAL ONCE
Qty: 0 | Refills: 0 | Status: COMPLETED | OUTPATIENT
Start: 2017-12-27 | End: 2017-12-27

## 2017-12-27 RX ADMIN — Medication 650 MILLIGRAM(S): at 22:52

## 2017-12-27 RX ADMIN — SENNA PLUS 2 TABLET(S): 8.6 TABLET ORAL at 21:20

## 2017-12-27 RX ADMIN — FAMOTIDINE 20 MILLIGRAM(S): 10 INJECTION INTRAVENOUS at 05:46

## 2017-12-27 RX ADMIN — Medication 2 MILLIGRAM(S): at 21:20

## 2017-12-27 RX ADMIN — Medication 50 MILLIGRAM(S): at 06:33

## 2017-12-27 RX ADMIN — PIPERACILLIN AND TAZOBACTAM 25 GRAM(S): 4; .5 INJECTION, POWDER, LYOPHILIZED, FOR SOLUTION INTRAVENOUS at 11:39

## 2017-12-27 RX ADMIN — OXYCODONE HYDROCHLORIDE 10 MILLIGRAM(S): 5 TABLET ORAL at 19:53

## 2017-12-27 RX ADMIN — GABAPENTIN 300 MILLIGRAM(S): 400 CAPSULE ORAL at 14:29

## 2017-12-27 RX ADMIN — Medication 650 MILLIGRAM(S): at 23:10

## 2017-12-27 RX ADMIN — Medication 102 MILLIGRAM(S): at 05:39

## 2017-12-27 RX ADMIN — OXYCODONE HYDROCHLORIDE 10 MILLIGRAM(S): 5 TABLET ORAL at 20:53

## 2017-12-27 RX ADMIN — HEPARIN SODIUM 5000 UNIT(S): 5000 INJECTION INTRAVENOUS; SUBCUTANEOUS at 05:38

## 2017-12-27 RX ADMIN — Medication 650 MILLIGRAM(S): at 02:02

## 2017-12-27 RX ADMIN — Medication 5 MILLIGRAM(S): at 14:28

## 2017-12-27 RX ADMIN — HEPARIN SODIUM 5000 UNIT(S): 5000 INJECTION INTRAVENOUS; SUBCUTANEOUS at 21:20

## 2017-12-27 RX ADMIN — CHLORHEXIDINE GLUCONATE 15 MILLILITER(S): 213 SOLUTION TOPICAL at 05:39

## 2017-12-27 RX ADMIN — Medication 50 MILLIGRAM(S): at 17:12

## 2017-12-27 RX ADMIN — AMLODIPINE BESYLATE 10 MILLIGRAM(S): 2.5 TABLET ORAL at 21:20

## 2017-12-27 RX ADMIN — Medication 75 MICROGRAM(S): at 09:27

## 2017-12-27 RX ADMIN — GABAPENTIN 300 MILLIGRAM(S): 400 CAPSULE ORAL at 21:20

## 2017-12-27 RX ADMIN — Medication 2 MILLIGRAM(S): at 02:03

## 2017-12-27 RX ADMIN — Medication 6: at 01:44

## 2017-12-27 RX ADMIN — Medication 5 MILLIGRAM(S): at 21:19

## 2017-12-27 RX ADMIN — Medication 25 MILLIGRAM(S): at 05:38

## 2017-12-27 RX ADMIN — Medication 81 MILLIGRAM(S): at 11:39

## 2017-12-27 RX ADMIN — INSULIN GLARGINE 20 UNIT(S): 100 INJECTION, SOLUTION SUBCUTANEOUS at 21:43

## 2017-12-27 RX ADMIN — PIPERACILLIN AND TAZOBACTAM 25 GRAM(S): 4; .5 INJECTION, POWDER, LYOPHILIZED, FOR SOLUTION INTRAVENOUS at 04:18

## 2017-12-27 RX ADMIN — Medication 4: at 09:27

## 2017-12-27 RX ADMIN — Medication 4: at 14:39

## 2017-12-27 RX ADMIN — PIPERACILLIN AND TAZOBACTAM 25 GRAM(S): 4; .5 INJECTION, POWDER, LYOPHILIZED, FOR SOLUTION INTRAVENOUS at 19:59

## 2017-12-27 RX ADMIN — Medication 100 MILLIGRAM(S): at 21:23

## 2017-12-27 RX ADMIN — GABAPENTIN 300 MILLIGRAM(S): 400 CAPSULE ORAL at 05:46

## 2017-12-27 RX ADMIN — Medication 6: at 05:38

## 2017-12-27 RX ADMIN — HEPARIN SODIUM 5000 UNIT(S): 5000 INJECTION INTRAVENOUS; SUBCUTANEOUS at 14:28

## 2017-12-27 NOTE — AIRWAY REMOVAL NOTE  ADULT & PEDS - ARTIFICAL AIRWAY REMOVAL COMMENTS
written order of extubation verified, the pt was identified by full name and birth date compared to the identification band, present during the procedure was Lisa Flores RN and MD.

## 2017-12-27 NOTE — PROGRESS NOTE ADULT - ASSESSMENT
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 from home BIBEMS after developing swelling and protrusion of tongue with difficulty swallowing. Admission to MICU for angioedema s/p nasal intubation.       #Neuro:  - neuro checks q 4 hrs and prn for changes  - Decrease sedation as tolerated. Propofol (40ml/hr) Precedex (1)  - Fentanyl patch  - Restarted Valium 2mg q8h  - EEG: potential seizures focus in the b/l frontal regions and b/l anterior temporal region. Mod to severe diffuse cerebral dysfunction. No seizures recorded.    #Pulm:  - +Cuff leak yesterday  - as sedation decreased will place on PSV   - HOB >/= 30 degree angle  - plan for glidescrope evaluation and extubation today    #CV:  - hold all ACE-I and ARBS  - now with NGT will change metoprolol to home dose ( 50 mg BID )  - restarted amlodipine 10 mg via NGT qhs  - restarted ASA 81 mg qd    #GI/:  - strict I & O's; keep even  - continue tube feeds to goal  - c/w senna and colace    #I.D.:  - +UA- culture sent c/w Zosyn (Day 3)    #FEN/HEME/ENDO:  - angioedema resolving, will continue with decadron, Benadryl 25 mg q8h/Pepcid 20 mg IV q12h   - continue lantus 20 mg qhs and q 4 hr POC glucose with ISS,. Maintain glucose 140 - 160  - trend lytes/cbc/coags. 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 from home BIBEMS after developing swelling and protrusion of tongue with difficulty swallowing. Admission to MICU for angioedema s/p nasal intubation.     #Neuro:  - neuro checks q 4 hrs and prn for changes  - Decrease sedation as tolerated. Propofol (40ml/hr) Precedex (1)  - Fentanyl patch  - Restarted Valium 2mg q8h  - EEG: potential seizures focus in the b/l frontal regions and b/l anterior temporal region. Mod to severe diffuse cerebral dysfunction. No seizures recorded.    #Pulm:  - +Cuff leak yesterday  - as sedation decreased will place on PSV   - HOB >/= 30 degree angle  - plan for glidescrope evaluation and extubation today    #CV:  - hold all ACE-I and ARBS  - now with NGT will change metoprolol to home dose ( 50 mg BID )  - restarted amlodipine 10 mg via NGT qhs  - restarted ASA 81 mg qd    #GI/:  - strict I & O's; keep even  - continue tube feeds to goal  - c/w senna and colace    #I.D.:  - +UA- culture sent c/w Zosyn (Day 3)  - BCx negative  - Bronchial cultures negative  - UCx - +proteus mirabilis, e. coli    #FEN/HEME/ENDO:  - angioedema resolving, will continue with decadron, Benadryl 25 mg q8h/Pepcid 20 mg IV q12h   - continue lantus 20 mg qhs and q 4 hr POC glucose with ISS,. Maintain glucose 140 - 160  - trend lytes/cbc/coags. 72 yr old female with PMHx of DM2 on insulin therapy, HTN on ACE-I, Hypothyroidism, s/p Left knee replacement '99 c/b MRSA infection, osteoarthritis, chronic pancreatitis (last episode > 10 yrs ago), spinal stenosis, recent d/c from hospital end of Novemeber s/p L1-L4 lumbar fusion which was c/b by UTI requiring antibx therapy who was BIBEMS after developing swelling and protrusion of tongue with difficulty swallowing. Admission to MICU for angioedema s/p nasal intubation.     #Neuro:  - neuro checks q 4 hrs and prn for changes  - Decrease sedation as tolerated. Propofol (40ml/hr) Precedex (1)  - Fentanyl patch  - Restarted Valium 2mg q8h  - EEG: potential seizures focus in the b/l frontal regions and b/l anterior temporal region. Mod to severe diffuse cerebral dysfunction. No seizures recorded.    #Pulm:  - +Cuff leak yesterday  - as sedation decreased will place on PSV   - HOB >/= 30 degree angle  - plan for glidescrope evaluation and extubation today    #CV:  - hold all ACE-I and ARBS  - now with NGT will change metoprolol to home dose ( 50 mg BID )  - restarted amlodipine 10 mg via NGT qhs  - restarted ASA 81 mg qd    #GI/:  - strict I & O's; keep even  - continue tube feeds to goal  - c/w senna and colace    #I.D.:  - +UA- culture sent c/w Zosyn (Day 3)  - BCx negative  - Bronchial cultures negative  - UCx - +proteus mirabilis, e. coli    #FEN/HEME/ENDO:  - angioedema resolving, will continue with decadron, Benadryl 25 mg q8h/Pepcid 20 mg IV q12h   - continue lantus 20 mg qhs and q 4 hr POC glucose with ISS,. Maintain glucose 140 - 160  - trend lytes/cbc/coags. 72 yr old female with PMHx of DM2 on insulin therapy, HTN on ACE-I, Hypothyroidism, s/p Left knee replacement '99 c/b MRSA infection, osteoarthritis, chronic pancreatitis (last episode > 10 yrs ago), spinal stenosis, recent d/c from hospital end of Novemeber s/p L1-L4 lumbar fusion which was c/b by UTI requiring antibx therapy who was BIBEMS after developing swelling and protrusion of tongue with difficulty swallowing. Admission to MICU for angioedema s/p nasal intubation.     #Neuro:  - neuro checks q 4 hrs and prn for changes  - off sedation  - c/w Fentanyl patch  - c/w Valium 2mg q8h  - EEG: potential seizures focus in the b/l frontal regions and b/l anterior temporal region. Mod to severe diffuse cerebral dysfunction. No seizures recorded.    #Pulm:  - +Cuff leak yesterday  - as sedation decreased will place on PSV   - HOB >/= 30 degree angle  - plan to extubate patient today    #CV:  - hold all ACE-I and ARBs  - c/w metoprolol to home dose ( 50 mg BID )  - c/w amlodipine 10 mg via NGT qhs  - c/w restarted ASA 81 mg qd    #GI/:  - strict I & O's; keep even  - continue tube feeds to goal  - c/w senna and colace    #I.D.:  - +UA- culture sent c/w Zosyn (Day 3)  - BCx negative  - Bronchial cultures negative  - UCx - +proteus mirabilis, e. coli  - c/w zosyn    #FEN/HEME/ENDO:  - angioedema resolving  - taper dexamethasone to 5 mg IV push q8hrs  - d/c diphenhydramine  - change famotidine to 20 mg daily  - continue lantus 20 mg qhs and q 4 hr POC glucose with ISS,. Maintain glucose 140 to 160   - trend lytes/cbc/coags

## 2017-12-27 NOTE — PROGRESS NOTE ADULT - SUBJECTIVE AND OBJECTIVE BOX
POD/STATUS POST/ENT ISSUE: Angioedema-improving/s/p fiberoptic nasal intubation    INTERVAL HPI: pt s/p fiberoptic nasal intubation 12/22 in ED for angioedema 12/22: glideoscope: angioedema of anterior tongue and uvula, right over left pharyngeal wall b/l, Anterior VC appear normal, with cuff leak  has good ventilation.  pt seen & examined, pt nasally intubated/sedated. Glideoscope done at bedside yesterday showed significant improved. Possible extubation today. On CPAP this morning. Noted to be febrile     Vital Signs Last 24 Hrs  T(C): 38.1 (27 Dec 2017 08:00), Max: 38.4 (27 Dec 2017 02:00)  T(F): 100.6 (27 Dec 2017 08:00), Max: 101.1 (27 Dec 2017 02:00)  HR: 77 (27 Dec 2017 10:00) (61 - 88)  BP: 163/75 (27 Dec 2017 10:00) (97/54 - 167/81)  BP(mean): 108 (27 Dec 2017 10:00) (72 - 116)  RR: 20 (27 Dec 2017 10:00) (14 - 28)  SpO2: 100% (27 Dec 2017 10:00) (96% - 100%)    PHYSICAL EXAM:  Gen: NAD, well-developed sedated, no facial swelling  Head: Normocephalic, Atraumatic  Eyes: PERRL, no scleral injection  Nose:  pt nasally intubated no obvious drainage or bleeding  Mouth: normal contour of lips Mucosa moist, tongue midline no edema    Neck: Flat, supple, no lymphadenopathy, trachea midline, no masses  Resp:  no stridor    LABS:                        8.7    8.4   )-----------( 647      ( 27 Dec 2017 02:35 )             27.1 POD/STATUS POST/ENT ISSUE: Angioedema-improving/s/p fiberoptic nasal intubation    INTERVAL HPI: pt s/p fiberoptic nasal intubation 12/22 in ED for angioedema 12/22: glideoscope: angioedema of anterior tongue and uvula, right over left pharyngeal wall b/l, Anterior VC appear normal, with cuff leak  has good ventilation.  pt seen & examined, pt nasally intubated/sedated. Glideoscope done at bedside yesterday showed significant improved. Patient was extubated today. Patient c/o aphonia, some throat discomfort and minimal bleeding from the right nare       Vital Signs Last 24 Hrs  T(C): 38.1 (27 Dec 2017 08:00), Max: 38.4 (27 Dec 2017 02:00)  T(F): 100.6 (27 Dec 2017 08:00), Max: 101.1 (27 Dec 2017 02:00)  HR: 77 (27 Dec 2017 10:00) (61 - 88)  BP: 163/75 (27 Dec 2017 10:00) (97/54 - 167/81)  BP(mean): 108 (27 Dec 2017 10:00) (72 - 116)  RR: 20 (27 Dec 2017 10:00) (14 - 28)  SpO2: 100% (27 Dec 2017 10:00) (96% - 100%)    PHYSICAL EXAM:  Gen: NAD, well-developed sedated, no facial swelling  Head: Normocephalic, Atraumatic  Eyes: PERRL, no scleral injection  Nose: noted with some dry blood from the R nare   Mouth: normal contour of lips Mucosa moist, tongue midline no edema    Neck: Flat, supple, no lymphadenopathy, trachea midline, no masses  Resp:  no stridor    Laryngoscope/FOE scope #1 done at bedside: patient found to have subglottic edema, vocal cords mobile, airway patent     LABS:                        8.7    8.4   )-----------( 647      ( 27 Dec 2017 02:35 )             27.1 POD/STATUS POST/ENT ISSUE: Angioedema-improving/s/p fiberoptic nasal intubation    INTERVAL HPI: pt s/p fiberoptic nasal intubation 12/22 in ED for angioedema 12/22: glideoscope: angioedema of anterior tongue and uvula, right over left pharyngeal wall b/l, Anterior VC appear normal, with cuff leak  has good ventilation.  pt seen & examined, pt nasally intubated/sedated. Glideoscope done at bedside yesterday showed significant improved. Patient was extubated today. Patient c/o aphonia, some throat discomfort and minimal bleeding from the right nare       Vital Signs Last 24 Hrs  T(C): 38.1 (27 Dec 2017 08:00), Max: 38.4 (27 Dec 2017 02:00)  T(F): 100.6 (27 Dec 2017 08:00), Max: 101.1 (27 Dec 2017 02:00)  HR: 77 (27 Dec 2017 10:00) (61 - 88)  BP: 163/75 (27 Dec 2017 10:00) (97/54 - 167/81)  BP(mean): 108 (27 Dec 2017 10:00) (72 - 116)  RR: 20 (27 Dec 2017 10:00) (14 - 28)  SpO2: 100% (27 Dec 2017 10:00) (96% - 100%)    PHYSICAL EXAM:  Gen: NAD, well-developed sedated, no facial swelling  Head: Normocephalic, Atraumatic  Eyes: PERRL, no scleral injection  Nose: noted with some dry blood from the R nare   Mouth: normal contour of lips Mucosa moist, tongue midline no edema    Neck: Flat, supple, no lymphadenopathy, trachea midline, no masses  Resp:  no stridor    Laryngoscope/FOE scope #1 done at bedside: patient found to have subglottic edema, vocal cords mobile, airway patent   Supraglottic and pharynx clear   	  LABS:                        8.7    8.4   )-----------( 647      ( 27 Dec 2017 02:35 )             27.1

## 2017-12-27 NOTE — ED PROCEDURE NOTE - CPROC ED INFUS LINE DETAIL1
The location was identified, and the area was draped and prepped.
The catheter was placed using sterile technique.

## 2017-12-27 NOTE — ED PROCEDURE NOTE - CPROC ED INTRAOSS CONFIRM1
aspiration of blood/marrow mixture without difficulty/flushing with fluid
flushing with fluid/aspiration of blood/marrow mixture without difficulty/needle stands without support

## 2017-12-27 NOTE — PROGRESS NOTE ADULT - PROBLEM SELECTOR PLAN 1
Possible Extubation today  Plan for airway eval when pt extubated  Continue Steroids   MICU care. Continue Steroids   Monitor Airway  MICU care.

## 2017-12-27 NOTE — ED PROCEDURE NOTE - ATTENDING CONTRIBUTION TO CARE
IO needle to Right prox tibia
IO needle right prox tibia - angioedema and potential airway loss - no peripheral access

## 2017-12-27 NOTE — PROGRESS NOTE ADULT - SUBJECTIVE AND OBJECTIVE BOX
INTERVAL HPI/OVERNIGHT EVENTS: Febrile to 101.1 overnight.  Given 10 hydralazine for HTN.    SUBJECTIVE: Patient seen and examined at bedside. Resting comfortably.    CONSTITUTIONAL: No weakness, fevers or chills  EYES/ENT: No visual changes;  No vertigo or throat pain   NECK: No pain or stiffness  RESPIRATORY: No cough, wheezing, hemoptysis; No shortness of breath  CARDIOVASCULAR: No chest pain or palpitations  GASTROINTESTINAL: No abdominal or epigastric pain. No nausea, vomiting, or hematemesis; No diarrhea or constipation. No melena or hematochezia.  GENITOURINARY: No dysuria, frequency or hematuria  NEUROLOGICAL: No numbness or weakness  SKIN: No itching, rashes    OBJECTIVE:    VITAL SIGNS:  ICU Vital Signs Last 24 Hrs  T(C): 37.9 (27 Dec 2017 03:00), Max: 38.4 (27 Dec 2017 02:00)  T(F): 100.2 (27 Dec 2017 03:00), Max: 101.1 (27 Dec 2017 02:00)  HR: 80 (27 Dec 2017 06:10) (61 - 88)  BP: 160/69 (27 Dec 2017 06:00) (97/54 - 167/81)  BP(mean): 99 (27 Dec 2017 06:00) (72 - 116)  ABP: --  ABP(mean): --  RR: 27 (27 Dec 2017 06:00) (14 - 28)  SpO2: 100% (27 Dec 2017 06:10) (96% - 100%)    Mode: CPAP with PS  FiO2: 30  PEEP: 5  PS: 5  MAP: 8  PIP: 12    I&O's Summary    26 Dec 2017 07:01  -  27 Dec 2017 07:00  --------------------------------------------------------  IN: 769.6 mL / OUT: 2050 mL / NET: -1280.4 mL      CAPILLARY BLOOD GLUCOSE      POCT Blood Glucose.: 246 mg/dL (27 Dec 2017 05:06)  POCT Blood Glucose.: 222 mg/dL (27 Dec 2017 01:33)  POCT Blood Glucose.: 175 mg/dL (26 Dec 2017 21:03)  POCT Blood Glucose.: 123 mg/dL (26 Dec 2017 17:20)  POCT Blood Glucose.: 175 mg/dL (26 Dec 2017 13:40)  POCT Blood Glucose.: 179 mg/dL (26 Dec 2017 09:43)      PHYSICAL EXAM:    General: NAD  HEENT: NC/AT; PERRL, clear conjunctiva  Neck: supple  Respiratory: CTA b/l  Cardiovascular: +S1/S2; RRR  Abdomen: soft, NT/ND; +BS x4  Extremities: WWP, 2+ peripheral pulses b/l; no LE edema  Skin: normal color and turgor; no rash  Neurological:    MEDICATIONS  (STANDING):  amLODIPine   Tablet 10 milliGRAM(s) Oral at bedtime  aspirin  chewable 81 milliGRAM(s) Oral daily  chlorhexidine 0.12% Liquid 15 milliLiter(s) Swish and Spit two times a day  dexamethasone  IVPB 10 milliGRAM(s) IV Intermittent every 8 hours  dexmedetomidine Infusion 0.2 MICROgram(s)/kG/Hr (4.85 mL/Hr) IV Continuous <Continuous>  diazepam    Tablet 2 milliGRAM(s) Oral every 8 hours  diphenhydrAMINE   Injectable 25 milliGRAM(s) IV Push every 8 hours  docusate sodium Liquid 100 milliGRAM(s) Oral every 8 hours  famotidine Injectable 20 milliGRAM(s) IV Push every 12 hours  fentaNYL   Patch  25 MICROgram(s)/Hr. 1 Patch Transdermal every 48 hours  gabapentin   Solution 300 milliGRAM(s) Oral every 8 hours  heparin  Injectable 5000 Unit(s) SubCutaneous every 8 hours  insulin glargine Injectable (LANTUS) 20 Unit(s) SubCutaneous at bedtime  insulin lispro (HumaLOG) corrective regimen sliding scale   SubCutaneous every 4 hours  levothyroxine Injectable 75 MICROGram(s) IV Push <User Schedule>  metoprolol     tartrate 50 milliGRAM(s) Oral every 12 hours  piperacillin/tazobactam IVPB. 3.375 Gram(s) IV Intermittent every 8 hours  propofol Infusion 20 MICROgram(s)/kG/Min (12 mL/Hr) IV Continuous <Continuous>  senna 2 Tablet(s) Oral at bedtime    MEDICATIONS  (PRN):        ALLERGIES:  Allergies    No Known Allergies    Intolerances        LABS:                         8.7    8.4   )-----------( 647      ( 27 Dec 2017 02:35 )             27.1     12-    138  |  98  |  22  ----------------------------<  296<H>  4.7   |  23  |  0.58    Ca    8.9      27 Dec 2017 02:35  Phos  3.0       Mg     2.4         TPro  7.2  /  Alb  3.3  /  TBili  0.1<L>  /  DBili  x   /  AST  8<L>  /  ALT  7<L>  /  AlkPhos  97      PT/INR - ( 26 Dec 2017 03:56 )   PT: 11.6 sec;   INR: 1.07 ratio         PTT - ( 26 Dec 2017 03:56 )  PTT:26.4 sec    Urinalysis Basic - ( 25 Dec 2017 05:32 )    Color: PL Yellow / Appearance: Clear / S.013 / pH: x  Gluc: x / Ketone: Negative  / Bili: Negative / Urobili: Negative   Blood: x / Protein: Trace / Nitrite: Negative   Leuk Esterase: Large / RBC: 0-2 /HPF / WBC 5-10 /HPF   Sq Epi: x / Non Sq Epi: Occasional /HPF / Bacteria: Few /HPF        RADIOLOGY & ADDITIONAL TESTS: Reviewed.    Clinical Impression:  These findings indicate a potential seizures focus in the bilateral frontal regions as well as the bilateral anterior temporal regions. There is also evidence of moderate to severe diffuse or multifocal cerebral dysfunction (ie metabolic encephalopathy). There were no seizures recorded. INTERVAL HPI/OVERNIGHT EVENTS: Febrile to 101.1 overnight.  Given 10 hydralazine for HTN.    SUBJECTIVE: Patient seen and examined at bedside. Resting comfortably.  Awake and alert.     ROS: limited 2/2 to patient being intubated  RESPIRATORY: No cough, No shortness of breath  CARDIOVASCULAR: No chest pain or palpitations  GASTROINTESTINAL: No abdominal or epigastric pain. No nausea, vomiting, or hematemesis;  SKIN: No itching, rashes    OBJECTIVE:    VITAL SIGNS:  ICU Vital Signs Last 24 Hrs  T(C): 37.9 (27 Dec 2017 03:00), Max: 38.4 (27 Dec 2017 02:00)  T(F): 100.2 (27 Dec 2017 03:00), Max: 101.1 (27 Dec 2017 02:00)  HR: 80 (27 Dec 2017 06:10) (61 - 88)  BP: 160/69 (27 Dec 2017 06:00) (97/54 - 167/81)  BP(mean): 99 (27 Dec 2017 06:00) (72 - 116)  ABP: --  ABP(mean): --  RR: 27 (27 Dec 2017 06:00) (14 - 28)  SpO2: 100% (27 Dec 2017 06:10) (96% - 100%)    Mode: CPAP with PS  FiO2: 30  PEEP: 5  PS: 5  MAP: 8  PIP: 12    I&O's Summary    26 Dec 2017 07:01  -  27 Dec 2017 07:00  --------------------------------------------------------  IN: 769.6 mL / OUT: 2050 mL / NET: -1280.4 mL      CAPILLARY BLOOD GLUCOSE      POCT Blood Glucose.: 246 mg/dL (27 Dec 2017 05:06)  POCT Blood Glucose.: 222 mg/dL (27 Dec 2017 01:33)  POCT Blood Glucose.: 175 mg/dL (26 Dec 2017 21:03)  POCT Blood Glucose.: 123 mg/dL (26 Dec 2017 17:20)  POCT Blood Glucose.: 175 mg/dL (26 Dec 2017 13:40)  POCT Blood Glucose.: 179 mg/dL (26 Dec 2017 09:43)      PHYSICAL EXAM:    General: NAD, intubated, awake and alert  HEENT: NC/AT; PERRL, clear conjunctiva  Neck: supple  Respiratory: CTA b/l  Cardiovascular: +S1/S2; RRR  Abdomen: soft, NT/ND; +BS x4  Extremities: 2+ peripheral pulses b/l; no LE edema  Skin: normal color and turgor; no rash  Neurological: awake and alert, non focal    MEDICATIONS  (STANDING):  amLODIPine   Tablet 10 milliGRAM(s) Oral at bedtime  aspirin  chewable 81 milliGRAM(s) Oral daily  chlorhexidine 0.12% Liquid 15 milliLiter(s) Swish and Spit two times a day  dexamethasone  IVPB 10 milliGRAM(s) IV Intermittent every 8 hours  dexmedetomidine Infusion 0.2 MICROgram(s)/kG/Hr (4.85 mL/Hr) IV Continuous <Continuous>  diazepam    Tablet 2 milliGRAM(s) Oral every 8 hours  diphenhydrAMINE   Injectable 25 milliGRAM(s) IV Push every 8 hours  docusate sodium Liquid 100 milliGRAM(s) Oral every 8 hours  famotidine Injectable 20 milliGRAM(s) IV Push every 12 hours  fentaNYL   Patch  25 MICROgram(s)/Hr. 1 Patch Transdermal every 48 hours  gabapentin   Solution 300 milliGRAM(s) Oral every 8 hours  heparin  Injectable 5000 Unit(s) SubCutaneous every 8 hours  insulin glargine Injectable (LANTUS) 20 Unit(s) SubCutaneous at bedtime  insulin lispro (HumaLOG) corrective regimen sliding scale   SubCutaneous every 4 hours  levothyroxine Injectable 75 MICROGram(s) IV Push <User Schedule>  metoprolol     tartrate 50 milliGRAM(s) Oral every 12 hours  piperacillin/tazobactam IVPB. 3.375 Gram(s) IV Intermittent every 8 hours  propofol Infusion 20 MICROgram(s)/kG/Min (12 mL/Hr) IV Continuous <Continuous>  senna 2 Tablet(s) Oral at bedtime    MEDICATIONS  (PRN):        ALLERGIES:  Allergies    No Known Allergies    Intolerances        LABS:                         8.7    8.4   )-----------( 647      ( 27 Dec 2017 02:35 )             27.1     12-    138  |  98  |  22  ----------------------------<  296<H>  4.7   |  23  |  0.58    Ca    8.9      27 Dec 2017 02:35  Phos  3.0       Mg     2.4         TPro  7.2  /  Alb  3.3  /  TBili  0.1<L>  /  DBili  x   /  AST  8<L>  /  ALT  7<L>  /  AlkPhos  97      PT/INR - ( 26 Dec 2017 03:56 )   PT: 11.6 sec;   INR: 1.07 ratio         PTT - ( 26 Dec 2017 03:56 )  PTT:26.4 sec    Urinalysis Basic - ( 25 Dec 2017 05:32 )    Color: PL Yellow / Appearance: Clear / S.013 / pH: x  Gluc: x / Ketone: Negative  / Bili: Negative / Urobili: Negative   Blood: x / Protein: Trace / Nitrite: Negative   Leuk Esterase: Large / RBC: 0-2 /HPF / WBC 5-10 /HPF   Sq Epi: x / Non Sq Epi: Occasional /HPF / Bacteria: Few /HPF        RADIOLOGY & ADDITIONAL TESTS: Reviewed.  Clinical Impression:  These findings indicate a potential seizures focus in the bilateral frontal regions as well as the bilateral anterior temporal regions. There is also evidence of moderate to severe diffuse or multifocal cerebral dysfunction (ie metabolic encephalopathy). There were no seizures recorded.

## 2017-12-27 NOTE — PROGRESS NOTE ADULT - ASSESSMENT
73 y/o female with Angioedema requiring nasal fiberoptic intubation, glidescope done at bedside today which showed significant improvement. possible plan for extubation today 71 y/o female with Angioedema requiring nasal fiberoptic intubation, glidescope done at bedside yesterday which showed significant improvement. Extubated today. Laryngoscope done at bedside showed subglottic edema. Patient also noted with mild epistaxis from the right nare

## 2017-12-28 DIAGNOSIS — R04.0 EPISTAXIS: ICD-10-CM

## 2017-12-28 LAB
-  AMIKACIN: SIGNIFICANT CHANGE UP
-  AMIKACIN: SIGNIFICANT CHANGE UP
-  AMPICILLIN/SULBACTAM: SIGNIFICANT CHANGE UP
-  AMPICILLIN/SULBACTAM: SIGNIFICANT CHANGE UP
-  AMPICILLIN: SIGNIFICANT CHANGE UP
-  AMPICILLIN: SIGNIFICANT CHANGE UP
-  AZTREONAM: SIGNIFICANT CHANGE UP
-  AZTREONAM: SIGNIFICANT CHANGE UP
-  CEFAZOLIN: SIGNIFICANT CHANGE UP
-  CEFAZOLIN: SIGNIFICANT CHANGE UP
-  CEFEPIME: SIGNIFICANT CHANGE UP
-  CEFEPIME: SIGNIFICANT CHANGE UP
-  CEFOXITIN: SIGNIFICANT CHANGE UP
-  CEFOXITIN: SIGNIFICANT CHANGE UP
-  CEFTAZIDIME: SIGNIFICANT CHANGE UP
-  CEFTAZIDIME: SIGNIFICANT CHANGE UP
-  CEFTRIAXONE: SIGNIFICANT CHANGE UP
-  CEFTRIAXONE: SIGNIFICANT CHANGE UP
-  CIPROFLOXACIN: SIGNIFICANT CHANGE UP
-  CIPROFLOXACIN: SIGNIFICANT CHANGE UP
-  ERTAPENEM: SIGNIFICANT CHANGE UP
-  ERTAPENEM: SIGNIFICANT CHANGE UP
-  GENTAMICIN: SIGNIFICANT CHANGE UP
-  GENTAMICIN: SIGNIFICANT CHANGE UP
-  IMIPENEM: SIGNIFICANT CHANGE UP
-  LEVOFLOXACIN: SIGNIFICANT CHANGE UP
-  LEVOFLOXACIN: SIGNIFICANT CHANGE UP
-  MEROPENEM: SIGNIFICANT CHANGE UP
-  MEROPENEM: SIGNIFICANT CHANGE UP
-  NITROFURANTOIN: SIGNIFICANT CHANGE UP
-  NITROFURANTOIN: SIGNIFICANT CHANGE UP
-  PIPERACILLIN/TAZOBACTAM: SIGNIFICANT CHANGE UP
-  PIPERACILLIN/TAZOBACTAM: SIGNIFICANT CHANGE UP
-  TOBRAMYCIN: SIGNIFICANT CHANGE UP
-  TOBRAMYCIN: SIGNIFICANT CHANGE UP
-  TRIMETHOPRIM/SULFAMETHOXAZOLE: SIGNIFICANT CHANGE UP
-  TRIMETHOPRIM/SULFAMETHOXAZOLE: SIGNIFICANT CHANGE UP
ALBUMIN SERPL ELPH-MCNC: 3.5 G/DL — SIGNIFICANT CHANGE UP (ref 3.3–5)
ALP SERPL-CCNC: 90 U/L — SIGNIFICANT CHANGE UP (ref 40–120)
ALT FLD-CCNC: 5 U/L RC — LOW (ref 10–45)
ANION GAP SERPL CALC-SCNC: 12 MMOL/L — SIGNIFICANT CHANGE UP (ref 5–17)
APTT BLD: 27.9 SEC — SIGNIFICANT CHANGE UP (ref 27.5–37.4)
AST SERPL-CCNC: 8 U/L — LOW (ref 10–40)
BASOPHILS # BLD AUTO: 0 K/UL — SIGNIFICANT CHANGE UP (ref 0–0.2)
BASOPHILS NFR BLD AUTO: 0 % — SIGNIFICANT CHANGE UP (ref 0–2)
BILIRUB SERPL-MCNC: 0.2 MG/DL — SIGNIFICANT CHANGE UP (ref 0.2–1.2)
BUN SERPL-MCNC: 22 MG/DL — SIGNIFICANT CHANGE UP (ref 7–23)
CALCIUM SERPL-MCNC: 9.2 MG/DL — SIGNIFICANT CHANGE UP (ref 8.4–10.5)
CHLORIDE SERPL-SCNC: 98 MMOL/L — SIGNIFICANT CHANGE UP (ref 96–108)
CO2 SERPL-SCNC: 28 MMOL/L — SIGNIFICANT CHANGE UP (ref 22–31)
CREAT SERPL-MCNC: 0.58 MG/DL — SIGNIFICANT CHANGE UP (ref 0.5–1.3)
CULTURE RESULTS: SIGNIFICANT CHANGE UP
EOSINOPHIL # BLD AUTO: 0 K/UL — SIGNIFICANT CHANGE UP (ref 0–0.5)
EOSINOPHIL NFR BLD AUTO: 0.1 % — SIGNIFICANT CHANGE UP (ref 0–6)
GLUCOSE BLDC GLUCOMTR-MCNC: 139 MG/DL — HIGH (ref 70–99)
GLUCOSE BLDC GLUCOMTR-MCNC: 150 MG/DL — HIGH (ref 70–99)
GLUCOSE BLDC GLUCOMTR-MCNC: 166 MG/DL — HIGH (ref 70–99)
GLUCOSE BLDC GLUCOMTR-MCNC: 169 MG/DL — HIGH (ref 70–99)
GLUCOSE BLDC GLUCOMTR-MCNC: 180 MG/DL — HIGH (ref 70–99)
GLUCOSE BLDC GLUCOMTR-MCNC: 182 MG/DL — HIGH (ref 70–99)
GLUCOSE BLDC GLUCOMTR-MCNC: 188 MG/DL — HIGH (ref 70–99)
GLUCOSE SERPL-MCNC: 208 MG/DL — HIGH (ref 70–99)
HCT VFR BLD CALC: 27 % — LOW (ref 34.5–45)
HGB BLD-MCNC: 8.7 G/DL — LOW (ref 11.5–15.5)
INR BLD: 1.14 RATIO — SIGNIFICANT CHANGE UP (ref 0.88–1.16)
LYMPHOCYTES # BLD AUTO: 1.2 K/UL — SIGNIFICANT CHANGE UP (ref 1–3.3)
LYMPHOCYTES # BLD AUTO: 15 % — SIGNIFICANT CHANGE UP (ref 13–44)
MAGNESIUM SERPL-MCNC: 2.2 MG/DL — SIGNIFICANT CHANGE UP (ref 1.6–2.6)
MCHC RBC-ENTMCNC: 27.9 PG — SIGNIFICANT CHANGE UP (ref 27–34)
MCHC RBC-ENTMCNC: 32.2 GM/DL — SIGNIFICANT CHANGE UP (ref 32–36)
MCV RBC AUTO: 86.7 FL — SIGNIFICANT CHANGE UP (ref 80–100)
METHOD TYPE: SIGNIFICANT CHANGE UP
METHOD TYPE: SIGNIFICANT CHANGE UP
MONOCYTES # BLD AUTO: 0.5 K/UL — SIGNIFICANT CHANGE UP (ref 0–0.9)
MONOCYTES NFR BLD AUTO: 6.3 % — SIGNIFICANT CHANGE UP (ref 2–14)
NEUTROPHILS # BLD AUTO: 6.3 K/UL — SIGNIFICANT CHANGE UP (ref 1.8–7.4)
NEUTROPHILS NFR BLD AUTO: 78.5 % — HIGH (ref 43–77)
ORGANISM # SPEC MICROSCOPIC CNT: SIGNIFICANT CHANGE UP
PHOSPHATE SERPL-MCNC: 3.6 MG/DL — SIGNIFICANT CHANGE UP (ref 2.5–4.5)
PLATELET # BLD AUTO: 583 K/UL — HIGH (ref 150–400)
POTASSIUM SERPL-MCNC: 4.4 MMOL/L — SIGNIFICANT CHANGE UP (ref 3.5–5.3)
POTASSIUM SERPL-SCNC: 4.4 MMOL/L — SIGNIFICANT CHANGE UP (ref 3.5–5.3)
PROT SERPL-MCNC: 7 G/DL — SIGNIFICANT CHANGE UP (ref 6–8.3)
PROTHROM AB SERPL-ACNC: 12.3 SEC — SIGNIFICANT CHANGE UP (ref 9.8–12.7)
RBC # BLD: 3.12 M/UL — LOW (ref 3.8–5.2)
RBC # FLD: 15.5 % — HIGH (ref 10.3–14.5)
SODIUM SERPL-SCNC: 138 MMOL/L — SIGNIFICANT CHANGE UP (ref 135–145)
SPECIMEN SOURCE: SIGNIFICANT CHANGE UP
WBC # BLD: 8 K/UL — SIGNIFICANT CHANGE UP (ref 3.8–10.5)
WBC # FLD AUTO: 8 K/UL — SIGNIFICANT CHANGE UP (ref 3.8–10.5)

## 2017-12-28 PROCEDURE — 99232 SBSQ HOSP IP/OBS MODERATE 35: CPT

## 2017-12-28 PROCEDURE — 93970 EXTREMITY STUDY: CPT | Mod: 26

## 2017-12-28 PROCEDURE — 93971 EXTREMITY STUDY: CPT | Mod: 26,59

## 2017-12-28 PROCEDURE — 99233 SBSQ HOSP IP/OBS HIGH 50: CPT

## 2017-12-28 RX ORDER — METOPROLOL TARTRATE 50 MG
50 TABLET ORAL
Qty: 0 | Refills: 0 | Status: DISCONTINUED | OUTPATIENT
Start: 2017-12-28 | End: 2018-01-03

## 2017-12-28 RX ORDER — AMLODIPINE BESYLATE 2.5 MG/1
10 TABLET ORAL AT BEDTIME
Qty: 0 | Refills: 0 | Status: DISCONTINUED | OUTPATIENT
Start: 2017-12-28 | End: 2018-01-03

## 2017-12-28 RX ORDER — SENNA PLUS 8.6 MG/1
2 TABLET ORAL AT BEDTIME
Qty: 0 | Refills: 0 | Status: DISCONTINUED | OUTPATIENT
Start: 2017-12-28 | End: 2018-01-03

## 2017-12-28 RX ORDER — ENOXAPARIN SODIUM 100 MG/ML
100 INJECTION SUBCUTANEOUS EVERY 12 HOURS
Qty: 0 | Refills: 0 | Status: DISCONTINUED | OUTPATIENT
Start: 2017-12-28 | End: 2018-01-03

## 2017-12-28 RX ORDER — INSULIN LISPRO 100/ML
VIAL (ML) SUBCUTANEOUS
Qty: 0 | Refills: 0 | Status: DISCONTINUED | OUTPATIENT
Start: 2017-12-28 | End: 2017-12-28

## 2017-12-28 RX ORDER — OXYCODONE AND ACETAMINOPHEN 5; 325 MG/1; MG/1
1 TABLET ORAL ONCE
Qty: 0 | Refills: 0 | Status: DISCONTINUED | OUTPATIENT
Start: 2017-12-28 | End: 2017-12-28

## 2017-12-28 RX ORDER — DOCUSATE SODIUM 100 MG
100 CAPSULE ORAL EVERY 8 HOURS
Qty: 0 | Refills: 0 | Status: DISCONTINUED | OUTPATIENT
Start: 2017-12-28 | End: 2017-12-30

## 2017-12-28 RX ORDER — INSULIN LISPRO 100/ML
VIAL (ML) SUBCUTANEOUS
Qty: 0 | Refills: 0 | Status: DISCONTINUED | OUTPATIENT
Start: 2017-12-28 | End: 2018-01-03

## 2017-12-28 RX ORDER — INSULIN LISPRO 100/ML
VIAL (ML) SUBCUTANEOUS AT BEDTIME
Qty: 0 | Refills: 0 | Status: DISCONTINUED | OUTPATIENT
Start: 2017-12-28 | End: 2018-01-03

## 2017-12-28 RX ORDER — OXYCODONE AND ACETAMINOPHEN 5; 325 MG/1; MG/1
1 TABLET ORAL EVERY 4 HOURS
Qty: 0 | Refills: 0 | Status: DISCONTINUED | OUTPATIENT
Start: 2017-12-28 | End: 2017-12-30

## 2017-12-28 RX ORDER — GABAPENTIN 400 MG/1
300 CAPSULE ORAL EVERY 8 HOURS
Qty: 0 | Refills: 0 | Status: DISCONTINUED | OUTPATIENT
Start: 2017-12-28 | End: 2017-12-29

## 2017-12-28 RX ADMIN — Medication 5 MILLIGRAM(S): at 05:09

## 2017-12-28 RX ADMIN — HEPARIN SODIUM 5000 UNIT(S): 5000 INJECTION INTRAVENOUS; SUBCUTANEOUS at 05:09

## 2017-12-28 RX ADMIN — OXYCODONE AND ACETAMINOPHEN 1 TABLET(S): 5; 325 TABLET ORAL at 10:00

## 2017-12-28 RX ADMIN — GABAPENTIN 300 MILLIGRAM(S): 400 CAPSULE ORAL at 13:07

## 2017-12-28 RX ADMIN — INSULIN GLARGINE 20 UNIT(S): 100 INJECTION, SOLUTION SUBCUTANEOUS at 22:47

## 2017-12-28 RX ADMIN — Medication 81 MILLIGRAM(S): at 11:24

## 2017-12-28 RX ADMIN — Medication 50 MILLIGRAM(S): at 05:09

## 2017-12-28 RX ADMIN — FENTANYL CITRATE 1 PATCH: 50 INJECTION INTRAVENOUS at 11:28

## 2017-12-28 RX ADMIN — GABAPENTIN 300 MILLIGRAM(S): 400 CAPSULE ORAL at 05:10

## 2017-12-28 RX ADMIN — OXYCODONE AND ACETAMINOPHEN 1 TABLET(S): 5; 325 TABLET ORAL at 15:34

## 2017-12-28 RX ADMIN — OXYCODONE AND ACETAMINOPHEN 1 TABLET(S): 5; 325 TABLET ORAL at 22:02

## 2017-12-28 RX ADMIN — Medication 4: at 09:50

## 2017-12-28 RX ADMIN — Medication 4: at 06:48

## 2017-12-28 RX ADMIN — OXYCODONE AND ACETAMINOPHEN 1 TABLET(S): 5; 325 TABLET ORAL at 22:32

## 2017-12-28 RX ADMIN — OXYCODONE AND ACETAMINOPHEN 1 TABLET(S): 5; 325 TABLET ORAL at 16:25

## 2017-12-28 RX ADMIN — Medication 50 MILLIGRAM(S): at 17:49

## 2017-12-28 RX ADMIN — Medication 4: at 03:01

## 2017-12-28 RX ADMIN — Medication 100 MILLIGRAM(S): at 05:09

## 2017-12-28 RX ADMIN — ENOXAPARIN SODIUM 100 MILLIGRAM(S): 100 INJECTION SUBCUTANEOUS at 23:58

## 2017-12-28 RX ADMIN — OXYCODONE AND ACETAMINOPHEN 1 TABLET(S): 5; 325 TABLET ORAL at 10:50

## 2017-12-28 RX ADMIN — PIPERACILLIN AND TAZOBACTAM 25 GRAM(S): 4; .5 INJECTION, POWDER, LYOPHILIZED, FOR SOLUTION INTRAVENOUS at 05:09

## 2017-12-28 RX ADMIN — AMLODIPINE BESYLATE 10 MILLIGRAM(S): 2.5 TABLET ORAL at 22:47

## 2017-12-28 RX ADMIN — Medication 2 MILLIGRAM(S): at 05:09

## 2017-12-28 RX ADMIN — Medication 75 MICROGRAM(S): at 09:47

## 2017-12-28 RX ADMIN — Medication 650 MILLIGRAM(S): at 03:15

## 2017-12-28 RX ADMIN — PIPERACILLIN AND TAZOBACTAM 25 GRAM(S): 4; .5 INJECTION, POWDER, LYOPHILIZED, FOR SOLUTION INTRAVENOUS at 12:11

## 2017-12-28 RX ADMIN — HEPARIN SODIUM 5000 UNIT(S): 5000 INJECTION INTRAVENOUS; SUBCUTANEOUS at 13:05

## 2017-12-28 RX ADMIN — Medication 5 MILLIGRAM(S): at 18:54

## 2017-12-28 RX ADMIN — Medication 650 MILLIGRAM(S): at 03:30

## 2017-12-28 NOTE — PROGRESS NOTE ADULT - SUBJECTIVE AND OBJECTIVE BOX
INTERVAL HPI/OVERNIGHT EVENTS: HA o/n pt given Tylenol w/ symptomatic relief. Decadron switched to q12h. C1 esterase elevated-55.     SUBJECTIVE: Patient seen and examined at bedside. No acute events over night. No active complaints.     CONSTITUTIONAL: No weakness, fevers or chills  EYES/ENT: No visual changes;  No vertigo or throat pain   NECK: No pain or stiffness  RESPIRATORY: No cough, wheezing, hemoptysis; No shortness of breath  CARDIOVASCULAR: No chest pain or palpitations  GASTROINTESTINAL: No abdominal or epigastric pain. No nausea, vomiting, or hematemesis; No diarrhea or constipation. No melena or hematochezia.  GENITOURINARY: No dysuria, frequency or hematuria  NEUROLOGICAL: No numbness or weakness  SKIN: No itching, rashes    OBJECTIVE:    VITAL SIGNS:  ICU Vital Signs Last 24 Hrs  T(C): 36.5 (28 Dec 2017 04:00), Max: 38.1 (27 Dec 2017 08:00)  T(F): 97.7 (28 Dec 2017 04:00), Max: 100.6 (27 Dec 2017 08:00)  HR: 65 (28 Dec 2017 06:00) (62 - 83)  BP: 152/70 (28 Dec 2017 06:00) (123/57 - 172/73)  BP(mean): 101 (28 Dec 2017 06:00) (82 - 109)  ABP: --  ABP(mean): --  RR: 22 (28 Dec 2017 06:00) (17 - 33)  SpO2: 96% (28 Dec 2017 06:00) (96% - 100%)    Mode: CPAP with PS, FiO2: 30, PEEP: 5, PS: 5, MAP: 9, PIP: 12    12-26 @ 07:01 - 12-27 @ 07:00  --------------------------------------------------------  IN: 1570.6 mL / OUT: 2050 mL / NET: -479.4 mL    12-27 @ 07:01 - 12-28 @ 06:29  --------------------------------------------------------  IN: 815 mL / OUT: 1785 mL / NET: -970 mL      CAPILLARY BLOOD GLUCOSE      POCT Blood Glucose.: 182 mg/dL (28 Dec 2017 03:00)      PHYSICAL EXAM:    General: NAD  HEENT: NC/AT; PERRL, clear conjunctiva  Neck: supple  Respiratory: CTA b/l  Cardiovascular: +S1/S2; RRR  Abdomen: soft, NT/ND; +BS x4  Extremities: WWP, 2+ peripheral pulses b/l; no LE edema  Skin: normal color and turgor; no rash  Neurological:    MEDICATIONS:  MEDICATIONS  (STANDING):  amLODIPine   Tablet 10 milliGRAM(s) Oral at bedtime  aspirin  chewable 81 milliGRAM(s) Oral daily  dexamethasone  Injectable 5 milliGRAM(s) IV Push every 12 hours  diazepam    Tablet 2 milliGRAM(s) Oral every 8 hours  docusate sodium Liquid 100 milliGRAM(s) Oral every 8 hours  famotidine Injectable 20 milliGRAM(s) IV Push daily  fentaNYL   Patch  25 MICROgram(s)/Hr. 1 Patch Transdermal every 48 hours  gabapentin   Solution 300 milliGRAM(s) Oral every 8 hours  heparin  Injectable 5000 Unit(s) SubCutaneous every 8 hours  insulin glargine Injectable (LANTUS) 20 Unit(s) SubCutaneous at bedtime  insulin lispro (HumaLOG) corrective regimen sliding scale   SubCutaneous every 4 hours  levothyroxine Injectable 75 MICROGram(s) IV Push <User Schedule>  metoprolol     tartrate 50 milliGRAM(s) Oral every 12 hours  piperacillin/tazobactam IVPB. 3.375 Gram(s) IV Intermittent every 8 hours  senna 2 Tablet(s) Oral at bedtime    MEDICATIONS  (PRN):  acetaminophen   Tablet. 650 milliGRAM(s) Oral every 6 hours PRN Moderate Pain (4 - 6)      ALLERGIES:  Allergies    ACE inhibitors (Anaphylaxis; Angioedema)    Intolerances        LABS:                        8.7    8.0   )-----------( 583      ( 28 Dec 2017 02:29 )             27.0     12-28    138  |  98  |  22  ----------------------------<  208<H>  4.4   |  28  |  0.58    Ca    9.2      28 Dec 2017 02:29  Phos  3.6     12-28  Mg     2.2     12-28    TPro  7.0  /  Alb  3.5  /  TBili  0.2  /  DBili  x   /  AST  8<L>  /  ALT  5<L>  /  AlkPhos  90  12-28    PT/INR - ( 28 Dec 2017 02:29 )   PT: 12.3 sec;   INR: 1.14 ratio         PTT - ( 28 Dec 2017 02:29 )  PTT:27.9 sec      RADIOLOGY & ADDITIONAL TESTS: Reviewed.

## 2017-12-28 NOTE — PROGRESS NOTE ADULT - SUBJECTIVE AND OBJECTIVE BOX
POD/STATUS POST/ENT ISSUE: Angioedema-improving/s/p fiberoptic nasal intubation now extubated with epistaxis     INTERVAL HPI: pt s/p fiberoptic nasal intubation 12/22 in ED for angioedema 12/22: glideoscope: angioedema of anterior tongue and uvula, right over left pharyngeal wall b/l, Anterior VC appear normal, with cuff leak  has good ventilation. Glideoscope done at bedside 12/26  significantly improved. pt was extubated yesterday. Pt c/o aphonia, some throat discomfort and minimal bleeding from the right nare      Vital Signs Last 24 Hrs  T(C): 36.5 (28 Dec 2017 04:00), Max: 38.1 (27 Dec 2017 08:00)  T(F): 97.7 (28 Dec 2017 04:00), Max: 100.6 (27 Dec 2017 08:00)  HR: 65 (28 Dec 2017 06:00) (62 - 83)  BP: 152/70 (28 Dec 2017 06:00) (123/57 - 172/73)  BP(mean): 101 (28 Dec 2017 06:00) (82 - 109)  RR: 22 (28 Dec 2017 06:00) (17 - 33)  SpO2: 96% (28 Dec 2017 06:00) (96% - 100%)    PHYSICAL EXAM:    Gen: NAD, well-developed sedated, no facial swelling  Head: Normocephalic, Atraumatic  Eyes: PERRL, no scleral injection  Nose: noted with some dry blood from the R nare   Mouth: normal contour of lips Mucosa moist, tongue midline no edema    Neck: Flat, supple, no lymphadenopathy, trachea midline, no masses  Resp:  no stridor    LABS:                        8.7    8.0   )-----------( 583      ( 28 Dec 2017 02:29 )             27.0 POD/STATUS POST/ENT ISSUE: Angioedema-improving/s/p fiberoptic nasal intubation now extubated with epistaxis     INTERVAL HPI: pt s/p fiberoptic nasal intubation 12/22 in ED for angioedema 12/22: glideoscope: angioedema of anterior tongue and uvula, right over left pharyngeal wall b/l, Anterior VC appear normal, with cuff leak  has good ventilation. Glideoscope done at bedside 12/26  significantly improved. pt was extubated yesterday. Pt c/o aphonia but improved today, still with some throat discomfort. no bleeding from the right nare. On 2L NC satting well w/ no signs of respiratory distress. Decadron switched to q12h.        Vital Signs Last 24 Hrs  T(C): 36.5 (28 Dec 2017 04:00), Max: 38.1 (27 Dec 2017 08:00)  T(F): 97.7 (28 Dec 2017 04:00), Max: 100.6 (27 Dec 2017 08:00)  HR: 65 (28 Dec 2017 06:00) (62 - 83)  BP: 152/70 (28 Dec 2017 06:00) (123/57 - 172/73)  BP(mean): 101 (28 Dec 2017 06:00) (82 - 109)  RR: 22 (28 Dec 2017 06:00) (17 - 33)  SpO2: 96% (28 Dec 2017 06:00) (96% - 100%)    PHYSICAL EXAM:    Gen: NAD, well-developed sedated, no facial swelling  Head: Normocephalic, Atraumatic  Eyes: PERRL, no scleral injection  Nose: noted with some dry blood from the R nare, NGT in Left nare   Mouth: normal contour of lips Mucosa moist, tongue midline no edema    Neck: Flat, supple, no lymphadenopathy, trachea midline, no masses  Resp:  no stridor    LABS:                        8.7    8.0   )-----------( 583      ( 28 Dec 2017 02:29 )             27.0

## 2017-12-28 NOTE — PHYSICAL THERAPY INITIAL EVALUATION ADULT - ADDITIONAL COMMENTS
Lives alone in an apartment alone. Prior to admission, pt was at [facility] rehab for 2.5 weeks but prior to that was independent in ADLs and used a cane ambulation. Lives alone in an apartment alone, +3 steps to enter building, elevator to apartment. Prior to admission, pt was at [facility] rehab for 2.5 weeks but prior to that was independent in ADLs and used a cane ambulation.

## 2017-12-28 NOTE — PROGRESS NOTE ADULT - PROBLEM SELECTOR PLAN 2
- Continue with Nasal Packing (Rapid Rhino)  - Strict blood pressure control.  - Nasal saline, 2 sprays to both nares 4 times a day  - Avoid nasal trauma; no nose rubbing, blowing or manipulating nasal packing.  Nasal Saline Spray  Humidified O2  Sneeze with mouth open and pinching nares.  Avoid bending with head blow the waist. Strict blood pressure control.  Nasal saline, 2 sprays to both nares 4 times a day  Avoid nasal trauma; no nose rubbing, blowing   Nasal Saline Spray  Humidified O2  Sneeze with mouth open and pinching nares.  Avoid bending with head blow the waist.

## 2017-12-28 NOTE — CHART NOTE - NSCHARTNOTEFT_GEN_A_CORE
MICU Transfer Note    Transfer from: MICU    Transfer to: (X) Medicine    (  ) Telemetry     (   ) RCU        (    ) Palliative         (   ) Stroke Unit          (   ) __________________    Accepting Physician: Dr. Azael MD  Signout given to:     MICU COURSE:   73 y/o F w/ a pmh significant for Type 2 DM on insulin,  HTN on ACE-I, Hypothyroidism, s/p Left knee replacement '99 c/b MRSA infection, osteoarthritis, chronic pancreatitis (last episode > 10 yrs ago), spinal stenosis, recent d/c from hospital end of November s/p L1-L4 lumbar fusion course complicated by UTI requiring antibiotics who presented to the ED w/ a cc of swollen tongue and difficulty swallowing. Pt found to have angioedema likely 2/2 ACE-inhibitor and was admitted to MICU for for airway protection s/p nasal intubation 12/22. Pt was treated with Decadron, Benadryl, and Pepcid. Hospital course was complicated by UTI w/ cx growing proteus mirabilis for which pt was started on Zosyn (12/25). Pt was extubated on 12/27.  Pt passed speech and swallow and was hemodynamically stable for transfer to medicine floors.           ASSESSMENT & PLAN:   72 yr old female with PMHx of DM2 on insulin therapy, HTN on ACE-I, Hypothyroidism, s/p Left knee replacement '99 c/b MRSA infection, osteoarthritis, chronic pancreatitis (last episode > 10 yrs ago), spinal stenosis, recent d/c from hospital end of Novemeber s/p L1-L4 lumbar fusion which was c/b by UTI requiring antibx therapy who was BIBEMS after developing swelling and protrusion of tongue with difficulty swallowing. Admission to MICU for angioedema s/p nasal intubation.     #Neuro:  - Fentanyl patch d/c and Valium 2mg q8h switched to PRN.  - EEG: potential seizures focus in the b/l frontal regions and b/l anterior temporal region. Mod to severe diffuse cerebral dysfunction. No seizures recorded.    #Pulm:  - Extubated yesterday, now on 2L NC satting well w/ no signs of respiratory distress    #CV:  - hold all ACE-I and ARBs  - c/w metoprolol to home dose (50 mg BID)  - c/w amlodipine 10 mg via NGT qhs  - c/w ASA 81 mg qd    #GI/:  - Passed speech and swallow; tolerating po w/o difficulty.   - c/w senna and colace    #I.D.:  - +UA +proteus mirabilis, e. coli c/w Zosyn (Day 4)  - BCx negative  - Bronchial cultures negative      #FEN/HEME/ENDO:  - angioedema resolving  - Decadron and Famotidine d/c  - continue lantus 20 mg qhs and q 4 hr POC glucose with ISS,. Maintain glucose 140 to 160   - trend lytes/cbc/coags    #PPX  -HSQ 5000 units q 8 MICU Transfer Note    Transfer from: MICU    Transfer to: (X) Medicine    (  ) Telemetry     (   ) RCU        (    ) Palliative         (   ) Stroke Unit          (   ) __________________    Accepting Physician: Dr. Rios MD  Signout given to:     MICU COURSE:   73 y/o F w/ a pmh significant for Type 2 DM on insulin,  HTN on ACE-I, Hypothyroidism, s/p Left knee replacement '99 c/b MRSA infection, osteoarthritis, chronic pancreatitis (last episode > 10 yrs ago), spinal stenosis, recent d/c from hospital end of November s/p L1-L4 lumbar fusion course complicated by UTI requiring antibiotics who presented to the ED w/ a cc of swollen tongue and difficulty swallowing. Pt found to have angioedema likely 2/2 ACE-inhibitor and was admitted to MICU for for airway protection s/p nasal intubation 12/22. Pt was treated with Decadron, Benadryl, and Pepcid. Hospital course was complicated by UTI w/ cx growing proteus mirabilis for which pt completed 4 days of Zosyn. Pt was extubated on 12/27.  Pt passed speech and swallow and was hemodynamically stable for transfer to medicine floors.           ASSESSMENT & PLAN:   72 yr old female with PMHx of DM2 on insulin therapy, HTN on ACE-I, Hypothyroidism, s/p Left knee replacement '99 c/b MRSA infection, osteoarthritis, chronic pancreatitis (last episode > 10 yrs ago), spinal stenosis, recent d/c from hospital end of November s/p L1-L4 lumbar fusion which was c/b by UTI requiring antibx therapy who was BIBEMS after developing swelling and protrusion of tongue with difficulty swallowing. Admission to MICU for angioedema s/p nasal intubation.     #Neuro:  - Fentanyl patch d/c and Valium 2mg q8h switched to PRN.  - EEG: potential seizures focus in the b/l frontal regions and b/l anterior temporal region. Mod to severe diffuse cerebral dysfunction. No seizures recorded.    #Pulm:  - Extubated yesterday, now on 2L NC satting well w/ no signs of respiratory distress    #CV:  - hold all ACE-I and ARBs  - c/w metoprolol to home dose (50 mg BID)  - c/w amlodipine 10 mg via NGT qhs  - c/w ASA 81 mg qd    #GI/:  - Passed speech and swallow; tolerating po w/o difficulty.   - c/w senna and colace    #I.D.:  - +UA for proteus mirabilis, e. coli- completed 4 days of Zosyn  - BCx negative  - Bronchial cultures negative      #FEN/HEME/ENDO:  - angioedema resolving  - Decadron and Famotidine d/c  - continue lantus 20 mg qhs and q 4 hr POC glucose with ISS,. Maintain glucose 140 to 160   - trend lytes/cbc/coags    #PPX  -HSQ 5000 units q 8 MICU Transfer Note    Transfer from: MICU    Transfer to: (X) Medicine    (  ) Telemetry     (   ) RCU        (    ) Palliative         (   ) Stroke Unit          (   ) __________________    Accepting Physician: Dr. Rios MD  Signout given to:     MICU COURSE:   71 y/o F w/ a pmh significant for Type 2 DM on insulin,  HTN on ACE-I, Hypothyroidism, s/p Left knee replacement '99 c/b MRSA infection, osteoarthritis, chronic pancreatitis (last episode > 10 yrs ago), spinal stenosis, recent d/c from hospital end of November s/p L1-L4 lumbar fusion course complicated by UTI requiring antibiotics who presented to the ED w/ a cc of swollen tongue and difficulty swallowing. Pt found to have angioedema likely 2/2 ACE-inhibitor and was admitted to MICU for for airway protection s/p nasal intubation 12/22. Pt was treated with Decadron, Benadryl, and Pepcid. Hospital course was complicated by UTI w/ cx growing proteus mirabilis for which pt completed 4 days of Zosyn. Pt was extubated on 12/27.  Pt passed speech and swallow and was hemodynamically stable for transfer to medicine floors.           ASSESSMENT & PLAN:   72 yr old female with PMHx of DM2 on insulin therapy, HTN on ACE-I, Hypothyroidism, s/p Left knee replacement '99 c/b MRSA infection, osteoarthritis, chronic pancreatitis (last episode > 10 yrs ago), spinal stenosis, recent d/c from hospital end of November s/p L1-L4 lumbar fusion which was c/b by UTI requiring antibx therapy who was BIBEMS after developing swelling and protrusion of tongue with difficulty swallowing. Admission to MICU for angioedema s/p nasal intubation.     #Neuro:  - Fentanyl patch was d/c and Valium 2mg q8h switched to PRN.  - EEG: potential seizures focus in the b/l frontal regions and b/l anterior temporal region. Mod to severe diffuse cerebral dysfunction. No seizures recorded.    #Pulm:  - Extubated yesterday, now on 2L NC satting well w/ no signs of respiratory distress    #CV:  - hold all ACE-I and ARBs  - c/w metoprolol to home dose (50 mg BID)  - c/w amlodipine 10 mg via NGT qhs  - c/w ASA 81 mg qd    #GI/:  - Passed speech and swallow; tolerating po w/o difficulty.   - c/w senna and colace    #I.D.:  - +UA for proteus mirabilis, e. coli- completed 4 days of Zosyn  - BCx negative  - Bronchial cultures negative      #FEN/HEME/ENDO:  - angioedema resolving  - Decadron and Famotidine d/c  - continue lantus 20 mg qhs and q 4 hr POC glucose with ISS,. Maintain glucose 140 to 160   - trend lytes/cbc/coags    #PPX  -HSQ 5000 units q 8

## 2017-12-28 NOTE — PROGRESS NOTE ADULT - NSHPATTENDINGPLANDISCUSS_GEN_ALL_CORE
Critical Care team
MICU team
Critical Care team
Dr Pike
Dr Donis
MICU team
MICU team

## 2017-12-28 NOTE — PHYSICAL THERAPY INITIAL EVALUATION ADULT - PERTINENT HX OF CURRENT PROBLEM, REHAB EVAL
71 yo recent d/c from hospital end of Novemeber s/p L1-L4 lumbar fusion which was c/b by UTI requiring antibx therapy who now presents from home w/ swelling and protrusion of tongue with difficulty swallowing. S/p fiberoptic nasal intubation 12/22 in ED for angioedema, admitted to MICU. Extubated 12/27. 73 yo recent d/c from hospital end of November s/p L1-L4 lumbar fusion which was c/b by UTI requiring antibx therapy who now presents from rehab w/ swelling and protrusion of tongue with difficulty swallowing. S/p fiberoptic nasal intubation 12/22 in ED for angioedema, admitted to MICU. Extubated 12/27.

## 2017-12-28 NOTE — PROGRESS NOTE ADULT - ASSESSMENT
72 yr old female with PMHx of DM2 on insulin therapy, HTN on ACE-I, Hypothyroidism, s/p Left knee replacement '99 c/b MRSA infection, osteoarthritis, chronic pancreatitis (last episode > 10 yrs ago), spinal stenosis, recent d/c from hospital end of Novemeber s/p L1-L4 lumbar fusion which was c/b by UTI requiring antibx therapy who was BIBEMS after developing swelling and protrusion of tongue with difficulty swallowing. Admission to MICU for angioedema s/p nasal intubation.     #Neuro:  - neuro checks q 4 hrs and prn for changes  - off sedation  - c/w Fentanyl patch  - c/w Valium 2mg q8h  - EEG: potential seizures focus in the b/l frontal regions and b/l anterior temporal region. Mod to severe diffuse cerebral dysfunction. No seizures recorded.    #Pulm:  - +Cuff leak yesterday  - as sedation decreased will place on PSV   - HOB >/= 30 degree angle  - plan to extubate patient today    #CV:  - hold all ACE-I and ARBs  - c/w metoprolol to home dose ( 50 mg BID )  - c/w amlodipine 10 mg via NGT qhs  - c/w restarted ASA 81 mg qd    #GI/:  - strict I & O's; keep even  - continue tube feeds to goal  - c/w senna and colace    #I.D.:  - +UA- culture sent c/w Zosyn (Day 3)  - BCx negative  - Bronchial cultures negative  - UCx - +proteus mirabilis, e. coli  - c/w zosyn    #FEN/HEME/ENDO:  - angioedema resolving  - taper dexamethasone to 5 mg IV push q8hrs  - d/c diphenhydramine  - change famotidine to 20 mg daily  - continue lantus 20 mg qhs and q 4 hr POC glucose with ISS,. Maintain glucose 140 to 160   - trend lytes/cbc/coags 72 yr old female with PMHx of DM2 on insulin therapy, HTN on ACE-I, Hypothyroidism, s/p Left knee replacement '99 c/b MRSA infection, osteoarthritis, chronic pancreatitis (last episode > 10 yrs ago), spinal stenosis, recent d/c from hospital end of Novemeber s/p L1-L4 lumbar fusion which was c/b by UTI requiring antibx therapy who was BIBEMS after developing swelling and protrusion of tongue with difficulty swallowing. Admission to MICU for angioedema s/p nasal intubation.     #Neuro:  - neuro checks q 4 hrs and prn for changes  - off sedation  - c/w Fentanyl patch  - c/w Valium 2mg q8h  - EEG: potential seizures focus in the b/l frontal regions and b/l anterior temporal region. Mod to severe diffuse cerebral dysfunction. No seizures recorded.    #Pulm:  - +Cuff leak yesterday  - HOB >/= 30 degree angle  - extubated yesterday-     #CV:  - hold all ACE-I and ARBs  - c/w metoprolol to home dose (50 mg BID)  - c/w amlodipine 10 mg via NGT qhs  - c/w restarted ASA 81 mg qd    #GI/:  - strict I & O's; keep even  - continue tube feeds to goal  - c/w senna and colace    #I.D.:  - +UA- culture sent c/w Zosyn (Day 4)  - BCx negative  - Bronchial cultures negative  - UCx - +proteus mirabilis, e. coli  - c/w zosyn    #FEN/HEME/ENDO:  - angioedema resolving  - Decadron 5mg q12h.   - change famotidine to 20 mg daily  - continue lantus 20 mg qhs and q 4 hr POC glucose with ISS,. Maintain glucose 140 to 160   - trend lytes/cbc/coags 72 yr old female with PMHx of DM2 on insulin therapy, HTN on ACE-I, Hypothyroidism, s/p Left knee replacement '99 c/b MRSA infection, osteoarthritis, chronic pancreatitis (last episode > 10 yrs ago), spinal stenosis, recent d/c from hospital end of Novemeber s/p L1-L4 lumbar fusion which was c/b by UTI requiring antibx therapy who was BIBEMS after developing swelling and protrusion of tongue with difficulty swallowing. Admission to MICU for angioedema s/p nasal intubation.     #Neuro:  - neuro checks q 4 hrs and prn for changes/ off sedation  - c/w Fentanyl patch  - c/w Valium 2mg q8h  - EEG: potential seizures focus in the b/l frontal regions and b/l anterior temporal region. Mod to severe diffuse cerebral dysfunction. No seizures recorded.    #Pulm:  - +Cuff leak yesterday  - HOB >/= 30 degree angle  - extubated yesterday, now on 2L NC satting well w/ no signs of respiratory distress    #CV:  - hold all ACE-I and ARBs  - c/w metoprolol to home dose (50 mg BID)  - c/w amlodipine 10 mg via NGT qhs  - c/w ASA 81 mg qd    #GI/:  - strict I & O's; keep even  - continue tube feeds to goal  - c/w senna and colace    #I.D.:  - +UA- culture sent c/w Zosyn (Day 4)  - BCx negative  - Bronchial cultures negative  - UCx - +proteus mirabilis, e. coli    #FEN/HEME/ENDO:  - angioedema resolving  - Decadron 5mg q12h.   - change famotidine to 20 mg daily  - continue lantus 20 mg qhs and q 4 hr POC glucose with ISS,. Maintain glucose 140 to 160   - trend lytes/cbc/coags    #PPX  -HSQ 5000 units q 8

## 2017-12-28 NOTE — CHART NOTE - NSCHARTNOTEFT_GEN_A_CORE
Per MICU transfer note:    71 y/o F w/ a pmh significant for Type 2 DM on insulin,  HTN on ACE-I, Hypothyroidism, s/p Left knee replacement '99 c/b MRSA infection, osteoarthritis, chronic pancreatitis (last episode > 10 yrs ago), spinal stenosis, recent d/c from hospital end of November s/p L1-L4 lumbar fusion course complicated by UTI requiring antibiotics who presented to the ED w/ a cc of swollen tongue and difficulty swallowing. Pt found to have angioedema likely 2/2 ACE-inhibitor and was admitted to MICU for for airway protection s/p nasal intubation 12/22. glideoscope: angioedema of anterior tongue and uvula, right over left pharyngeal wall b/l, Anterior VC appear normal. Pt was treated with Decadron, Benadryl, and Pepcid. Patient also noted with mild epistaxis from the right nare which has resolved with nasal saline sprays and humidified O2. Hospital course was complicated by UTI w/ cx growing proteus mirabilis for which pt completed 4 days of Zosyn. Pt was extubated on 12/27. ABG on 12/27 7.49 | 36 | 286, HCO2 28. Pt passed speech and swallow and was hemodynamically stable for transfer to medicine floors. Pt also complained of LE pain and swelling b/l, Doppler LE performed ruled out DVT. Upon leaving the unit, pt was found to have a small L brachial clot on bedside US, official ultrasound is currently pending.     ICU Vital Signs Last 24 Hrs  T(C): 36.5 (28 Dec 2017 16:00), Max: 37.3 (28 Dec 2017 12:00)  T(F): 97.7 (28 Dec 2017 16:00), Max: 99.1 (28 Dec 2017 12:00)  HR: 67 (28 Dec 2017 16:00) (60 - 76)  BP: 151/83 (28 Dec 2017 16:00) (123/57 - 172/73)  BP(mean): 90 (28 Dec 2017 15:00) (82 - 117)  ABP: --  ABP(mean): --  RR: 20 (28 Dec 2017 16:00) (17 - 33)  SpO2: 96% (28 Dec 2017 16:00) (94% - 100%)    Plan:  -F/u official LUE US  -c/w metoprolol to home dose (50 mg BID)  -c/w amlodipine 10 mg   -continue to hold ACE-I and ARBs  -c/w Decadron 5 BID for angioedema, f/u ENT    Yuniel Schofield, MAR  #[id number]

## 2017-12-29 DIAGNOSIS — K86.1 OTHER CHRONIC PANCREATITIS: ICD-10-CM

## 2017-12-29 DIAGNOSIS — I82.622 ACUTE EMBOLISM AND THROMBOSIS OF DEEP VEINS OF LEFT UPPER EXTREMITY: ICD-10-CM

## 2017-12-29 DIAGNOSIS — Z29.9 ENCOUNTER FOR PROPHYLACTIC MEASURES, UNSPECIFIED: ICD-10-CM

## 2017-12-29 DIAGNOSIS — E11.9 TYPE 2 DIABETES MELLITUS WITHOUT COMPLICATIONS: ICD-10-CM

## 2017-12-29 DIAGNOSIS — I10 ESSENTIAL (PRIMARY) HYPERTENSION: ICD-10-CM

## 2017-12-29 DIAGNOSIS — E03.9 HYPOTHYROIDISM, UNSPECIFIED: ICD-10-CM

## 2017-12-29 DIAGNOSIS — N39.0 URINARY TRACT INFECTION, SITE NOT SPECIFIED: ICD-10-CM

## 2017-12-29 DIAGNOSIS — K21.9 GASTRO-ESOPHAGEAL REFLUX DISEASE WITHOUT ESOPHAGITIS: ICD-10-CM

## 2017-12-29 LAB
ALBUMIN SERPL ELPH-MCNC: 3.5 G/DL — SIGNIFICANT CHANGE UP (ref 3.3–5)
ALP SERPL-CCNC: 90 U/L — SIGNIFICANT CHANGE UP (ref 40–120)
ALT FLD-CCNC: 8 U/L RC — LOW (ref 10–45)
ANION GAP SERPL CALC-SCNC: 13 MMOL/L — SIGNIFICANT CHANGE UP (ref 5–17)
AST SERPL-CCNC: 9 U/L — LOW (ref 10–40)
BASOPHILS # BLD AUTO: 0 K/UL — SIGNIFICANT CHANGE UP (ref 0–0.2)
BASOPHILS NFR BLD AUTO: 0.4 % — SIGNIFICANT CHANGE UP (ref 0–2)
BILIRUB SERPL-MCNC: 0.2 MG/DL — SIGNIFICANT CHANGE UP (ref 0.2–1.2)
BUN SERPL-MCNC: 18 MG/DL — SIGNIFICANT CHANGE UP (ref 7–23)
CALCIUM SERPL-MCNC: 9 MG/DL — SIGNIFICANT CHANGE UP (ref 8.4–10.5)
CHLORIDE SERPL-SCNC: 98 MMOL/L — SIGNIFICANT CHANGE UP (ref 96–108)
CO2 SERPL-SCNC: 28 MMOL/L — SIGNIFICANT CHANGE UP (ref 22–31)
CREAT SERPL-MCNC: 0.55 MG/DL — SIGNIFICANT CHANGE UP (ref 0.5–1.3)
EOSINOPHIL # BLD AUTO: 0 K/UL — SIGNIFICANT CHANGE UP (ref 0–0.5)
EOSINOPHIL NFR BLD AUTO: 0.3 % — SIGNIFICANT CHANGE UP (ref 0–6)
GLUCOSE BLDC GLUCOMTR-MCNC: 131 MG/DL — HIGH (ref 70–99)
GLUCOSE BLDC GLUCOMTR-MCNC: 153 MG/DL — HIGH (ref 70–99)
GLUCOSE BLDC GLUCOMTR-MCNC: 204 MG/DL — HIGH (ref 70–99)
GLUCOSE BLDC GLUCOMTR-MCNC: 252 MG/DL — HIGH (ref 70–99)
GLUCOSE BLDC GLUCOMTR-MCNC: 298 MG/DL — HIGH (ref 70–99)
GLUCOSE SERPL-MCNC: 181 MG/DL — HIGH (ref 70–99)
HCT VFR BLD CALC: 27.9 % — LOW (ref 34.5–45)
HGB BLD-MCNC: 9 G/DL — LOW (ref 11.5–15.5)
INR BLD: 1.14 RATIO — SIGNIFICANT CHANGE UP (ref 0.88–1.16)
LYMPHOCYTES # BLD AUTO: 2.5 K/UL — SIGNIFICANT CHANGE UP (ref 1–3.3)
LYMPHOCYTES # BLD AUTO: 32.9 % — SIGNIFICANT CHANGE UP (ref 13–44)
MAGNESIUM SERPL-MCNC: 2.2 MG/DL — SIGNIFICANT CHANGE UP (ref 1.6–2.6)
MCHC RBC-ENTMCNC: 27.8 PG — SIGNIFICANT CHANGE UP (ref 27–34)
MCHC RBC-ENTMCNC: 32.2 GM/DL — SIGNIFICANT CHANGE UP (ref 32–36)
MCV RBC AUTO: 86.4 FL — SIGNIFICANT CHANGE UP (ref 80–100)
MONOCYTES # BLD AUTO: 0.6 K/UL — SIGNIFICANT CHANGE UP (ref 0–0.9)
MONOCYTES NFR BLD AUTO: 7.8 % — SIGNIFICANT CHANGE UP (ref 2–14)
NEUTROPHILS # BLD AUTO: 4.4 K/UL — SIGNIFICANT CHANGE UP (ref 1.8–7.4)
NEUTROPHILS NFR BLD AUTO: 58.6 % — SIGNIFICANT CHANGE UP (ref 43–77)
PHOSPHATE SERPL-MCNC: 3.1 MG/DL — SIGNIFICANT CHANGE UP (ref 2.5–4.5)
PLATELET # BLD AUTO: 637 K/UL — HIGH (ref 150–400)
POTASSIUM SERPL-MCNC: 3.8 MMOL/L — SIGNIFICANT CHANGE UP (ref 3.5–5.3)
POTASSIUM SERPL-SCNC: 3.8 MMOL/L — SIGNIFICANT CHANGE UP (ref 3.5–5.3)
PROT SERPL-MCNC: 7.1 G/DL — SIGNIFICANT CHANGE UP (ref 6–8.3)
PROTHROM AB SERPL-ACNC: 12.5 SEC — SIGNIFICANT CHANGE UP (ref 9.8–12.7)
RBC # BLD: 3.23 M/UL — LOW (ref 3.8–5.2)
RBC # FLD: 15.7 % — HIGH (ref 10.3–14.5)
SODIUM SERPL-SCNC: 139 MMOL/L — SIGNIFICANT CHANGE UP (ref 135–145)
WBC # BLD: 7.5 K/UL — SIGNIFICANT CHANGE UP (ref 3.8–10.5)
WBC # FLD AUTO: 7.5 K/UL — SIGNIFICANT CHANGE UP (ref 3.8–10.5)

## 2017-12-29 PROCEDURE — 31575 DIAGNOSTIC LARYNGOSCOPY: CPT

## 2017-12-29 PROCEDURE — 70450 CT HEAD/BRAIN W/O DYE: CPT | Mod: 26

## 2017-12-29 PROCEDURE — 99233 SBSQ HOSP IP/OBS HIGH 50: CPT | Mod: GC

## 2017-12-29 PROCEDURE — 99232 SBSQ HOSP IP/OBS MODERATE 35: CPT | Mod: 25

## 2017-12-29 RX ORDER — MEROPENEM 1 G/30ML
INJECTION INTRAVENOUS
Qty: 0 | Refills: 0 | Status: DISCONTINUED | OUTPATIENT
Start: 2017-12-29 | End: 2018-01-03

## 2017-12-29 RX ORDER — GABAPENTIN 400 MG/1
400 CAPSULE ORAL EVERY 8 HOURS
Qty: 0 | Refills: 0 | Status: DISCONTINUED | OUTPATIENT
Start: 2017-12-29 | End: 2018-01-01

## 2017-12-29 RX ORDER — FENTANYL CITRATE 50 UG/ML
1 INJECTION INTRAVENOUS
Qty: 0 | Refills: 0 | Status: DISCONTINUED | OUTPATIENT
Start: 2017-12-29 | End: 2017-12-29

## 2017-12-29 RX ORDER — APIXABAN 2.5 MG/1
1 TABLET, FILM COATED ORAL
Qty: 60 | Refills: 0 | OUTPATIENT
Start: 2017-12-29 | End: 2018-01-27

## 2017-12-29 RX ORDER — LEVOTHYROXINE SODIUM 125 MCG
150 TABLET ORAL DAILY
Qty: 0 | Refills: 0 | Status: DISCONTINUED | OUTPATIENT
Start: 2017-12-29 | End: 2018-01-03

## 2017-12-29 RX ORDER — RIVAROXABAN 15 MG-20MG
1 KIT ORAL
Qty: 30 | Refills: 0 | OUTPATIENT
Start: 2017-12-29 | End: 2018-01-27

## 2017-12-29 RX ORDER — MEROPENEM 1 G/30ML
1000 INJECTION INTRAVENOUS EVERY 8 HOURS
Qty: 0 | Refills: 0 | Status: DISCONTINUED | OUTPATIENT
Start: 2017-12-29 | End: 2018-01-03

## 2017-12-29 RX ORDER — MEROPENEM 1 G/30ML
1000 INJECTION INTRAVENOUS ONCE
Qty: 0 | Refills: 0 | Status: COMPLETED | OUTPATIENT
Start: 2017-12-29 | End: 2017-12-29

## 2017-12-29 RX ORDER — PANTOPRAZOLE SODIUM 20 MG/1
40 TABLET, DELAYED RELEASE ORAL
Qty: 0 | Refills: 0 | Status: DISCONTINUED | OUTPATIENT
Start: 2017-12-29 | End: 2018-01-03

## 2017-12-29 RX ORDER — LANOLIN ALCOHOL/MO/W.PET/CERES
5 CREAM (GRAM) TOPICAL AT BEDTIME
Qty: 0 | Refills: 0 | Status: DISCONTINUED | OUTPATIENT
Start: 2017-12-29 | End: 2018-01-03

## 2017-12-29 RX ORDER — DIAZEPAM 5 MG
2 TABLET ORAL EVERY 8 HOURS
Qty: 0 | Refills: 0 | Status: DISCONTINUED | OUTPATIENT
Start: 2017-12-29 | End: 2018-01-03

## 2017-12-29 RX ORDER — ACETAMINOPHEN 500 MG
650 TABLET ORAL EVERY 6 HOURS
Qty: 0 | Refills: 0 | Status: DISCONTINUED | OUTPATIENT
Start: 2017-12-29 | End: 2017-12-31

## 2017-12-29 RX ADMIN — Medication 2 MILLIGRAM(S): at 02:04

## 2017-12-29 RX ADMIN — Medication 150 MICROGRAM(S): at 10:50

## 2017-12-29 RX ADMIN — SENNA PLUS 2 TABLET(S): 8.6 TABLET ORAL at 21:51

## 2017-12-29 RX ADMIN — OXYCODONE AND ACETAMINOPHEN 1 TABLET(S): 5; 325 TABLET ORAL at 19:30

## 2017-12-29 RX ADMIN — OXYCODONE AND ACETAMINOPHEN 1 TABLET(S): 5; 325 TABLET ORAL at 18:28

## 2017-12-29 RX ADMIN — OXYCODONE AND ACETAMINOPHEN 1 TABLET(S): 5; 325 TABLET ORAL at 09:28

## 2017-12-29 RX ADMIN — MEROPENEM 100 MILLIGRAM(S): 1 INJECTION INTRAVENOUS at 21:53

## 2017-12-29 RX ADMIN — Medication 3: at 18:02

## 2017-12-29 RX ADMIN — ENOXAPARIN SODIUM 100 MILLIGRAM(S): 100 INJECTION SUBCUTANEOUS at 17:38

## 2017-12-29 RX ADMIN — Medication 50 MILLIGRAM(S): at 17:38

## 2017-12-29 RX ADMIN — Medication 5 MILLIGRAM(S): at 21:50

## 2017-12-29 RX ADMIN — GABAPENTIN 400 MILLIGRAM(S): 400 CAPSULE ORAL at 21:49

## 2017-12-29 RX ADMIN — AMLODIPINE BESYLATE 10 MILLIGRAM(S): 2.5 TABLET ORAL at 21:49

## 2017-12-29 RX ADMIN — GABAPENTIN 300 MILLIGRAM(S): 400 CAPSULE ORAL at 07:17

## 2017-12-29 RX ADMIN — OXYCODONE AND ACETAMINOPHEN 1 TABLET(S): 5; 325 TABLET ORAL at 12:29

## 2017-12-29 RX ADMIN — Medication 50 MILLIGRAM(S): at 07:18

## 2017-12-29 RX ADMIN — MEROPENEM 100 MILLIGRAM(S): 1 INJECTION INTRAVENOUS at 12:34

## 2017-12-29 RX ADMIN — Medication 81 MILLIGRAM(S): at 10:55

## 2017-12-29 RX ADMIN — OXYCODONE AND ACETAMINOPHEN 1 TABLET(S): 5; 325 TABLET ORAL at 13:30

## 2017-12-29 RX ADMIN — ENOXAPARIN SODIUM 100 MILLIGRAM(S): 100 INJECTION SUBCUTANEOUS at 07:18

## 2017-12-29 RX ADMIN — GABAPENTIN 400 MILLIGRAM(S): 400 CAPSULE ORAL at 14:22

## 2017-12-29 RX ADMIN — OXYCODONE AND ACETAMINOPHEN 1 TABLET(S): 5; 325 TABLET ORAL at 08:19

## 2017-12-29 RX ADMIN — PANTOPRAZOLE SODIUM 40 MILLIGRAM(S): 20 TABLET, DELAYED RELEASE ORAL at 09:28

## 2017-12-29 RX ADMIN — Medication 2: at 12:59

## 2017-12-29 RX ADMIN — Medication 100 MILLIGRAM(S): at 21:49

## 2017-12-29 RX ADMIN — Medication 5 MILLIGRAM(S): at 07:18

## 2017-12-29 RX ADMIN — GABAPENTIN 300 MILLIGRAM(S): 400 CAPSULE ORAL at 01:51

## 2017-12-29 RX ADMIN — Medication 40 MILLIGRAM(S): at 10:51

## 2017-12-29 RX ADMIN — Medication 100 MILLIGRAM(S): at 14:22

## 2017-12-29 NOTE — PROGRESS NOTE ADULT - ASSESSMENT
72 yr old female with PMHx of DM2 on insulin therapy, HTN on ACE-I, Hypothyroidism, s/p Left knee replacement '99 c/b MRSA infection, osteoarthritis, chronic pancreatitis (last episode > 10 yrs ago), spinal stenosis, recent d/c from hospital end of Novemeber s/p L1-L4 lumbar fusion which was c/b by UTI requiring antibx therapy who was BIBEMS after developing swelling and protrusion of tongue with difficulty swallowing. Admission to MICU for angioedema s/p nasal intubation.     #Neuro:  - neuro checks q 4 hrs and prn for changes/ off sedation  - c/w Fentanyl patch  - c/w Valium 2mg q8h  - EEG: potential seizures focus in the b/l frontal regions and b/l anterior temporal region. Mod to severe diffuse cerebral dysfunction. No seizures recorded.    #Pulm:  - +Cuff leak yesterday  - HOB >/= 30 degree angle  - extubated yesterday, now on 2L NC satting well w/ no signs of respiratory distress    #CV:  - hold all ACE-I and ARBs  - c/w metoprolol to home dose (50 mg BID)  - c/w amlodipine 10 mg via NGT qhs  - c/w ASA 81 mg qd    #GI/:  - strict I & O's; keep even  - continue tube feeds to goal  - c/w senna and colace    #I.D.:  - +UA- culture sent c/w Zosyn (Day 4)  - BCx negative  - Bronchial cultures negative  - UCx - +proteus mirabilis, e. coli    #FEN/HEME/ENDO:  - angioedema resolving  - Decadron 5mg q12h.   - change famotidine to 20 mg daily  - continue lantus 20 mg qhs and q 4 hr POC glucose with ISS,. Maintain glucose 140 to 160   - trend lytes/cbc/coags    #PPX  -HSQ 5000 units q 8 72 yr old female with PMHx of DM2 on insulin therapy, HTN on ACE-I, Hypothyroidism, s/p Left knee replacement '99 c/b MRSA infection, osteoarthritis, chronic pancreatitis (last episode > 10 yrs ago), spinal stenosis, recent d/c from hospital end of Novemeber s/p L1-L4 lumbar fusion which was c/b by UTI requiring antibx therapy who was BIBEMS after developing swelling and protrusion of tongue with difficulty swallowing. Admission to MICU for angioedema s/p nasal intubation and transferred to general medicine service following extubation

## 2017-12-29 NOTE — PROGRESS NOTE ADULT - PROBLEM SELECTOR PLAN 5
- BP WNL  - continue amlodipine and Metoprolol  - will avoid all ACE-I and ARBs  - will continue to monitor

## 2017-12-29 NOTE — PROGRESS NOTE ADULT - PROBLEM SELECTOR PLAN 4
- blood sugar levels WNL  - continue Lantus 20 and low dose ISS  - FS with meals and at bedtime  - will continue to monitor

## 2017-12-29 NOTE — PROGRESS NOTE ADULT - PROBLEM SELECTOR PLAN 3
- Pt found to have Proteus Mirabilis and ESBL Ecoli in the urine   - Pt received a 4 day course of Zosyn in the MICU.  - given the fact that it is ESBL Ecoli, will start pt on IV Meropenem for a total of 7 days

## 2017-12-29 NOTE — PROGRESS NOTE ADULT - SUBJECTIVE AND OBJECTIVE BOX
Patient is a 72y old  Female who presents with a chief complaint of swelling of tongue (22 Dec 2017 11:16)      SUBJECTIVE / OVERNIGHT EVENTS:  Overnight, pt started on Lovenox AC for newly discovered L brachial DVT. Pt this AM is complaining of LLE stabbing pain which she rates a 10/10, and suprapubic pain. Pt has no other complaints and denies any fevers/chill, CP, palpitations, SOB, abdominal pain, N/V/D/C, dysuria.    MEDICATIONS  (STANDING):  amLODIPine   Tablet 10 milliGRAM(s) Oral at bedtime  aspirin  chewable 81 milliGRAM(s) Oral daily  dexamethasone  Injectable 5 milliGRAM(s) IV Push every 12 hours  docusate sodium Liquid 100 milliGRAM(s) Oral every 8 hours  enoxaparin Injectable 100 milliGRAM(s) SubCutaneous every 12 hours  gabapentin   Solution 300 milliGRAM(s) Oral every 8 hours  insulin glargine Injectable (LANTUS) 20 Unit(s) SubCutaneous at bedtime  insulin lispro (HumaLOG) corrective regimen sliding scale   SubCutaneous three times a day before meals  insulin lispro (HumaLOG) corrective regimen sliding scale   SubCutaneous at bedtime  levothyroxine Injectable 75 MICROGram(s) IV Push <User Schedule>  melatonin 5 milliGRAM(s) Oral at bedtime  metoprolol     tartrate 50 milliGRAM(s) Oral two times a day  senna 2 Tablet(s) Oral at bedtime    MEDICATIONS  (PRN):  acetaminophen   Tablet. 650 milliGRAM(s) Oral every 6 hours PRN Moderate Pain (4 - 6)  diazepam    Tablet 2 milliGRAM(s) Oral every 8 hours PRN Anxiety and Agitation  diazepam  Injectable 2 milliGRAM(s) IV Push every 8 hours PRN Anxiety  oxyCODONE    5 mG/acetaminophen 325 mG 1 Tablet(s) Oral every 4 hours PRN Moderate or Severe Pain (4 - 6)      OBJECTIVE:    Vital Signs Last 24 Hrs  T(C): 36.7 (29 Dec 2017 04:44), Max: 37.3 (28 Dec 2017 12:00)  T(F): 98.1 (29 Dec 2017 04:44), Max: 99.1 (28 Dec 2017 12:00)  HR: 70 (29 Dec 2017 04:44) (60 - 76)  BP: 142/79 (29 Dec 2017 04:44) (137/62 - 159/70)  BP(mean): 90 (28 Dec 2017 15:00) (89 - 112)  RR: 18 (29 Dec 2017 04:44) (18 - 22)  SpO2: 97% (29 Dec 2017 04:44) (94% - 99%)    CAPILLARY BLOOD GLUCOSE      POCT Blood Glucose.: 153 mg/dL (29 Dec 2017 07:14)  POCT Blood Glucose.: 169 mg/dL (28 Dec 2017 22:46)  POCT Blood Glucose.: 188 mg/dL (28 Dec 2017 21:10)  POCT Blood Glucose.: 150 mg/dL (28 Dec 2017 17:42)  POCT Blood Glucose.: 139 mg/dL (28 Dec 2017 12:56)  POCT Blood Glucose.: 166 mg/dL (28 Dec 2017 09:36)    I&O's Summary    28 Dec 2017 07:01  -  29 Dec 2017 07:00  --------------------------------------------------------  IN: 245 mL / OUT: 1250 mL / NET: -1005 mL        PHYSICAL EXAM:  GENERAL: NAD, well-developed  HEAD:  Atraumatic, Normocephalic  EYES: EOMI, PERRLA, conjunctiva and sclera clear  NECK: Supple, No JVD  CHEST/LUNG: Clear to auscultation bilaterally; No wheeze  HEART: Regular rate and rhythm; No murmurs, rubs, or gallops  ABDOMEN: Soft, TTP in the suprapubic region, Nondistended; Bowel sounds present  EXTREMITIES:  2+ Peripheral Pulses, No clubbing, cyanosis, or edema. TTP in the b/l LE L>>R  PSYCH: AAOx3  NEUROLOGY: non-focal  SKIN: No rashes or lesions    LABS:                        9.0    7.5   )-----------( 637      ( 29 Dec 2017 06:41 )             27.9     Auto Eosinophil # 0.0   / Auto Eosinophil % 0.3   / Auto Neutrophil # 4.4   / Auto Neutrophil % 58.6  / BANDS % x                            8.7    8.0   )-----------( 583      ( 28 Dec 2017 02:29 )             27.0     Auto Eosinophil # 0.0   / Auto Eosinophil % 0.1   / Auto Neutrophil # 6.3   / Auto Neutrophil % 78.5  / BANDS % x        12-29    139  |  98  |  18  ----------------------------<  181<H>  3.8   |  28  |  0.55  12-28    138  |  98  |  22  ----------------------------<  208<H>  4.4   |  28  |  0.58    Ca    9.0      29 Dec 2017 06:41  Mg     2.2     12-29  Phos  3.1     12-29  TPro  7.1  /  Alb  3.5  /  TBili  0.2  /  DBili  x   /  AST  9<L>  /  ALT  8<L>  /  AlkPhos  90  12-29  TPro  7.0  /  Alb  3.5  /  TBili  0.2  /  DBili  x   /  AST  8<L>  /  ALT  5<L>  /  AlkPhos  90  12-28    PT/INR - ( 29 Dec 2017 06:41 )   PT: 12.5 sec;   INR: 1.14 ratio         PTT - ( 28 Dec 2017 02:29 )  PTT:27.9 sec            RESPIRATORY  VENT:    ABG: ( 27 Dec 2017 09:18 ) pH: 7.49  /  pCO2: 36    /  pO2: 286   / HCO3: 27    / Base Excess: 4.0   /  SaO2: 100             ( 24 Dec 2017 07:04 ) pH: 7.44  /  pCO2: 37    /  pO2: 88    / HCO3: 24    / Base Excess: .9    /  SaO2: 98              ( 23 Dec 2017 02:44 ) pH: 7.34  /  pCO2: 45    /  pO2: 118   / HCO3: 24    / Base Excess: -1.2  /  SaO2: 99                  VBG:     RADIOLOGY & ADDITIONAL TESTS:  (Imaging Personally Reviewed)    Consultant(s) Notes Reviewed:      Care Discussed with Consultants/Other Providers:

## 2017-12-29 NOTE — CONSULT NOTE ADULT - ATTENDING COMMENTS
I have seen and examined the patient.  1. EEG findings- There is evidence of cortical irritation and cerebral dysfunction.     Clinically the patient appears well and mentation is appropriate.     - Repeat EEG     - MRI Brain  2. Lower Extremity Weakness - the patient reports weakness to be new, however neurosurg reports weakness prior to surgery.  Her weakness maybe an exacerbation of previous weakness in setting of medical illness/deconditioning.     If the patient's weakness does not improve or declines in the next 24-48 hours I would recommend considering alternative diagnosis including lumbar spine pathology or even AIDP      -  I would recommend PT/OT

## 2017-12-29 NOTE — PROGRESS NOTE ADULT - SUBJECTIVE AND OBJECTIVE BOX
ENT ISSUE: angioedema    HPI: 71yo female s/p fiberoptic nasal intubation 12/22 in ED for angioedema 12/22: glideoscope: angioedema of anterior tongue and uvula, right over left pharyngeal wall b/l, Anterior VC appear normal, with cuff leak  has good ventilation. Glideoscope done at bedside 12/26 showed significant improved. Patient was extubated 12/27. Pt was seen and examined at bedside. Pt states mild throat pain, otherwise improvement of symptoms. Pt denies fever, chills, n/v, SOB, HA, stridor, hoarseness, epistaxis.       Vital Signs Last 24 Hrs  T(C): 36.7 (29 Dec 2017 08:32), Max: 37.3 (28 Dec 2017 12:00)  T(F): 98.1 (29 Dec 2017 08:32), Max: 99.1 (28 Dec 2017 12:00)  HR: 60 (29 Dec 2017 08:32) (60 - 76)  BP: 157/68 (29 Dec 2017 08:32) (137/62 - 157/68)  BP(mean): 90 (28 Dec 2017 15:00) (89 - 99)  RR: 18 (29 Dec 2017 04:44) (18 - 22)  SpO2: 94% (29 Dec 2017 08:32) (94% - 99%)    LABS:                        9.0    7.5   )-----------( 637      ( 29 Dec 2017 06:41 )             27.9       PHYSICAL EXAM:  Gen: NAD, well-developed  Head: Normocephalic, Atraumatic  Face: no edema/erythema/fluctuance, parotid glands soft without mass  Eyes: PERRL, EOMI, no scleral injection  Nose: Nares bilaterally patent, no discharge  Mouth: No Stridor / Drooling / Trismus.  Mucosa moist, tongue/uvula midline, oropharynx clear  Neck: Flat, supple, no lymphadenopathy, trachea midline, no masses  Resp: breathing easily, no stridor  CV: no peripheral edema/cyanosis    Indirect Fiberopic laryngoscopy: (With Scope #2)  Nasopharynx, oropharynx, and hypopharynx clear, no bleeding. + mild subglottic edema, improving. Otherwise airway patent, no foreign body visualized. No erythema, pooling of secretions, masses or lesions. Vocal cords mobile with good contact b/l. + reflux changes.

## 2017-12-29 NOTE — CONSULT NOTE ADULT - ASSESSMENT
Patient is a 72y old women who presents with a chief complaint of swelling of tongue (22 Dec 2017 11:16). with a pmh significant for Type 2 DM on insulin,  HTN on ACE-I, Hypothyroidism, s/p Left knee replacement '99 c/b MRSA infection c/b CVA with no residual deficits, osteoarthritis, chronic pancreatitis (last episode > 10 yrs ago), spinal stenosis, with neurology consulted for EEG findings s/p left arm twitching in the MICU. Patient currently with diplopia and lower extremity weakness, likely secondary to pain. Unclear etiology of twitching in MICU, though EEG revealed a potential seizure focus.     Plan   - CTH w/out contrast   - Pending CTH may need further neuroimaging  - discussed AED with patient, who wants to discuss medication options with attending tomorrow.   - recommendations will be updated with attending input Patient is a 72y old women who presents with a chief complaint of swelling of tongue (22 Dec 2017 11:16). with a pmh significant for Type 2 DM on insulin,  HTN on ACE-I, Hypothyroidism, s/p Left knee replacement '99 c/b MRSA infection c/b CVA with no residual deficits, osteoarthritis, chronic pancreatitis (last episode > 10 yrs ago), spinal stenosis, with neurology consulted for EEG findings s/p left arm twitching in the MICU. Patient currently with diplopia and lower extremity weakness, likely secondary to pain. Unclear etiology of twitching in MICU, though EEG revealed a potential seizure focus.     Plan   - CTH w/out contrast   - discussed AED with patient, who wants to discuss medication options with attending tomorrow.   - recommendations will be updated with attending input

## 2017-12-29 NOTE — CONSULT NOTE ADULT - SUBJECTIVE AND OBJECTIVE BOX
Patient is a 72y old women who presents with a chief complaint of swelling of tongue (22 Dec 2017 11:16). with a pmh significant for Type 2 DM on insulin,  HTN on ACE-I, Hypothyroidism, s/p Left knee replacement '99 c/b MRSA infection c/b CVA with no residual deficits, osteoarthritis, chronic pancreatitis (last episode > 10 yrs ago), spinal stenosis, recent d/c from hospital end of November 2017 s/p L1-L4 lumbar fusion course complicated by UTI requiring antibiotics who presented to the ED w/ a cc of swollen tongue and difficulty swallowing. Pt found to have angioedema likely 2/2 ACE-inhibitor and was admitted to MICU for for airway protection s/p nasal intubation 12/22.     In the MICU pt as found to have one episode of left arm twitching, had subsequent EEG which revealed a potential seizure focus in the b/l frontal regions as well as the b/l anterior temporal regions, with evidence of moderate to severe diffuse or multifocal cerebral dysfunction. No seizures were recorded. Neurology was consulted today to f/u EEG findings and for recommendations for management.  Pt seen at bedside. She denies history of seizures or seizure like episodes currently and in past. No family history of seizures. History of stroke many years ago with no residual deficits. She endorses intermittent diplopia that comes and goes. She endorses chronic left extremity weakness worse on left than right, with pain s/p lumbar fusion. She endorses difficulty walking since admission secondary to weakness/pain. No other complaints. Denies headache, dizziness, chest pain, shortness of breath, N/V.      HPI:  72 yr old female with PMHx of DM2 on insulin therapy, HTN on ACE-I, Hypothyroidism, s/p Left knee replacement '99 c/b MRSA infection, osteoarthritis, chronic pancreatitis (last episode > 10 yrs ago), spinal stenosis, recent d/c from hospital end of November s/p L1-L4 lumbar fusion which was c/b by UTI requiring antibx therapy who now presents this a.m. from home BIBEMS after developing swelling and protrusion of tongue with difficulty swallowing. Received solumedrol 125 mg, bendryl 50 mg IM by EMS. In E.D. pt bronchoscopically nasally intubated with 7 fr tube, received 1 mg epi I.M., racemic epi Nebulizer. I.O placed in right tibial area and received atropine for secretions. Pt last was well last night and was able to eat dinner, last meds last night. Consult and admission to MICU for angioedema  pt denies new meds/food (22 Dec 2017 11:16)      REVIEW OF SYSTEMS: per HPI     MEDICATIONS    acetaminophen   Tablet. 650 milliGRAM(s) Oral every 6 hours PRN  amLODIPine   Tablet 10 milliGRAM(s) Oral at bedtime  aspirin  chewable 81 milliGRAM(s) Oral daily  diazepam    Tablet 2 milliGRAM(s) Oral every 8 hours PRN  docusate sodium Liquid 100 milliGRAM(s) Oral every 8 hours  enoxaparin Injectable 100 milliGRAM(s) SubCutaneous every 12 hours  gabapentin 400 milliGRAM(s) Oral every 8 hours  insulin glargine Injectable (LANTUS) 20 Unit(s) SubCutaneous at bedtime  insulin lispro (HumaLOG) corrective regimen sliding scale   SubCutaneous three times a day before meals  insulin lispro (HumaLOG) corrective regimen sliding scale   SubCutaneous at bedtime  levothyroxine 150 MICROGram(s) Oral daily  melatonin 5 milliGRAM(s) Oral at bedtime  meropenem IVPB 1000 milliGRAM(s) IV Intermittent every 8 hours  meropenem IVPB      metoprolol     tartrate 50 milliGRAM(s) Oral two times a day  oxyCODONE    5 mG/acetaminophen 325 mG 1 Tablet(s) Oral every 4 hours PRN  pantoprazole    Tablet 40 milliGRAM(s) Oral before breakfast  predniSONE   Tablet 40 milliGRAM(s) Oral daily  senna 2 Tablet(s) Oral at bedtime      PMH: MRSA (methicillin resistant Staphylococcus aureus) infection  Spinal stenosis  Osteoarthritis  Diverticulosis  Pancreatitis, chronic  Hypothyroid  Hypertension  Diabetes       PSH: H/O right inguinal hernia repair  H/O total knee replacement, bilateral  FH: cholecystectomy  H/O: hysterectomy      Family history: No history of dementia, strokes, or seizures   FAMILY HISTORY:  No pertinent family history in first degree relatives      SOCIAL HISTORY:  No history of tobacco or alcohol use     Allergies    ACE inhibitors (Anaphylaxis; Angioedema)    Intolerances      Vital Signs Last 24 Hrs  T(C): 37.6 (29 Dec 2017 12:45), Max: 37.6 (29 Dec 2017 12:45)  T(F): 99.7 (29 Dec 2017 12:45), Max: 99.7 (29 Dec 2017 12:45)  HR: 68 (29 Dec 2017 12:45) (60 - 74)  BP: 132/83 (29 Dec 2017 12:45) (132/83 - 157/68)  BP(mean): 90 (28 Dec 2017 15:00) (90 - 90)  RR: 18 (29 Dec 2017 12:45) (18 - 20)  SpO2: 98% (29 Dec 2017 12:45) (94% - 99%)      On Neurological Examination:    Mental Status - Patient is alert, awake, oriented X3. fluent, names, no dysarthria no aphasia, Follows commands well and able to answer questions appropriately. Mood and affect  normal    Cranial Nerves - PERRL, pupils large, EOMI, VFF, V1-V3 intact, no gross facial asymmetry, tongue/uvula midline    Motor Exam - RLE: 4-, LLE: no effort against gravity, limited by pain. RUE 4+, LUE: limited by shoulder pain     Sensory -    Intact to light touch x 4     Coord: FTN intact bilaterally     Gait -  not examined secondary to weakness    Reflexes:  UE: 2+                                        LABS:  CBC Full  -  ( 29 Dec 2017 06:41 )  WBC Count : 7.5 K/uL  Hemoglobin : 9.0 g/dL  Hematocrit : 27.9 %  Platelet Count - Automated : 637 K/uL  Mean Cell Volume : 86.4 fl  Mean Cell Hemoglobin : 27.8 pg  Mean Cell Hemoglobin Concentration : 32.2 gm/dL  Auto Neutrophil # : 4.4 K/uL  Auto Lymphocyte # : 2.5 K/uL  Auto Monocyte # : 0.6 K/uL  Auto Eosinophil # : 0.0 K/uL  Auto Basophil # : 0.0 K/uL  Auto Neutrophil % : 58.6 %  Auto Lymphocyte % : 32.9 %  Auto Monocyte % : 7.8 %  Auto Eosinophil % : 0.3 %  Auto Basophil % : 0.4 %      12-29    139  |  98  |  18  ----------------------------<  181<H>  3.8   |  28  |  0.55    Ca    9.0      29 Dec 2017 06:41  Phos  3.1     12-29  Mg     2.2     12-29    TPro  7.1  /  Alb  3.5  /  TBili  0.2  /  DBili  x   /  AST  9<L>  /  ALT  8<L>  /  AlkPhos  90  12-29    LIVER FUNCTIONS - ( 29 Dec 2017 06:41 )  Alb: 3.5 g/dL / Pro: 7.1 g/dL / ALK PHOS: 90 U/L / ALT: 8 U/L RC / AST: 9 U/L / GGT: x           Hemoglobin A1C:       PT/INR - ( 29 Dec 2017 06:41 )   PT: 12.5 sec;   INR: 1.14 ratio         PTT - ( 28 Dec 2017 02:29 )  PTT:27.9 sec    EEG 12/26/17:   Clinical Impression:  These findings indicate a potential seizures focus in the bilateral frontal regions as well as the bilateral anterior temporal regions. There is also evidence of moderate to severe diffuse or multifocal cerebral dysfunction (ie metabolic encephalopathy). There were no seizures recorded. Patient is a 72y old women who presents with a chief complaint of swelling of tongue (22 Dec 2017 11:16). with a pmh significant for Type 2 DM on insulin,  HTN on ACE-I, Hypothyroidism, s/p Left knee replacement '99 c/b MRSA infection c/b CVA with no residual deficits, osteoarthritis, chronic pancreatitis (last episode > 10 yrs ago), spinal stenosis, recent d/c from hospital end of November 2017 s/p L1-L4 lumbar fusion course complicated by UTI requiring antibiotics who presented to the ED w/ a cc of swollen tongue and difficulty swallowing. Pt found to have angioedema likely 2/2 ACE-inhibitor and was admitted to MICU for for airway protection s/p nasal intubation 12/22.     In the MICU pt as found to have one episode of left arm twitching, had subsequent EEG which revealed a potential seizure focus in the b/l frontal regions as well as the b/l anterior temporal regions, with evidence of moderate to severe diffuse or multifocal cerebral dysfunction. No seizures were recorded. Neurology was consulted today to f/u EEG findings and for recommendations for management.  Pt seen at bedside. She denies history of seizures or seizure like episodes currently and in past. No family history of seizures. History of stroke many years ago with no residual deficits. She endorses intermittent diplopia that comes and goes. She endorses chronic left extremity weakness worse on left than right, with pain s/p lumbar fusion. She endorses difficulty walking since admission secondary to weakness/pain. No other complaints. Denies headache, dizziness, shortness of breath, N/V.      HPI:  72 yr old female with PMHx of DM2 on insulin therapy, HTN on ACE-I, Hypothyroidism, s/p Left knee replacement '99 c/b MRSA infection, osteoarthritis, chronic pancreatitis (last episode > 10 yrs ago), spinal stenosis, recent d/c from hospital end of November s/p L1-L4 lumbar fusion which was c/b by UTI requiring antibx therapy who now presents this a.m. from home BIBEMS after developing swelling and protrusion of tongue with difficulty swallowing. Received solumedrol 125 mg, bendryl 50 mg IM by EMS. In E.D. pt bronchoscopically nasally intubated with 7 fr tube, received 1 mg epi I.M., racemic epi Nebulizer. I.O placed in right tibial area and received atropine for secretions. Pt last was well last night and was able to eat dinner, last meds last night. Consult and admission to MICU for angioedema  pt denies new meds/food (22 Dec 2017 11:16)      REVIEW OF SYSTEMS: per HPI     MEDICATIONS    acetaminophen   Tablet. 650 milliGRAM(s) Oral every 6 hours PRN  amLODIPine   Tablet 10 milliGRAM(s) Oral at bedtime  aspirin  chewable 81 milliGRAM(s) Oral daily  diazepam    Tablet 2 milliGRAM(s) Oral every 8 hours PRN  docusate sodium Liquid 100 milliGRAM(s) Oral every 8 hours  enoxaparin Injectable 100 milliGRAM(s) SubCutaneous every 12 hours  gabapentin 400 milliGRAM(s) Oral every 8 hours  insulin glargine Injectable (LANTUS) 20 Unit(s) SubCutaneous at bedtime  insulin lispro (HumaLOG) corrective regimen sliding scale   SubCutaneous three times a day before meals  insulin lispro (HumaLOG) corrective regimen sliding scale   SubCutaneous at bedtime  levothyroxine 150 MICROGram(s) Oral daily  melatonin 5 milliGRAM(s) Oral at bedtime  meropenem IVPB 1000 milliGRAM(s) IV Intermittent every 8 hours  meropenem IVPB      metoprolol     tartrate 50 milliGRAM(s) Oral two times a day  oxyCODONE    5 mG/acetaminophen 325 mG 1 Tablet(s) Oral every 4 hours PRN  pantoprazole    Tablet 40 milliGRAM(s) Oral before breakfast  predniSONE   Tablet 40 milliGRAM(s) Oral daily  senna 2 Tablet(s) Oral at bedtime      PMH: MRSA (methicillin resistant Staphylococcus aureus) infection  Spinal stenosis  Osteoarthritis  Diverticulosis  Pancreatitis, chronic  Hypothyroid  Hypertension  Diabetes       PSH: H/O right inguinal hernia repair  H/O total knee replacement, bilateral  FH: cholecystectomy  H/O: hysterectomy      Family history: No history of dementia, strokes, or seizures   FAMILY HISTORY:  No pertinent family history in first degree relatives      SOCIAL HISTORY:  No history of tobacco or alcohol use     Allergies    ACE inhibitors (Anaphylaxis; Angioedema)    Intolerances      Vital Signs Last 24 Hrs  T(C): 37.6 (29 Dec 2017 12:45), Max: 37.6 (29 Dec 2017 12:45)  T(F): 99.7 (29 Dec 2017 12:45), Max: 99.7 (29 Dec 2017 12:45)  HR: 68 (29 Dec 2017 12:45) (60 - 74)  BP: 132/83 (29 Dec 2017 12:45) (132/83 - 157/68)  BP(mean): 90 (28 Dec 2017 15:00) (90 - 90)  RR: 18 (29 Dec 2017 12:45) (18 - 20)  SpO2: 98% (29 Dec 2017 12:45) (94% - 99%)      On Neurological Examination:    Mental Status - Patient is alert, awake, oriented X3. fluent, names, no dysarthria no aphasia, Follows commands well and able to answer questions appropriately. Mood and affect  normal    Cranial Nerves - PERRL, pupils large, EOMI, VFF, V1-V3 intact, no gross facial asymmetry, tongue/uvula midline    Motor Exam - RLE: 4-, LLE: no effort against gravity, limited by pain. RUE 4+, LUE: limited by shoulder pain     Sensory -    Intact to light touch x 4     Coord: FTN intact bilaterally     Gait -  not examined secondary to weakness    Reflexes:  UE: 2+                                        LABS:  CBC Full  -  ( 29 Dec 2017 06:41 )  WBC Count : 7.5 K/uL  Hemoglobin : 9.0 g/dL  Hematocrit : 27.9 %  Platelet Count - Automated : 637 K/uL  Mean Cell Volume : 86.4 fl  Mean Cell Hemoglobin : 27.8 pg  Mean Cell Hemoglobin Concentration : 32.2 gm/dL  Auto Neutrophil # : 4.4 K/uL  Auto Lymphocyte # : 2.5 K/uL  Auto Monocyte # : 0.6 K/uL  Auto Eosinophil # : 0.0 K/uL  Auto Basophil # : 0.0 K/uL  Auto Neutrophil % : 58.6 %  Auto Lymphocyte % : 32.9 %  Auto Monocyte % : 7.8 %  Auto Eosinophil % : 0.3 %  Auto Basophil % : 0.4 %      12-29    139  |  98  |  18  ----------------------------<  181<H>  3.8   |  28  |  0.55    Ca    9.0      29 Dec 2017 06:41  Phos  3.1     12-29  Mg     2.2     12-29    TPro  7.1  /  Alb  3.5  /  TBili  0.2  /  DBili  x   /  AST  9<L>  /  ALT  8<L>  /  AlkPhos  90  12-29    LIVER FUNCTIONS - ( 29 Dec 2017 06:41 )  Alb: 3.5 g/dL / Pro: 7.1 g/dL / ALK PHOS: 90 U/L / ALT: 8 U/L RC / AST: 9 U/L / GGT: x           Hemoglobin A1C:       PT/INR - ( 29 Dec 2017 06:41 )   PT: 12.5 sec;   INR: 1.14 ratio         PTT - ( 28 Dec 2017 02:29 )  PTT:27.9 sec    EEG 12/26/17:   Clinical Impression:  These findings indicate a potential seizures focus in the bilateral frontal regions as well as the bilateral anterior temporal regions. There is also evidence of moderate to severe diffuse or multifocal cerebral dysfunction (ie metabolic encephalopathy). There were no seizures recorded. Patient is a 72y old women who presents with a chief complaint of swelling of tongue (22 Dec 2017 11:16). with a pmh significant for Type 2 DM on insulin,  HTN on ACE-I, Hypothyroidism, s/p Left knee replacement '99 c/b MRSA infection c/b CVA with no residual deficits, osteoarthritis, chronic pancreatitis (last episode > 10 yrs ago), spinal stenosis, recent d/c from hospital end of November 2017 s/p L1-L4 lumbar fusion course complicated by UTI requiring antibiotics who presented to the ED w/ a cc of swollen tongue and difficulty swallowing. Pt found to have angioedema likely 2/2 ACE-inhibitor and was admitted to MICU for for airway protection s/p nasal intubation 12/22.     In the MICU pt as found to have one episode of left arm twitching, had subsequent EEG which revealed a potential seizure focus in the b/l frontal regions as well as the b/l anterior temporal regions, with evidence of moderate to severe diffuse or multifocal cerebral dysfunction. No seizures were recorded. Neurology was consulted today to f/u EEG findings and for recommendations for management.  Pt seen at bedside. She denies history of seizures or seizure like episodes currently and in past. No family history of seizures. History of stroke many years ago with no residual deficits. She endorses intermittent diplopia that comes and goes. She endorses chronic left extremity weakness worse on left than right, with pain s/p lumbar fusion. She endorses difficulty walking since admission secondary to weakness/pain. No other complaints. Denies headache, dizziness, shortness of breath, N/V.        REVIEW OF SYSTEMS: per HPI     MEDICATIONS    acetaminophen   Tablet. 650 milliGRAM(s) Oral every 6 hours PRN  amLODIPine   Tablet 10 milliGRAM(s) Oral at bedtime  aspirin  chewable 81 milliGRAM(s) Oral daily  diazepam    Tablet 2 milliGRAM(s) Oral every 8 hours PRN  docusate sodium Liquid 100 milliGRAM(s) Oral every 8 hours  enoxaparin Injectable 100 milliGRAM(s) SubCutaneous every 12 hours  gabapentin 400 milliGRAM(s) Oral every 8 hours  insulin glargine Injectable (LANTUS) 20 Unit(s) SubCutaneous at bedtime  insulin lispro (HumaLOG) corrective regimen sliding scale   SubCutaneous three times a day before meals  insulin lispro (HumaLOG) corrective regimen sliding scale   SubCutaneous at bedtime  levothyroxine 150 MICROGram(s) Oral daily  melatonin 5 milliGRAM(s) Oral at bedtime  meropenem IVPB 1000 milliGRAM(s) IV Intermittent every 8 hours  meropenem IVPB      metoprolol     tartrate 50 milliGRAM(s) Oral two times a day  oxyCODONE    5 mG/acetaminophen 325 mG 1 Tablet(s) Oral every 4 hours PRN  pantoprazole    Tablet 40 milliGRAM(s) Oral before breakfast  predniSONE   Tablet 40 milliGRAM(s) Oral daily  senna 2 Tablet(s) Oral at bedtime      PMH: MRSA (methicillin resistant Staphylococcus aureus) infection  Spinal stenosis  Osteoarthritis  Diverticulosis  Pancreatitis, chronic  Hypothyroid  Hypertension  Diabetes       PSH: H/O right inguinal hernia repair  H/O total knee replacement, bilateral  FH: cholecystectomy  H/O: hysterectomy      Family history: No history of dementia, strokes, or seizures   FAMILY HISTORY:  No pertinent family history in first degree relatives      SOCIAL HISTORY:  No history of tobacco or alcohol use     Allergies    ACE inhibitors (Anaphylaxis; Angioedema)    Intolerances      Vital Signs Last 24 Hrs  T(C): 37.6 (29 Dec 2017 12:45), Max: 37.6 (29 Dec 2017 12:45)  T(F): 99.7 (29 Dec 2017 12:45), Max: 99.7 (29 Dec 2017 12:45)  HR: 68 (29 Dec 2017 12:45) (60 - 74)  BP: 132/83 (29 Dec 2017 12:45) (132/83 - 157/68)  BP(mean): 90 (28 Dec 2017 15:00) (90 - 90)  RR: 18 (29 Dec 2017 12:45) (18 - 20)  SpO2: 98% (29 Dec 2017 12:45) (94% - 99%)      On Neurological Examination:    Mental Status - Patient is alert, awake, oriented X3. fluent, names, no dysarthria no aphasia, Follows commands well and able to answer questions appropriately. Mood and affect  normal    Cranial Nerves - PERRL, pupils large, EOMI, VFF, V1-V3 intact, no gross facial asymmetry, tongue/uvula midline    Motor Exam - RLE: 4-, LLE: no effort against gravity, limited by pain. RUE 4+, LUE: limited by shoulder pain     Sensory -    Intact to light touch x 4     Coord: FTN intact bilaterally     Gait -  not examined secondary to weakness    Reflexes:  UE: 2+                                        LABS:  CBC Full  -  ( 29 Dec 2017 06:41 )  WBC Count : 7.5 K/uL  Hemoglobin : 9.0 g/dL  Hematocrit : 27.9 %  Platelet Count - Automated : 637 K/uL  Mean Cell Volume : 86.4 fl  Mean Cell Hemoglobin : 27.8 pg  Mean Cell Hemoglobin Concentration : 32.2 gm/dL  Auto Neutrophil # : 4.4 K/uL  Auto Lymphocyte # : 2.5 K/uL  Auto Monocyte # : 0.6 K/uL  Auto Eosinophil # : 0.0 K/uL  Auto Basophil # : 0.0 K/uL  Auto Neutrophil % : 58.6 %  Auto Lymphocyte % : 32.9 %  Auto Monocyte % : 7.8 %  Auto Eosinophil % : 0.3 %  Auto Basophil % : 0.4 %      12-29    139  |  98  |  18  ----------------------------<  181<H>  3.8   |  28  |  0.55    Ca    9.0      29 Dec 2017 06:41  Phos  3.1     12-29  Mg     2.2     12-29    TPro  7.1  /  Alb  3.5  /  TBili  0.2  /  DBili  x   /  AST  9<L>  /  ALT  8<L>  /  AlkPhos  90  12-29    LIVER FUNCTIONS - ( 29 Dec 2017 06:41 )  Alb: 3.5 g/dL / Pro: 7.1 g/dL / ALK PHOS: 90 U/L / ALT: 8 U/L RC / AST: 9 U/L / GGT: x           Hemoglobin A1C:       PT/INR - ( 29 Dec 2017 06:41 )   PT: 12.5 sec;   INR: 1.14 ratio         PTT - ( 28 Dec 2017 02:29 )  PTT:27.9 sec    EEG 12/26/17:   Clinical Impression:  These findings indicate a potential seizures focus in the bilateral frontal regions as well as the bilateral anterior temporal regions. There is also evidence of moderate to severe diffuse or multifocal cerebral dysfunction (ie metabolic encephalopathy). There were no seizures recorded.

## 2017-12-29 NOTE — PROGRESS NOTE ADULT - PROBLEM SELECTOR PLAN 1
- pt presentes with andioedema likely 2/2 ACE-I therapy  - s/p extubation - pt presentes with andioedema likely 2/2 ACE-I therapy  - s/p extubation  - will avoid all ACE-I and ARBs  - will continue to monitor  - will taper Decadron - pt presentes with andioedema likely 2/2 ACE-I therapy  - s/p extubation,  will avoid all ACE-I and ARBs  - will continue to monitor respiratory status  - will taper Decadron

## 2017-12-29 NOTE — PROGRESS NOTE ADULT - ASSESSMENT
71 y/o female with Angioedema requiring nasal fiberoptic intubation, extubated on 12/27. Laryngoscope done at bedside showed subglottic edema on 12/27. Laryngoscopy today shows improving mild subglottic edema and reflux changes.

## 2017-12-29 NOTE — PROGRESS NOTE ADULT - PROBLEM SELECTOR PLAN 2
- Pt found to have acute DVT in L brachial vein  - Given Lovenox for AC  - LE dopplers negative for LE DVT  - will d/c Lovenox and transition to Eliquis in anticipation of d/c if his insurance covers it - Pt found to have acute DVT in L brachial vein  - Given Lovenox for AC  - LE dopplers negative for LE DVT  - will d/c Lovenox and transition to Eliquis in anticipation of d/c if her insurance covers it

## 2017-12-30 DIAGNOSIS — R29.898 OTHER SYMPTOMS AND SIGNS INVOLVING THE MUSCULOSKELETAL SYSTEM: ICD-10-CM

## 2017-12-30 LAB
ANION GAP SERPL CALC-SCNC: 16 MMOL/L — SIGNIFICANT CHANGE UP (ref 5–17)
BUN SERPL-MCNC: 17 MG/DL — SIGNIFICANT CHANGE UP (ref 7–23)
CALCIUM SERPL-MCNC: 9.1 MG/DL — SIGNIFICANT CHANGE UP (ref 8.4–10.5)
CHLORIDE SERPL-SCNC: 96 MMOL/L — SIGNIFICANT CHANGE UP (ref 96–108)
CO2 SERPL-SCNC: 21 MMOL/L — LOW (ref 22–31)
CREAT SERPL-MCNC: 0.54 MG/DL — SIGNIFICANT CHANGE UP (ref 0.5–1.3)
CULTURE RESULTS: SIGNIFICANT CHANGE UP
CULTURE RESULTS: SIGNIFICANT CHANGE UP
GLUCOSE BLDC GLUCOMTR-MCNC: 149 MG/DL — HIGH (ref 70–99)
GLUCOSE BLDC GLUCOMTR-MCNC: 206 MG/DL — HIGH (ref 70–99)
GLUCOSE BLDC GLUCOMTR-MCNC: 293 MG/DL — HIGH (ref 70–99)
GLUCOSE BLDC GLUCOMTR-MCNC: 335 MG/DL — HIGH (ref 70–99)
GLUCOSE SERPL-MCNC: 343 MG/DL — HIGH (ref 70–99)
HCT VFR BLD CALC: 29.9 % — LOW (ref 34.5–45)
HGB BLD-MCNC: 9.9 G/DL — LOW (ref 11.5–15.5)
MAGNESIUM SERPL-MCNC: 2 MG/DL — SIGNIFICANT CHANGE UP (ref 1.6–2.6)
MCHC RBC-ENTMCNC: 28.8 PG — SIGNIFICANT CHANGE UP (ref 27–34)
MCHC RBC-ENTMCNC: 33 GM/DL — SIGNIFICANT CHANGE UP (ref 32–36)
MCV RBC AUTO: 87.4 FL — SIGNIFICANT CHANGE UP (ref 80–100)
PHOSPHATE SERPL-MCNC: 3.2 MG/DL — SIGNIFICANT CHANGE UP (ref 2.5–4.5)
PLATELET # BLD AUTO: 570 K/UL — HIGH (ref 150–400)
POTASSIUM SERPL-MCNC: 4.8 MMOL/L — SIGNIFICANT CHANGE UP (ref 3.5–5.3)
POTASSIUM SERPL-SCNC: 4.8 MMOL/L — SIGNIFICANT CHANGE UP (ref 3.5–5.3)
RBC # BLD: 3.42 M/UL — LOW (ref 3.8–5.2)
RBC # FLD: 16.1 % — HIGH (ref 10.3–14.5)
SODIUM SERPL-SCNC: 133 MMOL/L — LOW (ref 135–145)
SPECIMEN SOURCE: SIGNIFICANT CHANGE UP
SPECIMEN SOURCE: SIGNIFICANT CHANGE UP
WBC # BLD: 7.6 K/UL — SIGNIFICANT CHANGE UP (ref 3.8–10.5)
WBC # FLD AUTO: 7.6 K/UL — SIGNIFICANT CHANGE UP (ref 3.8–10.5)

## 2017-12-30 PROCEDURE — 70551 MRI BRAIN STEM W/O DYE: CPT | Mod: 26

## 2017-12-30 PROCEDURE — 99233 SBSQ HOSP IP/OBS HIGH 50: CPT | Mod: GC

## 2017-12-30 RX ORDER — CYCLOBENZAPRINE HYDROCHLORIDE 10 MG/1
5 TABLET, FILM COATED ORAL ONCE
Qty: 0 | Refills: 0 | Status: COMPLETED | OUTPATIENT
Start: 2017-12-30 | End: 2017-12-30

## 2017-12-30 RX ORDER — OXYCODONE HYDROCHLORIDE 5 MG/1
10 TABLET ORAL EVERY 4 HOURS
Qty: 0 | Refills: 0 | Status: DISCONTINUED | OUTPATIENT
Start: 2017-12-30 | End: 2018-01-01

## 2017-12-30 RX ORDER — DOCUSATE SODIUM 100 MG
100 CAPSULE ORAL THREE TIMES A DAY
Qty: 0 | Refills: 0 | Status: DISCONTINUED | OUTPATIENT
Start: 2017-12-30 | End: 2018-01-03

## 2017-12-30 RX ORDER — OXYCODONE HYDROCHLORIDE 5 MG/1
5 TABLET ORAL EVERY 4 HOURS
Qty: 0 | Refills: 0 | Status: DISCONTINUED | OUTPATIENT
Start: 2017-12-30 | End: 2018-01-01

## 2017-12-30 RX ADMIN — OXYCODONE HYDROCHLORIDE 10 MILLIGRAM(S): 5 TABLET ORAL at 11:30

## 2017-12-30 RX ADMIN — Medication 40 MILLIGRAM(S): at 06:34

## 2017-12-30 RX ADMIN — ENOXAPARIN SODIUM 100 MILLIGRAM(S): 100 INJECTION SUBCUTANEOUS at 06:34

## 2017-12-30 RX ADMIN — GABAPENTIN 400 MILLIGRAM(S): 400 CAPSULE ORAL at 06:35

## 2017-12-30 RX ADMIN — MEROPENEM 100 MILLIGRAM(S): 1 INJECTION INTRAVENOUS at 22:44

## 2017-12-30 RX ADMIN — OXYCODONE AND ACETAMINOPHEN 1 TABLET(S): 5; 325 TABLET ORAL at 05:16

## 2017-12-30 RX ADMIN — Medication 50 MILLIGRAM(S): at 18:27

## 2017-12-30 RX ADMIN — Medication 3: at 18:25

## 2017-12-30 RX ADMIN — ENOXAPARIN SODIUM 100 MILLIGRAM(S): 100 INJECTION SUBCUTANEOUS at 18:27

## 2017-12-30 RX ADMIN — OXYCODONE HYDROCHLORIDE 10 MILLIGRAM(S): 5 TABLET ORAL at 22:44

## 2017-12-30 RX ADMIN — Medication 5 MILLIGRAM(S): at 22:44

## 2017-12-30 RX ADMIN — CYCLOBENZAPRINE HYDROCHLORIDE 5 MILLIGRAM(S): 10 TABLET, FILM COATED ORAL at 19:44

## 2017-12-30 RX ADMIN — Medication 100 MILLIGRAM(S): at 06:35

## 2017-12-30 RX ADMIN — PANTOPRAZOLE SODIUM 40 MILLIGRAM(S): 20 TABLET, DELAYED RELEASE ORAL at 06:34

## 2017-12-30 RX ADMIN — OXYCODONE HYDROCHLORIDE 10 MILLIGRAM(S): 5 TABLET ORAL at 10:54

## 2017-12-30 RX ADMIN — Medication 81 MILLIGRAM(S): at 14:00

## 2017-12-30 RX ADMIN — Medication 150 MICROGRAM(S): at 06:36

## 2017-12-30 RX ADMIN — MEROPENEM 100 MILLIGRAM(S): 1 INJECTION INTRAVENOUS at 15:32

## 2017-12-30 RX ADMIN — MEROPENEM 100 MILLIGRAM(S): 1 INJECTION INTRAVENOUS at 06:36

## 2017-12-30 RX ADMIN — INSULIN GLARGINE 20 UNIT(S): 100 INJECTION, SOLUTION SUBCUTANEOUS at 22:43

## 2017-12-30 RX ADMIN — Medication 100 MILLIGRAM(S): at 14:01

## 2017-12-30 RX ADMIN — OXYCODONE HYDROCHLORIDE 10 MILLIGRAM(S): 5 TABLET ORAL at 15:35

## 2017-12-30 RX ADMIN — GABAPENTIN 400 MILLIGRAM(S): 400 CAPSULE ORAL at 14:01

## 2017-12-30 RX ADMIN — Medication 4: at 13:59

## 2017-12-30 RX ADMIN — GABAPENTIN 400 MILLIGRAM(S): 400 CAPSULE ORAL at 22:43

## 2017-12-30 RX ADMIN — INSULIN GLARGINE 20 UNIT(S): 100 INJECTION, SOLUTION SUBCUTANEOUS at 00:22

## 2017-12-30 RX ADMIN — AMLODIPINE BESYLATE 10 MILLIGRAM(S): 2.5 TABLET ORAL at 22:42

## 2017-12-30 RX ADMIN — Medication 100 MILLIGRAM(S): at 22:43

## 2017-12-30 RX ADMIN — OXYCODONE HYDROCHLORIDE 10 MILLIGRAM(S): 5 TABLET ORAL at 23:14

## 2017-12-30 RX ADMIN — OXYCODONE HYDROCHLORIDE 10 MILLIGRAM(S): 5 TABLET ORAL at 16:10

## 2017-12-30 RX ADMIN — Medication 50 MILLIGRAM(S): at 06:36

## 2017-12-30 RX ADMIN — SENNA PLUS 2 TABLET(S): 8.6 TABLET ORAL at 22:43

## 2017-12-30 RX ADMIN — Medication 1: at 00:23

## 2017-12-30 NOTE — PROGRESS NOTE ADULT - PROBLEM SELECTOR PLAN 8
- DVT ppx: pt currently anticoagulated on Lovenox with plans to transition to Elaquis  - Diet: CC diet  - Dispo: JONNY once medically stable    Larry Colmenares, PGY-1  [facility]  Pager [phone] - DVT ppx: pt currently anticoagulated on Lovenox with plans to transition to Elaquis  - Diet: CC diet  - Dispo: JONNY once medically stable    Kaur Bush, PGY3  Care Model B  Pager [phone]

## 2017-12-30 NOTE — PROGRESS NOTE ADULT - SUBJECTIVE AND OBJECTIVE BOX
Patient is a 72y old  Female who presents with a chief complaint of swelling of tongue (22 Dec 2017 11:16)    SUBJECTIVE / OVERNIGHT EVENTS: Patient complaining of worsening lower extremity pain, left>right. Also c/o weakness in the left lower extremity, which is worsened since yesterday. Percocet is not providing relief for pain at this time.     MEDICATIONS  (STANDING):  amLODIPine   Tablet 10 milliGRAM(s) Oral at bedtime  aspirin  chewable 81 milliGRAM(s) Oral daily  docusate sodium Liquid 100 milliGRAM(s) Oral every 8 hours  enoxaparin Injectable 100 milliGRAM(s) SubCutaneous every 12 hours  gabapentin 400 milliGRAM(s) Oral every 8 hours  insulin glargine Injectable (LANTUS) 20 Unit(s) SubCutaneous at bedtime  insulin lispro (HumaLOG) corrective regimen sliding scale   SubCutaneous three times a day before meals  insulin lispro (HumaLOG) corrective regimen sliding scale   SubCutaneous at bedtime  levothyroxine 150 MICROGram(s) Oral daily  melatonin 5 milliGRAM(s) Oral at bedtime  meropenem IVPB 1000 milliGRAM(s) IV Intermittent every 8 hours  meropenem IVPB      metoprolol     tartrate 50 milliGRAM(s) Oral two times a day  pantoprazole    Tablet 40 milliGRAM(s) Oral before breakfast  predniSONE   Tablet 40 milliGRAM(s) Oral daily  senna 2 Tablet(s) Oral at bedtime    MEDICATIONS  (PRN):  acetaminophen   Tablet. 650 milliGRAM(s) Oral every 6 hours PRN Mild Pain (1 - 3)  diazepam    Tablet 2 milliGRAM(s) Oral every 8 hours PRN Anxiety and Agitation  oxyCODONE    5 mG/acetaminophen 325 mG 1 Tablet(s) Oral every 4 hours PRN Moderate or Severe Pain (4 - 6)    T(C): 37.3 (12-30-17 @ 06:28), Max: 37.6 (12-29-17 @ 12:45)  HR: 80 (12-30-17 @ 06:28) (60 - 91)  BP: 153/74 (12-30-17 @ 06:28) (115/73 - 157/68)  RR: 18 (12-30-17 @ 06:28) (18 - 18)  SpO2: 100% (12-30-17 @ 06:28) (94% - 100%)  CAPILLARY BLOOD GLUCOSE      POCT Blood Glucose.: 298 mg/dL (29 Dec 2017 22:35)  POCT Blood Glucose.: 252 mg/dL (29 Dec 2017 17:50)  POCT Blood Glucose.: 204 mg/dL (29 Dec 2017 12:47)  POCT Blood Glucose.: 131 mg/dL (29 Dec 2017 08:41)    I&O's Summary    29 Dec 2017 07:01  -  30 Dec 2017 07:00  --------------------------------------------------------  IN: 50 mL / OUT: 0 mL / NET: 50 mL      PHYSICAL EXAM:  GENERAL: NAD, well-developed  HEAD:  Atraumatic, Normocephalic  EYES: EOMI, PERRLA, conjunctiva and sclera clear  NECK: Supple, No JVD  CHEST/LUNG: Clear to auscultation bilaterally; No wheeze  HEART: Regular rate and rhythm; No murmurs, rubs, or gallops  ABDOMEN: Soft, Nondistended; Bowel sounds present  EXTREMITIES:  2+ Peripheral Pulses, No clubbing, cyanosis, or edema. TTP in the b/l LE L>>R  PSYCH: AAOx3  NEUROLOGY: LLE weakness, 3+/5, RLE 5/5  SKIN: No rashes or lesions    LABS:                        9.0    7.5   )-----------( 637      ( 29 Dec 2017 06:41 )             27.9     12-29    139  |  98  |  18  ----------------------------<  181<H>  3.8   |  28  |  0.55    Ca    9.0      29 Dec 2017 06:41  Phos  3.1     12-29  Mg     2.2     12-29    TPro  7.1  /  Alb  3.5  /  TBili  0.2  /  DBili  x   /  AST  9<L>  /  ALT  8<L>  /  AlkPhos  90  12-29    PT/INR - ( 29 Dec 2017 06:41 )   PT: 12.5 sec;   INR: 1.14 ratio         Consultant(s) Notes Reviewed:  neurology, ENT     Care Discussed with Consultants/Other Providers: neurology

## 2017-12-30 NOTE — PROGRESS NOTE ADULT - PROBLEM SELECTOR PLAN 3
Today patient c/o worsening b/l lower extremity pain, L>R and weakness of LLE 3+/5  - differential diagnosis for pain includes radiculopathy vs more likely neuropathy 2/2 diabetes  - differential diagnosis for weakness includes generalized deconditioning vs neuropathy vs spinal process which is concerning given recent spinal surgery  - will consult neurosurgery today for evaluation Today patient c/o worsening b/l lower extremity pain, L>R and weakness of LLE 3+/5  - differential diagnosis for pain includes radiculopathy vs more likely neuropathy 2/2 diabetes  - differential diagnosis for weakness includes generalized deconditioning vs neuropathy vs spinal process which is concerning given recent spinal surgery  - will consult neurosurgery today for evaluation, per neurosurg unlikely spinal process as exam is c/w exam prior to her spinal surgery  - neurology recommend repeat EEG and MRI head in setting of abnormal EEG performed while patient in MICU   - continue to f/u neuro and neurosx recs

## 2017-12-30 NOTE — CONSULT NOTE ADULT - SUBJECTIVE AND OBJECTIVE BOX
p ([id number])     HPI:  72 yr old female with PMHx of DM2 on insulin therapy, HTN on ACE-I, Hypothyroidism, s/p Left knee replacement '99 c/b MRSA infection, osteoarthritis, chronic pancreatitis (last episode > 10 yrs ago), spinal stenosis, recent d/c from hospital end of Novemeber s/p L1-L4 LLIF and L5-S1 PLIF which was c/b by UTI requiring antibx therapy who now presents this a.m. from home BIBEMS after developing swelling and protrusion of tongue with difficulty swallowing. Received solumedrol 125 mg, bendryl 50 mg IM by EMS. In E.D. pt bronchoscopically nasally intubated with 7 fr tube, received 1 mg epi I.M., racemic epi Nebulizer. I.O placed in right tibial area and received atropine for secretions. Pt last was well last night and was able to eat dinner, last meds last night. Consult and admission to MICU for angioedema  pt denies new meds/food (22 Dec 2017 11:16)    Patient states that she still has some back pain which is better than before the surgery. She feels that he legs are mildly weaker than when she came into the hospital but does endorse that she is able to move them. She denies any numbness, paresthesias, saddle anethesia, bowel or bladder incontinence.   PAST MEDICAL HISTORY   MRSA (methicillin resistant Staphylococcus aureus) infection  Spinal stenosis  Osteoarthritis  Diverticulosis  Pancreatitis, chronic  Hypothyroid  Hypertension  Diabetes    PAST SURGICAL HISTORY   H/O right inguinal hernia repair  H/O total knee replacement, bilateral  FH: cholecystectomy  H/O: hysterectomy    ACE inhibitors (Anaphylaxis; Angioedema)      MEDICATIONS:  Antibiotics:  meropenem IVPB 1000 milliGRAM(s) IV Intermittent every 8 hours  meropenem IVPB        Neuro:  acetaminophen   Tablet. 650 milliGRAM(s) Oral every 6 hours PRN  diazepam    Tablet 2 milliGRAM(s) Oral every 8 hours PRN  gabapentin 400 milliGRAM(s) Oral every 8 hours  melatonin 5 milliGRAM(s) Oral at bedtime  oxyCODONE    5 mG/acetaminophen 325 mG 1 Tablet(s) Oral every 4 hours PRN    Anticoagulation:  aspirin  chewable 81 milliGRAM(s) Oral daily  enoxaparin Injectable 100 milliGRAM(s) SubCutaneous every 12 hours    Other:  amLODIPine   Tablet 10 milliGRAM(s) Oral at bedtime  docusate sodium Liquid 100 milliGRAM(s) Oral every 8 hours  insulin glargine Injectable (LANTUS) 20 Unit(s) SubCutaneous at bedtime  insulin lispro (HumaLOG) corrective regimen sliding scale   SubCutaneous three times a day before meals  insulin lispro (HumaLOG) corrective regimen sliding scale   SubCutaneous at bedtime  levothyroxine 150 MICROGram(s) Oral daily  metoprolol     tartrate 50 milliGRAM(s) Oral two times a day  pantoprazole    Tablet 40 milliGRAM(s) Oral before breakfast  predniSONE   Tablet 40 milliGRAM(s) Oral daily  senna 2 Tablet(s) Oral at bedtime      SOCIAL HISTORY:   Occupation:   Marital Status:     FAMILY HISTORY:  No pertinent family history in first degree relatives  No pertinent family history in first degree relatives      REVIEW OF SYSTEMS:  as per HPI  General:  Eyes:  ENT:  Cardiac:  Respiratory:  GI:  Musculoskeletal:   Skin:  Neurologic:   Psychiatric:     PHYSICAL EXAMINATION:   T(C): 37.3 (12-30-17 @ 06:28), Max: 37.6 (12-29-17 @ 12:45)  HR: 80 (12-30-17 @ 06:28) (64 - 91)  BP: 153/74 (12-30-17 @ 06:28) (115/73 - 153/85)  RR: 18 (12-30-17 @ 06:28) (18 - 18)  SpO2: 100% (12-30-17 @ 06:28) (94% - 100%)  Wt(kg): --    General Examination:     Neurologic Examination:           PHYSICAL EXAM:    Constitutional: No Acute Distress     Neurological: AOx3, Following Commands    Motor exam:          Upper extremity                         Delt     Bicep     Tricep    HG                                                 R         4+/5        5/5        5/5       5/5 (right delt pain limited)                                               L          5/5        5/5        5/5       5/5          Lower extremity                        HF         KF        KE       DF         PF                                                  R        4/5        4+/5    4+/5    5/5        5/5                                               L        4-/5       4/5       4/5      5/5        5/5 LLE pain limited weakness proximally                                                 Sensation: [x] intact to light touch  [] decreased:     No clonus, down going babinski, no hoffmans    Incision:     LABS:                        9.0    7.5   )-----------( 637      ( 29 Dec 2017 06:41 )             27.9     12-29    139  |  98  |  18  ----------------------------<  181<H>  3.8   |  28  |  0.55    Ca    9.0      29 Dec 2017 06:41  Phos  3.1     12-29  Mg     2.2     12-29    TPro  7.1  /  Alb  3.5  /  TBili  0.2  /  DBili  x   /  AST  9<L>  /  ALT  8<L>  /  AlkPhos  90  12-29    PT/INR - ( 29 Dec 2017 06:41 )   PT: 12.5 sec;   INR: 1.14 ratio               RADIOLOGY & ADDITIONAL STUDIES:

## 2017-12-30 NOTE — PROGRESS NOTE ADULT - PROBLEM SELECTOR PLAN 4
- Proteus Mirabilis and ESBL Ecoli in the urine   - Pt received a 4 day course of Zosyn in the MICU  - given the fact that it is ESBL Ecoli, will c/w IV Meropenem for a total of 7 days - Proteus Mirabilis and ESBL Ecoli in the urine   - Pt received a 4 day course of Zosyn in the MICU, given the fact that it is ESBL Ecoli, will c/w IV Meropenem for a total of 7 days for complicated UTI

## 2017-12-30 NOTE — PROGRESS NOTE ADULT - ASSESSMENT
72 yr old female with PMHx of DM2 on insulin therapy, HTN on ACE-I, Hypothyroidism, s/p Left knee replacement '99 c/b MRSA infection, osteoarthritis, chronic pancreatitis (last episode > 10 yrs ago), spinal stenosis, recent d/c from hospital end of Novemeber s/p L1-L4 lumbar fusion which was c/b by UTI requiring antibx therapy initially a/w angioedema requiring intubation and MICU stay, course c/b ESBL E. coli UTI, DVT and lower extremity weakness.

## 2017-12-30 NOTE — PROGRESS NOTE ADULT - PROBLEM SELECTOR PLAN 1
- andioedema likely 2/2 ACE-I therapy  - s/p extubation,  will avoid all ACE-I and ARBs  - will continue to monitor respiratory status, currently stable  - will taper Decadron  - will need outpatient ENT follow-up

## 2017-12-30 NOTE — CONSULT NOTE ADULT - ASSESSMENT
72 year old female h/o spinal stenosis s/p L1-4 LLIF and L5-S1 PLIF here now with angioedema requiring intubation. Patient's surgery was in November 2017, at the time of surgery both pre and post operatively patient had objective b/l  LE weakness. Patients current exam is objectively similar to her exam upon previous admission. Her weakness is partially pain limited. Her new subjective weakness is likely due in part to some deconditioning from acute illness. 72 year old female h/o spinal stenosis s/p L1-4 LLIF and L5-S1 PLIF here now with angioedema requiring intubation. Patient's surgery was in November 2017, at the time of surgery both pre and post operatively patient had objective b/l  LE weakness. Patients current exam is objectively similar to her exam upon previous admission. Her weakness is partially pain limited. Her new subjective weakness is likely due in part to deconditioning from acute illness. No neurosurgical intervention. Patient should follow up with Dr. Balderas as an outpatient.

## 2017-12-30 NOTE — PROGRESS NOTE ADULT - PROBLEM SELECTOR PLAN 2
- Acute DVT in L brachial vein, lower extremity dopplers negative for DVT  - Given Lovenox for AC, with plan for eventual transition to NOAC

## 2017-12-31 LAB
ANION GAP SERPL CALC-SCNC: 14 MMOL/L — SIGNIFICANT CHANGE UP (ref 5–17)
APTT BLD: 29.7 SEC — SIGNIFICANT CHANGE UP (ref 27.5–37.4)
BUN SERPL-MCNC: 17 MG/DL — SIGNIFICANT CHANGE UP (ref 7–23)
CALCIUM SERPL-MCNC: 8.9 MG/DL — SIGNIFICANT CHANGE UP (ref 8.4–10.5)
CHLORIDE SERPL-SCNC: 98 MMOL/L — SIGNIFICANT CHANGE UP (ref 96–108)
CO2 SERPL-SCNC: 21 MMOL/L — LOW (ref 22–31)
CREAT SERPL-MCNC: 0.54 MG/DL — SIGNIFICANT CHANGE UP (ref 0.5–1.3)
GLUCOSE BLDC GLUCOMTR-MCNC: 156 MG/DL — HIGH (ref 70–99)
GLUCOSE BLDC GLUCOMTR-MCNC: 242 MG/DL — HIGH (ref 70–99)
GLUCOSE BLDC GLUCOMTR-MCNC: 250 MG/DL — HIGH (ref 70–99)
GLUCOSE BLDC GLUCOMTR-MCNC: 339 MG/DL — HIGH (ref 70–99)
GLUCOSE SERPL-MCNC: 355 MG/DL — HIGH (ref 70–99)
HCT VFR BLD CALC: 26.4 % — LOW (ref 34.5–45)
HGB BLD-MCNC: 8.6 G/DL — LOW (ref 11.5–15.5)
INR BLD: 1.11 RATIO — SIGNIFICANT CHANGE UP (ref 0.88–1.16)
MAGNESIUM SERPL-MCNC: 1.8 MG/DL — SIGNIFICANT CHANGE UP (ref 1.6–2.6)
MCHC RBC-ENTMCNC: 28.1 PG — SIGNIFICANT CHANGE UP (ref 27–34)
MCHC RBC-ENTMCNC: 32.5 GM/DL — SIGNIFICANT CHANGE UP (ref 32–36)
MCV RBC AUTO: 86.6 FL — SIGNIFICANT CHANGE UP (ref 80–100)
PHOSPHATE SERPL-MCNC: 3.3 MG/DL — SIGNIFICANT CHANGE UP (ref 2.5–4.5)
PLATELET # BLD AUTO: 442 K/UL — HIGH (ref 150–400)
POTASSIUM SERPL-MCNC: 4.8 MMOL/L — SIGNIFICANT CHANGE UP (ref 3.5–5.3)
POTASSIUM SERPL-SCNC: 4.8 MMOL/L — SIGNIFICANT CHANGE UP (ref 3.5–5.3)
PROTHROM AB SERPL-ACNC: 12 SEC — SIGNIFICANT CHANGE UP (ref 9.8–12.7)
RBC # BLD: 3.04 M/UL — LOW (ref 3.8–5.2)
RBC # FLD: 16.4 % — HIGH (ref 10.3–14.5)
SODIUM SERPL-SCNC: 133 MMOL/L — LOW (ref 135–145)
WBC # BLD: 7.8 K/UL — SIGNIFICANT CHANGE UP (ref 3.8–10.5)
WBC # FLD AUTO: 7.8 K/UL — SIGNIFICANT CHANGE UP (ref 3.8–10.5)

## 2017-12-31 RX ORDER — INSULIN LISPRO 100/ML
2 VIAL (ML) SUBCUTANEOUS
Qty: 0 | Refills: 0 | Status: DISCONTINUED | OUTPATIENT
Start: 2017-12-31 | End: 2018-01-03

## 2017-12-31 RX ORDER — ACETAMINOPHEN 500 MG
1000 TABLET ORAL EVERY 6 HOURS
Qty: 0 | Refills: 0 | Status: DISCONTINUED | OUTPATIENT
Start: 2017-12-31 | End: 2018-01-03

## 2017-12-31 RX ORDER — ACETAMINOPHEN 500 MG
100 TABLET ORAL EVERY 6 HOURS
Qty: 0 | Refills: 0 | Status: DISCONTINUED | OUTPATIENT
Start: 2017-12-31 | End: 2017-12-31

## 2017-12-31 RX ADMIN — OXYCODONE HYDROCHLORIDE 10 MILLIGRAM(S): 5 TABLET ORAL at 14:37

## 2017-12-31 RX ADMIN — Medication 50 MILLIGRAM(S): at 17:14

## 2017-12-31 RX ADMIN — MEROPENEM 100 MILLIGRAM(S): 1 INJECTION INTRAVENOUS at 21:36

## 2017-12-31 RX ADMIN — GABAPENTIN 400 MILLIGRAM(S): 400 CAPSULE ORAL at 21:36

## 2017-12-31 RX ADMIN — Medication 4: at 13:18

## 2017-12-31 RX ADMIN — OXYCODONE HYDROCHLORIDE 10 MILLIGRAM(S): 5 TABLET ORAL at 20:00

## 2017-12-31 RX ADMIN — Medication 1000 MILLIGRAM(S): at 11:34

## 2017-12-31 RX ADMIN — OXYCODONE HYDROCHLORIDE 10 MILLIGRAM(S): 5 TABLET ORAL at 04:43

## 2017-12-31 RX ADMIN — ENOXAPARIN SODIUM 100 MILLIGRAM(S): 100 INJECTION SUBCUTANEOUS at 17:12

## 2017-12-31 RX ADMIN — Medication 50 MILLIGRAM(S): at 05:47

## 2017-12-31 RX ADMIN — Medication 1000 MILLIGRAM(S): at 17:14

## 2017-12-31 RX ADMIN — SENNA PLUS 2 TABLET(S): 8.6 TABLET ORAL at 21:36

## 2017-12-31 RX ADMIN — GABAPENTIN 400 MILLIGRAM(S): 400 CAPSULE ORAL at 14:10

## 2017-12-31 RX ADMIN — OXYCODONE HYDROCHLORIDE 10 MILLIGRAM(S): 5 TABLET ORAL at 11:00

## 2017-12-31 RX ADMIN — OXYCODONE HYDROCHLORIDE 10 MILLIGRAM(S): 5 TABLET ORAL at 10:30

## 2017-12-31 RX ADMIN — Medication 100 MILLIGRAM(S): at 05:46

## 2017-12-31 RX ADMIN — Medication 1000 MILLIGRAM(S): at 23:04

## 2017-12-31 RX ADMIN — Medication 150 MICROGRAM(S): at 05:46

## 2017-12-31 RX ADMIN — AMLODIPINE BESYLATE 10 MILLIGRAM(S): 2.5 TABLET ORAL at 21:36

## 2017-12-31 RX ADMIN — GABAPENTIN 400 MILLIGRAM(S): 400 CAPSULE ORAL at 05:46

## 2017-12-31 RX ADMIN — PANTOPRAZOLE SODIUM 40 MILLIGRAM(S): 20 TABLET, DELAYED RELEASE ORAL at 05:48

## 2017-12-31 RX ADMIN — MEROPENEM 100 MILLIGRAM(S): 1 INJECTION INTRAVENOUS at 14:09

## 2017-12-31 RX ADMIN — Medication 1: at 09:00

## 2017-12-31 RX ADMIN — OXYCODONE HYDROCHLORIDE 10 MILLIGRAM(S): 5 TABLET ORAL at 05:13

## 2017-12-31 RX ADMIN — Medication 100 MILLIGRAM(S): at 14:10

## 2017-12-31 RX ADMIN — Medication 30 MILLIGRAM(S): at 05:46

## 2017-12-31 RX ADMIN — Medication 100 MILLIGRAM(S): at 21:36

## 2017-12-31 RX ADMIN — MEROPENEM 100 MILLIGRAM(S): 1 INJECTION INTRAVENOUS at 05:47

## 2017-12-31 RX ADMIN — OXYCODONE HYDROCHLORIDE 10 MILLIGRAM(S): 5 TABLET ORAL at 20:30

## 2017-12-31 RX ADMIN — ENOXAPARIN SODIUM 100 MILLIGRAM(S): 100 INJECTION SUBCUTANEOUS at 05:47

## 2017-12-31 RX ADMIN — Medication 2: at 18:06

## 2017-12-31 RX ADMIN — Medication 5 MILLIGRAM(S): at 21:37

## 2017-12-31 RX ADMIN — OXYCODONE HYDROCHLORIDE 10 MILLIGRAM(S): 5 TABLET ORAL at 15:00

## 2017-12-31 RX ADMIN — INSULIN GLARGINE 20 UNIT(S): 100 INJECTION, SOLUTION SUBCUTANEOUS at 22:49

## 2017-12-31 RX ADMIN — Medication 81 MILLIGRAM(S): at 11:34

## 2017-12-31 NOTE — PROGRESS NOTE ADULT - PROBLEM SELECTOR PLAN 3
- Today patient c/o continued b/l lower extremity pain, and weakness of LLE 3+/5  - differential diagnosis for pain includes radiculopathy vs more likely neuropathy 2/2 diabetes  - differential diagnosis for weakness includes generalized deconditioning vs neuropathy vs spinal process which is concerning given recent spinal surgery  - per neurosurg, unlikely spinal process as exam is c/w exam prior to her spinal surgery  - neurology recommend repeat EEG and MRI head in setting of abnormal EEG performed while patient in MICU. MRI read pending  - will continue to f/u neuro and neurosx recs

## 2017-12-31 NOTE — PROGRESS NOTE ADULT - PROBLEM SELECTOR PLAN 8
- DVT ppx: pt currently anticoagulated on Lovenox with plans to transition to [place]  - Diet: CC diet  - Dispo: JONNY once medically stable    Larry Colmenares, PGY-1  [facility]  Pager [phone]

## 2017-12-31 NOTE — PROGRESS NOTE ADULT - PROBLEM SELECTOR PLAN 5
- blood sugar levels WNL  - continue Lantus 20 and low dose ISS  - FS with meals and at bedtime  - will continue to monitor - pt with hyperglycemia  - will increase Lantus 20 and add 2 u humalog ac with ISS  - FS with meals and at bedtime  - will continue to monitor

## 2017-12-31 NOTE — PROGRESS NOTE ADULT - PROBLEM SELECTOR PLAN 4
- Proteus Mirabilis and ESBL Ecoli in the urine   - Pt received a 4 day course of Zosyn in the MICU, given the fact that it is ESBL Ecoli, will c/w IV Meropenem for a total of 7 days for complicated UTI

## 2017-12-31 NOTE — PROGRESS NOTE ADULT - SUBJECTIVE AND OBJECTIVE BOX
Patient is a 72y old  Female who presents with a chief complaint of swelling of tongue (22 Dec 2017 11:16)      SUBJECTIVE / OVERNIGHT EVENTS:   Pt continues to complain of b/l LE pain from the lower lumbar spine, which is 10/10 in intensity. Pt reports no improvement with Flexeril and minimial improvement of pain with Oxycodone. Pt is alos complaining of continued weakness of the b/l LE L>>R. Pt has no other complaints and denies any fevers/chills, CP, palpitations, SOB, abdominal pain, N/V/D/C, melena, BRBPR, dysuria.    MEDICATIONS  (STANDING):  amLODIPine   Tablet 10 milliGRAM(s) Oral at bedtime  aspirin  chewable 81 milliGRAM(s) Oral daily  docusate sodium 100 milliGRAM(s) Oral three times a day  enoxaparin Injectable 100 milliGRAM(s) SubCutaneous every 12 hours  gabapentin 400 milliGRAM(s) Oral every 8 hours  insulin glargine Injectable (LANTUS) 20 Unit(s) SubCutaneous at bedtime  insulin lispro (HumaLOG) corrective regimen sliding scale   SubCutaneous three times a day before meals  insulin lispro (HumaLOG) corrective regimen sliding scale   SubCutaneous at bedtime  levothyroxine 150 MICROGram(s) Oral daily  melatonin 5 milliGRAM(s) Oral at bedtime  meropenem IVPB 1000 milliGRAM(s) IV Intermittent every 8 hours  meropenem IVPB      metoprolol     tartrate 50 milliGRAM(s) Oral two times a day  pantoprazole    Tablet 40 milliGRAM(s) Oral before breakfast  predniSONE   Tablet 30 milliGRAM(s) Oral daily  senna 2 Tablet(s) Oral at bedtime    MEDICATIONS  (PRN):  acetaminophen   Tablet. 650 milliGRAM(s) Oral every 6 hours PRN Mild Pain (1 - 3)  diazepam    Tablet 2 milliGRAM(s) Oral every 8 hours PRN Anxiety and Agitation  oxyCODONE    IR 10 milliGRAM(s) Oral every 4 hours PRN Severe Pain (7 - 10)  oxyCODONE    IR 5 milliGRAM(s) Oral every 4 hours PRN Moderate Pain (4 - 6)      OBJECTIVE:    Vital Signs Last 24 Hrs  T(C): 36.7 (31 Dec 2017 05:16), Max: 36.9 (30 Dec 2017 14:32)  T(F): 98.1 (31 Dec 2017 05:16), Max: 98.4 (30 Dec 2017 14:32)  HR: 71 (31 Dec 2017 05:16) (71 - 78)  BP: 111/58 (31 Dec 2017 05:16) (111/58 - 142/76)  BP(mean): --  RR: 18 (31 Dec 2017 05:16) (18 - 18)  SpO2: 99% (31 Dec 2017 05:16) (96% - 99%)    CAPILLARY BLOOD GLUCOSE      POCT Blood Glucose.: 206 mg/dL (30 Dec 2017 22:31)  POCT Blood Glucose.: 293 mg/dL (30 Dec 2017 18:01)  POCT Blood Glucose.: 335 mg/dL (30 Dec 2017 12:44)  POCT Blood Glucose.: 149 mg/dL (30 Dec 2017 08:55)    I&O's Summary    30 Dec 2017 07:01  -  31 Dec 2017 07:00  --------------------------------------------------------  IN: 100 mL / OUT: 0 mL / NET: 100 mL      PHYSICAL EXAM:  GENERAL: NAD, well-developed  HEAD:  Atraumatic, Normocephalic  EYES: EOMI, PERRLA, conjunctiva and sclera clear  NECK: Supple, No JVD  CHEST/LUNG: Clear to auscultation bilaterally; No wheeze  HEART: Regular rate and rhythm; No murmurs, rubs, or gallops  ABDOMEN: Soft, Nondistended; Bowel sounds present  EXTREMITIES:  2+ Peripheral Pulses, No clubbing, cyanosis, or edema.  PSYCH: AAOx3  NEUROLOGY: LLE weakness, 3/5, RLE 5/5  SKIN: No rashes or lesions    LABS:                        9.9    7.6   )-----------( 570      ( 30 Dec 2017 13:07 )             29.9     Auto Eosinophil # x     / Auto Eosinophil % x     / Auto Neutrophil # x     / Auto Neutrophil % x     / BANDS % x        12-30    133<L>  |  96  |  17  ----------------------------<  343<H>  4.8   |  21<L>  |  0.54    Ca    9.1      30 Dec 2017 13:07  Mg     2.0     12-30  Phos  3.2     12-30                RESPIRATORY  VENT:    ABG: ( 27 Dec 2017 09:18 ) pH: 7.49  /  pCO2: 36    /  pO2: 286   / HCO3: 27    / Base Excess: 4.0   /  SaO2: 100                 VBG:     RADIOLOGY & ADDITIONAL TESTS:  (Imaging Personally Reviewed)    Consultant(s) Notes Reviewed:      Care Discussed with Consultants/Other Providers: Patient is a 72y old  Female who presents with a chief complaint of swelling of tongue (22 Dec 2017 11:16)      SUBJECTIVE / OVERNIGHT EVENTS:   Pt continues to complain of b/l LE pain from the lower lumbar spine, which is 10/10 in intensity. Pt reports no improvement with Flexeril and minimial improvement of pain with Oxycodone. Pt is also complaining of continued weakness of the b/l LE L>>R. Pt has no other complaints and denies any fevers/chills, CP, palpitations, SOB, abdominal pain, N/V/D/C, melena, BRBPR, dysuria.    MEDICATIONS  (STANDING):  amLODIPine   Tablet 10 milliGRAM(s) Oral at bedtime  aspirin  chewable 81 milliGRAM(s) Oral daily  docusate sodium 100 milliGRAM(s) Oral three times a day  enoxaparin Injectable 100 milliGRAM(s) SubCutaneous every 12 hours  gabapentin 400 milliGRAM(s) Oral every 8 hours  insulin glargine Injectable (LANTUS) 20 Unit(s) SubCutaneous at bedtime  insulin lispro (HumaLOG) corrective regimen sliding scale   SubCutaneous three times a day before meals  insulin lispro (HumaLOG) corrective regimen sliding scale   SubCutaneous at bedtime  levothyroxine 150 MICROGram(s) Oral daily  melatonin 5 milliGRAM(s) Oral at bedtime  meropenem IVPB 1000 milliGRAM(s) IV Intermittent every 8 hours  meropenem IVPB      metoprolol     tartrate 50 milliGRAM(s) Oral two times a day  pantoprazole    Tablet 40 milliGRAM(s) Oral before breakfast  predniSONE   Tablet 30 milliGRAM(s) Oral daily  senna 2 Tablet(s) Oral at bedtime    MEDICATIONS  (PRN):  acetaminophen   Tablet. 650 milliGRAM(s) Oral every 6 hours PRN Mild Pain (1 - 3)  diazepam    Tablet 2 milliGRAM(s) Oral every 8 hours PRN Anxiety and Agitation  oxyCODONE    IR 10 milliGRAM(s) Oral every 4 hours PRN Severe Pain (7 - 10)  oxyCODONE    IR 5 milliGRAM(s) Oral every 4 hours PRN Moderate Pain (4 - 6)      OBJECTIVE:    Vital Signs Last 24 Hrs  T(C): 36.7 (31 Dec 2017 05:16), Max: 36.9 (30 Dec 2017 14:32)  T(F): 98.1 (31 Dec 2017 05:16), Max: 98.4 (30 Dec 2017 14:32)  HR: 71 (31 Dec 2017 05:16) (71 - 78)  BP: 111/58 (31 Dec 2017 05:16) (111/58 - 142/76)  BP(mean): --  RR: 18 (31 Dec 2017 05:16) (18 - 18)  SpO2: 99% (31 Dec 2017 05:16) (96% - 99%)    CAPILLARY BLOOD GLUCOSE      POCT Blood Glucose.: 206 mg/dL (30 Dec 2017 22:31)  POCT Blood Glucose.: 293 mg/dL (30 Dec 2017 18:01)  POCT Blood Glucose.: 335 mg/dL (30 Dec 2017 12:44)  POCT Blood Glucose.: 149 mg/dL (30 Dec 2017 08:55)    I&O's Summary    30 Dec 2017 07:01  -  31 Dec 2017 07:00  --------------------------------------------------------  IN: 100 mL / OUT: 0 mL / NET: 100 mL      PHYSICAL EXAM:  GENERAL: NAD, well-developed  HEAD:  Atraumatic, Normocephalic  EYES: EOMI, PERRLA, conjunctiva and sclera clear  NECK: Supple, No JVD  CHEST/LUNG: Clear to auscultation bilaterally; No wheeze  HEART: Regular rate and rhythm; No murmurs, rubs, or gallops  ABDOMEN: Soft, Nondistended; Bowel sounds present  EXTREMITIES:  2+ Peripheral Pulses, No clubbing, cyanosis, or edema.  PSYCH: AAOx3  NEUROLOGY: LLE weakness, 3/5, RLE 5/5  SKIN: No rashes or lesions    LABS:                        9.9    7.6   )-----------( 570      ( 30 Dec 2017 13:07 )             29.9     Auto Eosinophil # x     / Auto Eosinophil % x     / Auto Neutrophil # x     / Auto Neutrophil % x     / BANDS % x        12-30    133<L>  |  96  |  17  ----------------------------<  343<H>  4.8   |  21<L>  |  0.54    Ca    9.1      30 Dec 2017 13:07  Mg     2.0     12-30  Phos  3.2     12-30                RESPIRATORY  VENT:    ABG: ( 27 Dec 2017 09:18 ) pH: 7.49  /  pCO2: 36    /  pO2: 286   / HCO3: 27    / Base Excess: 4.0   /  SaO2: 100                 VBG:     RADIOLOGY & ADDITIONAL TESTS:  (Imaging Personally Reviewed)    Consultant(s) Notes Reviewed:      Care Discussed with Consultants/Other Providers:

## 2017-12-31 NOTE — PROGRESS NOTE ADULT - PROBLEM SELECTOR PLAN 2
- Acute DVT in L brachial vein, lower extremity dopplers negative for DVT  - continue Lovenox for AC, with plan for eventual transition to NOAC

## 2017-12-31 NOTE — PROGRESS NOTE ADULT - PROBLEM SELECTOR PLAN 1
- angioedema likely 2/2 ACE-I therapy  - s/p extubation,  will avoid all ACE-I and ARBs  - will continue to monitor respiratory status, currently stable  - will continue to taper Decadron  - pt will  require outpatient ENT follow-up - angioedema likely 2/2 ACE-I therapy  - s/p extubation,  will avoid all ACE-I and ARBs  - will continue to monitor respiratory status, currently stable  - will continue to taper prednisone  - pt will  require outpatient ENT follow-up

## 2018-01-01 LAB
ALBUMIN SERPL ELPH-MCNC: 3.4 G/DL — SIGNIFICANT CHANGE UP (ref 3.3–5)
ALP SERPL-CCNC: 96 U/L — SIGNIFICANT CHANGE UP (ref 40–120)
ALT FLD-CCNC: 11 U/L RC — SIGNIFICANT CHANGE UP (ref 10–45)
ANION GAP SERPL CALC-SCNC: 10 MMOL/L — SIGNIFICANT CHANGE UP (ref 5–17)
APTT BLD: 38.6 SEC — HIGH (ref 27.5–37.4)
AST SERPL-CCNC: 12 U/L — SIGNIFICANT CHANGE UP (ref 10–40)
BILIRUB SERPL-MCNC: 0.2 MG/DL — SIGNIFICANT CHANGE UP (ref 0.2–1.2)
BUN SERPL-MCNC: 11 MG/DL — SIGNIFICANT CHANGE UP (ref 7–23)
CALCIUM SERPL-MCNC: 9.2 MG/DL — SIGNIFICANT CHANGE UP (ref 8.4–10.5)
CHLORIDE SERPL-SCNC: 101 MMOL/L — SIGNIFICANT CHANGE UP (ref 96–108)
CO2 SERPL-SCNC: 24 MMOL/L — SIGNIFICANT CHANGE UP (ref 22–31)
CREAT SERPL-MCNC: 0.48 MG/DL — LOW (ref 0.5–1.3)
GLUCOSE BLDC GLUCOMTR-MCNC: 143 MG/DL — HIGH (ref 70–99)
GLUCOSE BLDC GLUCOMTR-MCNC: 162 MG/DL — HIGH (ref 70–99)
GLUCOSE BLDC GLUCOMTR-MCNC: 187 MG/DL — HIGH (ref 70–99)
GLUCOSE BLDC GLUCOMTR-MCNC: 199 MG/DL — HIGH (ref 70–99)
GLUCOSE BLDC GLUCOMTR-MCNC: 237 MG/DL — HIGH (ref 70–99)
GLUCOSE BLDC GLUCOMTR-MCNC: 319 MG/DL — HIGH (ref 70–99)
GLUCOSE SERPL-MCNC: 158 MG/DL — HIGH (ref 70–99)
HCT VFR BLD CALC: 26.7 % — LOW (ref 34.5–45)
HGB BLD-MCNC: 9.2 G/DL — LOW (ref 11.5–15.5)
INR BLD: 1.12 RATIO — SIGNIFICANT CHANGE UP (ref 0.88–1.16)
MAGNESIUM SERPL-MCNC: 1.9 MG/DL — SIGNIFICANT CHANGE UP (ref 1.6–2.6)
MCHC RBC-ENTMCNC: 30.2 PG — SIGNIFICANT CHANGE UP (ref 27–34)
MCHC RBC-ENTMCNC: 34.5 GM/DL — SIGNIFICANT CHANGE UP (ref 32–36)
MCV RBC AUTO: 87.4 FL — SIGNIFICANT CHANGE UP (ref 80–100)
PHOSPHATE SERPL-MCNC: 3.2 MG/DL — SIGNIFICANT CHANGE UP (ref 2.5–4.5)
PLATELET # BLD AUTO: 462 K/UL — HIGH (ref 150–400)
POTASSIUM SERPL-MCNC: 4.6 MMOL/L — SIGNIFICANT CHANGE UP (ref 3.5–5.3)
POTASSIUM SERPL-SCNC: 4.6 MMOL/L — SIGNIFICANT CHANGE UP (ref 3.5–5.3)
PROT SERPL-MCNC: 6.7 G/DL — SIGNIFICANT CHANGE UP (ref 6–8.3)
PROTHROM AB SERPL-ACNC: 12.2 SEC — SIGNIFICANT CHANGE UP (ref 9.8–12.7)
RBC # BLD: 3.05 M/UL — LOW (ref 3.8–5.2)
RBC # FLD: 16.4 % — HIGH (ref 10.3–14.5)
SODIUM SERPL-SCNC: 135 MMOL/L — SIGNIFICANT CHANGE UP (ref 135–145)
WBC # BLD: 7.6 K/UL — SIGNIFICANT CHANGE UP (ref 3.8–10.5)
WBC # FLD AUTO: 7.6 K/UL — SIGNIFICANT CHANGE UP (ref 3.8–10.5)

## 2018-01-01 PROCEDURE — 99233 SBSQ HOSP IP/OBS HIGH 50: CPT | Mod: GC

## 2018-01-01 RX ORDER — GABAPENTIN 400 MG/1
600 CAPSULE ORAL EVERY 8 HOURS
Qty: 0 | Refills: 0 | Status: DISCONTINUED | OUTPATIENT
Start: 2018-01-01 | End: 2018-01-03

## 2018-01-01 RX ORDER — OXYCODONE HYDROCHLORIDE 5 MG/1
15 TABLET ORAL EVERY 4 HOURS
Qty: 0 | Refills: 0 | Status: DISCONTINUED | OUTPATIENT
Start: 2018-01-01 | End: 2018-01-02

## 2018-01-01 RX ORDER — OXYCODONE HYDROCHLORIDE 5 MG/1
10 TABLET ORAL EVERY 4 HOURS
Qty: 0 | Refills: 0 | Status: DISCONTINUED | OUTPATIENT
Start: 2018-01-01 | End: 2018-01-02

## 2018-01-01 RX ADMIN — Medication 4: at 13:32

## 2018-01-01 RX ADMIN — Medication 5 MILLIGRAM(S): at 21:46

## 2018-01-01 RX ADMIN — OXYCODONE HYDROCHLORIDE 15 MILLIGRAM(S): 5 TABLET ORAL at 17:11

## 2018-01-01 RX ADMIN — OXYCODONE HYDROCHLORIDE 10 MILLIGRAM(S): 5 TABLET ORAL at 10:12

## 2018-01-01 RX ADMIN — MEROPENEM 100 MILLIGRAM(S): 1 INJECTION INTRAVENOUS at 21:46

## 2018-01-01 RX ADMIN — Medication 100 MILLIGRAM(S): at 21:47

## 2018-01-01 RX ADMIN — Medication 100 MILLIGRAM(S): at 13:33

## 2018-01-01 RX ADMIN — Medication 1000 MILLIGRAM(S): at 13:31

## 2018-01-01 RX ADMIN — MEROPENEM 100 MILLIGRAM(S): 1 INJECTION INTRAVENOUS at 05:38

## 2018-01-01 RX ADMIN — SENNA PLUS 2 TABLET(S): 8.6 TABLET ORAL at 21:47

## 2018-01-01 RX ADMIN — Medication 1000 MILLIGRAM(S): at 05:36

## 2018-01-01 RX ADMIN — Medication 50 MILLIGRAM(S): at 17:13

## 2018-01-01 RX ADMIN — ENOXAPARIN SODIUM 100 MILLIGRAM(S): 100 INJECTION SUBCUTANEOUS at 05:36

## 2018-01-01 RX ADMIN — Medication 81 MILLIGRAM(S): at 13:32

## 2018-01-01 RX ADMIN — Medication 2 UNIT(S): at 09:41

## 2018-01-01 RX ADMIN — PANTOPRAZOLE SODIUM 40 MILLIGRAM(S): 20 TABLET, DELAYED RELEASE ORAL at 05:37

## 2018-01-01 RX ADMIN — GABAPENTIN 400 MILLIGRAM(S): 400 CAPSULE ORAL at 05:37

## 2018-01-01 RX ADMIN — Medication 1: at 18:33

## 2018-01-01 RX ADMIN — Medication 30 MILLIGRAM(S): at 05:37

## 2018-01-01 RX ADMIN — Medication 50 MILLIGRAM(S): at 05:37

## 2018-01-01 RX ADMIN — AMLODIPINE BESYLATE 10 MILLIGRAM(S): 2.5 TABLET ORAL at 21:47

## 2018-01-01 RX ADMIN — Medication 2 UNIT(S): at 13:33

## 2018-01-01 RX ADMIN — OXYCODONE HYDROCHLORIDE 15 MILLIGRAM(S): 5 TABLET ORAL at 22:49

## 2018-01-01 RX ADMIN — OXYCODONE HYDROCHLORIDE 10 MILLIGRAM(S): 5 TABLET ORAL at 06:30

## 2018-01-01 RX ADMIN — Medication 150 MICROGRAM(S): at 05:37

## 2018-01-01 RX ADMIN — Medication 100 MILLIGRAM(S): at 05:37

## 2018-01-01 RX ADMIN — OXYCODONE HYDROCHLORIDE 15 MILLIGRAM(S): 5 TABLET ORAL at 22:19

## 2018-01-01 RX ADMIN — GABAPENTIN 600 MILLIGRAM(S): 400 CAPSULE ORAL at 13:32

## 2018-01-01 RX ADMIN — GABAPENTIN 600 MILLIGRAM(S): 400 CAPSULE ORAL at 21:47

## 2018-01-01 RX ADMIN — INSULIN GLARGINE 20 UNIT(S): 100 INJECTION, SOLUTION SUBCUTANEOUS at 21:46

## 2018-01-01 RX ADMIN — ENOXAPARIN SODIUM 100 MILLIGRAM(S): 100 INJECTION SUBCUTANEOUS at 17:13

## 2018-01-01 RX ADMIN — Medication 2 UNIT(S): at 18:34

## 2018-01-01 RX ADMIN — MEROPENEM 100 MILLIGRAM(S): 1 INJECTION INTRAVENOUS at 13:41

## 2018-01-01 NOTE — PROGRESS NOTE ADULT - ASSESSMENT
72y old women who presents with a chief complaint of swelling of tongue (22 Dec 2017 11:16). with a pmh significant for Type 2 DM on insulin,  HTN on ACE-I, Hypothyroidism, s/p Left knee replacement '99 c/b MRSA infection c/b CVA with no residual deficits, osteoarthritis, chronic pancreatitis (last episode > 10 yrs ago), spinal stenosis, with neurology consulted for EEG findings s/p left arm twitching in the MICU. Patient currently with diplopia and lower extremity weakness, likely secondary to pain. Unclear etiology of twitching in MICU, though EEG revealed a potential seizure focus.   MRI brain showed no acute pathology    Recommend:  Repeat routine EEG  May need LP  PT/OT 72y old women who presents with a chief complaint of swelling of tongue (22 Dec 2017 11:16). with a pmh significant for Type 2 DM on insulin,  HTN on ACE-I, Hypothyroidism, s/p Left knee replacement '99 c/b MRSA infection c/b CVA with no residual deficits, osteoarthritis, chronic pancreatitis (last episode > 10 yrs ago), spinal stenosis, with neurology consulted for EEG findings s/p left arm twitching in the MICU. Patient currently with diplopia and lower extremity weakness, likely secondary to pain. Unclear etiology of twitching in MICU, though EEG revealed a potential seizure focus.   MRI brain showed no acute pathology    Recommend:  Repeat routine EEG  At this time there is no indication of a progressive weakness in the lower extremity  PT/OT

## 2018-01-01 NOTE — PROGRESS NOTE ADULT - PROBLEM SELECTOR PLAN 5
- pt with hyperglycemia  - continue Lantus 20 and 2u humalog ac with ISS  - insulin requirement will likely decrease as steroid doses continue to decrease  - FS with meals and at bedtime  - will continue to monitor - pt with hyperglycemia  - continue Lantus 20 and 2u humalog ac with ISS for now.  - insulin requirement will likely decrease as steroid doses continue to decrease  - FS with meals and at bedtime  - will continue to monitor

## 2018-01-01 NOTE — PROGRESS NOTE ADULT - SUBJECTIVE AND OBJECTIVE BOX
Patient is a 72y old  Female who presents with a chief complaint of swelling of tongue (22 Dec 2017 11:16)      SUBJECTIVE / OVERNIGHT EVENTS:  Pt had MANUEL overnight. Pt reports that sh    MEDICATIONS  (STANDING):  acetaminophen   Tablet 1000 milliGRAM(s) Oral every 6 hours  amLODIPine   Tablet 10 milliGRAM(s) Oral at bedtime  aspirin  chewable 81 milliGRAM(s) Oral daily  docusate sodium 100 milliGRAM(s) Oral three times a day  enoxaparin Injectable 100 milliGRAM(s) SubCutaneous every 12 hours  gabapentin 400 milliGRAM(s) Oral every 8 hours  insulin glargine Injectable (LANTUS) 20 Unit(s) SubCutaneous at bedtime  insulin lispro (HumaLOG) corrective regimen sliding scale   SubCutaneous three times a day before meals  insulin lispro (HumaLOG) corrective regimen sliding scale   SubCutaneous at bedtime  insulin lispro Injectable (HumaLOG) 2 Unit(s) SubCutaneous three times a day before meals  levothyroxine 150 MICROGram(s) Oral daily  melatonin 5 milliGRAM(s) Oral at bedtime  meropenem IVPB 1000 milliGRAM(s) IV Intermittent every 8 hours  meropenem IVPB      metoprolol     tartrate 50 milliGRAM(s) Oral two times a day  pantoprazole    Tablet 40 milliGRAM(s) Oral before breakfast  senna 2 Tablet(s) Oral at bedtime    MEDICATIONS  (PRN):  diazepam    Tablet 2 milliGRAM(s) Oral every 8 hours PRN Anxiety and Agitation  oxyCODONE    IR 10 milliGRAM(s) Oral every 4 hours PRN Severe Pain (7 - 10)  oxyCODONE    IR 5 milliGRAM(s) Oral every 4 hours PRN Moderate Pain (4 - 6)      OBJECTIVE:    Vital Signs Last 24 Hrs  T(C): 37 (01 Jan 2018 06:21), Max: 37 (31 Dec 2017 20:57)  T(F): 98.6 (01 Jan 2018 06:21), Max: 98.6 (31 Dec 2017 20:57)  HR: 67 (01 Jan 2018 06:21) (65 - 68)  BP: 127/76 (01 Jan 2018 06:21) (106/66 - 136/73)  BP(mean): --  RR: 18 (01 Jan 2018 06:21) (18 - 18)  SpO2: 100% (01 Jan 2018 06:21) (98% - 100%)    CAPILLARY BLOOD GLUCOSE      POCT Blood Glucose.: 250 mg/dL (31 Dec 2017 22:36)  POCT Blood Glucose.: 242 mg/dL (31 Dec 2017 17:49)  POCT Blood Glucose.: 339 mg/dL (31 Dec 2017 12:24)  POCT Blood Glucose.: 156 mg/dL (31 Dec 2017 08:41)    I&O's Summary    31 Dec 2017 07:01  -  01 Jan 2018 07:00  --------------------------------------------------------  IN: 100 mL / OUT: 750 mL / NET: -650 mL        PHYSICAL EXAM:  GENERAL: NAD, well-developed  HEAD:  Atraumatic, Normocephalic  EYES: EOMI, PERRLA, conjunctiva and sclera clear  NECK: Supple, No JVD  CHEST/LUNG: Clear to auscultation bilaterally; No wheeze  HEART: Regular rate and rhythm; No murmurs, rubs, or gallops  ABDOMEN: Soft, Nontender, Nondistended; Bowel sounds present  EXTREMITIES:  2+ Peripheral Pulses, No clubbing, cyanosis, or edema  PSYCH: AAOx3  NEUROLOGY: non-focal  SKIN: No rashes or lesions    LABS:                        8.6    7.8   )-----------( 442      ( 31 Dec 2017 13:06 )             26.4     Auto Eosinophil # x     / Auto Eosinophil % x     / Auto Neutrophil # x     / Auto Neutrophil % x     / BANDS % x                            9.9    7.6   )-----------( 570      ( 30 Dec 2017 13:07 )             29.9     Auto Eosinophil # x     / Auto Eosinophil % x     / Auto Neutrophil # x     / Auto Neutrophil % x     / BANDS % x        12-31    133<L>  |  98  |  17  ----------------------------<  355<H>  4.8   |  21<L>  |  0.54  12-30    133<L>  |  96  |  17  ----------------------------<  343<H>  4.8   |  21<L>  |  0.54    Ca    8.9      31 Dec 2017 13:06  Mg     1.8     12-31  Phos  3.3     12-31    PT/INR - ( 31 Dec 2017 13:06 )   PT: 12.0 sec;   INR: 1.11 ratio         PTT - ( 31 Dec 2017 13:06 )  PTT:29.7 sec            RESPIRATORY  VENT:    ABG: ( 27 Dec 2017 09:18 ) pH: 7.49  /  pCO2: 36    /  pO2: 286   / HCO3: 27    / Base Excess: 4.0   /  SaO2: 100                 VBG:     RADIOLOGY & ADDITIONAL TESTS:  (Imaging Personally Reviewed)    Consultant(s) Notes Reviewed:      Care Discussed with Consultants/Other Providers: Patient is a 72y old  Female who presents with a chief complaint of swelling of tongue (22 Dec 2017 11:16)      SUBJECTIVE / OVERNIGHT EVENTS:  Pt had MANUEL overnight. Pt reports that she is not sure whether her pain is better controlled with the addition of standing tylenol. Pt continues to complain of pain in the LE from her lumbar spine down. Pt has no complaints and denies any     MEDICATIONS  (STANDING):  acetaminophen   Tablet 1000 milliGRAM(s) Oral every 6 hours  amLODIPine   Tablet 10 milliGRAM(s) Oral at bedtime  aspirin  chewable 81 milliGRAM(s) Oral daily  docusate sodium 100 milliGRAM(s) Oral three times a day  enoxaparin Injectable 100 milliGRAM(s) SubCutaneous every 12 hours  gabapentin 400 milliGRAM(s) Oral every 8 hours  insulin glargine Injectable (LANTUS) 20 Unit(s) SubCutaneous at bedtime  insulin lispro (HumaLOG) corrective regimen sliding scale   SubCutaneous three times a day before meals  insulin lispro (HumaLOG) corrective regimen sliding scale   SubCutaneous at bedtime  insulin lispro Injectable (HumaLOG) 2 Unit(s) SubCutaneous three times a day before meals  levothyroxine 150 MICROGram(s) Oral daily  melatonin 5 milliGRAM(s) Oral at bedtime  meropenem IVPB 1000 milliGRAM(s) IV Intermittent every 8 hours  meropenem IVPB      metoprolol     tartrate 50 milliGRAM(s) Oral two times a day  pantoprazole    Tablet 40 milliGRAM(s) Oral before breakfast  senna 2 Tablet(s) Oral at bedtime    MEDICATIONS  (PRN):  diazepam    Tablet 2 milliGRAM(s) Oral every 8 hours PRN Anxiety and Agitation  oxyCODONE    IR 10 milliGRAM(s) Oral every 4 hours PRN Severe Pain (7 - 10)  oxyCODONE    IR 5 milliGRAM(s) Oral every 4 hours PRN Moderate Pain (4 - 6)      OBJECTIVE:    Vital Signs Last 24 Hrs  T(C): 37 (01 Jan 2018 06:21), Max: 37 (31 Dec 2017 20:57)  T(F): 98.6 (01 Jan 2018 06:21), Max: 98.6 (31 Dec 2017 20:57)  HR: 67 (01 Jan 2018 06:21) (65 - 68)  BP: 127/76 (01 Jan 2018 06:21) (106/66 - 136/73)  BP(mean): --  RR: 18 (01 Jan 2018 06:21) (18 - 18)  SpO2: 100% (01 Jan 2018 06:21) (98% - 100%)    CAPILLARY BLOOD GLUCOSE      POCT Blood Glucose.: 250 mg/dL (31 Dec 2017 22:36)  POCT Blood Glucose.: 242 mg/dL (31 Dec 2017 17:49)  POCT Blood Glucose.: 339 mg/dL (31 Dec 2017 12:24)  POCT Blood Glucose.: 156 mg/dL (31 Dec 2017 08:41)    I&O's Summary    31 Dec 2017 07:01  -  01 Jan 2018 07:00  --------------------------------------------------------  IN: 100 mL / OUT: 750 mL / NET: -650 mL        PHYSICAL EXAM:  GENERAL: NAD, well-developed  HEAD:  Atraumatic, Normocephalic  EYES: EOMI, PERRLA, conjunctiva and sclera clear  NECK: Supple, No JVD  CHEST/LUNG: Clear to auscultation bilaterally; No wheeze  HEART: Regular rate and rhythm; No murmurs, rubs, or gallops  ABDOMEN: Soft, Nontender, Nondistended; Bowel sounds present  EXTREMITIES:  2+ Peripheral Pulses, No clubbing, cyanosis, or edema  PSYCH: AAOx3  NEUROLOGY: non-focal  SKIN: No rashes or lesions    LABS:                        8.6    7.8   )-----------( 442      ( 31 Dec 2017 13:06 )             26.4     Auto Eosinophil # x     / Auto Eosinophil % x     / Auto Neutrophil # x     / Auto Neutrophil % x     / BANDS % x                            9.9    7.6   )-----------( 570      ( 30 Dec 2017 13:07 )             29.9     Auto Eosinophil # x     / Auto Eosinophil % x     / Auto Neutrophil # x     / Auto Neutrophil % x     / BANDS % x        12-31    133<L>  |  98  |  17  ----------------------------<  355<H>  4.8   |  21<L>  |  0.54  12-30    133<L>  |  96  |  17  ----------------------------<  343<H>  4.8   |  21<L>  |  0.54    Ca    8.9      31 Dec 2017 13:06  Mg     1.8     12-31  Phos  3.3     12-31    PT/INR - ( 31 Dec 2017 13:06 )   PT: 12.0 sec;   INR: 1.11 ratio         PTT - ( 31 Dec 2017 13:06 )  PTT:29.7 sec            RESPIRATORY  VENT:    ABG: ( 27 Dec 2017 09:18 ) pH: 7.49  /  pCO2: 36    /  pO2: 286   / HCO3: 27    / Base Excess: 4.0   /  SaO2: 100                 VBG:     RADIOLOGY & ADDITIONAL TESTS:  (Imaging Personally Reviewed)    Consultant(s) Notes Reviewed:      Care Discussed with Consultants/Other Providers: Patient is a 72y old  Female who presents with a chief complaint of swelling of tongue (22 Dec 2017 11:16)      SUBJECTIVE / OVERNIGHT EVENTS:  Pt had MANUEL overnight. Pt reports that she is not sure whether her pain is better controlled with the addition of standing tylenol. Pt continues to complain of pain in the LE from her lumbar spine down. Pt has no complaints and denies any fevers/chills, CP, palpitations, SOB, abdominal pain, N/V/D/C, melena, BRBPR, dysuria.    MEDICATIONS  (STANDING):  acetaminophen   Tablet 1000 milliGRAM(s) Oral every 6 hours  amLODIPine   Tablet 10 milliGRAM(s) Oral at bedtime  aspirin  chewable 81 milliGRAM(s) Oral daily  docusate sodium 100 milliGRAM(s) Oral three times a day  enoxaparin Injectable 100 milliGRAM(s) SubCutaneous every 12 hours  gabapentin 400 milliGRAM(s) Oral every 8 hours  insulin glargine Injectable (LANTUS) 20 Unit(s) SubCutaneous at bedtime  insulin lispro (HumaLOG) corrective regimen sliding scale   SubCutaneous three times a day before meals  insulin lispro (HumaLOG) corrective regimen sliding scale   SubCutaneous at bedtime  insulin lispro Injectable (HumaLOG) 2 Unit(s) SubCutaneous three times a day before meals  levothyroxine 150 MICROGram(s) Oral daily  melatonin 5 milliGRAM(s) Oral at bedtime  meropenem IVPB 1000 milliGRAM(s) IV Intermittent every 8 hours  meropenem IVPB      metoprolol     tartrate 50 milliGRAM(s) Oral two times a day  pantoprazole    Tablet 40 milliGRAM(s) Oral before breakfast  senna 2 Tablet(s) Oral at bedtime    MEDICATIONS  (PRN):  diazepam    Tablet 2 milliGRAM(s) Oral every 8 hours PRN Anxiety and Agitation  oxyCODONE    IR 10 milliGRAM(s) Oral every 4 hours PRN Severe Pain (7 - 10)  oxyCODONE    IR 5 milliGRAM(s) Oral every 4 hours PRN Moderate Pain (4 - 6)      OBJECTIVE:    Vital Signs Last 24 Hrs  T(C): 37 (01 Jan 2018 06:21), Max: 37 (31 Dec 2017 20:57)  T(F): 98.6 (01 Jan 2018 06:21), Max: 98.6 (31 Dec 2017 20:57)  HR: 67 (01 Jan 2018 06:21) (65 - 68)  BP: 127/76 (01 Jan 2018 06:21) (106/66 - 136/73)  BP(mean): --  RR: 18 (01 Jan 2018 06:21) (18 - 18)  SpO2: 100% (01 Jan 2018 06:21) (98% - 100%)    CAPILLARY BLOOD GLUCOSE      POCT Blood Glucose.: 250 mg/dL (31 Dec 2017 22:36)  POCT Blood Glucose.: 242 mg/dL (31 Dec 2017 17:49)  POCT Blood Glucose.: 339 mg/dL (31 Dec 2017 12:24)  POCT Blood Glucose.: 156 mg/dL (31 Dec 2017 08:41)    I&O's Summary    31 Dec 2017 07:01  -  01 Jan 2018 07:00  --------------------------------------------------------  IN: 100 mL / OUT: 750 mL / NET: -650 mL        PHYSICAL EXAM:  GENERAL: NAD, well-developed  HEAD:  Atraumatic, Normocephalic  EYES: EOMI, PERRLA, conjunctiva and sclera clear  NECK: Supple, No JVD  CHEST/LUNG: Clear to auscultation bilaterally; No wheeze  HEART: Regular rate and rhythm; No murmurs, rubs, or gallops  ABDOMEN: Soft, Nondistended; Bowel sounds present  EXTREMITIES:  2+ Peripheral Pulses, No clubbing, cyanosis, or edema.  PSYCH: AAOx3  NEUROLOGY: LLE weakness 4/5, improved from yesterday. RLE 5/5.   SKIN: No rashes or lesions    LABS:                        8.6    7.8   )-----------( 442      ( 31 Dec 2017 13:06 )             26.4     Auto Eosinophil # x     / Auto Eosinophil % x     / Auto Neutrophil # x     / Auto Neutrophil % x     / BANDS % x                            9.9    7.6   )-----------( 570      ( 30 Dec 2017 13:07 )             29.9     Auto Eosinophil # x     / Auto Eosinophil % x     / Auto Neutrophil # x     / Auto Neutrophil % x     / BANDS % x        12-31    133<L>  |  98  |  17  ----------------------------<  355<H>  4.8   |  21<L>  |  0.54  12-30    133<L>  |  96  |  17  ----------------------------<  343<H>  4.8   |  21<L>  |  0.54    Ca    8.9      31 Dec 2017 13:06  Mg     1.8     12-31  Phos  3.3     12-31    PT/INR - ( 31 Dec 2017 13:06 )   PT: 12.0 sec;   INR: 1.11 ratio         PTT - ( 31 Dec 2017 13:06 )  PTT:29.7 sec            RESPIRATORY  VENT:    ABG: ( 27 Dec 2017 09:18 ) pH: 7.49  /  pCO2: 36    /  pO2: 286   / HCO3: 27    / Base Excess: 4.0   /  SaO2: 100                 VBG:     RADIOLOGY & ADDITIONAL TESTS:  (Imaging Personally Reviewed)  < from: MR Head No Cont (12.30.17 @ 21:47) >  IMPRESSION:     No evidence for acute infarct or acute hemorrhage. Mild to moderate   chronic white matter changes are again noted.    An air-fluid level is again noted in the sphenoid sinus. Correlate for   possible sinusitis.    < end of copied text >      Consultant(s) Notes Reviewed:      Care Discussed with Consultants/Other Providers:

## 2018-01-01 NOTE — PROGRESS NOTE ADULT - SUBJECTIVE AND OBJECTIVE BOX
Subjective:Interval History - No events overnight  Reports LE weakness is getting better. Legs are still painful.    Objective:   Vital Signs Last 24 Hrs  T(C): 37 (01 Jan 2018 06:21), Max: 37 (31 Dec 2017 20:57)  T(F): 98.6 (01 Jan 2018 06:21), Max: 98.6 (31 Dec 2017 20:57)  HR: 67 (01 Jan 2018 06:21) (65 - 67)  BP: 127/76 (01 Jan 2018 06:21) (127/76 - 136/73)  RR: 18 (01 Jan 2018 06:21) (18 - 18)  SpO2: 100% (01 Jan 2018 06:21) (98% - 100%)    General Exam:   General appearance: No acute distress                   Neurological Exam:  Mental Status: Orientated to self, date and place.  Attention intact.  No dysarthria, aphasia or neglect.  Knowledge intact.  Registration intact.  Short and long term memory grossly intact.      Cranial Nerves: PERRL, EOMI, VFF, no nystagmus or diplopia.  No facial asymmetry.  Hearing intact.  Tongue midline.      Motor Exam - RLE: 4-, LLE: no effort against gravity, limited by pain    Sensation: intact to light touch    Other:    01-01    135  |  101  |  11  ----------------------------<  158<H>  4.6   |  24  |  0.48<L>    Ca    9.2      01 Jan 2018 10:01  Phos  3.2     01-01  Mg     1.9     01-01    TPro  6.7  /  Alb  3.4  /  TBili  0.2  /  DBili  x   /  AST  12  /  ALT  11  /  AlkPhos  96  01-01    LIVER FUNCTIONS - ( 01 Jan 2018 10:01 )  Alb: 3.4 g/dL / Pro: 6.7 g/dL / ALK PHOS: 96 U/L / ALT: 11 U/L RC / AST: 12 U/L / GGT: x                                 9.2    7.6   )-----------( 462      ( 01 Jan 2018 10:01 )             26.7     MEDICATIONS  (STANDING):  acetaminophen   Tablet 1000 milliGRAM(s) Oral every 6 hours  amLODIPine   Tablet 10 milliGRAM(s) Oral at bedtime  aspirin  chewable 81 milliGRAM(s) Oral daily  docusate sodium 100 milliGRAM(s) Oral three times a day  enoxaparin Injectable 100 milliGRAM(s) SubCutaneous every 12 hours  gabapentin 600 milliGRAM(s) Oral every 8 hours  insulin glargine Injectable (LANTUS) 20 Unit(s) SubCutaneous at bedtime  insulin lispro (HumaLOG) corrective regimen sliding scale   SubCutaneous three times a day before meals  insulin lispro (HumaLOG) corrective regimen sliding scale   SubCutaneous at bedtime  insulin lispro Injectable (HumaLOG) 2 Unit(s) SubCutaneous three times a day before meals  levothyroxine 150 MICROGram(s) Oral daily  melatonin 5 milliGRAM(s) Oral at bedtime  meropenem IVPB 1000 milliGRAM(s) IV Intermittent every 8 hours  meropenem IVPB      metoprolol     tartrate 50 milliGRAM(s) Oral two times a day  pantoprazole    Tablet 40 milliGRAM(s) Oral before breakfast  senna 2 Tablet(s) Oral at bedtime    MEDICATIONS  (PRN):  diazepam    Tablet 2 milliGRAM(s) Oral every 8 hours PRN Anxiety and Agitation  oxyCODONE    IR 15 milliGRAM(s) Oral every 4 hours PRN Severe Pain (7 - 10)  oxyCODONE    IR 10 milliGRAM(s) Oral every 4 hours PRN Moderate Pain (4 - 6)

## 2018-01-01 NOTE — PROGRESS NOTE ADULT - ASSESSMENT
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 coli UTI, DVT and lower extremity weakness.

## 2018-01-01 NOTE — PROGRESS NOTE ADULT - PROBLEM SELECTOR PLAN 1
- angioedema likely 2/2 ACE-I therapy  - s/p extubation, will avoid all ACE-I and ARBs  - will continue to monitor respiratory status, currently stable  - will continue to taper prednisone  - pt will require outpatient ENT follow-up

## 2018-01-01 NOTE — PROGRESS NOTE ADULT - PROBLEM SELECTOR PLAN 8
- DVT ppx: pt currently anticoagulated on Lovenox with plans to transition to Elaquis  - Diet: CC diet  - Dispo: JONNY once medically stable    Larry Colmenares, PGY-1  Care Model B  Pager [phone]

## 2018-01-01 NOTE — PROGRESS NOTE ADULT - PROBLEM SELECTOR PLAN 3
- Today patient c/o continued b/l lower extremity pain, and weakness of LLE 4/5. Weakness improved from yesterday  - differential diagnosis for pain includes radiculopathy vs more likely neuropathy 2/2 diabetes  - differential diagnosis for weakness includes generalized deconditioning vs neuropathy  - per neurosurg, unlikely spinal process as exam is c/w exam prior to her spinal surgery  - neurology recommend repeat EEG and MRI head in setting of abnormal EEG performed while patient in MICU.  - MRI shows redemonstration of mild-mod chronic white matter changes and possible sinusitis  - will continue to f/u neuro and neurosx recs

## 2018-01-01 NOTE — PROGRESS NOTE ADULT - PROBLEM SELECTOR PLAN 4
- Proteus Mirabilis and ESBL Ecoli in the urine   - Pt received a 4 day course of Zosyn in the MICU, given the fact that it is ESBL Ecoli, will c/w IV Meropenem for complicated UTI, now day 4/7 - Proteus Mirabilis and ESBL Ecoli in the urine   - Pt received a 4 day course of Zosyn in the MICU, given the fact that it is ESBL Ecoli, will c/w IV Meropenem for complicated UTI, now day 4/7, if patient is discharged prior to day 7 then she can be switched over to Ertapenem for completion of her antibiotic course.

## 2018-01-02 LAB
ANION GAP SERPL CALC-SCNC: 12 MMOL/L — SIGNIFICANT CHANGE UP (ref 5–17)
APTT BLD: 37.1 SEC — SIGNIFICANT CHANGE UP (ref 27.5–37.4)
BUN SERPL-MCNC: 14 MG/DL — SIGNIFICANT CHANGE UP (ref 7–23)
CALCIUM SERPL-MCNC: 8.9 MG/DL — SIGNIFICANT CHANGE UP (ref 8.4–10.5)
CHLORIDE SERPL-SCNC: 99 MMOL/L — SIGNIFICANT CHANGE UP (ref 96–108)
CO2 SERPL-SCNC: 24 MMOL/L — SIGNIFICANT CHANGE UP (ref 22–31)
CREAT SERPL-MCNC: 0.57 MG/DL — SIGNIFICANT CHANGE UP (ref 0.5–1.3)
GLUCOSE BLDC GLUCOMTR-MCNC: 156 MG/DL — HIGH (ref 70–99)
GLUCOSE BLDC GLUCOMTR-MCNC: 232 MG/DL — HIGH (ref 70–99)
GLUCOSE BLDC GLUCOMTR-MCNC: 307 MG/DL — HIGH (ref 70–99)
GLUCOSE BLDC GLUCOMTR-MCNC: 330 MG/DL — HIGH (ref 70–99)
GLUCOSE SERPL-MCNC: 253 MG/DL — HIGH (ref 70–99)
HCT VFR BLD CALC: 26.6 % — LOW (ref 34.5–45)
HGB BLD-MCNC: 9 G/DL — LOW (ref 11.5–15.5)
INR BLD: 1.08 RATIO — SIGNIFICANT CHANGE UP (ref 0.88–1.16)
MAGNESIUM SERPL-MCNC: 2 MG/DL — SIGNIFICANT CHANGE UP (ref 1.6–2.6)
MCHC RBC-ENTMCNC: 29.5 PG — SIGNIFICANT CHANGE UP (ref 27–34)
MCHC RBC-ENTMCNC: 33.7 GM/DL — SIGNIFICANT CHANGE UP (ref 32–36)
MCV RBC AUTO: 87.5 FL — SIGNIFICANT CHANGE UP (ref 80–100)
PHOSPHATE SERPL-MCNC: 3.1 MG/DL — SIGNIFICANT CHANGE UP (ref 2.5–4.5)
PLATELET # BLD AUTO: 417 K/UL — HIGH (ref 150–400)
POTASSIUM SERPL-MCNC: 4.7 MMOL/L — SIGNIFICANT CHANGE UP (ref 3.5–5.3)
POTASSIUM SERPL-SCNC: 4.7 MMOL/L — SIGNIFICANT CHANGE UP (ref 3.5–5.3)
PROTHROM AB SERPL-ACNC: 11.8 SEC — SIGNIFICANT CHANGE UP (ref 9.8–12.7)
RBC # BLD: 3.04 M/UL — LOW (ref 3.8–5.2)
RBC # FLD: 17.4 % — HIGH (ref 10.3–14.5)
SODIUM SERPL-SCNC: 135 MMOL/L — SIGNIFICANT CHANGE UP (ref 135–145)
WBC # BLD: 9.1 K/UL — SIGNIFICANT CHANGE UP (ref 3.8–10.5)
WBC # FLD AUTO: 9.1 K/UL — SIGNIFICANT CHANGE UP (ref 3.8–10.5)

## 2018-01-02 PROCEDURE — 95951: CPT | Mod: 26

## 2018-01-02 PROCEDURE — 99233 SBSQ HOSP IP/OBS HIGH 50: CPT | Mod: GC

## 2018-01-02 RX ORDER — OXYCODONE HYDROCHLORIDE 5 MG/1
10 TABLET ORAL ONCE
Qty: 0 | Refills: 0 | Status: DISCONTINUED | OUTPATIENT
Start: 2018-01-02 | End: 2018-01-02

## 2018-01-02 RX ORDER — OXYCODONE HYDROCHLORIDE 5 MG/1
20 TABLET ORAL EVERY 12 HOURS
Qty: 0 | Refills: 0 | Status: DISCONTINUED | OUTPATIENT
Start: 2018-01-02 | End: 2018-01-03

## 2018-01-02 RX ORDER — NYSTATIN CREAM 100000 [USP'U]/G
1 CREAM TOPICAL
Qty: 0 | Refills: 0 | Status: DISCONTINUED | OUTPATIENT
Start: 2018-01-02 | End: 2018-01-03

## 2018-01-02 RX ORDER — OXYCODONE HYDROCHLORIDE 5 MG/1
5 TABLET ORAL EVERY 4 HOURS
Qty: 0 | Refills: 0 | Status: DISCONTINUED | OUTPATIENT
Start: 2018-01-02 | End: 2018-01-03

## 2018-01-02 RX ORDER — BENZOCAINE AND MENTHOL 5; 1 G/100ML; G/100ML
1 LIQUID ORAL EVERY 6 HOURS
Qty: 0 | Refills: 0 | Status: DISCONTINUED | OUTPATIENT
Start: 2018-01-02 | End: 2018-01-03

## 2018-01-02 RX ADMIN — OXYCODONE HYDROCHLORIDE 10 MILLIGRAM(S): 5 TABLET ORAL at 20:54

## 2018-01-02 RX ADMIN — Medication 81 MILLIGRAM(S): at 13:39

## 2018-01-02 RX ADMIN — OXYCODONE HYDROCHLORIDE 15 MILLIGRAM(S): 5 TABLET ORAL at 15:05

## 2018-01-02 RX ADMIN — Medication 100 MILLIGRAM(S): at 05:33

## 2018-01-02 RX ADMIN — AMLODIPINE BESYLATE 10 MILLIGRAM(S): 2.5 TABLET ORAL at 21:36

## 2018-01-02 RX ADMIN — ENOXAPARIN SODIUM 100 MILLIGRAM(S): 100 INJECTION SUBCUTANEOUS at 05:35

## 2018-01-02 RX ADMIN — Medication 150 MICROGRAM(S): at 05:33

## 2018-01-02 RX ADMIN — Medication 20 MILLIGRAM(S): at 05:33

## 2018-01-02 RX ADMIN — MEROPENEM 100 MILLIGRAM(S): 1 INJECTION INTRAVENOUS at 16:39

## 2018-01-02 RX ADMIN — Medication 1000 MILLIGRAM(S): at 05:34

## 2018-01-02 RX ADMIN — OXYCODONE HYDROCHLORIDE 15 MILLIGRAM(S): 5 TABLET ORAL at 09:00

## 2018-01-02 RX ADMIN — OXYCODONE HYDROCHLORIDE 20 MILLIGRAM(S): 5 TABLET ORAL at 18:12

## 2018-01-02 RX ADMIN — Medication 5 MILLIGRAM(S): at 21:35

## 2018-01-02 RX ADMIN — Medication 4: at 12:28

## 2018-01-02 RX ADMIN — OXYCODONE HYDROCHLORIDE 15 MILLIGRAM(S): 5 TABLET ORAL at 04:21

## 2018-01-02 RX ADMIN — Medication 2 UNIT(S): at 18:07

## 2018-01-02 RX ADMIN — Medication 1: at 09:03

## 2018-01-02 RX ADMIN — MEROPENEM 100 MILLIGRAM(S): 1 INJECTION INTRAVENOUS at 05:33

## 2018-01-02 RX ADMIN — Medication 2 UNIT(S): at 12:28

## 2018-01-02 RX ADMIN — PANTOPRAZOLE SODIUM 40 MILLIGRAM(S): 20 TABLET, DELAYED RELEASE ORAL at 05:34

## 2018-01-02 RX ADMIN — SENNA PLUS 2 TABLET(S): 8.6 TABLET ORAL at 21:36

## 2018-01-02 RX ADMIN — GABAPENTIN 600 MILLIGRAM(S): 400 CAPSULE ORAL at 21:35

## 2018-01-02 RX ADMIN — OXYCODONE HYDROCHLORIDE 15 MILLIGRAM(S): 5 TABLET ORAL at 08:30

## 2018-01-02 RX ADMIN — GABAPENTIN 600 MILLIGRAM(S): 400 CAPSULE ORAL at 05:33

## 2018-01-02 RX ADMIN — ENOXAPARIN SODIUM 100 MILLIGRAM(S): 100 INJECTION SUBCUTANEOUS at 18:06

## 2018-01-02 RX ADMIN — MEROPENEM 100 MILLIGRAM(S): 1 INJECTION INTRAVENOUS at 22:21

## 2018-01-02 RX ADMIN — INSULIN GLARGINE 20 UNIT(S): 100 INJECTION, SOLUTION SUBCUTANEOUS at 21:36

## 2018-01-02 RX ADMIN — Medication 100 MILLIGRAM(S): at 21:35

## 2018-01-02 RX ADMIN — Medication 1000 MILLIGRAM(S): at 12:27

## 2018-01-02 RX ADMIN — Medication 50 MILLIGRAM(S): at 05:33

## 2018-01-02 RX ADMIN — Medication 1000 MILLIGRAM(S): at 18:06

## 2018-01-02 RX ADMIN — NYSTATIN CREAM 1 APPLICATION(S): 100000 CREAM TOPICAL at 18:12

## 2018-01-02 RX ADMIN — Medication 100 MILLIGRAM(S): at 13:36

## 2018-01-02 RX ADMIN — Medication 50 MILLIGRAM(S): at 18:16

## 2018-01-02 RX ADMIN — Medication 2: at 21:36

## 2018-01-02 RX ADMIN — GABAPENTIN 600 MILLIGRAM(S): 400 CAPSULE ORAL at 13:36

## 2018-01-02 RX ADMIN — Medication 2: at 18:06

## 2018-01-02 RX ADMIN — Medication 2 UNIT(S): at 09:03

## 2018-01-02 NOTE — PROGRESS NOTE ADULT - PROBLEM SELECTOR PLAN 1
- angioedema likely 2/2 ACE-I therapy  - s/p extubation, will avoid all ACE-I and ARBs  - will continue to monitor respiratory status, currently stable  - will continue to taper prednisone  - pt will require outpatient ENT follow-up - angioedema likely 2/2 ACE-I therapy  - s/p extubation, will avoid all ACE-I and ARBs (allergy list updated)  - will continue to monitor respiratory status, currently stable  - will continue to taper prednisone  - pt will require outpatient ENT follow-up

## 2018-01-02 NOTE — PROGRESS NOTE ADULT - PROBLEM SELECTOR PLAN 4
- Proteus Mirabilis and ESBL Ecoli in the urine   - Pt received a 4 day course of Zosyn in the MICU, given the fact that it is ESBL Ecoli, will c/w IV Meropenem for complicated UTI, now day 5/7, if patient is discharged prior to day 7, she will be switched over to Ertapenem for completion of her antibiotic course.

## 2018-01-02 NOTE — PROGRESS NOTE ADULT - PROBLEM SELECTOR PLAN 5
- pts FS WNL  - continue Lantus 20 and 2u humalog ac with ISS for now.  - insulin requirement will likely decrease as steroid doses continue to decrease  - FS with meals and at bedtime  - will continue to monitor

## 2018-01-02 NOTE — PROGRESS NOTE ADULT - PROBLEM SELECTOR PLAN 2
- Acute DVT in L brachial vein, lower extremity dopplers negative for DVT  - continue Lovenox for AC, with plan for eventual transition to NOAC - Acute DVT in L brachial vein, lower extremity dopplers negative for DVT  - continue Lovenox for AC, with plan for eventual transition to NOAC (none of the noac's covered by her insurance plan. Will send Rx and prior-auth)

## 2018-01-02 NOTE — PROGRESS NOTE ADULT - PROBLEM SELECTOR PLAN 3
- Today patient reporting much improvement of her b/l LE pain, and weakness of LLE 4/5, also improved   - differential diagnosis for pain includes radiculopathy vs more likely neuropathy 2/2 diabetes  - differential diagnosis for weakness includes generalized deconditioning vs neuropathy  - per neurosurg, unlikely spinal process as exam is c/w exam prior to her spinal surgery  - per neurology, repeat EEG ordered, pending  - MRI shows redemonstration of mild-mod chronic white matter changes and possible sinusitis  - will continue to f/u neuro - Today patient reporting much improvement of her b/l LE pain, and weakness of LLE 4/5, also improved   - differential diagnosis for pain includes radiculopathy vs more likely neuropathy 2/2 diabetes  - differential diagnosis for weakness includes generalized deconditioning vs neuropathy  - per neurosurg, unlikely spinal process as exam is c/w exam prior to her spinal surgery  - per neurology, repeat EEG ordered, pending  - MRI shows re-demonstration of mild-mod chronic white matter changes and possible sinusitis  - will continue to f/u neuro

## 2018-01-02 NOTE — PROGRESS NOTE ADULT - SUBJECTIVE AND OBJECTIVE BOX
Patient is a 72y old  Female who presents with a chief complaint of swelling of tongue (22 Dec 2017 11:16)      SUBJECTIVE / OVERNIGHT EVENTS:  Pt had MANUEL overnight. Pt reports much improvement of her pain. Pt has no complaints and denies any fevers/chills, CP, palpitations, SOB, abdominal pain, N/V/D/C, melena, BRBPR, dysuria.    MEDICATIONS  (STANDING):  acetaminophen   Tablet 1000 milliGRAM(s) Oral every 6 hours  amLODIPine   Tablet 10 milliGRAM(s) Oral at bedtime  aspirin  chewable 81 milliGRAM(s) Oral daily  docusate sodium 100 milliGRAM(s) Oral three times a day  enoxaparin Injectable 100 milliGRAM(s) SubCutaneous every 12 hours  gabapentin 600 milliGRAM(s) Oral every 8 hours  insulin glargine Injectable (LANTUS) 20 Unit(s) SubCutaneous at bedtime  insulin lispro (HumaLOG) corrective regimen sliding scale   SubCutaneous three times a day before meals  insulin lispro (HumaLOG) corrective regimen sliding scale   SubCutaneous at bedtime  insulin lispro Injectable (HumaLOG) 2 Unit(s) SubCutaneous three times a day before meals  levothyroxine 150 MICROGram(s) Oral daily  melatonin 5 milliGRAM(s) Oral at bedtime  meropenem IVPB 1000 milliGRAM(s) IV Intermittent every 8 hours  meropenem IVPB      metoprolol     tartrate 50 milliGRAM(s) Oral two times a day  pantoprazole    Tablet 40 milliGRAM(s) Oral before breakfast  predniSONE   Tablet 20 milliGRAM(s) Oral daily  senna 2 Tablet(s) Oral at bedtime    MEDICATIONS  (PRN):  benzocaine 15 mG/menthol 3.6 mG Lozenge 1 Lozenge Oral every 6 hours PRN Sore Throat  diazepam    Tablet 2 milliGRAM(s) Oral every 8 hours PRN Anxiety and Agitation  oxyCODONE    IR 15 milliGRAM(s) Oral every 4 hours PRN Severe Pain (7 - 10)  oxyCODONE    IR 10 milliGRAM(s) Oral every 4 hours PRN Moderate Pain (4 - 6)      OBJECTIVE:    Vital Signs Last 24 Hrs  T(C): 36.9 (02 Jan 2018 05:29), Max: 36.9 (02 Jan 2018 05:29)  T(F): 98.5 (02 Jan 2018 05:29), Max: 98.5 (02 Jan 2018 05:29)  HR: 67 (02 Jan 2018 05:29) (67 - 76)  BP: 113/62 (02 Jan 2018 05:29) (113/62 - 127/76)  BP(mean): --  RR: 18 (02 Jan 2018 05:29) (18 - 18)  SpO2: 96% (02 Jan 2018 05:29) (96% - 100%)    CAPILLARY BLOOD GLUCOSE      POCT Blood Glucose.: 162 mg/dL (01 Jan 2018 21:33)  POCT Blood Glucose.: 199 mg/dL (01 Jan 2018 18:32)  POCT Blood Glucose.: 237 mg/dL (01 Jan 2018 17:23)  POCT Blood Glucose.: 319 mg/dL (01 Jan 2018 13:04)  POCT Blood Glucose.: 187 mg/dL (01 Jan 2018 09:40)  POCT Blood Glucose.: 143 mg/dL (01 Jan 2018 08:29)    I&O's Summary      PHYSICAL EXAM:  GENERAL: NAD, well-developed  HEAD:  Atraumatic, Normocephalic  EYES: EOMI, PERRLA, conjunctiva and sclera clear  NECK: Supple, No JVD  CHEST/LUNG: Clear to auscultation bilaterally; No wheeze  HEART: Regular rate and rhythm; No murmurs, rubs, or gallops  ABDOMEN: Soft, Nondistended; Bowel sounds present  EXTREMITIES:  2+ Peripheral Pulses, No clubbing, cyanosis, or edema.  PSYCH: AAOx3  NEUROLOGY: LLE weakness 4/5, improved from yesterday. RLE 5/5.   SKIN: No rashes or lesions    LABS:                        9.2    7.6   )-----------( 462      ( 01 Jan 2018 10:01 )             26.7     Auto Eosinophil # x     / Auto Eosinophil % x     / Auto Neutrophil # x     / Auto Neutrophil % x     / BANDS % x                            8.6    7.8   )-----------( 442      ( 31 Dec 2017 13:06 )             26.4     Auto Eosinophil # x     / Auto Eosinophil % x     / Auto Neutrophil # x     / Auto Neutrophil % x     / BANDS % x        01-01    135  |  101  |  11  ----------------------------<  158<H>  4.6   |  24  |  0.48<L>  12-31    133<L>  |  98  |  17  ----------------------------<  355<H>  4.8   |  21<L>  |  0.54    Ca    9.2      01 Jan 2018 10:01  Mg     1.9     01-01  Phos  3.2     01-01  TPro  6.7  /  Alb  3.4  /  TBili  0.2  /  DBili  x   /  AST  12  /  ALT  11  /  AlkPhos  96  01-01    PT/INR - ( 01 Jan 2018 10:01 )   PT: 12.2 sec;   INR: 1.12 ratio         PTT - ( 01 Jan 2018 10:01 )  PTT:38.6 sec            RESPIRATORY  VENT:    ABG: ( 27 Dec 2017 09:18 ) pH: 7.49  /  pCO2: 36    /  pO2: 286   / HCO3: 27    / Base Excess: 4.0   /  SaO2: 100                 VBG:     RADIOLOGY & ADDITIONAL TESTS:  (Imaging Personally Reviewed)    Consultant(s) Notes Reviewed:      Care Discussed with Consultants/Other Providers: Patient is a 72y old  Female who presents with a chief complaint of swelling of tongue (22 Dec 2017 11:16)      SUBJECTIVE / OVERNIGHT EVENTS:  Pt had MANUEL overnight. Pt reports much improvement of her pain. Pt has no complaints and denies any fevers/chills, CP, palpitations, SOB, abdominal pain, N/V/D/C, melena, BRBPR, dysuria.    MEDICATIONS  (STANDING):  acetaminophen   Tablet 1000 milliGRAM(s) Oral every 6 hours  amLODIPine   Tablet 10 milliGRAM(s) Oral at bedtime  aspirin  chewable 81 milliGRAM(s) Oral daily  docusate sodium 100 milliGRAM(s) Oral three times a day  enoxaparin Injectable 100 milliGRAM(s) SubCutaneous every 12 hours  gabapentin 600 milliGRAM(s) Oral every 8 hours  insulin glargine Injectable (LANTUS) 20 Unit(s) SubCutaneous at bedtime  insulin lispro (HumaLOG) corrective regimen sliding scale   SubCutaneous three times a day before meals  insulin lispro (HumaLOG) corrective regimen sliding scale   SubCutaneous at bedtime  insulin lispro Injectable (HumaLOG) 2 Unit(s) SubCutaneous three times a day before meals  levothyroxine 150 MICROGram(s) Oral daily  melatonin 5 milliGRAM(s) Oral at bedtime  meropenem IVPB 1000 milliGRAM(s) IV Intermittent every 8 hours  meropenem IVPB      metoprolol     tartrate 50 milliGRAM(s) Oral two times a day  pantoprazole    Tablet 40 milliGRAM(s) Oral before breakfast  predniSONE   Tablet 20 milliGRAM(s) Oral daily  senna 2 Tablet(s) Oral at bedtime    MEDICATIONS  (PRN):  benzocaine 15 mG/menthol 3.6 mG Lozenge 1 Lozenge Oral every 6 hours PRN Sore Throat  diazepam    Tablet 2 milliGRAM(s) Oral every 8 hours PRN Anxiety and Agitation  oxyCODONE    IR 15 milliGRAM(s) Oral every 4 hours PRN Severe Pain (7 - 10)  oxyCODONE    IR 10 milliGRAM(s) Oral every 4 hours PRN Moderate Pain (4 - 6)      OBJECTIVE:    Vital Signs Last 24 Hrs  T(C): 36.9 (02 Jan 2018 05:29), Max: 36.9 (02 Jan 2018 05:29)  T(F): 98.5 (02 Jan 2018 05:29), Max: 98.5 (02 Jan 2018 05:29)  HR: 67 (02 Jan 2018 05:29) (67 - 76)  BP: 113/62 (02 Jan 2018 05:29) (113/62 - 127/76)  BP(mean): --  RR: 18 (02 Jan 2018 05:29) (18 - 18)  SpO2: 96% (02 Jan 2018 05:29) (96% - 100%)    CAPILLARY BLOOD GLUCOSE      POCT Blood Glucose.: 162 mg/dL (01 Jan 2018 21:33)  POCT Blood Glucose.: 199 mg/dL (01 Jan 2018 18:32)  POCT Blood Glucose.: 237 mg/dL (01 Jan 2018 17:23)  POCT Blood Glucose.: 319 mg/dL (01 Jan 2018 13:04)  POCT Blood Glucose.: 187 mg/dL (01 Jan 2018 09:40)  POCT Blood Glucose.: 143 mg/dL (01 Jan 2018 08:29)    I&O's Summary      PHYSICAL EXAM:  GENERAL: NAD, well-developed  HEAD:  Atraumatic, Normocephalic  EYES: EOMI, PERRLA, conjunctiva and sclera clear  NECK: Supple, No JVD  CHEST/LUNG: Clear to auscultation bilaterally; No wheeze  HEART: Regular rate and rhythm; No murmurs, rubs, or gallops  ABDOMEN: Soft, Nondistended; Bowel sounds present  EXTREMITIES:  2+ Peripheral Pulses, No clubbing, cyanosis, or edema.  PSYCH: AAOx3  NEUROLOGY: LLE weakness 4/5, improved from yesterday. RLE 5/5.   SKIN: No rashes or lesions    LABS:  personally reviewed                        9.2    7.6   )-----------( 462      ( 01 Jan 2018 10:01 )             26.7     Auto Eosinophil # x     / Auto Eosinophil % x     / Auto Neutrophil # x     / Auto Neutrophil % x     / BANDS % x                            8.6    7.8   )-----------( 442      ( 31 Dec 2017 13:06 )             26.4     Auto Eosinophil # x     / Auto Eosinophil % x     / Auto Neutrophil # x     / Auto Neutrophil % x     / BANDS % x        01-01    135  |  101  |  11  ----------------------------<  158<H>  4.6   |  24  |  0.48<L>  12-31    133<L>  |  98  |  17  ----------------------------<  355<H>  4.8   |  21<L>  |  0.54    Ca    9.2      01 Jan 2018 10:01  Mg     1.9     01-01  Phos  3.2     01-01  TPro  6.7  /  Alb  3.4  /  TBili  0.2  /  DBili  x   /  AST  12  /  ALT  11  /  AlkPhos  96  01-01    PT/INR - ( 01 Jan 2018 10:01 )   PT: 12.2 sec;   INR: 1.12 ratio         PTT - ( 01 Jan 2018 10:01 )  PTT:38.6 sec            RESPIRATORY  VENT:    ABG: ( 27 Dec 2017 09:18 ) pH: 7.49  /  pCO2: 36    /  pO2: 286   / HCO3: 27    / Base Excess: 4.0   /  SaO2: 100                 VBG:     RADIOLOGY & ADDITIONAL TESTS:  (Imaging Personally Reviewed)    Consultant(s) Notes Reviewed:  neurology    Care Discussed with Consultants/Other Providers:

## 2018-01-02 NOTE — PROGRESS NOTE ADULT - PROBLEM SELECTOR PLAN 8
- DVT ppx: pt currently anticoagulated on Lovenox with plans to transition to Elaquis  - Diet: CC diet  - Dispo: JONNY once medically stable    Larry Colmenares, PGY-1  Care Model B  Pager [phone]

## 2018-01-03 ENCOUNTER — TRANSCRIPTION ENCOUNTER (OUTPATIENT)
Age: 73
End: 2018-01-03

## 2018-01-03 VITALS
OXYGEN SATURATION: 100 % | RESPIRATION RATE: 16 BRPM | HEART RATE: 63 BPM | TEMPERATURE: 98 F | SYSTOLIC BLOOD PRESSURE: 130 MMHG | DIASTOLIC BLOOD PRESSURE: 80 MMHG

## 2018-01-03 LAB
GLUCOSE BLDC GLUCOMTR-MCNC: 221 MG/DL — HIGH (ref 70–99)
GLUCOSE BLDC GLUCOMTR-MCNC: 319 MG/DL — HIGH (ref 70–99)
GLUCOSE BLDC GLUCOMTR-MCNC: 589 MG/DL — CRITICAL HIGH (ref 70–99)

## 2018-01-03 PROCEDURE — 83930 ASSAY OF BLOOD OSMOLALITY: CPT

## 2018-01-03 PROCEDURE — 85014 HEMATOCRIT: CPT

## 2018-01-03 PROCEDURE — 96376 TX/PRO/DX INJ SAME DRUG ADON: CPT | Mod: XU

## 2018-01-03 PROCEDURE — 84300 ASSAY OF URINE SODIUM: CPT

## 2018-01-03 PROCEDURE — 70450 CT HEAD/BRAIN W/O DYE: CPT

## 2018-01-03 PROCEDURE — 87086 URINE CULTURE/COLONY COUNT: CPT

## 2018-01-03 PROCEDURE — 97162 PT EVAL MOD COMPLEX 30 MIN: CPT

## 2018-01-03 PROCEDURE — 99239 HOSP IP/OBS DSCHRG MGMT >30: CPT

## 2018-01-03 PROCEDURE — 82962 GLUCOSE BLOOD TEST: CPT

## 2018-01-03 PROCEDURE — 82436 ASSAY OF URINE CHLORIDE: CPT

## 2018-01-03 PROCEDURE — 80048 BASIC METABOLIC PNL TOTAL CA: CPT

## 2018-01-03 PROCEDURE — 82803 BLOOD GASES ANY COMBINATION: CPT

## 2018-01-03 PROCEDURE — 81001 URINALYSIS AUTO W/SCOPE: CPT

## 2018-01-03 PROCEDURE — 31575 DIAGNOSTIC LARYNGOSCOPY: CPT

## 2018-01-03 PROCEDURE — 82565 ASSAY OF CREATININE: CPT

## 2018-01-03 PROCEDURE — 82947 ASSAY GLUCOSE BLOOD QUANT: CPT

## 2018-01-03 PROCEDURE — 86850 RBC ANTIBODY SCREEN: CPT

## 2018-01-03 PROCEDURE — 36680 INSERT NEEDLE BONE CAVITY: CPT

## 2018-01-03 PROCEDURE — 95951: CPT

## 2018-01-03 PROCEDURE — 84145 PROCALCITONIN (PCT): CPT

## 2018-01-03 PROCEDURE — 93970 EXTREMITY STUDY: CPT

## 2018-01-03 PROCEDURE — 94003 VENT MGMT INPAT SUBQ DAY: CPT

## 2018-01-03 PROCEDURE — 93971 EXTREMITY STUDY: CPT

## 2018-01-03 PROCEDURE — 51702 INSERT TEMP BLADDER CATH: CPT | Mod: XU

## 2018-01-03 PROCEDURE — 95951: CPT | Mod: 26

## 2018-01-03 PROCEDURE — 86901 BLOOD TYPING SEROLOGIC RH(D): CPT

## 2018-01-03 PROCEDURE — 84436 ASSAY OF TOTAL THYROXINE: CPT

## 2018-01-03 PROCEDURE — 94799 UNLISTED PULMONARY SVC/PX: CPT

## 2018-01-03 PROCEDURE — 87186 SC STD MICRODIL/AGAR DIL: CPT

## 2018-01-03 PROCEDURE — 71045 X-RAY EXAM CHEST 1 VIEW: CPT

## 2018-01-03 PROCEDURE — 85610 PROTHROMBIN TIME: CPT

## 2018-01-03 PROCEDURE — 82330 ASSAY OF CALCIUM: CPT

## 2018-01-03 PROCEDURE — 96372 THER/PROPH/DIAG INJ SC/IM: CPT | Mod: XU

## 2018-01-03 PROCEDURE — 83735 ASSAY OF MAGNESIUM: CPT

## 2018-01-03 PROCEDURE — 84100 ASSAY OF PHOSPHORUS: CPT

## 2018-01-03 PROCEDURE — 80053 COMPREHEN METABOLIC PANEL: CPT

## 2018-01-03 PROCEDURE — 84443 ASSAY THYROID STIM HORMONE: CPT

## 2018-01-03 PROCEDURE — 94002 VENT MGMT INPAT INIT DAY: CPT

## 2018-01-03 PROCEDURE — 93005 ELECTROCARDIOGRAM TRACING: CPT | Mod: XU

## 2018-01-03 PROCEDURE — 83935 ASSAY OF URINE OSMOLALITY: CPT

## 2018-01-03 PROCEDURE — 83605 ASSAY OF LACTIC ACID: CPT

## 2018-01-03 PROCEDURE — 86900 BLOOD TYPING SEROLOGIC ABO: CPT

## 2018-01-03 PROCEDURE — 86160 COMPLEMENT ANTIGEN: CPT

## 2018-01-03 PROCEDURE — 95819 EEG AWAKE AND ASLEEP: CPT

## 2018-01-03 PROCEDURE — 87070 CULTURE OTHR SPECIMN AEROBIC: CPT

## 2018-01-03 PROCEDURE — 84295 ASSAY OF SERUM SODIUM: CPT

## 2018-01-03 PROCEDURE — 70551 MRI BRAIN STEM W/O DYE: CPT

## 2018-01-03 PROCEDURE — 99285 EMERGENCY DEPT VISIT HI MDM: CPT | Mod: 25

## 2018-01-03 PROCEDURE — 85730 THROMBOPLASTIN TIME PARTIAL: CPT

## 2018-01-03 PROCEDURE — 84133 ASSAY OF URINE POTASSIUM: CPT

## 2018-01-03 PROCEDURE — 82570 ASSAY OF URINE CREATININE: CPT

## 2018-01-03 PROCEDURE — 85027 COMPLETE CBC AUTOMATED: CPT

## 2018-01-03 PROCEDURE — 96374 THER/PROPH/DIAG INJ IV PUSH: CPT | Mod: XU

## 2018-01-03 PROCEDURE — 87040 BLOOD CULTURE FOR BACTERIA: CPT

## 2018-01-03 PROCEDURE — 84480 ASSAY TRIIODOTHYRONINE (T3): CPT

## 2018-01-03 PROCEDURE — 84132 ASSAY OF SERUM POTASSIUM: CPT

## 2018-01-03 PROCEDURE — 84540 ASSAY OF URINE/UREA-N: CPT

## 2018-01-03 PROCEDURE — 82435 ASSAY OF BLOOD CHLORIDE: CPT

## 2018-01-03 RX ORDER — INSULIN LISPRO 100/ML
0 VIAL (ML) SUBCUTANEOUS
Qty: 0 | Refills: 0 | DISCHARGE
Start: 2018-01-03

## 2018-01-03 RX ORDER — GABAPENTIN 400 MG/1
800 CAPSULE ORAL EVERY 8 HOURS
Qty: 0 | Refills: 0 | Status: DISCONTINUED | OUTPATIENT
Start: 2018-01-03 | End: 2018-01-03

## 2018-01-03 RX ORDER — OXYCODONE HYDROCHLORIDE 5 MG/1
1 TABLET ORAL
Qty: 0 | Refills: 0 | DISCHARGE
Start: 2018-01-03

## 2018-01-03 RX ORDER — GABAPENTIN 400 MG/1
2 CAPSULE ORAL
Qty: 0 | Refills: 0 | DISCHARGE
Start: 2018-01-03

## 2018-01-03 RX ORDER — ERTAPENEM SODIUM 1 G/1
1000 INJECTION, POWDER, LYOPHILIZED, FOR SOLUTION INTRAMUSCULAR; INTRAVENOUS ONCE
Qty: 0 | Refills: 0 | Status: DISCONTINUED | OUTPATIENT
Start: 2018-01-03 | End: 2018-01-03

## 2018-01-03 RX ORDER — DIAZEPAM 5 MG
1 TABLET ORAL
Qty: 0 | Refills: 0 | DISCHARGE
Start: 2018-01-03

## 2018-01-03 RX ORDER — LANOLIN ALCOHOL/MO/W.PET/CERES
1 CREAM (GRAM) TOPICAL
Qty: 0 | Refills: 0 | DISCHARGE
Start: 2018-01-03

## 2018-01-03 RX ORDER — METFORMIN HYDROCHLORIDE 850 MG/1
1 TABLET ORAL
Qty: 0 | Refills: 0 | COMMUNITY

## 2018-01-03 RX ORDER — NYSTATIN CREAM 100000 [USP'U]/G
1 CREAM TOPICAL
Qty: 0 | Refills: 0 | DISCHARGE
Start: 2018-01-03

## 2018-01-03 RX ORDER — AMLODIPINE BESYLATE 2.5 MG/1
1 TABLET ORAL
Qty: 0 | Refills: 0 | DISCHARGE
Start: 2018-01-03

## 2018-01-03 RX ORDER — INSULIN GLARGINE 100 [IU]/ML
25 INJECTION, SOLUTION SUBCUTANEOUS AT BEDTIME
Qty: 0 | Refills: 0 | Status: DISCONTINUED | OUTPATIENT
Start: 2018-01-03 | End: 2018-01-03

## 2018-01-03 RX ORDER — SENNA PLUS 8.6 MG/1
2 TABLET ORAL
Qty: 0 | Refills: 0 | DISCHARGE
Start: 2018-01-03

## 2018-01-03 RX ORDER — OXYCODONE HYDROCHLORIDE 5 MG/1
30 TABLET ORAL EVERY 12 HOURS
Qty: 0 | Refills: 0 | Status: DISCONTINUED | OUTPATIENT
Start: 2018-01-03 | End: 2018-01-03

## 2018-01-03 RX ORDER — ERTAPENEM SODIUM 1 G/1
1000 INJECTION, POWDER, LYOPHILIZED, FOR SOLUTION INTRAMUSCULAR; INTRAVENOUS EVERY 24 HOURS
Qty: 0 | Refills: 0 | Status: DISCONTINUED | OUTPATIENT
Start: 2018-01-04 | End: 2018-01-03

## 2018-01-03 RX ORDER — DOCUSATE SODIUM 100 MG
1 CAPSULE ORAL
Qty: 0 | Refills: 0 | DISCHARGE
Start: 2018-01-03

## 2018-01-03 RX ORDER — ENOXAPARIN SODIUM 100 MG/ML
100 INJECTION SUBCUTANEOUS
Qty: 0 | Refills: 0 | DISCHARGE
Start: 2018-01-03

## 2018-01-03 RX ORDER — INSULIN LISPRO 100/ML
5 VIAL (ML) SUBCUTANEOUS
Qty: 0 | Refills: 0 | Status: DISCONTINUED | OUTPATIENT
Start: 2018-01-03 | End: 2018-01-03

## 2018-01-03 RX ORDER — INSULIN GLARGINE 100 [IU]/ML
25 INJECTION, SOLUTION SUBCUTANEOUS
Qty: 0 | Refills: 0 | DISCHARGE
Start: 2018-01-03

## 2018-01-03 RX ORDER — OMEPRAZOLE 10 MG/1
1 CAPSULE, DELAYED RELEASE ORAL
Qty: 0 | Refills: 0 | COMMUNITY

## 2018-01-03 RX ORDER — ASPIRIN/CALCIUM CARB/MAGNESIUM 324 MG
1 TABLET ORAL
Qty: 0 | Refills: 0 | DISCHARGE
Start: 2018-01-03

## 2018-01-03 RX ORDER — BENZOCAINE AND MENTHOL 5; 1 G/100ML; G/100ML
1 LIQUID ORAL
Qty: 0 | Refills: 0 | DISCHARGE
Start: 2018-01-03

## 2018-01-03 RX ORDER — INSULIN LISPRO 100/ML
5 VIAL (ML) SUBCUTANEOUS
Qty: 0 | Refills: 0 | DISCHARGE
Start: 2018-01-03

## 2018-01-03 RX ORDER — ERTAPENEM SODIUM 1 G/1
INJECTION, POWDER, LYOPHILIZED, FOR SOLUTION INTRAMUSCULAR; INTRAVENOUS
Qty: 0 | Refills: 0 | Status: DISCONTINUED | OUTPATIENT
Start: 2018-01-03 | End: 2018-01-03

## 2018-01-03 RX ORDER — PANTOPRAZOLE SODIUM 20 MG/1
1 TABLET, DELAYED RELEASE ORAL
Qty: 0 | Refills: 0 | DISCHARGE
Start: 2018-01-03

## 2018-01-03 RX ORDER — ACETAMINOPHEN 500 MG
2 TABLET ORAL
Qty: 0 | Refills: 0 | DISCHARGE
Start: 2018-01-03

## 2018-01-03 RX ORDER — INSULIN LISPRO 100 [IU]/ML
10 INJECTION, SUSPENSION SUBCUTANEOUS
Qty: 0 | Refills: 0 | COMMUNITY

## 2018-01-03 RX ORDER — LEVOTHYROXINE SODIUM 125 MCG
1 TABLET ORAL
Qty: 0 | Refills: 0 | DISCHARGE
Start: 2018-01-03

## 2018-01-03 RX ORDER — METOPROLOL TARTRATE 50 MG
1 TABLET ORAL
Qty: 0 | Refills: 0 | DISCHARGE
Start: 2018-01-03

## 2018-01-03 RX ORDER — INSULIN LISPRO 100 [IU]/ML
5 INJECTION, SUSPENSION SUBCUTANEOUS
Qty: 0 | Refills: 0 | COMMUNITY

## 2018-01-03 RX ADMIN — Medication 2 UNIT(S): at 09:10

## 2018-01-03 RX ADMIN — OXYCODONE HYDROCHLORIDE 5 MILLIGRAM(S): 5 TABLET ORAL at 03:19

## 2018-01-03 RX ADMIN — Medication 1000 MILLIGRAM(S): at 12:29

## 2018-01-03 RX ADMIN — PANTOPRAZOLE SODIUM 40 MILLIGRAM(S): 20 TABLET, DELAYED RELEASE ORAL at 05:07

## 2018-01-03 RX ADMIN — GABAPENTIN 600 MILLIGRAM(S): 400 CAPSULE ORAL at 05:07

## 2018-01-03 RX ADMIN — Medication 50 MILLIGRAM(S): at 05:18

## 2018-01-03 RX ADMIN — OXYCODONE HYDROCHLORIDE 5 MILLIGRAM(S): 5 TABLET ORAL at 14:33

## 2018-01-03 RX ADMIN — NYSTATIN CREAM 1 APPLICATION(S): 100000 CREAM TOPICAL at 05:08

## 2018-01-03 RX ADMIN — GABAPENTIN 800 MILLIGRAM(S): 400 CAPSULE ORAL at 14:13

## 2018-01-03 RX ADMIN — MEROPENEM 100 MILLIGRAM(S): 1 INJECTION INTRAVENOUS at 05:13

## 2018-01-03 RX ADMIN — OXYCODONE HYDROCHLORIDE 5 MILLIGRAM(S): 5 TABLET ORAL at 02:49

## 2018-01-03 RX ADMIN — Medication 100 MILLIGRAM(S): at 05:07

## 2018-01-03 RX ADMIN — Medication 100 MILLIGRAM(S): at 14:13

## 2018-01-03 RX ADMIN — Medication 1000 MILLIGRAM(S): at 05:05

## 2018-01-03 RX ADMIN — Medication 20 MILLIGRAM(S): at 05:07

## 2018-01-03 RX ADMIN — OXYCODONE HYDROCHLORIDE 20 MILLIGRAM(S): 5 TABLET ORAL at 05:05

## 2018-01-03 RX ADMIN — Medication 150 MICROGRAM(S): at 05:07

## 2018-01-03 RX ADMIN — Medication 81 MILLIGRAM(S): at 12:29

## 2018-01-03 RX ADMIN — OXYCODONE HYDROCHLORIDE 5 MILLIGRAM(S): 5 TABLET ORAL at 10:00

## 2018-01-03 RX ADMIN — Medication 4: at 14:12

## 2018-01-03 RX ADMIN — Medication 2: at 09:09

## 2018-01-03 RX ADMIN — Medication 5 UNIT(S): at 14:11

## 2018-01-03 RX ADMIN — ENOXAPARIN SODIUM 100 MILLIGRAM(S): 100 INJECTION SUBCUTANEOUS at 05:07

## 2018-01-03 RX ADMIN — OXYCODONE HYDROCHLORIDE 5 MILLIGRAM(S): 5 TABLET ORAL at 09:09

## 2018-01-03 NOTE — DISCHARGE NOTE ADULT - PROVIDER TOKENS
TOKEN:'[id number]',FREE:[LAST:[[facility]],PHONE:[phone]],FAX:[(   )    -],ADDRESS:[[address] [place] [id number]]],FREE:[LAST:[Marito],FIRST:[Joseluis],PHONE:[phone]:[(   )    -],ADDRESS:[[address] [place] [address]]]

## 2018-01-03 NOTE — DISCHARGE NOTE ADULT - SECONDARY DIAGNOSIS.
Acute deep vein thrombosis (DVT) of brachial vein of left upper extremity Urinary tract infection without hematuria, site unspecified Weakness of left lower extremity Seizure

## 2018-01-03 NOTE — DISCHARGE NOTE ADULT - MEDICATION SUMMARY - MEDICATIONS TO CHANGE
I will SWITCH the dose or number of times a day I take the medications listed below when I get home from the hospital:    gabapentin 300 mg oral capsule  -- 1 cap(s) by mouth 3 times a day I will SWITCH the dose or number of times a day I take the medications listed below when I get home from the hospital:    enoxaparin  -- 40 milligram(s) subcutaneous once a day (at bedtime)    gabapentin 300 mg oral capsule  -- 1 cap(s) by mouth 3 times a day

## 2018-01-03 NOTE — DISCHARGE NOTE ADULT - PLAN OF CARE
[facility] Follow up You came into the hospital because of severe swelling of your tongue. We believe this is because of a reaction to your Enalapril. DO NOT TAKE ANY MORE ACE-INHIBITORS OR ARBs. Please finish the full course of your Prednisone taper. Please follow up with Dr. Shepherd, your ENT physician, and your PCP within 1 week of discharge. You were found to have a clot in one of the veins of your left arm. Please continue to take You were found to have a clot in one of the veins of your left arm. Please continue to take Lovenox as prescribed to prevent any more clots from forming. Please follow up with your PCP within 1 week of discharge for further management. While here, you were found to have a UTI with 2 different bacteria. You were treated with IV antibiotics for this. Unfortunately, you have 1 more days worth of IV antibiotics that you will complete at rehab. Please follow up with your PCP within 1 week of discharge for further management. You were found to have pain in your legs along with some weakness of your left leg. To treat this, we increased your pain regimen. Please continue to take all medications as prescribed. Work hard in rehab in order to regain your strength. Please follow up with your PCP within 1 week of discharge for further management. You were found to have possible seizures while here in the hospital. You received an EEG which showed some abnormality. Please follow up with the [facility], within 2 weeks of discharge. Do not drive or operate heavy machinery until you see them. Please call the number below to schedule your appointment.

## 2018-01-03 NOTE — DISCHARGE NOTE ADULT - CARE PLAN
Principal Discharge DX:	Angioedema, initial encounter  Goal:	Resolution  Secondary Diagnosis:	Acute deep vein thrombosis (DVT) of brachial vein of left upper extremity  Goal:	Anticoagulation  Secondary Diagnosis:	Urinary tract infection without hematuria, site unspecified  Goal:	Resolution  Secondary Diagnosis:	Weakness of left lower extremity  Goal:	Resolution  Secondary Diagnosis:	Seizure  Goal:	Follow up Principal Discharge DX:	Angioedema, initial encounter  Goal:	Resolution  Instructions for follow-up, activity and diet:	You came into the hospital because of severe swelling of your tongue. We believe this is because of a reaction to your Enalapril. DO NOT TAKE ANY MORE ACE-INHIBITORS OR ARBs. Please finish the full course of your Prednisone taper. Please follow up with Dr. Shepherd, your ENT physician, and your PCP within 1 week of discharge.  Secondary Diagnosis:	Acute deep vein thrombosis (DVT) of brachial vein of left upper extremity  Goal:	Anticoagulation  Instructions for follow-up, activity and diet:	You were found to have a clot in one of the veins of your left arm. Please continue to take  Secondary Diagnosis:	Urinary tract infection without hematuria, site unspecified  Goal:	Resolution  Secondary Diagnosis:	Weakness of left lower extremity  Goal:	Resolution  Secondary Diagnosis:	Seizure  Goal:	Follow up Principal Discharge DX:	Angioedema, initial encounter  Goal:	Resolution  Instructions for follow-up, activity and diet:	You came into the hospital because of severe swelling of your tongue. We believe this is because of a reaction to your Enalapril. DO NOT TAKE ANY MORE ACE-INHIBITORS OR ARBs. Please finish the full course of your Prednisone taper. Please follow up with Dr. Shepherd, your ENT physician, and your PCP within 1 week of discharge.  Secondary Diagnosis:	Acute deep vein thrombosis (DVT) of brachial vein of left upper extremity  Goal:	Anticoagulation  Instructions for follow-up, activity and diet:	You were found to have a clot in one of the veins of your left arm. Please continue to take Lovenox as prescribed to prevent any more clots from forming. Please follow up with your PCP within 1 week of discharge for further management.  Secondary Diagnosis:	Urinary tract infection without hematuria, site unspecified  Goal:	Resolution  Instructions for follow-up, activity and diet:	While here, you were found to have a UTI with 2 different bacteria. You were treated with IV antibiotics for this. Unfortunately, you have 1 more days worth of IV antibiotics that you will complete at rehab. Please follow up with your PCP within 1 week of discharge for further management.  Secondary Diagnosis:	Weakness of left lower extremity  Goal:	Resolution  Instructions for follow-up, activity and diet:	You were found to have pain in your legs along with some weakness of your left leg. To treat this, we increased your pain regimen. Please continue to take all medications as prescribed. Work hard in rehab in order to regain your strength. Please follow up with your PCP within 1 week of discharge for further management.  Secondary Diagnosis:	Seizure  Goal:	Follow up  Instructions for follow-up, activity and diet:	You were found to have possible seizures while here in the hospital. You received an EEG which showed some abnormality. Please follow up with the [facility], within 2 weeks of discharge. Do not drive or operate heavy machinery until you see them. Please call the number below to schedule your appointment.

## 2018-01-03 NOTE — PROGRESS NOTE ADULT - SUBJECTIVE AND OBJECTIVE BOX
Patient is a 72y old  Female who presents with a chief complaint of swelling of tongue (22 Dec 2017 11:16)      SUBJECTIVE / OVERNIGHT EVENTS:  Pt had MANUEL overnight. Pt reports that her pain is worse than yesterday, however still moderately controlled following the change in her pain regimen. Pt is also endorsing some increased weakness in her left LE. Pt has no other complaints and denies any fevers/chills, CP, palpitations, SOB, abdominal pain, N/V/D/C, melena, BRBPR, dysuria.      MEDICATIONS  (STANDING):  acetaminophen   Tablet 1000 milliGRAM(s) Oral every 6 hours  amLODIPine   Tablet 10 milliGRAM(s) Oral at bedtime  aspirin  chewable 81 milliGRAM(s) Oral daily  docusate sodium 100 milliGRAM(s) Oral three times a day  enoxaparin Injectable 100 milliGRAM(s) SubCutaneous every 12 hours  gabapentin 600 milliGRAM(s) Oral every 8 hours  insulin glargine Injectable (LANTUS) 20 Unit(s) SubCutaneous at bedtime  insulin lispro (HumaLOG) corrective regimen sliding scale   SubCutaneous three times a day before meals  insulin lispro (HumaLOG) corrective regimen sliding scale   SubCutaneous at bedtime  insulin lispro Injectable (HumaLOG) 2 Unit(s) SubCutaneous three times a day before meals  levothyroxine 150 MICROGram(s) Oral daily  melatonin 5 milliGRAM(s) Oral at bedtime  meropenem IVPB 1000 milliGRAM(s) IV Intermittent every 8 hours  meropenem IVPB      metoprolol     tartrate 50 milliGRAM(s) Oral two times a day  nystatin Powder 1 Application(s) Topical two times a day  oxyCODONE  ER Tablet 20 milliGRAM(s) Oral every 12 hours  pantoprazole    Tablet 40 milliGRAM(s) Oral before breakfast  senna 2 Tablet(s) Oral at bedtime    MEDICATIONS  (PRN):  benzocaine 15 mG/menthol 3.6 mG Lozenge 1 Lozenge Oral every 6 hours PRN Sore Throat  diazepam    Tablet 2 milliGRAM(s) Oral every 8 hours PRN Anxiety and Agitation  oxyCODONE    IR 5 milliGRAM(s) Oral every 4 hours PRN breakthrough pain      OBJECTIVE:    Vital Signs Last 24 Hrs  T(C): 36.6 (03 Jan 2018 05:16), Max: 36.9 (02 Jan 2018 14:36)  T(F): 97.9 (03 Jan 2018 05:16), Max: 98.4 (02 Jan 2018 14:36)  HR: 69 (03 Jan 2018 05:16) (69 - 78)  BP: 124/74 (03 Jan 2018 05:16) (110/69 - 132/82)  BP(mean): --  RR: 18 (03 Jan 2018 05:16) (18 - 18)  SpO2: 98% (03 Jan 2018 05:16) (96% - 98%)    CAPILLARY BLOOD GLUCOSE      POCT Blood Glucose.: 307 mg/dL (02 Jan 2018 21:15)  POCT Blood Glucose.: 232 mg/dL (02 Jan 2018 17:44)  POCT Blood Glucose.: 330 mg/dL (02 Jan 2018 11:49)    I&O's Summary    02 Jan 2018 07:01  -  03 Jan 2018 07:00  --------------------------------------------------------  IN: 0 mL / OUT: 650 mL / NET: -650 mL        PHYSICAL EXAM:  GENERAL: NAD, well-developed  HEAD:  Atraumatic, Normocephalic  EYES: EOMI, PERRLA, conjunctiva and sclera clear  NECK: Supple, No JVD  CHEST/LUNG: Clear to auscultation bilaterally; No wheeze  HEART: Regular rate and rhythm; No murmurs, rubs, or gallops  ABDOMEN: Soft, Nondistended; Bowel sounds present  EXTREMITIES:  2+ Peripheral Pulses, No clubbing, cyanosis, or edema.  PSYCH: AAOx3  NEUROLOGY: LLE weakness 3/5, worsened compared to yesterday. RLE 5/5.   SKIN: No rashes or lesions    LABS:                        9.0    9.1   )-----------( 417      ( 02 Jan 2018 10:37 )             26.6     Auto Eosinophil # x     / Auto Eosinophil % x     / Auto Neutrophil # x     / Auto Neutrophil % x     / BANDS % x                            9.2    7.6   )-----------( 462      ( 01 Jan 2018 10:01 )             26.7     Auto Eosinophil # x     / Auto Eosinophil % x     / Auto Neutrophil # x     / Auto Neutrophil % x     / BANDS % x        01-02    135  |  99  |  14  ----------------------------<  253<H>  4.7   |  24  |  0.57  01-01    135  |  101  |  11  ----------------------------<  158<H>  4.6   |  24  |  0.48<L>    Ca    8.9      02 Jan 2018 10:37  Mg     2.0     01-02  Phos  3.1     01-02  TPro  6.7  /  Alb  3.4  /  TBili  0.2  /  DBili  x   /  AST  12  /  ALT  11  /  AlkPhos  96  01-01    PT/INR - ( 02 Jan 2018 10:37 )   PT: 11.8 sec;   INR: 1.08 ratio         PTT - ( 02 Jan 2018 10:37 )  PTT:37.1 sec            RESPIRATORY  VENT:    ABG: ( 27 Dec 2017 09:18 ) pH: 7.49  /  pCO2: 36    /  pO2: 286   / HCO3: 27    / Base Excess: 4.0   /  SaO2: 100                 VBG:     RADIOLOGY & ADDITIONAL TESTS:  (Imaging Personally Reviewed)    Consultant(s) Notes Reviewed:      Care Discussed with Consultants/Other Providers:

## 2018-01-03 NOTE — DISCHARGE NOTE ADULT - PATIENT PORTAL LINK FT
“You can access the [facility] Patient Portal, offered by [facility], by registering with the following website: [email or web address]”

## 2018-01-03 NOTE — PROGRESS NOTE ADULT - PROBLEM SELECTOR PROBLEM 3
Weakness of left lower extremity
Urinary tract infection without hematuria, site unspecified
Weakness of left lower extremity

## 2018-01-03 NOTE — PROGRESS NOTE ADULT - PROBLEM SELECTOR PLAN 7
- cont home synthroid
- DVT ppx: pt currently anticoagulated on Lovenox with plans to transition to Elaquis  - Diet: CC diet  - Dispo: JONNY once medically stable    Laryr Colmenares, PGY-1  Care Model B  Pager [phone]

## 2018-01-03 NOTE — DISCHARGE NOTE ADULT - CARE PROVIDER_API CALL
Darius Shepherd), Otolaryngology  [address]  [address]  [place] [address]  Phone: [phone]  Fax: [phone]    [facility],   [address],   [place] [address]  Phone: [phone]  Fax: (   )    -    Joseluis Dvei  [address] [place] [address]  Phone: [phone]  Fax: (   )    -

## 2018-01-03 NOTE — PROGRESS NOTE ADULT - PROVIDER SPECIALTY LIST ADULT
ENT
Internal Medicine
MICU
Neurology
MICU
Internal Medicine

## 2018-01-03 NOTE — PROGRESS NOTE ADULT - PROBLEM SELECTOR PLAN 8
- DVT ppx: pt currently anticoagulated on Lovenox with plans to transition to Elaquis  - Diet: CC diet  - Dispo: JONNY once medically stable    Larry Colmenares, PGY-1  Care Model B  Pager [phone] - DVT ppx: pt currently anticoagulated on Lovenox with plans to transition to Eliquis  - Diet: CC diet  - Dispo: JONNY once medically stable    Larry Colmenares, PGY-1  Care Model B  Pager [phone]

## 2018-01-03 NOTE — PROGRESS NOTE ADULT - PROBLEM SELECTOR PROBLEM 4
Urinary tract infection without hematuria, site unspecified
Type 2 diabetes mellitus without complication, with long-term current use of insulin
Urinary tract infection without hematuria, site unspecified

## 2018-01-03 NOTE — PROGRESS NOTE ADULT - PROBLEM SELECTOR PLAN 3
- Today patient reporting much improvement of her b/l LE pain, and weakness of LLE 4/5, also improved   - differential diagnosis for pain includes radiculopathy vs more likely neuropathy 2/2 diabetes  - differential diagnosis for weakness includes generalized deconditioning vs neuropathy  - per neurosurg, unlikely spinal process as exam is c/w exam prior to her spinal surgery  - pt s/p EEG, official read pending   - will f/u neuro recs - Today patient reporting much improvement of her b/l LE pain, and weakness of LLE 4/5, also improved   - differential diagnosis for pain includes radiculopathy vs more likely neuropathy 2/2 diabetes  - differential diagnosis for weakness includes generalized deconditioning vs neuropathy  - per neurosurg, unlikely spinal process as exam is c/w exam prior to her spinal surgery  - continue Gabapentin 600 TID  - will increase Oxy ER to 30 BID, and continue with Oxy IR 5 q4 PRN breakthrough pain   - pt s/p EEG, official read pending   - will f/u neuro recs

## 2018-01-03 NOTE — EEG REPORT - NS EEG TEXT BOX
Study Date: 	1-2-18	        --------------------------------------------------------------------------------------------------  Study Interpretation:    FINDINGS:  The background was continuous, spontaneously variable and reactive.  During wakefulness, the posteriorly dominant rhythm consisted of symmetric, well modulated 8 Hz activity, with an amplitude to 30 uV, that attenuated to eye opening.  Low amplitude central beta was noted in wakefulness.    Background Slowing:  Generalized slowing: intermittent diffuse irregular delta was present.  Focal slowing: none was present.    Sleep Background:  Stage II sleep transients were not recorded.    Epileptiform Activity:   No epileptiform discharges were present.    Events:  No clinical events were recorded.  No seizures were recorded.    Activation Procedures:   -Hyperventilation was not performed.    -Photic stimulation was not performed.    Artifacts:  Intermittent myogenic and movement artifacts were noted.    ECG:  The heart rate on single channel ECG was predominantly between 60-70BPM.  -----------------------------------------------------------------------------------------------------    EEG Classification / Summary:  Abnormal Routine EEG Study:  intermittent diffuse irregular delta     Clinical Impression:  Mild nonspecific cerebral dysfunction noted.  There were no epileptiform abnormalities recorded.      -------------------------------------------------------------------------------------------------------  Brian T. Sosa, M.D.   of Neurology, [facility]

## 2018-01-03 NOTE — DISCHARGE NOTE ADULT - HOSPITAL COURSE
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 Pt found to have angioedema likely 2/2 ACE-inhibitor and was admitted to MICU for airway protection s/p nasal intubation 12/22. Pt was treated with Decadron, Benadryl, and Pepcid. Hospital course was complicated by UTI w/ cx growing proteus mirabilis and ESBL E coli. Pt was started on a 7 day course of carbapenem which he will complete in [facility]. Pt was extubated on 12/27. Pt was found to have an acute DVT in the left brachial vein and was started on AC dosage of Lovenox. Pt was transferred to the floor where she experience b/l LE pain and LLE weakness. Pts Gabapentin was uptitrated and pt was started on a pain regimen which controlled her pain. Pt received an EEG which revealed Mild-moderate nonspecific diffuse/multifocal cerebral dysfunction. Neurology was consulted who recommended outpt follow up. Pts pain improved and she was d/francis to [facility] in stable condition.

## 2018-01-03 NOTE — PROGRESS NOTE ADULT - PROBLEM SELECTOR PROBLEM 5
Type 2 diabetes mellitus without complication, with long-term current use of insulin
Essential hypertension
Type 2 diabetes mellitus without complication, with long-term current use of insulin

## 2018-01-03 NOTE — PROGRESS NOTE ADULT - PROBLEM SELECTOR PROBLEM 1
Angioedema, initial encounter

## 2018-01-03 NOTE — PROGRESS NOTE ADULT - PROBLEM SELECTOR PLAN 2
- Acute DVT in L brachial vein, lower extremity dopplers negative for DVT  - continue Lovenox for AC. Pt states that she is comfortable continuing on Lovenox at home.

## 2018-01-03 NOTE — PROGRESS NOTE ADULT - PROBLEM SELECTOR PLAN 1
- angioedema likely 2/2 ACE-I therapy  - s/p extubation, will avoid all ACE-I and ARBs (allergy list updated)  - will continue to monitor respiratory status, currently stable  - will continue to taper prednisone  - pt will require outpatient ENT follow-up

## 2018-01-03 NOTE — PROGRESS NOTE ADULT - PROBLEM SELECTOR PLAN 6
- BP WNL  - continue amlodipine and Metoprolol  - will avoid all ACE-I and ARBs  - will continue to monitor
- cont home synthroid

## 2018-01-03 NOTE — DISCHARGE NOTE ADULT - MEDICATION SUMMARY - MEDICATIONS TO STOP TAKING
I will STOP taking the medications listed below when I get home from the hospital:    metFORMIN 1000 mg oral tablet  -- 1 tab(s) by mouth 2 times a day    HumaLOG Mix 75/25 KwikPen subcutaneous suspension  -- 10 unit(s) subcutaneous once a day in am    HumaLOG Mix 75/25 subcutaneous suspension  -- 5 unit(s) subcutaneous once a day (at bedtime)    acetaminophen 325 mg oral tablet  -- 2 tab(s) by mouth every 6 hours, As needed, For Temp greater than 38 C (100.4 F)    oxyCODONE 10 mg oral tablet  -- 1 tab(s) by mouth every 4 hours, As needed, Moderate Pain (4 - 6)    oxyCODONE 15 mg oral tablet  -- 1 tab(s) by mouth every 4 hours, As needed, Severe Pain (7 - 10)    enalapril 20 mg oral tablet  -- 1 tab(s) by mouth once a day (at bedtime)    nitrofurantoin macrocrystals-monohydrate 100 mg oral capsule  -- 1 cap(s) by mouth 2 times a day (with meals) for 7 days    rivaroxaban 20 mg oral tablet  -- 1 tab(s) by mouth once a day   -- Check with your doctor before becoming pregnant.  It is very important that you take or use this exactly as directed.  Do not skip doses or discontinue unless directed by your doctor.  Obtain medical advice before taking any non-prescription drugs as some may affect the action of this medication.  Take with food.

## 2018-01-03 NOTE — DISCHARGE NOTE ADULT - MEDICATION SUMMARY - MEDICATIONS TO TAKE
I will START or STAY ON the medications listed below when I get home from the hospital:    predniSONE  -- Continue taking 1 10mg tablet daily until 1/5/17.  -- Indication: For Angioedema    acetaminophen 500 mg oral tablet  -- 2 tab(s) by mouth every 6 hours  -- Indication: For Pain    oxyCODONE 5 mg oral tablet  -- 1 tab(s) by mouth every 4 hours, As needed, breakthrough pain  -- Indication: For Pain    oxyCODONE 30 mg oral tablet, extended release  -- 1 tab(s) by mouth every 12 hours  -- Indication: For Pain    aspirin 81 mg oral tablet, chewable  -- 1 tab(s) by mouth once a day  -- Indication: For Type 2 diabetes mellitus without complication, with long-term current use of insulin    enoxaparin  -- 40 milligram(s) subcutaneous once a day (at bedtime)  -- Indication: For Acute deep vein thrombosis (DVT) of brachial vein of left upper extremity    gabapentin 400 mg oral capsule  -- 2 cap(s) by mouth every 8 hours  -- Indication: For Pain    diazePAM 2 mg oral tablet  -- 1 tab(s) by mouth every 8 hours, As needed, Anxiety and Agitation  -- Indication: For Anxiety    insulin lispro 100 units/mL subcutaneous solution  --  subcutaneous 3 times a day (before meals); 1 Unit(s) if Glucose 151 - 200  2 Unit(s) if Glucose 201 - 250  3 Unit(s) if Glucose 251 - 300  4 Unit(s) if Glucose 301 - 350  5 Unit(s) if Glucose 351 - 400  6 Unit(s) if Glucose Greater Than 400  -- Indication: For Type 2 diabetes mellitus without complication, with long-term current use of insulin    insulin lispro 100 units/mL subcutaneous solution  --  subcutaneous once a day (at bedtime); 0 Unit(s) if Glucose 0 - 250  1 Unit(s) if Glucose 251 - 300  2 Unit(s) if Glucose 301 - 350  3 Unit(s) if Glucose 351 - 400  4 Unit(s) if Glucose Greater Than 400  -- Indication: For Type 2 diabetes mellitus without complication, with long-term current use of insulin    insulin lispro 100 units/mL subcutaneous solution  -- 5 unit(s) subcutaneous 3 times a day (before meals)  -- Indication: For Type 2 diabetes mellitus without complication, with long-term current use of insulin    insulin glargine  -- 25 unit(s) subcutaneous once a day (at bedtime)  -- Indication: For Type 2 diabetes mellitus without complication, with long-term current use of insulin    metoprolol tartrate 50 mg oral tablet  -- 1 tab(s) by mouth 2 times a day  -- Indication: For Essential hypertension    amLODIPine 10 mg oral tablet  -- 1 tab(s) by mouth once a day (at bedtime)  -- Indication: For Essential hypertension    ertapenem 1 g injection  -- 1 gram(s) injectable once a day  -- Indication: For Urinary tract infection without hematuria, site unspecified    nystatin 100,000 units/g topical powder  -- 1 application on skin 2 times a day  -- Indication: For Rash    docusate sodium 100 mg oral capsule  -- 1 cap(s) by mouth 3 times a day  -- Indication: For Constipation    polyethylene glycol 3350 oral powder for reconstitution  -- 17 gram(s) by mouth 2 times a day  -- Indication: For Constipation    senna oral tablet  -- 2 tab(s) by mouth once a day (at bedtime)  -- Indication: For Constipation    benzocaine-menthol 15 mg-3.6 mg mucous membrane lozenge  -- 1 lozenge mucous membrane 4 times a day, As Needed  -- Indication: For Sore Throat    melatonin 5 mg oral tablet  -- 1 tab(s) by mouth once a day (at bedtime)  -- Indication: For Insomnia    pantoprazole 40 mg oral delayed release tablet  -- 1 tab(s) by mouth once a day (before a meal)  -- Indication: For GERD (gastroesophageal reflux disease)    levothyroxine 150 mcg (0.15 mg) oral tablet  -- 1 tab(s) by mouth once a day  -- Indication: For Hypothyroidism, unspecified type I will START or STAY ON the medications listed below when I get home from the hospital:    predniSONE  -- Continue taking 1 10mg tablet daily until 1/5/17.  -- Indication: For Angioedema    acetaminophen 500 mg oral tablet  -- 2 tab(s) by mouth every 6 hours  -- Indication: For Pain    oxyCODONE 5 mg oral tablet  -- 1 tab(s) by mouth every 4 hours, As needed, breakthrough pain  -- Indication: For Pain    oxyCODONE 30 mg oral tablet, extended release  -- 1 tab(s) by mouth every 12 hours  -- Indication: For Pain    aspirin 81 mg oral tablet, chewable  -- 1 tab(s) by mouth once a day  -- Indication: For Type 2 diabetes mellitus without complication, with long-term current use of insulin    enoxaparin 100 mg/mL injectable solution  -- 100 milligram(s) subcutaneous every 12 hours  -- Indication: For Acute deep vein thrombosis (DVT) of brachial vein of left upper extremity    diazePAM 2 mg oral tablet  -- 1 tab(s) by mouth every 8 hours, As needed, Anxiety and Agitation  -- Indication: For Anxiety    gabapentin 400 mg oral capsule  -- 2 cap(s) by mouth every 8 hours  -- Indication: For Pain    insulin lispro 100 units/mL subcutaneous solution  --  subcutaneous 3 times a day (before meals); 1 Unit(s) if Glucose 151 - 200  2 Unit(s) if Glucose 201 - 250  3 Unit(s) if Glucose 251 - 300  4 Unit(s) if Glucose 301 - 350  5 Unit(s) if Glucose 351 - 400  6 Unit(s) if Glucose Greater Than 400  -- Indication: For Type 2 diabetes mellitus without complication, with long-term current use of insulin    insulin lispro 100 units/mL subcutaneous solution  --  subcutaneous once a day (at bedtime); 0 Unit(s) if Glucose 0 - 250  1 Unit(s) if Glucose 251 - 300  2 Unit(s) if Glucose 301 - 350  3 Unit(s) if Glucose 351 - 400  4 Unit(s) if Glucose Greater Than 400  -- Indication: For Type 2 diabetes mellitus without complication, with long-term current use of insulin    insulin lispro 100 units/mL subcutaneous solution  -- 5 unit(s) subcutaneous 3 times a day (before meals)  -- Indication: For Type 2 diabetes mellitus without complication, with long-term current use of insulin    insulin glargine  -- 25 unit(s) subcutaneous once a day (at bedtime)  -- Indication: For Type 2 diabetes mellitus without complication, with long-term current use of insulin    metoprolol tartrate 50 mg oral tablet  -- 1 tab(s) by mouth 2 times a day  -- Indication: For Essential hypertension    amLODIPine 10 mg oral tablet  -- 1 tab(s) by mouth once a day (at bedtime)  -- Indication: For Essential hypertension    ertapenem 1 g injection  -- 1 gram(s) injectable once a day  -- Indication: For Urinary tract infection without hematuria, site unspecified    nystatin 100,000 units/g topical powder  -- 1 application on skin 2 times a day  -- Indication: For Rash    docusate sodium 100 mg oral capsule  -- 1 cap(s) by mouth 3 times a day  -- Indication: For Constipation    polyethylene glycol 3350 oral powder for reconstitution  -- 17 gram(s) by mouth 2 times a day  -- Indication: For Constipation    senna oral tablet  -- 2 tab(s) by mouth once a day (at bedtime)  -- Indication: For Constipation    benzocaine-menthol 15 mg-3.6 mg mucous membrane lozenge  -- 1 lozenge mucous membrane 4 times a day, As Needed  -- Indication: For Sore Throat    melatonin 5 mg oral tablet  -- 1 tab(s) by mouth once a day (at bedtime)  -- Indication: For Insomnia    pantoprazole 40 mg oral delayed release tablet  -- 1 tab(s) by mouth once a day (before a meal)  -- Indication: For GERD (gastroesophageal reflux disease)    levothyroxine 150 mcg (0.15 mg) oral tablet  -- 1 tab(s) by mouth once a day  -- Indication: For Hypothyroidism, unspecified type

## 2018-01-03 NOTE — CHART NOTE - NSCHARTNOTEFT_GEN_A_CORE
NUTRITION FOLLOW UP NOTE   Pt seen for length of stay.     Pt c recent d/c from hospital end of November s/p L1-L4 lumbar fusion which was c/b by UTI requiring antibx therapy initially a/w angioedema requiring intubation and MICU stay, course c/b ESBL E. coli UTI, DVT and lower extremity weakne      Source: Patient [ x]    Family [ ]     other [ x] comprehensive chart review     Diet : CSTHO      Patient reports [ ] nausea  [ ] vomiting [ ] diarrhea [ ] constipation  [ ]chewing problems [ ] swallowing issues  [ ] other:      PO intake:  < 50% [ ] 50-75% [ ]   % [ ]  other : %     Source for PO intake [ ] Patient [ ] family [x ] chart [ ] staff [ ] other     Enteral /Parenteral Nutrition: N/A    Weight: 1/3: 205.6lbs, 12/22: 213.8lbs. Noted generalized edema decreased from 3+ to 1+.    Pertinent Medications: MEDICATIONS  (STANDING):  acetaminophen   Tablet 1000 milliGRAM(s) Oral every 6 hours  amLODIPine   Tablet 10 milliGRAM(s) Oral at bedtime  aspirin  chewable 81 milliGRAM(s) Oral daily  docusate sodium 100 milliGRAM(s) Oral three times a day  enoxaparin Injectable 100 milliGRAM(s) SubCutaneous every 12 hours  gabapentin 800 milliGRAM(s) Oral every 8 hours  insulin glargine Injectable (LANTUS) 25 Unit(s) SubCutaneous at bedtime  insulin lispro (HumaLOG) corrective regimen sliding scale   SubCutaneous three times a day before meals  insulin lispro (HumaLOG) corrective regimen sliding scale   SubCutaneous at bedtime  insulin lispro Injectable (HumaLOG) 5 Unit(s) SubCutaneous three times a day before meals  levothyroxine 150 MICROGram(s) Oral daily  melatonin 5 milliGRAM(s) Oral at bedtime  meropenem IVPB 1000 milliGRAM(s) IV Intermittent every 8 hours  meropenem IVPB      metoprolol     tartrate 50 milliGRAM(s) Oral two times a day  nystatin Powder 1 Application(s) Topical two times a day  oxyCODONE  ER Tablet 30 milliGRAM(s) Oral every 12 hours  pantoprazole    Tablet 40 milliGRAM(s) Oral before breakfast  senna 2 Tablet(s) Oral at bedtime    MEDICATIONS  (PRN):  benzocaine 15 mG/menthol 3.6 mG Lozenge 1 Lozenge Oral every 6 hours PRN Sore Throat  diazepam    Tablet 2 milliGRAM(s) Oral every 8 hours PRN Anxiety and Agitation  oxyCODONE    IR 5 milliGRAM(s) Oral every 4 hours PRN breakthrough pain    Pertinent Labs:  Finger sticks 1/3: 221mg/dL, 1/2: 156-307mg/dL    Skin: intact. 1+ generalized edema.    Estimated Needs:   [x ] no change since previous assessment  [ ] recalculated:       Previous Nutrition Diagnosis:     [ ] Inadequate Energy Intake [ ]Inadequate Oral Intake [ ] Excessive Energy Intake     [ ] Underweight [ ] Increased Nutrient Needs [ x] Overweight/Obesity     [ ] Altered GI Function [ ] Unintended Weight Loss [ ] Food & Nutrition Related Knowledge Deficit [ ] Malnutrition          Nutrition Diagnosis is [ x] ongoing  [ ] resolved [ ] not applicable          New Nutrition Diagnosis: [ ] not applicable    [ ] Inadequate Protein Energy Intake [ ]Inadequate Oral Intake [ ] Excessive Energy Intake     [ ] Underweight [ ] Increased Nutrient Needs [ ] Overweight/Obesity     [ ] Altered GI Function [ ] Unintended Weight Loss [ ] Food & Nutrition Related Knowledge Deficit[ ] Limited Adherence to nutrition related recommendations [ ] Malnutrition  [ ] other: Free text       Related to:      As evidenced by:      Interventions:     Recommend    [ ] Change Diet To:    [ ] Nutrition Supplement    [x ] Nutrition Support: Continue to provide food preferences within diet restrictions as feasible. Continue to encourage good po intake at meals.     [ ] Other:        Monitoring and Evaluation:     [ x] PO intake [x ] Tolerance to diet prescription [ x] weights [x ] follow up per protocol    RD to remain available for further nutritional interventions as indicated/requested by medical team/pt. NUTRITION FOLLOW UP NOTE   Pt seen for length of stay.     Pt c recent d/c from hospital end of November s/p L1-L4 lumbar fusion which was c/b by UTI requiring antibiotics therapy initially a/w angioedema requiring intubation and MICU stay, course c/b ESBL E. coli UTI, DVT and lower extremity weakness.      Source: Patient [ x]    Family [ ]     other [ x] comprehensive chart review     Diet : CSTHO      Patient reports no GI distress. States she was having constipation but the prunes have helped. Enjoys the food and has a good appetite. States her blood sugar was <150mg/dL PTA. eats cereal with milk and a banana for breakfast, nasir crackers as  a snack instead of lunch and dinner is home-cooked with chicken/salmon vegetables and a small side of starch.        PO intake:  < 50% [ ] 50-75% [ ]   % [ ]  other : %     Source for PO intake [ ] Patient [ ] family [x ] chart [ ] staff [ ] other     Enteral /Parenteral Nutrition: N/A    Weight: 1/3: 205.6lbs, 12/22: 213.8lbs. Noted generalized edema decreased from 3+ to 1+.    Pertinent Medications: MEDICATIONS  (STANDING):  acetaminophen   Tablet 1000 milliGRAM(s) Oral every 6 hours  amLODIPine   Tablet 10 milliGRAM(s) Oral at bedtime  aspirin  chewable 81 milliGRAM(s) Oral daily  docusate sodium 100 milliGRAM(s) Oral three times a day  enoxaparin Injectable 100 milliGRAM(s) SubCutaneous every 12 hours  gabapentin 800 milliGRAM(s) Oral every 8 hours  insulin glargine Injectable (LANTUS) 25 Unit(s) SubCutaneous at bedtime  insulin lispro (HumaLOG) corrective regimen sliding scale   SubCutaneous three times a day before meals  insulin lispro (HumaLOG) corrective regimen sliding scale   SubCutaneous at bedtime  insulin lispro Injectable (HumaLOG) 5 Unit(s) SubCutaneous three times a day before meals  levothyroxine 150 MICROGram(s) Oral daily  melatonin 5 milliGRAM(s) Oral at bedtime  meropenem IVPB 1000 milliGRAM(s) IV Intermittent every 8 hours  meropenem IVPB      metoprolol     tartrate 50 milliGRAM(s) Oral two times a day  nystatin Powder 1 Application(s) Topical two times a day  oxyCODONE  ER Tablet 30 milliGRAM(s) Oral every 12 hours  pantoprazole    Tablet 40 milliGRAM(s) Oral before breakfast  senna 2 Tablet(s) Oral at bedtime    MEDICATIONS  (PRN):  benzocaine 15 mG/menthol 3.6 mG Lozenge 1 Lozenge Oral every 6 hours PRN Sore Throat  diazepam    Tablet 2 milliGRAM(s) Oral every 8 hours PRN Anxiety and Agitation  oxyCODONE    IR 5 milliGRAM(s) Oral every 4 hours PRN breakthrough pain    Pertinent Labs:  Finger sticks 1/3: 221mg/dL, 1/2: 156-307mg/dL    Skin: intact. 1+ generalized edema.    Estimated Needs:   [x ] no change since previous assessment  [ ] recalculated:       Previous Nutrition Diagnosis:     [ ] Inadequate Energy Intake [ ]Inadequate Oral Intake [ ] Excessive Energy Intake     [ ] Underweight [ ] Increased Nutrient Needs [ x] Overweight/Obesity     [ ] Altered GI Function [ ] Unintended Weight Loss [ ] Food & Nutrition Related Knowledge Deficit [ ] Malnutrition          Nutrition Diagnosis is [ x] ongoing  [ ] resolved [ ] not applicable          New Nutrition Diagnosis: [ ] not applicable         Interventions:     Recommend    [ ] Change Diet To:    [ ] Nutrition Supplement    [x ] Nutrition Support: Continue to provide food preferences within diet restrictions as feasible. Continue to encourage good po intake at meals.     [x ] Other: Reviewed T2DM Nutrition Therapy Handout. Discussed balanced meal pattern, monitoring portion sizes, including protein at each meal and snack, and limiting concentrated sweets. Reinforced importance of self-monitoring blood glucose levels.        Monitoring and Evaluation:     [ x] PO intake [x ] Tolerance to diet prescription [ x] weights [x ] follow up per protocol    RD to remain available for further nutritional interventions as indicated/requested by medical team/pt.

## 2018-01-03 NOTE — PROGRESS NOTE ADULT - PROBLEM SELECTOR PROBLEM 2
Acute deep vein thrombosis (DVT) of brachial vein of left upper extremity
Acute deep vein thrombosis (DVT) of brachial vein of left upper extremity
Epistaxis
GERD (gastroesophageal reflux disease)
Epistaxis
Acute deep vein thrombosis (DVT) of brachial vein of left upper extremity

## 2018-01-03 NOTE — EEG REPORT - NS EEG TEXT BOX
Study Date: 	1-2 to 1-3-18	        --------------------------------------------------------------------------------------------------  Study Interpretation:    FINDINGS:  The background was continuous, spontaneously variable and reactive.  During wakefulness, the posteriorly dominant rhythm consisted of symmetric, well modulated 8 Hz activity, with an amplitude to 30 uV, that attenuated to eye opening.  Low amplitude central beta was noted in wakefulness.    Background Slowing:  Generalized slowing: intermittent diffuse irregular delta was present.  Focal slowing: none was present.    Sleep Background:  Stage II sleep transients were not recorded.    Epileptiform Activity:   No epileptiform discharges were present.    Events:  No clinical events were recorded.  No seizures were recorded.    Activation Procedures:   -Hyperventilation was not performed.    -Photic stimulation was not performed.    Artifacts:  Intermittent myogenic and movement artifacts were noted.    ECG:  The heart rate on single channel ECG was predominantly between 60-70BPM.  -----------------------------------------------------------------------------------------------------    EEG Classification / Summary:  Abnormal Routine EEG Study:  Mild-moderate degree of intermittent diffuse irregular delta, asynchronous    Clinical Impression:  Mild-moderate nonspecific diffuse/multifocal cerebral dysfunction noted.  There were no epileptiform abnormalities recorded, as previously documented on prior report.      -------------------------------------------------------------------------------------------------------  Brian Sosa M.D.   of Neurology, [facility]ile[facility] [place] Study Date: 	1-2 to 1-3-18	        --------------------------------------------------------------------------------------------------  Study Interpretation:    FINDINGS:  The background was continuous, spontaneously variable and reactive.  During wakefulness, the posteriorly dominant rhythm consisted of symmetric, well modulated 8 Hz activity, with an amplitude to 30 uV, that attenuated to eye opening.  Low amplitude central beta was noted in wakefulness.    Background Slowing:  Generalized slowing: intermittent diffuse irregular delta was present.  Focal slowing: none was present.    Sleep Background:  Stage II sleep transients were  recorded with symmetrical spindles and k complexes.    Epileptiform Activity:   No epileptiform discharges were present.    Events:  No clinical events were recorded.  No seizures were recorded.    Activation Procedures:   -Hyperventilation was not performed.    -Photic stimulation was not performed.    Artifacts:  Intermittent myogenic and movement artifacts were noted.    ECG:  The heart rate on single channel ECG was predominantly between 60-70BPM.	  -----------------------------------------------------------------------------------------------------    EEG Classification / Summary:  Abnormal Routine EEG Study:  Mild-moderate degree of intermittent diffuse irregular delta, asynchronous    Clinical Impression:  Mild-moderate nonspecific diffuse/multifocal cerebral dysfunction noted.  There were no epileptiform abnormalities recorded, as previously documented on prior report.      -------------------------------------------------------------------------------------------------------  Brian Sosa M.D.   of Neurology, [facility] Epilepsy [place] Study Date: 	1-2 to 1-3-18	        --------------------------------------------------------------------------------------------------  Study Interpretation:    FINDINGS:  The background was continuous, spontaneously variable and reactive.  During wakefulness, the posteriorly dominant rhythm consisted of symmetric, well modulated 8 Hz activity, with an amplitude to 30 uV, that attenuated to eye opening.  Low amplitude central beta was noted in wakefulness.    Background Slowing:  Generalized slowing: intermittent diffuse irregular delta was present.  Focal slowing: none was present.    Sleep Background:  Stage II sleep transients were  recorded with symmetrical spindles and k complexes.    Epileptiform Activity:   No epileptiform discharges were present.    Events:  No clinical events were recorded.  No seizures were recorded.    Activation Procedures:   -Hyperventilation was not performed.    -Photic stimulation was not performed.    Artifacts:  Intermittent myogenic and movement artifacts were noted.    ECG:  The heart rate on single channel ECG was predominantly between 60-70BPM.	  -----------------------------------------------------------------------------------------------------    EEG Classification / Summary:  Abnormal VEEG Study:  Mild-moderate degree of intermittent diffuse irregular delta, asynchronous    Clinical Impression:  Mild-moderate nonspecific diffuse/multifocal cerebral dysfunction noted.  There were no epileptiform abnormalities recorded, as previously documented on prior report.      -------------------------------------------------------------------------------------------------------  Brian Sosa M.D.   of Neurology, [facility]ile[facility] [place]

## 2018-01-03 NOTE — PROGRESS NOTE ADULT - PROBLEM SELECTOR PROBLEM 6
Essential hypertension
Hypothyroidism, unspecified type
Essential hypertension

## 2018-01-03 NOTE — DISCHARGE NOTE ADULT - CARE PROVIDERS DIRECT ADDRESSES
,[email or web address],DirectAddress_Unknown

## 2018-01-03 NOTE — PROGRESS NOTE ADULT - PROBLEM SELECTOR PLAN 4
- Proteus Mirabilis and ESBL Ecoli in the urine   - Pt received a 4 day course of Zosyn in the MICU, given the fact that it is ESBL Ecoli, will c/w IV Meropenem for complicated UTI, now day 6/7, if patient is discharged prior to day 7, she will be switched over to Ertapenem for completion of her antibiotic course.

## 2018-01-03 NOTE — PROGRESS NOTE ADULT - ATTENDING COMMENTS
Pt is a 71 yo HTN on ACE(-), DM, and hypothyroidism BIBEMS after developing swelling and protrusion of tongue with difficulty swallowing. s/p nasal intubation and admitting dx angioedema    Resp/ENT: ENT evaluation of airway prior to extubation/ Cont H1+2 blocker and Steroids  ID: Cont empiric Zosyn  CVS: Cont antiHTN meds  FEN: Enteral feeds  Endo: Cont Lantus and coverage with Lispro/ Cont Synthroid  Neuro: Cont Sedation    CCT: 40 min
Pt seen and examined,. agree with above. 72 F with HTN, DM 2, s/p recent L1-L4 laminectomy, admitted with acute resp failure with hypoxia 2/2 angioedema, s/p fiberoptic nasal intubation. On mech ventilation. Cont Decadron, pepcid and add benadryl Q6H. Tongue and facial swelling improved. FUP with ENT re: evaluation of supraglottic area to determine if there is still residual edema. EEG negative for seizures. Spiked to 101 this am, UA +, FUP Bcxs, UCx. Will start broad spectrum antibiotic coverage with Vanc/ Zosyn for now.  Cont close monitoring of hemodynamics and blood glucose.  Cont valium Q8H as pt was on benzos at home.   - Critical Care Time Spent: 40 mins.
I agree with the resident assessment and plan.  The patient's mental status is intact and there is no evidence of worsening lower extremity weakness.  Clinical suspicion for diffuse cortical injury or diffuse CNS process is low.  I would await repeat EEG prior to further investigation.
Pt is a 71 yo HTN on ACE(-), DM, and hypothyroidism BIBEMS after developing swelling and protrusion of tongue with difficulty swallowing. s/p nasal intubation and admitting dx angioedema    Resp: s/p extubation 12/27  ID: ? of dc'ing Zosyn, pt with Proteus and E. coli in urine but UA only with 5-10 WBC  CVS: Cont antiHTN meds/ Dc central line  FEN: Trial of po diet/ Dc NGT  Endo: Cont Lantus and coverage with Lispro/ Cont Synthroid  PT/OT  Social: May transfer to [facility]
Pt seen and examined,. agree with above. 72 F with HTN, DM 2, s/p recent L1-L4 laminectomy, admitted with acute resp failure with hypoxia 2/2 angioedema, s/p fiberoptic nasal intubation. [facility] Cont Decadron, pepcid and add benadryl Q6H. Angioedema mildly improved. High sedative requirements. Will resume valium 2 mg Q8H as she was on valium prior to admission. Cont propofol and fentanyl gtts for sedation. Cont close monitoring of hemodynamics and blood glucose.   - Critical Care Time Spent: 35 mins.
Pt is a 71 yo HTN on ACE(-), DM, and hypothyroidism BIBEMS after developing swelling and protrusion of tongue with difficulty swallowing. s/p nasal intubation and admitting dx angioedema    Resp: Weaning well; wxtubate  ID: Cont empiric Zosyn  CVS: Cont antiHTN meds  FEN: NPO  Endo: Cont Lantus and coverage with Lispro/ Cont Synthroid  Neuro: Sedation held for extubation  Social: Pt's family at the bedside and given f/u    CCT: 35 min
Pt seen and examined,. agree with above. 72 F with HTN, DM 2, s/p recent L1-L4 laminectomy, admitted with acute resp failure with hypoxia 2/2 angioedema, s/p fiberoptic nasal intubation. [facility]. Cont Decadron, pepcid and add benadryl Q6H. Angioedema continues to improve.  Cont propofol and fentanyl gtts for sedation. Cont close monitoring of hemodynamics and blood glucose. ? seizure like activity yesterday. Currently getting a video EEG. Cont valium Q8H as pt was on benzos at home.   - Critical Care Time Spent: 35 mins.
Safe discharge planning discussed with pt, team members, consultants and . Repeat EEG without seizure focus. Pain better controlled on the current regimen. Pt to be discharged to [facility] rehab where he would continue PT. All questions and concerns were addressed    Tenisha Bello MD  Division of Hospital Medicine  Pager: [phone]  Office: [phone]
Pain much better improved on current pain regimen  Pending repeat EEG - if stable, likely dc to [facility] tomorrow    Tenisha Bello MD  Division of Hospital Medicine  Pager: [phone]  Office: [phone]

## 2018-01-03 NOTE — PROGRESS NOTE ADULT - PROBLEM SELECTOR PLAN 5
- pts FS WNL  - continue Lantus 20 and 2u humalog ac with ISS for now.  - insulin requirement will likely decrease as steroid doses continue to decrease  - FS with meals and at bedtime  - will continue to monitor - pts FS not optimally controlled  - increase Lantus to 25 units and 5 units humalog ac with ISS for now.  - insulin requirement will likely decrease as steroid doses continue to decrease  - FS with meals and at bedtime  - will continue to monitor

## 2018-02-01 ENCOUNTER — APPOINTMENT (OUTPATIENT)
Dept: SPINE | Facility: CLINIC | Age: 73
End: 2018-02-01
Payer: MEDICARE

## 2018-02-01 VITALS
WEIGHT: 191 LBS | BODY MASS INDEX: 31.82 KG/M2 | SYSTOLIC BLOOD PRESSURE: 126 MMHG | HEIGHT: 65 IN | DIASTOLIC BLOOD PRESSURE: 78 MMHG

## 2018-02-01 DIAGNOSIS — R68.89 OTHER GENERAL SYMPTOMS AND SIGNS: ICD-10-CM

## 2018-02-01 DIAGNOSIS — T78.40XA ALLERGY, UNSPECIFIED, INITIAL ENCOUNTER: ICD-10-CM

## 2018-02-01 DIAGNOSIS — Z87.898 PERSONAL HISTORY OF OTHER SPECIFIED CONDITIONS: ICD-10-CM

## 2018-02-01 PROCEDURE — 99024 POSTOP FOLLOW-UP VISIT: CPT

## 2018-02-02 ENCOUNTER — APPOINTMENT (OUTPATIENT)
Dept: RADIOLOGY | Facility: CLINIC | Age: 73
End: 2018-02-02
Payer: MEDICARE

## 2018-02-02 ENCOUNTER — OUTPATIENT (OUTPATIENT)
Dept: OUTPATIENT SERVICES | Facility: HOSPITAL | Age: 73
LOS: 1 days | End: 2018-02-02
Payer: MEDICARE

## 2018-02-02 DIAGNOSIS — Z82.8 FAMILY HISTORY OF OTHER DISABILITIES AND CHRONIC DISEASES LEADING TO DISABLEMENT, NOT ELSEWHERE CLASSIFIED: Chronic | ICD-10-CM

## 2018-02-02 DIAGNOSIS — Z98.89 OTHER SPECIFIED POSTPROCEDURAL STATES: Chronic | ICD-10-CM

## 2018-02-02 DIAGNOSIS — Z00.8 ENCOUNTER FOR OTHER GENERAL EXAMINATION: ICD-10-CM

## 2018-02-02 DIAGNOSIS — Z96.653 PRESENCE OF ARTIFICIAL KNEE JOINT, BILATERAL: Chronic | ICD-10-CM

## 2018-02-02 DIAGNOSIS — Z90.710 ACQUIRED ABSENCE OF BOTH CERVIX AND UTERUS: Chronic | ICD-10-CM

## 2018-02-02 PROBLEM — Z87.898 HISTORY OF ANGIOEDEMA: Status: RESOLVED | Noted: 2018-02-02 | Resolved: 2018-02-02

## 2018-02-02 PROCEDURE — 72100 X-RAY EXAM L-S SPINE 2/3 VWS: CPT | Mod: 26

## 2018-02-02 PROCEDURE — 72100 X-RAY EXAM L-S SPINE 2/3 VWS: CPT

## 2018-02-09 ENCOUNTER — RX RENEWAL (OUTPATIENT)
Age: 73
End: 2018-02-09

## 2018-02-22 ENCOUNTER — APPOINTMENT (OUTPATIENT)
Dept: SPINE | Facility: CLINIC | Age: 73
End: 2018-02-22
Payer: MEDICARE

## 2018-02-22 VITALS
HEIGHT: 65 IN | DIASTOLIC BLOOD PRESSURE: 76 MMHG | BODY MASS INDEX: 31.82 KG/M2 | WEIGHT: 191 LBS | SYSTOLIC BLOOD PRESSURE: 124 MMHG

## 2018-02-22 PROCEDURE — 99213 OFFICE O/P EST LOW 20 MIN: CPT

## 2018-03-15 ENCOUNTER — APPOINTMENT (OUTPATIENT)
Dept: SPINE | Facility: CLINIC | Age: 73
End: 2018-03-15
Payer: MEDICARE

## 2018-03-15 VITALS
SYSTOLIC BLOOD PRESSURE: 126 MMHG | DIASTOLIC BLOOD PRESSURE: 84 MMHG | HEIGHT: 65 IN | WEIGHT: 191 LBS | BODY MASS INDEX: 31.82 KG/M2

## 2018-03-15 PROCEDURE — 99213 OFFICE O/P EST LOW 20 MIN: CPT

## 2018-03-15 RX ORDER — OXYCODONE AND ACETAMINOPHEN 10; 325 MG/1; MG/1
10-325 TABLET ORAL 4 TIMES DAILY
Qty: 56 | Refills: 0 | Status: DISCONTINUED | COMMUNITY
Start: 2017-10-27 | End: 2018-03-15

## 2018-03-15 RX ORDER — ACETAMINOPHEN 500 MG/1
TABLET, COATED ORAL
Refills: 0 | Status: ACTIVE | COMMUNITY

## 2018-03-15 RX ORDER — OXYCODONE AND ACETAMINOPHEN 10; 325 MG/1; MG/1
10-325 TABLET ORAL 4 TIMES DAILY
Qty: 56 | Refills: 0 | Status: DISCONTINUED | COMMUNITY
Start: 2017-10-10 | End: 2018-03-15

## 2018-03-15 RX ORDER — OXYCODONE AND ACETAMINOPHEN 5; 325 MG/1; MG/1
5-325 TABLET ORAL EVERY 8 HOURS
Qty: 63 | Refills: 0 | Status: DISCONTINUED | COMMUNITY
Start: 2018-02-01 | End: 2018-03-15

## 2018-03-15 RX ORDER — GABAPENTIN 400 MG/1
400 CAPSULE ORAL
Qty: 180 | Refills: 0 | Status: DISCONTINUED | COMMUNITY
Start: 2018-01-18 | End: 2018-03-15

## 2018-03-22 ENCOUNTER — RX RENEWAL (OUTPATIENT)
Age: 73
End: 2018-03-22

## 2018-05-24 ENCOUNTER — APPOINTMENT (OUTPATIENT)
Dept: SPINE | Facility: CLINIC | Age: 73
End: 2018-05-24

## 2018-06-01 ENCOUNTER — OUTPATIENT (OUTPATIENT)
Dept: OUTPATIENT SERVICES | Facility: HOSPITAL | Age: 73
LOS: 1 days | End: 2018-06-01
Payer: MEDICARE

## 2018-06-01 ENCOUNTER — APPOINTMENT (OUTPATIENT)
Dept: RADIOLOGY | Facility: CLINIC | Age: 73
End: 2018-06-01
Payer: MEDICARE

## 2018-06-01 DIAGNOSIS — Z90.710 ACQUIRED ABSENCE OF BOTH CERVIX AND UTERUS: Chronic | ICD-10-CM

## 2018-06-01 DIAGNOSIS — Z96.653 PRESENCE OF ARTIFICIAL KNEE JOINT, BILATERAL: Chronic | ICD-10-CM

## 2018-06-01 DIAGNOSIS — Z98.1 ARTHRODESIS STATUS: ICD-10-CM

## 2018-06-01 DIAGNOSIS — Z82.8 FAMILY HISTORY OF OTHER DISABILITIES AND CHRONIC DISEASES LEADING TO DISABLEMENT, NOT ELSEWHERE CLASSIFIED: Chronic | ICD-10-CM

## 2018-06-01 DIAGNOSIS — Z98.89 OTHER SPECIFIED POSTPROCEDURAL STATES: Chronic | ICD-10-CM

## 2018-06-01 PROCEDURE — 72100 X-RAY EXAM L-S SPINE 2/3 VWS: CPT | Mod: 26

## 2018-06-01 PROCEDURE — 72100 X-RAY EXAM L-S SPINE 2/3 VWS: CPT

## 2018-06-07 ENCOUNTER — APPOINTMENT (OUTPATIENT)
Dept: SPINE | Facility: CLINIC | Age: 73
End: 2018-06-07
Payer: MEDICARE

## 2018-06-07 VITALS
BODY MASS INDEX: 32.1 KG/M2 | DIASTOLIC BLOOD PRESSURE: 78 MMHG | HEIGHT: 65.5 IN | WEIGHT: 195 LBS | SYSTOLIC BLOOD PRESSURE: 122 MMHG

## 2018-06-07 PROCEDURE — 99213 OFFICE O/P EST LOW 20 MIN: CPT

## 2018-07-05 ENCOUNTER — MEDICATION RENEWAL (OUTPATIENT)
Age: 73
End: 2018-07-05

## 2018-08-07 ENCOUNTER — OUTPATIENT (OUTPATIENT)
Dept: OUTPATIENT SERVICES | Facility: HOSPITAL | Age: 73
LOS: 1 days | End: 2018-08-07
Payer: MEDICARE

## 2018-08-07 ENCOUNTER — APPOINTMENT (OUTPATIENT)
Dept: RADIOLOGY | Facility: CLINIC | Age: 73
End: 2018-08-07
Payer: MEDICARE

## 2018-08-07 DIAGNOSIS — Z96.653 PRESENCE OF ARTIFICIAL KNEE JOINT, BILATERAL: Chronic | ICD-10-CM

## 2018-08-07 DIAGNOSIS — Z90.710 ACQUIRED ABSENCE OF BOTH CERVIX AND UTERUS: Chronic | ICD-10-CM

## 2018-08-07 DIAGNOSIS — Z82.8 FAMILY HISTORY OF OTHER DISABILITIES AND CHRONIC DISEASES LEADING TO DISABLEMENT, NOT ELSEWHERE CLASSIFIED: Chronic | ICD-10-CM

## 2018-08-07 DIAGNOSIS — Z98.1 ARTHRODESIS STATUS: ICD-10-CM

## 2018-08-07 DIAGNOSIS — Z00.8 ENCOUNTER FOR OTHER GENERAL EXAMINATION: ICD-10-CM

## 2018-08-07 DIAGNOSIS — Z98.89 OTHER SPECIFIED POSTPROCEDURAL STATES: Chronic | ICD-10-CM

## 2018-08-07 PROCEDURE — 72100 X-RAY EXAM L-S SPINE 2/3 VWS: CPT | Mod: 26

## 2018-08-07 PROCEDURE — 72100 X-RAY EXAM L-S SPINE 2/3 VWS: CPT

## 2018-08-30 ENCOUNTER — APPOINTMENT (OUTPATIENT)
Dept: SPINE | Facility: CLINIC | Age: 73
End: 2018-08-30
Payer: MEDICARE

## 2018-08-30 VITALS
HEIGHT: 65.5 IN | WEIGHT: 195 LBS | BODY MASS INDEX: 32.1 KG/M2 | DIASTOLIC BLOOD PRESSURE: 84 MMHG | SYSTOLIC BLOOD PRESSURE: 140 MMHG

## 2018-08-30 DIAGNOSIS — M54.5 LOW BACK PAIN: ICD-10-CM

## 2018-08-30 DIAGNOSIS — M79.605 LOW BACK PAIN: ICD-10-CM

## 2018-08-30 PROCEDURE — 99213 OFFICE O/P EST LOW 20 MIN: CPT

## 2018-08-30 RX ORDER — TRAMADOL HYDROCHLORIDE 50 MG/1
50 TABLET, COATED ORAL
Qty: 28 | Refills: 0 | Status: DISCONTINUED | COMMUNITY
Start: 2018-03-22 | End: 2018-08-30

## 2018-08-30 RX ORDER — GABAPENTIN 600 MG/1
600 TABLET, COATED ORAL EVERY 8 HOURS
Qty: 90 | Refills: 2 | Status: DISCONTINUED | COMMUNITY
Start: 2018-02-01 | End: 2018-08-30

## 2018-09-12 ENCOUNTER — APPOINTMENT (OUTPATIENT)
Dept: MRI IMAGING | Facility: CLINIC | Age: 73
End: 2018-09-12
Payer: MEDICARE

## 2018-09-12 ENCOUNTER — OUTPATIENT (OUTPATIENT)
Dept: OUTPATIENT SERVICES | Facility: HOSPITAL | Age: 73
LOS: 1 days | End: 2018-09-12
Payer: MEDICARE

## 2018-09-12 ENCOUNTER — APPOINTMENT (OUTPATIENT)
Dept: RADIOLOGY | Facility: CLINIC | Age: 73
End: 2018-09-12

## 2018-09-12 ENCOUNTER — APPOINTMENT (OUTPATIENT)
Dept: CT IMAGING | Facility: CLINIC | Age: 73
End: 2018-09-12
Payer: MEDICARE

## 2018-09-12 DIAGNOSIS — Z82.8 FAMILY HISTORY OF OTHER DISABILITIES AND CHRONIC DISEASES LEADING TO DISABLEMENT, NOT ELSEWHERE CLASSIFIED: Chronic | ICD-10-CM

## 2018-09-12 DIAGNOSIS — Z96.653 PRESENCE OF ARTIFICIAL KNEE JOINT, BILATERAL: Chronic | ICD-10-CM

## 2018-09-12 DIAGNOSIS — M54.5 LOW BACK PAIN: ICD-10-CM

## 2018-09-12 DIAGNOSIS — Z98.89 OTHER SPECIFIED POSTPROCEDURAL STATES: Chronic | ICD-10-CM

## 2018-09-12 DIAGNOSIS — Z00.8 ENCOUNTER FOR OTHER GENERAL EXAMINATION: ICD-10-CM

## 2018-09-12 DIAGNOSIS — Z90.710 ACQUIRED ABSENCE OF BOTH CERVIX AND UTERUS: Chronic | ICD-10-CM

## 2018-09-12 DIAGNOSIS — Z98.1 ARTHRODESIS STATUS: ICD-10-CM

## 2018-09-12 PROCEDURE — 72082 X-RAY EXAM ENTIRE SPI 2/3 VW: CPT | Mod: 26

## 2018-09-12 PROCEDURE — 72148 MRI LUMBAR SPINE W/O DYE: CPT | Mod: 26

## 2018-09-12 PROCEDURE — 72131 CT LUMBAR SPINE W/O DYE: CPT | Mod: 26

## 2018-09-12 PROCEDURE — 72082 X-RAY EXAM ENTIRE SPI 2/3 VW: CPT

## 2018-09-12 PROCEDURE — 76376 3D RENDER W/INTRP POSTPROCES: CPT | Mod: 26

## 2018-09-12 PROCEDURE — 72131 CT LUMBAR SPINE W/O DYE: CPT

## 2018-09-12 PROCEDURE — 72148 MRI LUMBAR SPINE W/O DYE: CPT

## 2018-09-12 PROCEDURE — 76376 3D RENDER W/INTRP POSTPROCES: CPT

## 2018-09-13 ENCOUNTER — APPOINTMENT (OUTPATIENT)
Dept: SPINE | Facility: CLINIC | Age: 73
End: 2018-09-13
Payer: MEDICARE

## 2018-09-13 VITALS
SYSTOLIC BLOOD PRESSURE: 134 MMHG | HEIGHT: 65.5 IN | BODY MASS INDEX: 32.1 KG/M2 | DIASTOLIC BLOOD PRESSURE: 80 MMHG | WEIGHT: 195 LBS

## 2018-09-13 PROCEDURE — 99213 OFFICE O/P EST LOW 20 MIN: CPT

## 2018-09-18 DIAGNOSIS — M48.07 SPINAL STENOSIS, LUMBOSACRAL REGION: ICD-10-CM

## 2018-09-18 DIAGNOSIS — M51.16 INTERVERTEBRAL DISC DISORDERS WITH RADICULOPATHY, LUMBAR REGION: ICD-10-CM

## 2018-09-18 DIAGNOSIS — Z98.1 ARTHRODESIS STATUS: ICD-10-CM

## 2018-09-18 DIAGNOSIS — M48.061 SPINAL STENOSIS, LUMBAR REGION WITHOUT NEUROGENIC CLAUDICATION: ICD-10-CM

## 2018-12-13 ENCOUNTER — APPOINTMENT (OUTPATIENT)
Dept: SPINE | Facility: CLINIC | Age: 73
End: 2018-12-13

## 2019-01-24 ENCOUNTER — APPOINTMENT (OUTPATIENT)
Dept: SPINE | Facility: CLINIC | Age: 74
End: 2019-01-24
Payer: MEDICARE

## 2019-01-24 VITALS — HEIGHT: 65.5 IN | WEIGHT: 210 LBS | BODY MASS INDEX: 34.57 KG/M2

## 2019-01-24 VITALS — DIASTOLIC BLOOD PRESSURE: 84 MMHG | SYSTOLIC BLOOD PRESSURE: 134 MMHG

## 2019-01-24 DIAGNOSIS — Z98.1 ARTHRODESIS STATUS: ICD-10-CM

## 2019-01-24 DIAGNOSIS — M54.16 RADICULOPATHY, LUMBAR REGION: ICD-10-CM

## 2019-01-24 PROCEDURE — 99213 OFFICE O/P EST LOW 20 MIN: CPT

## 2019-01-24 RX ORDER — GABAPENTIN 600 MG/1
600 TABLET, COATED ORAL EVERY 8 HOURS
Qty: 90 | Refills: 2 | Status: ACTIVE | COMMUNITY
Start: 2019-01-24 | End: 1900-01-01

## 2019-01-24 NOTE — REASON FOR VISIT
[Follow-Up: _____] : a [unfilled] follow-up visit [FreeTextEntry1] : Describing back pain which is pretty much stable. She also has right sided radicular pain. She is known to have a pseudoarthrosis at L5-S1. She has not had any recent x-rays. Her functionality is stable.

## 2019-01-24 NOTE — ASSESSMENT
[FreeTextEntry1] : I have had a detailed discussion with the patient concerning further options. She does not wish to undergo any further surgery at the present time. She will return in 3 months with followup x-rays.

## 2019-01-24 NOTE — PHYSICAL EXAM
[Motor Strength] : muscle strength was normal in all four extremities [Sensation Tactile Decrease] : light touch was intact [FreeTextEntry8] : using a cane

## 2019-01-29 NOTE — PROGRESS NOTE ADULT - I WAS PHYSICALLY PRESENT FOR THE KEY PORTIONS OF THE EVALUATION AND MANAGEMENT (E/M) SERVICE PROVIDED.  I AGREE WITH THE ABOVE HISTORY, PHYSICAL, AND PLAN WHICH I HAVE REVIEWED AND EDITED WHERE APPROPRIATE
Referral to sleep medicine for rule out of obstructive sleep apnea contributing to elevated morning pressures. Continue home monitoring and follow up if numbers/ readings are worsening or any concerns.  Will recheck cholesterol at time of annual wellness visit.   
Statement Selected

## 2019-03-22 ENCOUNTER — RX RENEWAL (OUTPATIENT)
Age: 74
End: 2019-03-22

## 2019-04-25 ENCOUNTER — APPOINTMENT (OUTPATIENT)
Dept: SPINE | Facility: CLINIC | Age: 74
End: 2019-04-25

## 2020-05-20 NOTE — ED ADULT NURSE NOTE - PAIN: PRESENCE, MLM
FYI, decided to restart Novolog since her sugars are so high. Her sister is helping. 
non-verbal indicator of pain/discomfort present

## 2020-08-21 ENCOUNTER — NON-APPOINTMENT (OUTPATIENT)
Age: 75
End: 2020-08-21

## 2020-08-21 ENCOUNTER — APPOINTMENT (OUTPATIENT)
Age: 75
End: 2020-08-21
Payer: MEDICARE

## 2020-08-21 PROCEDURE — 92002 INTRM OPH EXAM NEW PATIENT: CPT

## 2020-08-21 PROCEDURE — 92025 CPTRIZED CORNEAL TOPOGRAPHY: CPT

## 2020-08-21 PROCEDURE — 92286 ANT SGM IMG I&R SPECLR MIC: CPT

## 2020-09-28 ENCOUNTER — TRANSCRIPTION ENCOUNTER (OUTPATIENT)
Age: 75
End: 2020-09-28

## 2020-09-29 ENCOUNTER — OUTPATIENT (OUTPATIENT)
Dept: OUTPATIENT SERVICES | Facility: HOSPITAL | Age: 75
LOS: 1 days | Discharge: HOME | End: 2020-09-29
Payer: MEDICARE

## 2020-09-29 ENCOUNTER — RESULT REVIEW (OUTPATIENT)
Age: 75
End: 2020-09-29

## 2020-09-29 ENCOUNTER — NON-APPOINTMENT (OUTPATIENT)
Age: 75
End: 2020-09-29

## 2020-09-29 ENCOUNTER — APPOINTMENT (OUTPATIENT)
Dept: OPHTHALMOLOGY | Facility: AMBULATORY SURGERY CENTER | Age: 75
End: 2020-09-29

## 2020-09-29 DIAGNOSIS — Z90.710 ACQUIRED ABSENCE OF BOTH CERVIX AND UTERUS: Chronic | ICD-10-CM

## 2020-09-29 DIAGNOSIS — Z98.89 OTHER SPECIFIED POSTPROCEDURAL STATES: Chronic | ICD-10-CM

## 2020-09-29 DIAGNOSIS — Z82.8 FAMILY HISTORY OF OTHER DISABILITIES AND CHRONIC DISEASES LEADING TO DISABLEMENT, NOT ELSEWHERE CLASSIFIED: Chronic | ICD-10-CM

## 2020-09-29 DIAGNOSIS — Z96.653 PRESENCE OF ARTIFICIAL KNEE JOINT, BILATERAL: Chronic | ICD-10-CM

## 2020-09-29 LAB
GLUCOSE BLDC GLUCOMTR-MCNC: 128 MG/DL — HIGH (ref 70–99)
GRAM STN FLD: SIGNIFICANT CHANGE UP
SPECIMEN SOURCE: SIGNIFICANT CHANGE UP

## 2020-09-29 PROCEDURE — 88304 TISSUE EXAM BY PATHOLOGIST: CPT | Mod: 26

## 2020-09-29 PROCEDURE — 65756 CORNEAL TRNSPL ENDOTHELIAL: CPT | Mod: RT

## 2020-09-30 ENCOUNTER — APPOINTMENT (OUTPATIENT)
Dept: OPHTHALMOLOGY | Facility: CLINIC | Age: 75
End: 2020-09-30
Payer: MEDICARE

## 2020-09-30 ENCOUNTER — NON-APPOINTMENT (OUTPATIENT)
Age: 75
End: 2020-09-30

## 2020-09-30 PROCEDURE — 99024 POSTOP FOLLOW-UP VISIT: CPT

## 2020-10-04 LAB
CULTURE RESULTS: NO GROWTH — SIGNIFICANT CHANGE UP
SPECIMEN SOURCE: SIGNIFICANT CHANGE UP

## 2020-10-06 LAB — SURGICAL PATHOLOGY STUDY: SIGNIFICANT CHANGE UP

## 2020-10-07 ENCOUNTER — NON-APPOINTMENT (OUTPATIENT)
Age: 75
End: 2020-10-07

## 2020-10-07 ENCOUNTER — APPOINTMENT (OUTPATIENT)
Dept: OPHTHALMOLOGY | Facility: CLINIC | Age: 75
End: 2020-10-07
Payer: MEDICARE

## 2020-10-07 PROCEDURE — 99024 POSTOP FOLLOW-UP VISIT: CPT

## 2020-10-09 ENCOUNTER — APPOINTMENT (OUTPATIENT)
Dept: OPHTHALMOLOGY | Facility: CLINIC | Age: 75
End: 2020-10-09
Payer: MEDICARE

## 2020-10-09 ENCOUNTER — NON-APPOINTMENT (OUTPATIENT)
Age: 75
End: 2020-10-09

## 2020-10-09 PROCEDURE — 99024 POSTOP FOLLOW-UP VISIT: CPT

## 2020-10-12 ENCOUNTER — TRANSCRIPTION ENCOUNTER (OUTPATIENT)
Age: 75
End: 2020-10-12

## 2020-10-13 ENCOUNTER — NON-APPOINTMENT (OUTPATIENT)
Age: 75
End: 2020-10-13

## 2020-10-13 ENCOUNTER — RESULT REVIEW (OUTPATIENT)
Age: 75
End: 2020-10-13

## 2020-10-13 ENCOUNTER — APPOINTMENT (OUTPATIENT)
Dept: OPHTHALMOLOGY | Facility: AMBULATORY SURGERY CENTER | Age: 75
End: 2020-10-13
Payer: MEDICARE

## 2020-10-13 ENCOUNTER — OUTPATIENT (OUTPATIENT)
Dept: OUTPATIENT SERVICES | Facility: HOSPITAL | Age: 75
LOS: 1 days | Discharge: HOME | End: 2020-10-13
Payer: MEDICARE

## 2020-10-13 DIAGNOSIS — Z82.8 FAMILY HISTORY OF OTHER DISABILITIES AND CHRONIC DISEASES LEADING TO DISABLEMENT, NOT ELSEWHERE CLASSIFIED: Chronic | ICD-10-CM

## 2020-10-13 DIAGNOSIS — Z96.653 PRESENCE OF ARTIFICIAL KNEE JOINT, BILATERAL: Chronic | ICD-10-CM

## 2020-10-13 DIAGNOSIS — Z98.89 OTHER SPECIFIED POSTPROCEDURAL STATES: Chronic | ICD-10-CM

## 2020-10-13 DIAGNOSIS — Z90.710 ACQUIRED ABSENCE OF BOTH CERVIX AND UTERUS: Chronic | ICD-10-CM

## 2020-10-13 LAB
GRAM STN FLD: SIGNIFICANT CHANGE UP
SPECIMEN SOURCE: SIGNIFICANT CHANGE UP

## 2020-10-13 PROCEDURE — 65756 CORNEAL TRNSPL ENDOTHELIAL: CPT | Mod: 79,RT

## 2020-10-13 PROCEDURE — ZZZZZ: CPT

## 2020-10-13 PROCEDURE — 88304 TISSUE EXAM BY PATHOLOGIST: CPT | Mod: 26

## 2020-10-14 ENCOUNTER — APPOINTMENT (OUTPATIENT)
Dept: OPHTHALMOLOGY | Facility: CLINIC | Age: 75
End: 2020-10-14
Payer: MEDICARE

## 2020-10-14 ENCOUNTER — NON-APPOINTMENT (OUTPATIENT)
Age: 75
End: 2020-10-14

## 2020-10-14 PROCEDURE — 99024 POSTOP FOLLOW-UP VISIT: CPT

## 2020-10-16 LAB
CULTURE RESULTS: NO GROWTH — SIGNIFICANT CHANGE UP
SPECIMEN SOURCE: SIGNIFICANT CHANGE UP

## 2020-10-19 ENCOUNTER — NON-APPOINTMENT (OUTPATIENT)
Age: 75
End: 2020-10-19

## 2020-10-19 ENCOUNTER — APPOINTMENT (OUTPATIENT)
Dept: OPHTHALMOLOGY | Facility: CLINIC | Age: 75
End: 2020-10-19
Payer: MEDICARE

## 2020-10-19 PROCEDURE — 92071 CONTACT LENS FITTING FOR TX: CPT | Mod: RT

## 2020-10-19 PROCEDURE — 99024 POSTOP FOLLOW-UP VISIT: CPT

## 2020-10-19 PROCEDURE — 99072 ADDL SUPL MATRL&STAF TM PHE: CPT

## 2020-10-21 ENCOUNTER — APPOINTMENT (OUTPATIENT)
Dept: OPHTHALMOLOGY | Facility: CLINIC | Age: 75
End: 2020-10-21

## 2020-10-21 LAB — SURGICAL PATHOLOGY STUDY: SIGNIFICANT CHANGE UP

## 2020-11-02 ENCOUNTER — APPOINTMENT (OUTPATIENT)
Dept: OPHTHALMOLOGY | Facility: CLINIC | Age: 75
End: 2020-11-02
Payer: MEDICARE

## 2020-11-02 ENCOUNTER — NON-APPOINTMENT (OUTPATIENT)
Age: 75
End: 2020-11-02

## 2020-11-02 PROCEDURE — 99024 POSTOP FOLLOW-UP VISIT: CPT

## 2020-11-16 ENCOUNTER — TRANSCRIPTION ENCOUNTER (OUTPATIENT)
Age: 75
End: 2020-11-16

## 2020-11-17 ENCOUNTER — OUTPATIENT (OUTPATIENT)
Dept: OUTPATIENT SERVICES | Facility: HOSPITAL | Age: 75
LOS: 1 days | Discharge: HOME | End: 2020-11-17
Payer: MEDICARE

## 2020-11-17 ENCOUNTER — RESULT REVIEW (OUTPATIENT)
Age: 75
End: 2020-11-17

## 2020-11-17 ENCOUNTER — NON-APPOINTMENT (OUTPATIENT)
Age: 75
End: 2020-11-17

## 2020-11-17 ENCOUNTER — APPOINTMENT (OUTPATIENT)
Dept: OPHTHALMOLOGY | Facility: AMBULATORY SURGERY CENTER | Age: 75
End: 2020-11-17

## 2020-11-17 DIAGNOSIS — Z82.8 FAMILY HISTORY OF OTHER DISABILITIES AND CHRONIC DISEASES LEADING TO DISABLEMENT, NOT ELSEWHERE CLASSIFIED: Chronic | ICD-10-CM

## 2020-11-17 DIAGNOSIS — Z98.89 OTHER SPECIFIED POSTPROCEDURAL STATES: Chronic | ICD-10-CM

## 2020-11-17 DIAGNOSIS — Z96.653 PRESENCE OF ARTIFICIAL KNEE JOINT, BILATERAL: Chronic | ICD-10-CM

## 2020-11-17 DIAGNOSIS — Z90.710 ACQUIRED ABSENCE OF BOTH CERVIX AND UTERUS: Chronic | ICD-10-CM

## 2020-11-17 PROCEDURE — 88342 IMHCHEM/IMCYTCHM 1ST ANTB: CPT | Mod: 26

## 2020-11-17 PROCEDURE — 88304 TISSUE EXAM BY PATHOLOGIST: CPT | Mod: 26

## 2020-11-17 PROCEDURE — 65920 REMOVE IMPLANT OF EYE: CPT | Mod: RT,79

## 2020-11-18 ENCOUNTER — APPOINTMENT (OUTPATIENT)
Dept: OPHTHALMOLOGY | Facility: CLINIC | Age: 75
End: 2020-11-18
Payer: MEDICARE

## 2020-11-18 ENCOUNTER — NON-APPOINTMENT (OUTPATIENT)
Age: 75
End: 2020-11-18

## 2020-11-18 PROCEDURE — 99024 POSTOP FOLLOW-UP VISIT: CPT

## 2020-11-23 ENCOUNTER — APPOINTMENT (OUTPATIENT)
Dept: OPHTHALMOLOGY | Facility: CLINIC | Age: 75
End: 2020-11-23
Payer: MEDICARE

## 2020-11-23 ENCOUNTER — NON-APPOINTMENT (OUTPATIENT)
Age: 75
End: 2020-11-23

## 2020-11-23 PROCEDURE — 99024 POSTOP FOLLOW-UP VISIT: CPT

## 2020-12-14 ENCOUNTER — NON-APPOINTMENT (OUTPATIENT)
Age: 75
End: 2020-12-14

## 2020-12-14 ENCOUNTER — APPOINTMENT (OUTPATIENT)
Dept: OPHTHALMOLOGY | Facility: CLINIC | Age: 75
End: 2020-12-14
Payer: MEDICARE

## 2020-12-14 PROCEDURE — 99024 POSTOP FOLLOW-UP VISIT: CPT

## 2021-01-13 ENCOUNTER — NON-APPOINTMENT (OUTPATIENT)
Age: 76
End: 2021-01-13

## 2021-01-13 ENCOUNTER — APPOINTMENT (OUTPATIENT)
Dept: OPHTHALMOLOGY | Facility: CLINIC | Age: 76
End: 2021-01-13
Payer: MEDICARE

## 2021-01-13 PROCEDURE — 99024 POSTOP FOLLOW-UP VISIT: CPT

## 2021-02-10 ENCOUNTER — APPOINTMENT (OUTPATIENT)
Dept: OPHTHALMOLOGY | Facility: CLINIC | Age: 76
End: 2021-02-10
Payer: MEDICARE

## 2021-02-10 ENCOUNTER — NON-APPOINTMENT (OUTPATIENT)
Age: 76
End: 2021-02-10

## 2021-02-10 PROCEDURE — 99024 POSTOP FOLLOW-UP VISIT: CPT

## 2021-02-10 NOTE — PROGRESS NOTE ADULT - PROBLEM/PLAN-1
DISPLAY PLAN FREE TEXT
There are no Wet Read(s) to document.

## 2021-03-11 ENCOUNTER — NON-APPOINTMENT (OUTPATIENT)
Age: 76
End: 2021-03-11

## 2021-03-11 ENCOUNTER — APPOINTMENT (OUTPATIENT)
Dept: OPHTHALMOLOGY | Facility: CLINIC | Age: 76
End: 2021-03-11
Payer: MEDICARE

## 2021-03-11 PROCEDURE — 92012 INTRM OPH EXAM EST PATIENT: CPT

## 2021-03-11 PROCEDURE — 99072 ADDL SUPL MATRL&STAF TM PHE: CPT

## 2021-04-01 ENCOUNTER — INPATIENT (INPATIENT)
Facility: HOSPITAL | Age: 76
LOS: 3 days | Discharge: HOME | End: 2021-04-05
Attending: GENERAL ACUTE CARE HOSPITAL | Admitting: GENERAL ACUTE CARE HOSPITAL
Payer: MEDICARE

## 2021-04-01 VITALS
TEMPERATURE: 98 F | RESPIRATION RATE: 18 BRPM | SYSTOLIC BLOOD PRESSURE: 147 MMHG | HEART RATE: 100 BPM | DIASTOLIC BLOOD PRESSURE: 84 MMHG | OXYGEN SATURATION: 95 % | HEIGHT: 66 IN | WEIGHT: 179.9 LBS

## 2021-04-01 DIAGNOSIS — K86.1 OTHER CHRONIC PANCREATITIS: ICD-10-CM

## 2021-04-01 DIAGNOSIS — K57.90 DIVERTICULOSIS OF INTESTINE, PART UNSPECIFIED, WITHOUT PERFORATION OR ABSCESS WITHOUT BLEEDING: ICD-10-CM

## 2021-04-01 DIAGNOSIS — I73.9 PERIPHERAL VASCULAR DISEASE, UNSPECIFIED: ICD-10-CM

## 2021-04-01 DIAGNOSIS — I37.1 NONRHEUMATIC PULMONARY VALVE INSUFFICIENCY: ICD-10-CM

## 2021-04-01 DIAGNOSIS — Z90.710 ACQUIRED ABSENCE OF BOTH CERVIX AND UTERUS: Chronic | ICD-10-CM

## 2021-04-01 DIAGNOSIS — R09.02 HYPOXEMIA: ICD-10-CM

## 2021-04-01 DIAGNOSIS — M48.00 SPINAL STENOSIS, SITE UNSPECIFIED: ICD-10-CM

## 2021-04-01 DIAGNOSIS — R09.89 OTHER SPECIFIED SYMPTOMS AND SIGNS INVOLVING THE CIRCULATORY AND RESPIRATORY SYSTEMS: ICD-10-CM

## 2021-04-01 DIAGNOSIS — Z79.4 LONG TERM (CURRENT) USE OF INSULIN: ICD-10-CM

## 2021-04-01 DIAGNOSIS — T40.2X5A ADVERSE EFFECT OF OTHER OPIOIDS, INITIAL ENCOUNTER: ICD-10-CM

## 2021-04-01 DIAGNOSIS — I10 ESSENTIAL (PRIMARY) HYPERTENSION: ICD-10-CM

## 2021-04-01 DIAGNOSIS — Z82.8 FAMILY HISTORY OF OTHER DISABILITIES AND CHRONIC DISEASES LEADING TO DISABLEMENT, NOT ELSEWHERE CLASSIFIED: Chronic | ICD-10-CM

## 2021-04-01 DIAGNOSIS — E11.9 TYPE 2 DIABETES MELLITUS WITHOUT COMPLICATIONS: ICD-10-CM

## 2021-04-01 DIAGNOSIS — E03.9 HYPOTHYROIDISM, UNSPECIFIED: ICD-10-CM

## 2021-04-01 DIAGNOSIS — G89.29 OTHER CHRONIC PAIN: ICD-10-CM

## 2021-04-01 DIAGNOSIS — E78.5 HYPERLIPIDEMIA, UNSPECIFIED: ICD-10-CM

## 2021-04-01 DIAGNOSIS — E83.39 OTHER DISORDERS OF PHOSPHORUS METABOLISM: ICD-10-CM

## 2021-04-01 DIAGNOSIS — I27.20 PULMONARY HYPERTENSION, UNSPECIFIED: ICD-10-CM

## 2021-04-01 DIAGNOSIS — R07.89 OTHER CHEST PAIN: ICD-10-CM

## 2021-04-01 DIAGNOSIS — Z96.653 PRESENCE OF ARTIFICIAL KNEE JOINT, BILATERAL: Chronic | ICD-10-CM

## 2021-04-01 DIAGNOSIS — R07.9 CHEST PAIN, UNSPECIFIED: ICD-10-CM

## 2021-04-01 DIAGNOSIS — E83.42 HYPOMAGNESEMIA: ICD-10-CM

## 2021-04-01 DIAGNOSIS — E87.6 HYPOKALEMIA: ICD-10-CM

## 2021-04-01 DIAGNOSIS — K59.03 DRUG INDUCED CONSTIPATION: ICD-10-CM

## 2021-04-01 DIAGNOSIS — Z98.89 OTHER SPECIFIED POSTPROCEDURAL STATES: Chronic | ICD-10-CM
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
## 2021-04-01 DIAGNOSIS — I07.1 RHEUMATIC TRICUSPID INSUFFICIENCY: ICD-10-CM

## 2021-04-01 DIAGNOSIS — M19.90 UNSPECIFIED OSTEOARTHRITIS, UNSPECIFIED SITE: ICD-10-CM

## 2021-04-01 LAB
ALBUMIN SERPL ELPH-MCNC: 3.5 G/DL — SIGNIFICANT CHANGE UP (ref 3.3–5)
ALP SERPL-CCNC: 99 U/L — SIGNIFICANT CHANGE UP (ref 40–120)
ALT FLD-CCNC: 16 U/L — SIGNIFICANT CHANGE UP (ref 12–78)
ANION GAP SERPL CALC-SCNC: 10 MMOL/L — SIGNIFICANT CHANGE UP (ref 5–17)
APTT BLD: 22.9 SEC — LOW (ref 27.5–35.5)
AST SERPL-CCNC: 18 U/L — SIGNIFICANT CHANGE UP (ref 15–37)
BASOPHILS # BLD AUTO: 0.02 K/UL — SIGNIFICANT CHANGE UP (ref 0–0.2)
BASOPHILS NFR BLD AUTO: 0.3 % — SIGNIFICANT CHANGE UP (ref 0–2)
BILIRUB SERPL-MCNC: 0.4 MG/DL — SIGNIFICANT CHANGE UP (ref 0.2–1.2)
BUN SERPL-MCNC: 31 MG/DL — HIGH (ref 7–23)
CALCIUM SERPL-MCNC: 8.4 MG/DL — LOW (ref 8.5–10.1)
CHLORIDE SERPL-SCNC: 101 MMOL/L — SIGNIFICANT CHANGE UP (ref 96–108)
CO2 SERPL-SCNC: 25 MMOL/L — SIGNIFICANT CHANGE UP (ref 22–31)
CREAT SERPL-MCNC: 1.28 MG/DL — SIGNIFICANT CHANGE UP (ref 0.5–1.3)
D DIMER BLD IA.RAPID-MCNC: 5543 NG/ML DDU — HIGH
EOSINOPHIL # BLD AUTO: 0.06 K/UL — SIGNIFICANT CHANGE UP (ref 0–0.5)
EOSINOPHIL NFR BLD AUTO: 1 % — SIGNIFICANT CHANGE UP (ref 0–6)
FLUAV AG NPH QL: SIGNIFICANT CHANGE UP
FLUBV AG NPH QL: SIGNIFICANT CHANGE UP
GLUCOSE BLDC GLUCOMTR-MCNC: 146 MG/DL — HIGH (ref 70–99)
GLUCOSE SERPL-MCNC: 265 MG/DL — HIGH (ref 70–99)
HCT VFR BLD CALC: 29.5 % — LOW (ref 34.5–45)
HGB BLD-MCNC: 9.3 G/DL — LOW (ref 11.5–15.5)
IMM GRANULOCYTES NFR BLD AUTO: 0.3 % — SIGNIFICANT CHANGE UP (ref 0–1.5)
INR BLD: 1.04 RATIO — SIGNIFICANT CHANGE UP (ref 0.88–1.16)
LYMPHOCYTES # BLD AUTO: 1.54 K/UL — SIGNIFICANT CHANGE UP (ref 1–3.3)
LYMPHOCYTES # BLD AUTO: 25.1 % — SIGNIFICANT CHANGE UP (ref 13–44)
MCHC RBC-ENTMCNC: 24.1 PG — LOW (ref 27–34)
MCHC RBC-ENTMCNC: 31.5 GM/DL — LOW (ref 32–36)
MCV RBC AUTO: 76.4 FL — LOW (ref 80–100)
MONOCYTES # BLD AUTO: 0.59 K/UL — SIGNIFICANT CHANGE UP (ref 0–0.9)
MONOCYTES NFR BLD AUTO: 9.6 % — SIGNIFICANT CHANGE UP (ref 2–14)
NEUTROPHILS # BLD AUTO: 3.91 K/UL — SIGNIFICANT CHANGE UP (ref 1.8–7.4)
NEUTROPHILS NFR BLD AUTO: 63.7 % — SIGNIFICANT CHANGE UP (ref 43–77)
NRBC # BLD: 0 /100 WBCS — SIGNIFICANT CHANGE UP (ref 0–0)
NT-PROBNP SERPL-SCNC: 89 PG/ML — SIGNIFICANT CHANGE UP (ref 0–450)
PLATELET # BLD AUTO: 279 K/UL — SIGNIFICANT CHANGE UP (ref 150–400)
POTASSIUM SERPL-MCNC: 3.7 MMOL/L — SIGNIFICANT CHANGE UP (ref 3.5–5.3)
POTASSIUM SERPL-SCNC: 3.7 MMOL/L — SIGNIFICANT CHANGE UP (ref 3.5–5.3)
PROT SERPL-MCNC: 8.1 GM/DL — SIGNIFICANT CHANGE UP (ref 6–8.3)
PROTHROM AB SERPL-ACNC: 12 SEC — SIGNIFICANT CHANGE UP (ref 10.6–13.6)
RBC # BLD: 3.86 M/UL — SIGNIFICANT CHANGE UP (ref 3.8–5.2)
RBC # FLD: 18.5 % — HIGH (ref 10.3–14.5)
SARS-COV-2 RNA SPEC QL NAA+PROBE: SIGNIFICANT CHANGE UP
SODIUM SERPL-SCNC: 136 MMOL/L — SIGNIFICANT CHANGE UP (ref 135–145)
TROPONIN I SERPL-MCNC: <.015 NG/ML — SIGNIFICANT CHANGE UP (ref 0.01–0.04)
WBC # BLD: 6.14 K/UL — SIGNIFICANT CHANGE UP (ref 3.8–10.5)
WBC # FLD AUTO: 6.14 K/UL — SIGNIFICANT CHANGE UP (ref 3.8–10.5)

## 2021-04-01 PROCEDURE — 93010 ELECTROCARDIOGRAM REPORT: CPT

## 2021-04-01 PROCEDURE — 93970 EXTREMITY STUDY: CPT | Mod: 26

## 2021-04-01 PROCEDURE — 71045 X-RAY EXAM CHEST 1 VIEW: CPT | Mod: 26

## 2021-04-01 PROCEDURE — 71275 CT ANGIOGRAPHY CHEST: CPT | Mod: 26,MA

## 2021-04-01 PROCEDURE — 99223 1ST HOSP IP/OBS HIGH 75: CPT | Mod: AI

## 2021-04-01 PROCEDURE — 99285 EMERGENCY DEPT VISIT HI MDM: CPT

## 2021-04-01 RX ORDER — DEXTROSE 50 % IN WATER 50 %
15 SYRINGE (ML) INTRAVENOUS ONCE
Refills: 0 | Status: DISCONTINUED | OUTPATIENT
Start: 2021-04-01 | End: 2021-04-05

## 2021-04-01 RX ORDER — HEPARIN SODIUM 5000 [USP'U]/ML
3000 INJECTION INTRAVENOUS; SUBCUTANEOUS EVERY 6 HOURS
Refills: 0 | Status: DISCONTINUED | OUTPATIENT
Start: 2021-04-01 | End: 2021-04-02

## 2021-04-01 RX ORDER — DEXTROSE 50 % IN WATER 50 %
25 SYRINGE (ML) INTRAVENOUS ONCE
Refills: 0 | Status: DISCONTINUED | OUTPATIENT
Start: 2021-04-01 | End: 2021-04-05

## 2021-04-01 RX ORDER — LANOLIN ALCOHOL/MO/W.PET/CERES
3 CREAM (GRAM) TOPICAL AT BEDTIME
Refills: 0 | Status: DISCONTINUED | OUTPATIENT
Start: 2021-04-01 | End: 2021-04-05

## 2021-04-01 RX ORDER — ACETAMINOPHEN 500 MG
650 TABLET ORAL EVERY 6 HOURS
Refills: 0 | Status: DISCONTINUED | OUTPATIENT
Start: 2021-04-01 | End: 2021-04-05

## 2021-04-01 RX ORDER — INSULIN LISPRO 100/ML
VIAL (ML) SUBCUTANEOUS AT BEDTIME
Refills: 0 | Status: DISCONTINUED | OUTPATIENT
Start: 2021-04-02 | End: 2021-04-05

## 2021-04-01 RX ORDER — HEPARIN SODIUM 5000 [USP'U]/ML
INJECTION INTRAVENOUS; SUBCUTANEOUS
Qty: 25000 | Refills: 0 | Status: DISCONTINUED | OUTPATIENT
Start: 2021-04-01 | End: 2021-04-02

## 2021-04-01 RX ORDER — HEPARIN SODIUM 5000 [USP'U]/ML
6500 INJECTION INTRAVENOUS; SUBCUTANEOUS ONCE
Refills: 0 | Status: COMPLETED | OUTPATIENT
Start: 2021-04-01 | End: 2021-04-01

## 2021-04-01 RX ORDER — MORPHINE SULFATE 50 MG/1
60 CAPSULE, EXTENDED RELEASE ORAL
Refills: 0 | Status: DISCONTINUED | OUTPATIENT
Start: 2021-04-01 | End: 2021-04-05

## 2021-04-01 RX ORDER — SODIUM CHLORIDE 9 MG/ML
1000 INJECTION, SOLUTION INTRAVENOUS
Refills: 0 | Status: DISCONTINUED | OUTPATIENT
Start: 2021-04-01 | End: 2021-04-05

## 2021-04-01 RX ORDER — INSULIN LISPRO 100/ML
VIAL (ML) SUBCUTANEOUS
Refills: 0 | Status: DISCONTINUED | OUTPATIENT
Start: 2021-04-01 | End: 2021-04-05

## 2021-04-01 RX ORDER — POLYETHYLENE GLYCOL 3350 17 G/17G
17 POWDER, FOR SOLUTION ORAL DAILY
Refills: 0 | Status: DISCONTINUED | OUTPATIENT
Start: 2021-04-01 | End: 2021-04-05

## 2021-04-01 RX ORDER — MORPHINE SULFATE 50 MG/1
2 CAPSULE, EXTENDED RELEASE ORAL ONCE
Refills: 0 | Status: COMPLETED | OUTPATIENT
Start: 2021-04-01 | End: 2021-04-01

## 2021-04-01 RX ORDER — HEPARIN SODIUM 5000 [USP'U]/ML
6500 INJECTION INTRAVENOUS; SUBCUTANEOUS EVERY 6 HOURS
Refills: 0 | Status: DISCONTINUED | OUTPATIENT
Start: 2021-04-01 | End: 2021-04-02

## 2021-04-01 RX ORDER — LIDOCAINE 4 G/100G
2 CREAM TOPICAL EVERY 24 HOURS
Refills: 0 | Status: DISCONTINUED | OUTPATIENT
Start: 2021-04-01 | End: 2021-04-05

## 2021-04-01 RX ORDER — SENNA PLUS 8.6 MG/1
2 TABLET ORAL AT BEDTIME
Refills: 0 | Status: DISCONTINUED | OUTPATIENT
Start: 2021-04-01 | End: 2021-04-05

## 2021-04-01 RX ORDER — LEVOTHYROXINE SODIUM 125 MCG
100 TABLET ORAL DAILY
Refills: 0 | Status: DISCONTINUED | OUTPATIENT
Start: 2021-04-01 | End: 2021-04-02

## 2021-04-01 RX ORDER — ERTAPENEM SODIUM 1 G/1
1 INJECTION, POWDER, LYOPHILIZED, FOR SOLUTION INTRAMUSCULAR; INTRAVENOUS
Qty: 0 | Refills: 0 | DISCHARGE
End: 2018-01-04

## 2021-04-01 RX ORDER — DEXTROSE 50 % IN WATER 50 %
12.5 SYRINGE (ML) INTRAVENOUS ONCE
Refills: 0 | Status: DISCONTINUED | OUTPATIENT
Start: 2021-04-01 | End: 2021-04-05

## 2021-04-01 RX ORDER — GABAPENTIN 400 MG/1
200 CAPSULE ORAL AT BEDTIME
Refills: 0 | Status: DISCONTINUED | OUTPATIENT
Start: 2021-04-01 | End: 2021-04-05

## 2021-04-01 RX ORDER — GLUCAGON INJECTION, SOLUTION 0.5 MG/.1ML
1 INJECTION, SOLUTION SUBCUTANEOUS ONCE
Refills: 0 | Status: DISCONTINUED | OUTPATIENT
Start: 2021-04-01 | End: 2021-04-05

## 2021-04-01 RX ORDER — INSULIN GLARGINE 100 [IU]/ML
8 INJECTION, SOLUTION SUBCUTANEOUS EVERY MORNING
Refills: 0 | Status: DISCONTINUED | OUTPATIENT
Start: 2021-04-02 | End: 2021-04-02

## 2021-04-01 RX ORDER — GABAPENTIN 400 MG/1
100 CAPSULE ORAL THREE TIMES A DAY
Refills: 0 | Status: DISCONTINUED | OUTPATIENT
Start: 2021-04-01 | End: 2021-04-05

## 2021-04-01 RX ORDER — INSULIN GLARGINE 100 [IU]/ML
8 INJECTION, SOLUTION SUBCUTANEOUS AT BEDTIME
Refills: 0 | Status: DISCONTINUED | OUTPATIENT
Start: 2021-04-02 | End: 2021-04-05

## 2021-04-01 RX ADMIN — MORPHINE SULFATE 60 MILLIGRAM(S): 50 CAPSULE, EXTENDED RELEASE ORAL at 23:20

## 2021-04-01 RX ADMIN — GABAPENTIN 200 MILLIGRAM(S): 400 CAPSULE ORAL at 23:22

## 2021-04-01 RX ADMIN — LIDOCAINE 2 PATCH: 4 CREAM TOPICAL at 23:20

## 2021-04-01 RX ADMIN — HEPARIN SODIUM 1500 UNIT(S)/HR: 5000 INJECTION INTRAVENOUS; SUBCUTANEOUS at 21:32

## 2021-04-01 RX ADMIN — HEPARIN SODIUM 6500 UNIT(S): 5000 INJECTION INTRAVENOUS; SUBCUTANEOUS at 21:32

## 2021-04-01 NOTE — H&P ADULT - NSICDXPASTMEDICALHX_GEN_ALL_CORE_FT
PAST MEDICAL HISTORY:  Diabetes     Diverticulosis     Hypertension     Hypothyroid     MRSA (methicillin resistant Staphylococcus aureus) infection 1999, infected knee replacement, required multiple revisions and long term IV antibiotics via central line    Osteoarthritis     Pancreatitis, chronic last episode > 10 years ago    Spinal stenosis

## 2021-04-01 NOTE — ED ADULT NURSE NOTE - CHPI ED NUR SYMPTOMS NEG
no congestion/no diaphoresis/no dizziness/no fever/no nausea/no shortness of breath/no syncope/no vomiting

## 2021-04-01 NOTE — ED PROVIDER NOTE - OBJECTIVE STATEMENT
75 year old female with 75 year old female with PMH of DM II , HTN, HLD presenting to ED due to chest pain, mild hypoxia to 90% on RA noted. Denies fevers but noted some body aches over past week - Had covid vaccine 6 days ago. Denies any hx of lung issues and denies any coronary history. O2 improved to 97-98% on nasal cannula.

## 2021-04-01 NOTE — H&P ADULT - NSHPLABSRESULTS_GEN_ALL_CORE
LABS:                        9.2    6.16  )-----------( 252      ( 02 Apr 2021 04:02 )             29.9     04-02    139  |  103  |  18  ----------------------------<  100<H>  3.0<L>   |  28  |  0.81    Ca    8.6      02 Apr 2021 04:02  Phos  1.6     04-02  Mg     2.0     04-02    TPro  8.1  /  Alb  3.5  /  TBili  0.4  /  DBili  x   /  AST  18  /  ALT  16  /  AlkPhos  99  04-01    PT/INR - ( 01 Apr 2021 17:33 )   PT: 12.0 sec;   INR: 1.04 ratio         PTT - ( 02 Apr 2021 04:02 )  PTT:>200.0 sec    CAPILLARY BLOOD GLUCOSE      POCT Blood Glucose.: 109 mg/dL (02 Apr 2021 07:58)  POCT Blood Glucose.: 146 mg/dL (01 Apr 2021 23:24)        RADIOLOGY & ADDITIONAL TESTS:    Imaging Personally Reviewed:  [ X] YES  [ ] NO

## 2021-04-01 NOTE — H&P ADULT - ASSESSMENT
75 year old female w DM II,  HTN, hypothyroid, spinal stenosis w chronic pain came to ED c/o chest pain, SOB    #Acute chest pain   #Dyspnea  CTA  distal PE not ruled out  dopplers neg DVT  COVID-19 not detected  CXR clear  1. admit to telemetry  2. IV heparin as per protocol  3. serial trop  4. echo  5. lipid profile      #elevated Cr 1.28  1. trend      #DM II  1. hold glucophage  2 consistent CHO   3. lantus 8 units BID  4. BGM  5. ISS  6. Hb A1c      #HTN  unable to get info re BP meds  1. monitor      #Hypothyroid  1. synthroid 100 mcg qd  2. TSH        #Spinal stenosis w chronic pain  1. morphine ER 60 mg BID w stat dose  2. gabapentin 100 mg TID w                       200 mg q HS w stat dose  3. lidoderm patches        #VTE  IMPROVE VTE Individual Risk Assessment    RISK                                                                Points    [  ] Previous VTE                                                  3    [ x ] Thrombophilia                                               2    [  ] Lower limb paralysis                                      2        (unable to hold up >15 seconds)      [  ] Current Cancer                                              2         (within 6 months)    [  ] Immobilization > 24 hrs                                1    [  ] ICU/CCU stay > 24 hours                              1    [X ] Age > 60                                                      1    IMPROVE VTE Score ___3______    IMPROVE Score 0-1: Low Risk, No VTE prophylaxis required for most patients, encourage ambulation.   IMPROVE Score 2-3: At risk, pharmacologic VTE prophylaxis is indicated for most patients (in the absence of a contraindication)  IMPROVE Score > or = 4: High Risk, pharmacologic VTE prophylaxis is indicated for most patients (in the absence of a contraindication)        VTE on IV heparin  echo      FOLLOW UP  HTN meds 75 year old female w DM II,  HTN, hypothyroid, spinal stenosis w chronic pain came to ED c/o chest pain, SOB    #Acute chest pain   #Dyspnea  CTA  distal PE not ruled out  dopplers neg DVT  COVID-19 not detected  CXR clear  1. admit to telemetry  2. IV heparin as per protocol  3. serial trop  4. echo  5. lipid profile  6. check v/q scan    #elevated Cr 1.28  1. trend      #DM II  1. hold glucophage  2 consistent CHO   3. lantus 8 units BID  4. BGM  5. ISS  6. Hb A1c      #HTN  unable to get info re BP meds  1. monitor      #Hypothyroid  1. synthroid 100 mcg qd  2. TSH        #Spinal stenosis w chronic pain  1. morphine ER 60 mg BID w stat dose  2. gabapentin 100 mg TID w                       200 mg q HS w stat dose  3. lidoderm patches        #VTE  IMPROVE VTE Individual Risk Assessment    RISK                                                                Points    [  ] Previous VTE                                                  3    [ x ] Thrombophilia                                               2    [  ] Lower limb paralysis                                      2        (unable to hold up >15 seconds)      [  ] Current Cancer                                              2         (within 6 months)    [  ] Immobilization > 24 hrs                                1    [  ] ICU/CCU stay > 24 hours                              1    [X ] Age > 60                                                      1    IMPROVE VTE Score ___3______    IMPROVE Score 0-1: Low Risk, No VTE prophylaxis required for most patients, encourage ambulation.   IMPROVE Score 2-3: At risk, pharmacologic VTE prophylaxis is indicated for most patients (in the absence of a contraindication)  IMPROVE Score > or = 4: High Risk, pharmacologic VTE prophylaxis is indicated for most patients (in the absence of a contraindication)        VTE on IV heparin  echo

## 2021-04-01 NOTE — H&P ADULT - NSHPPHYSICALEXAM_GEN_ALL_CORE
Vital Signs Last 24 Hrs  T(C): 36.7 (01 Apr 2021 21:27), Max: 37.2 (01 Apr 2021 20:00)  T(F): 98.1 (01 Apr 2021 21:27), Max: 98.9 (01 Apr 2021 20:00)  HR: 76 (01 Apr 2021 21:34) (71 - 100)  BP: 154/86 (01 Apr 2021 21:34) (147/84 - 176/84)  BP(mean): --  RR: 17 (01 Apr 2021 21:27) (16 - 18)  SpO2: 100% (01 Apr 2021 21:27) (95% - 100%) on nc    Telemedicine precludes detailed physical examination  pt wearing nc w surgical face mask, able to speak in sentences but has significant pain Vital Signs Last 24 Hrs  T(C): 36.7 (01 Apr 2021 21:27), Max: 37.2 (01 Apr 2021 20:00)  T(F): 98.1 (01 Apr 2021 21:27), Max: 98.9 (01 Apr 2021 20:00)  HR: 76 (01 Apr 2021 21:34) (71 - 100)  BP: 154/86 (01 Apr 2021 21:34) (147/84 - 176/84)  BP(mean): --  RR: 17 (01 Apr 2021 21:27) (16 - 18)  SpO2: 100% (01 Apr 2021 21:27) (95% - 100%) on nc    PHYSICAL EXAM:    GENERAL: NAD, well-groomed, well-developed  HEAD:  Atraumatic, Normocephalic  EYES: EOMI, PERRLA, conjunctiva and sclera clear  ENMT: No tonsillar erythema, exudates, or enlargement; Moist mucous membranes, No lesions  NECK: Supple, No JVD, Normal thyroid  NERVOUS SYSTEM:  Alert & Oriented X3, Motor Strength 5/5 B/L upper and lower extremities;  CHEST/LUNG: Clear to percussion bilaterally; No rales, rhonchi, wheezing, or rubs  HEART: Regular rate and rhythm; No rubs, or gallops, +S1,S2  ABDOMEN: Soft, Nontender, Nondistended; Bowel sounds present  EXTREMITIES:  2+ Peripheral Pulses, No clubbing, cyanosis, trace edema  LYMPH: No cervical adenopathy  RECTAL: deferred  BREAST: deferrd  : deferred  SKIN: No rashes or lesions

## 2021-04-01 NOTE — ED ADULT NURSE NOTE - OBJECTIVE STATEMENT
Patient received at bed E. Patient is A&Ox4. Patient reports that she woke up this morning and was feeling SOB and had left sided chest pain that comes and goes and is a sharp stabbing pain. Patient denies N/V/D. Patient denies dizziness and LOC. Patient has body aches and is complaining of bilateral leg pain.  Patient reports history of HTN, HLD.

## 2021-04-01 NOTE — ED ADULT TRIAGE NOTE - CHIEF COMPLAINT QUOTE
c/o l upper chest pain with shortness of breath and generalized weakness since last thurs worse today

## 2021-04-01 NOTE — ED ADULT TRIAGE NOTE - NS ED TRIAGE AVPU SCALE
Echo ok but needs Rhianna MP before colectomy. Also start Toprol XL 25 mg daiyl for sinus tachycardia and frequent PVC Alert-The patient is alert, awake and responds to voice. The patient is oriented to time, place, and person. The triage nurse is able to obtain subjective information.

## 2021-04-01 NOTE — H&P ADULT - HISTORY OF PRESENT ILLNESS
75 year old female w DM II, HLD, HTN, hypothyroid, spinal stenosis w chronic pain came to ED c/o chest pain    ED noted + mild hypoxia to 90% on RA noted; O2 improved to 97-98% on nasal cannula  Denies fevers  + body aches over past week  Had COVID vaccine 6 days ago    Pt states chest pain started suddenly 1 1/2 days ago while sitting  does not know what increases or decreases pain  + associated SOB  generalized weakness since week worse today  has had increased pain to RLE as well  feels terrible pains since she has not gotten her medications today        In ED /84     RR 18   T 98.2    95% sat RA  CTA done for d-dimer 5000 not diagnostic  doppler to both legs neg for DVT  on IV heparin  COVID-19 not detected      PAST MEDICAL HX  Angioedema requiring intubation from ACE-I   CVA with no residual deficits  DM II diabetes mellitus  Diverticulosis  HTN hypertension  Hypothyroid  MRSA (methicillin resistant Staphylococcus aureus) infection  Osteoarthritis  Pancreatitis, chronic last episode > 15 yrs ago  Spinal stenosis s/p fusion w chronic pain      SURGICAL HX  H/O right inguinal hernia repair  H/O total knee replacement, bilateral  FH: cholecystectomy  H/O: hysterectomy  Stage I L1-L4 Lumbar Lateral Fusion; S/P L1-L5 XLIF, L5-S1 PLIF, L1-S1  screw and john fixation     FAMILY HX  No history of dementia, strokes, or seizures      SOCIAL HX  denies smoking or drinking; Jehovah Witness       75 year old female w DM II,  HTN, hypothyroid, spinal stenosis w chronic pain came to ED c/o chest pain, SOB    Denies fevers  + body aches over past week  Had COVID vaccine 6 days ago    Pt states chest pain started suddenly 1 1/2 days ago while sitting  L upper chest area  graded 8/10   constant  does not know what increases or decreases pain  + associated SOB  generalized weakness since week worse today  has had increased pain to RLE as well  feels terrible pains since she has not gotten her medications today and has been in ED for hours    Pt is also taken an unnamed antibiotic 500 mg 4 times a day        In ED /84     RR 18   T 98.2    95% sat  ED noted + mild hypoxia to 90% on RA noted; O2 improved to 97-98% on nasal cannula    CTA done for d-dimer 5000 not diagnostic  doppler to both legs neg for DVT  on IV heparin  COVID-19 not detected      PAST MEDICAL HX  Angioedema requiring intubation from ACE-I   CVA with no residual deficits  DM II diabetes mellitus  Diverticulosis  HTN hypertension  Hypothyroid  MRSA (methicillin resistant Staphylococcus aureus) infection  Osteoarthritis  Pancreatitis, chronic last episode > 15 yrs ago  Spinal stenosis s/p fusion w chronic pain      SURGICAL HX  H/O right inguinal hernia repair  H/O total knee replacement, bilateral  FH: cholecystectomy  H/O: hysterectomy  Stage I L1-L4 Lumbar Lateral Fusion; S/P L1-L5 XLIF, L5-S1 PLIF, L1-S1  screw and john fixation     FAMILY HX  No history of dementia, strokes, or seizures      SOCIAL HX  denies smoking or drinking; Jehovah Witness

## 2021-04-01 NOTE — H&P ADULT - NSICDXPASTSURGICALHX_GEN_ALL_CORE_FT
PAST SURGICAL HISTORY:  FH: cholecystectomy     H/O right inguinal hernia repair     H/O total knee replacement, bilateral     H/O: hysterectomy

## 2021-04-01 NOTE — ED PROVIDER NOTE - CLINICAL SUMMARY MEDICAL DECISION MAKING FREE TEXT BOX
Pt with hypoxia otherwise unclear cause with chest pain - will start on heparin and admit to medicine for further care with Dr. Mckeon. Dr Goldberg to admit via tele-hospitalist service.

## 2021-04-02 LAB
A1C WITH ESTIMATED AVERAGE GLUCOSE RESULT: 6.5 % — HIGH (ref 4–5.6)
ANION GAP SERPL CALC-SCNC: 6 MMOL/L — SIGNIFICANT CHANGE UP (ref 5–17)
ANION GAP SERPL CALC-SCNC: 8 MMOL/L — SIGNIFICANT CHANGE UP (ref 5–17)
APTT BLD: 180.3 SEC — CRITICAL HIGH (ref 27.5–35.5)
APTT BLD: >200 SEC — CRITICAL HIGH (ref 27.5–35.5)
BUN SERPL-MCNC: 14 MG/DL — SIGNIFICANT CHANGE UP (ref 7–23)
BUN SERPL-MCNC: 18 MG/DL — SIGNIFICANT CHANGE UP (ref 7–23)
CALCIUM SERPL-MCNC: 8.6 MG/DL — SIGNIFICANT CHANGE UP (ref 8.5–10.1)
CALCIUM SERPL-MCNC: 9.1 MG/DL — SIGNIFICANT CHANGE UP (ref 8.5–10.1)
CHLORIDE SERPL-SCNC: 101 MMOL/L — SIGNIFICANT CHANGE UP (ref 96–108)
CHLORIDE SERPL-SCNC: 103 MMOL/L — SIGNIFICANT CHANGE UP (ref 96–108)
CHOLEST SERPL-MCNC: 110 MG/DL — SIGNIFICANT CHANGE UP
CK SERPL-CCNC: 113 U/L — SIGNIFICANT CHANGE UP (ref 26–192)
CK SERPL-CCNC: 131 U/L — SIGNIFICANT CHANGE UP (ref 26–192)
CO2 SERPL-SCNC: 28 MMOL/L — SIGNIFICANT CHANGE UP (ref 22–31)
CO2 SERPL-SCNC: 29 MMOL/L — SIGNIFICANT CHANGE UP (ref 22–31)
CREAT SERPL-MCNC: 0.81 MG/DL — SIGNIFICANT CHANGE UP (ref 0.5–1.3)
CREAT SERPL-MCNC: 1.24 MG/DL — SIGNIFICANT CHANGE UP (ref 0.5–1.3)
ESTIMATED AVERAGE GLUCOSE: 140 MG/DL — HIGH (ref 68–114)
GLUCOSE BLDC GLUCOMTR-MCNC: 109 MG/DL — HIGH (ref 70–99)
GLUCOSE BLDC GLUCOMTR-MCNC: 114 MG/DL — HIGH (ref 70–99)
GLUCOSE BLDC GLUCOMTR-MCNC: 115 MG/DL — HIGH (ref 70–99)
GLUCOSE BLDC GLUCOMTR-MCNC: 122 MG/DL — HIGH (ref 70–99)
GLUCOSE BLDC GLUCOMTR-MCNC: 145 MG/DL — HIGH (ref 70–99)
GLUCOSE SERPL-MCNC: 100 MG/DL — HIGH (ref 70–99)
GLUCOSE SERPL-MCNC: 142 MG/DL — HIGH (ref 70–99)
HCT VFR BLD CALC: 29.9 % — LOW (ref 34.5–45)
HCT VFR BLD CALC: 33.3 % — LOW (ref 34.5–45)
HDLC SERPL-MCNC: 54 MG/DL — SIGNIFICANT CHANGE UP
HGB BLD-MCNC: 10.1 G/DL — LOW (ref 11.5–15.5)
HGB BLD-MCNC: 9.2 G/DL — LOW (ref 11.5–15.5)
LIPID PNL WITH DIRECT LDL SERPL: 46 MG/DL — SIGNIFICANT CHANGE UP
MAGNESIUM SERPL-MCNC: 2 MG/DL — SIGNIFICANT CHANGE UP (ref 1.6–2.6)
MCHC RBC-ENTMCNC: 23.9 PG — LOW (ref 27–34)
MCHC RBC-ENTMCNC: 24 PG — LOW (ref 27–34)
MCHC RBC-ENTMCNC: 30.3 GM/DL — LOW (ref 32–36)
MCHC RBC-ENTMCNC: 30.8 GM/DL — LOW (ref 32–36)
MCV RBC AUTO: 77.9 FL — LOW (ref 80–100)
MCV RBC AUTO: 78.9 FL — LOW (ref 80–100)
NON HDL CHOLESTEROL: 56 MG/DL — SIGNIFICANT CHANGE UP
NRBC # BLD: 0 /100 WBCS — SIGNIFICANT CHANGE UP (ref 0–0)
NRBC # BLD: 0 /100 WBCS — SIGNIFICANT CHANGE UP (ref 0–0)
PHOSPHATE SERPL-MCNC: 1.6 MG/DL — LOW (ref 2.5–4.5)
PHOSPHATE SERPL-MCNC: 1.6 MG/DL — LOW (ref 2.5–4.5)
PLATELET # BLD AUTO: 245 K/UL — SIGNIFICANT CHANGE UP (ref 150–400)
PLATELET # BLD AUTO: 252 K/UL — SIGNIFICANT CHANGE UP (ref 150–400)
POTASSIUM SERPL-MCNC: 3 MMOL/L — LOW (ref 3.5–5.3)
POTASSIUM SERPL-MCNC: 4.2 MMOL/L — SIGNIFICANT CHANGE UP (ref 3.5–5.3)
POTASSIUM SERPL-SCNC: 3 MMOL/L — LOW (ref 3.5–5.3)
POTASSIUM SERPL-SCNC: 4.2 MMOL/L — SIGNIFICANT CHANGE UP (ref 3.5–5.3)
RBC # BLD: 3.84 M/UL — SIGNIFICANT CHANGE UP (ref 3.8–5.2)
RBC # BLD: 4.22 M/UL — SIGNIFICANT CHANGE UP (ref 3.8–5.2)
RBC # FLD: 18.6 % — HIGH (ref 10.3–14.5)
RBC # FLD: 19.1 % — HIGH (ref 10.3–14.5)
SODIUM SERPL-SCNC: 136 MMOL/L — SIGNIFICANT CHANGE UP (ref 135–145)
SODIUM SERPL-SCNC: 139 MMOL/L — SIGNIFICANT CHANGE UP (ref 135–145)
TRIGL SERPL-MCNC: 50 MG/DL — SIGNIFICANT CHANGE UP
TROPONIN I SERPL-MCNC: 0.28 NG/ML — HIGH (ref 0.01–0.04)
TROPONIN I SERPL-MCNC: 0.37 NG/ML — HIGH (ref 0.01–0.04)
TSH SERPL-MCNC: 22.9 UIU/ML — HIGH (ref 0.36–3.74)
WBC # BLD: 5.86 K/UL — SIGNIFICANT CHANGE UP (ref 3.8–10.5)
WBC # BLD: 6.16 K/UL — SIGNIFICANT CHANGE UP (ref 3.8–10.5)
WBC # FLD AUTO: 5.86 K/UL — SIGNIFICANT CHANGE UP (ref 3.8–10.5)
WBC # FLD AUTO: 6.16 K/UL — SIGNIFICANT CHANGE UP (ref 3.8–10.5)

## 2021-04-02 PROCEDURE — 93010 ELECTROCARDIOGRAM REPORT: CPT

## 2021-04-02 PROCEDURE — 73600 X-RAY EXAM OF ANKLE: CPT | Mod: 26,RT

## 2021-04-02 PROCEDURE — 99233 SBSQ HOSP IP/OBS HIGH 50: CPT

## 2021-04-02 PROCEDURE — 78580 LUNG PERFUSION IMAGING: CPT | Mod: 26

## 2021-04-02 RX ORDER — HEPARIN SODIUM 5000 [USP'U]/ML
3000 INJECTION INTRAVENOUS; SUBCUTANEOUS EVERY 6 HOURS
Refills: 0 | Status: DISCONTINUED | OUTPATIENT
Start: 2021-04-02 | End: 2021-04-02

## 2021-04-02 RX ORDER — SIMVASTATIN 20 MG/1
20 TABLET, FILM COATED ORAL AT BEDTIME
Refills: 0 | Status: DISCONTINUED | OUTPATIENT
Start: 2021-04-02 | End: 2021-04-05

## 2021-04-02 RX ORDER — PREDNISOLONE SODIUM PHOSPHATE 1 %
1 DROPS OPHTHALMIC (EYE)
Refills: 0 | Status: DISCONTINUED | OUTPATIENT
Start: 2021-04-02 | End: 2021-04-05

## 2021-04-02 RX ORDER — HEPARIN SODIUM 5000 [USP'U]/ML
6500 INJECTION INTRAVENOUS; SUBCUTANEOUS EVERY 6 HOURS
Refills: 0 | Status: DISCONTINUED | OUTPATIENT
Start: 2021-04-02 | End: 2021-04-02

## 2021-04-02 RX ORDER — ASPIRIN/CALCIUM CARB/MAGNESIUM 324 MG
81 TABLET ORAL DAILY
Refills: 0 | Status: DISCONTINUED | OUTPATIENT
Start: 2021-04-03 | End: 2021-04-05

## 2021-04-02 RX ORDER — ASPIRIN/CALCIUM CARB/MAGNESIUM 324 MG
325 TABLET ORAL ONCE
Refills: 0 | Status: COMPLETED | OUTPATIENT
Start: 2021-04-02 | End: 2021-04-02

## 2021-04-02 RX ORDER — LEVOTHYROXINE SODIUM 125 MCG
150 TABLET ORAL DAILY
Refills: 0 | Status: DISCONTINUED | OUTPATIENT
Start: 2021-04-03 | End: 2021-04-04

## 2021-04-02 RX ORDER — MAGNESIUM HYDROXIDE 400 MG/1
30 TABLET, CHEWABLE ORAL DAILY
Refills: 0 | Status: DISCONTINUED | OUTPATIENT
Start: 2021-04-02 | End: 2021-04-05

## 2021-04-02 RX ORDER — POTASSIUM PHOSPHATE, MONOBASIC POTASSIUM PHOSPHATE, DIBASIC 236; 224 MG/ML; MG/ML
30 INJECTION, SOLUTION INTRAVENOUS ONCE
Refills: 0 | Status: DISCONTINUED | OUTPATIENT
Start: 2021-04-02 | End: 2021-04-02

## 2021-04-02 RX ORDER — HEPARIN SODIUM 5000 [USP'U]/ML
5000 INJECTION INTRAVENOUS; SUBCUTANEOUS EVERY 12 HOURS
Refills: 0 | Status: DISCONTINUED | OUTPATIENT
Start: 2021-04-02 | End: 2021-04-05

## 2021-04-02 RX ORDER — SODIUM,POTASSIUM PHOSPHATES 278-250MG
2 POWDER IN PACKET (EA) ORAL THREE TIMES A DAY
Refills: 0 | Status: COMPLETED | OUTPATIENT
Start: 2021-04-02 | End: 2021-04-03

## 2021-04-02 RX ORDER — POTASSIUM CHLORIDE 20 MEQ
40 PACKET (EA) ORAL EVERY 4 HOURS
Refills: 0 | Status: COMPLETED | OUTPATIENT
Start: 2021-04-02 | End: 2021-04-02

## 2021-04-02 RX ORDER — HEPARIN SODIUM 5000 [USP'U]/ML
1200 INJECTION INTRAVENOUS; SUBCUTANEOUS
Qty: 25000 | Refills: 0 | Status: DISCONTINUED | OUTPATIENT
Start: 2021-04-02 | End: 2021-04-02

## 2021-04-02 RX ADMIN — INSULIN GLARGINE 8 UNIT(S): 100 INJECTION, SOLUTION SUBCUTANEOUS at 22:34

## 2021-04-02 RX ADMIN — Medication 40 MILLIEQUIVALENT(S): at 14:22

## 2021-04-02 RX ADMIN — HEPARIN SODIUM 0 UNIT(S)/HR: 5000 INJECTION INTRAVENOUS; SUBCUTANEOUS at 14:17

## 2021-04-02 RX ADMIN — Medication 325 MILLIGRAM(S): at 22:04

## 2021-04-02 RX ADMIN — HEPARIN SODIUM 900 UNIT(S)/HR: 5000 INJECTION INTRAVENOUS; SUBCUTANEOUS at 15:24

## 2021-04-02 RX ADMIN — GABAPENTIN 200 MILLIGRAM(S): 400 CAPSULE ORAL at 22:05

## 2021-04-02 RX ADMIN — POLYETHYLENE GLYCOL 3350 17 GRAM(S): 17 POWDER, FOR SOLUTION ORAL at 06:11

## 2021-04-02 RX ADMIN — LIDOCAINE 2 PATCH: 4 CREAM TOPICAL at 11:30

## 2021-04-02 RX ADMIN — MORPHINE SULFATE 60 MILLIGRAM(S): 50 CAPSULE, EXTENDED RELEASE ORAL at 21:00

## 2021-04-02 RX ADMIN — Medication 40 MILLIEQUIVALENT(S): at 06:11

## 2021-04-02 RX ADMIN — Medication 650 MILLIGRAM(S): at 22:35

## 2021-04-02 RX ADMIN — SENNA PLUS 2 TABLET(S): 8.6 TABLET ORAL at 22:04

## 2021-04-02 RX ADMIN — MORPHINE SULFATE 60 MILLIGRAM(S): 50 CAPSULE, EXTENDED RELEASE ORAL at 20:05

## 2021-04-02 RX ADMIN — Medication 3 MILLIGRAM(S): at 22:06

## 2021-04-02 RX ADMIN — MORPHINE SULFATE 60 MILLIGRAM(S): 50 CAPSULE, EXTENDED RELEASE ORAL at 07:00

## 2021-04-02 RX ADMIN — Medication 1 DROP(S): at 23:17

## 2021-04-02 RX ADMIN — Medication 2 TABLET(S): at 17:29

## 2021-04-02 RX ADMIN — GABAPENTIN 100 MILLIGRAM(S): 400 CAPSULE ORAL at 22:05

## 2021-04-02 RX ADMIN — LIDOCAINE 2 PATCH: 4 CREAM TOPICAL at 03:06

## 2021-04-02 RX ADMIN — GABAPENTIN 100 MILLIGRAM(S): 400 CAPSULE ORAL at 14:22

## 2021-04-02 RX ADMIN — Medication 100 MICROGRAM(S): at 06:01

## 2021-04-02 RX ADMIN — SIMVASTATIN 20 MILLIGRAM(S): 20 TABLET, FILM COATED ORAL at 22:34

## 2021-04-02 RX ADMIN — Medication 650 MILLIGRAM(S): at 23:30

## 2021-04-02 RX ADMIN — HEPARIN SODIUM 1200 UNIT(S)/HR: 5000 INJECTION INTRAVENOUS; SUBCUTANEOUS at 06:29

## 2021-04-02 RX ADMIN — Medication 2 TABLET(S): at 22:06

## 2021-04-02 RX ADMIN — MORPHINE SULFATE 60 MILLIGRAM(S): 50 CAPSULE, EXTENDED RELEASE ORAL at 06:11

## 2021-04-02 RX ADMIN — LIDOCAINE 2 PATCH: 4 CREAM TOPICAL at 23:15

## 2021-04-02 RX ADMIN — HEPARIN SODIUM 5000 UNIT(S): 5000 INJECTION INTRAVENOUS; SUBCUTANEOUS at 17:29

## 2021-04-02 RX ADMIN — Medication 40 MILLIEQUIVALENT(S): at 11:27

## 2021-04-02 RX ADMIN — GABAPENTIN 100 MILLIGRAM(S): 400 CAPSULE ORAL at 06:01

## 2021-04-02 RX ADMIN — INSULIN GLARGINE 8 UNIT(S): 100 INJECTION, SOLUTION SUBCUTANEOUS at 09:14

## 2021-04-02 RX ADMIN — HEPARIN SODIUM 0 UNIT(S)/HR: 5000 INJECTION INTRAVENOUS; SUBCUTANEOUS at 04:45

## 2021-04-02 NOTE — PROGRESS NOTE ADULT - ASSESSMENT
75 year old female w DM II,  HTN, hypothyroid, spinal stenosis w chronic pain came to ED c/o chest pain, SOB    #Acute chest pain   #Dyspnea  CTA  distal PE not ruled out  dopplers neg DVT  COVID-19 not detected  CXR clear  1. admit to telemetry  2. IV heparin as per protocol  3. serial trop  4. echo  5. lipid profile  6. check v/q scan    #elevated Cr 1.28  1. trend      #DM II  1. hold glucophage  2 consistent CHO   3. lantus 8 units BID  4. BGM  5. ISS  6. Hb A1c      #HTN  unable to get info re BP meds  1. monitor      #Hypothyroid  1. synthroid 100 mcg qd  2. TSH        #Spinal stenosis w chronic pain  1. morphine ER 60 mg BID w stat dose  2. gabapentin 100 mg TID w                       200 mg q HS w stat dose  3. lidoderm patches        #VTE  IMPROVE VTE Individual Risk Assessment    RISK                                                                Points    [  ] Previous VTE                                                  3    [ x ] Thrombophilia                                               2    [  ] Lower limb paralysis                                      2        (unable to hold up >15 seconds)      [  ] Current Cancer                                              2         (within 6 months)    [  ] Immobilization > 24 hrs                                1    [  ] ICU/CCU stay > 24 hours                              1    [X ] Age > 60                                                      1    IMPROVE VTE Score ___3______    IMPROVE Score 0-1: Low Risk, No VTE prophylaxis required for most patients, encourage ambulation.   IMPROVE Score 2-3: At risk, pharmacologic VTE prophylaxis is indicated for most patients (in the absence of a contraindication)  IMPROVE Score > or = 4: High Risk, pharmacologic VTE prophylaxis is indicated for most patients (in the absence of a contraindication)        VTE on IV heparin  echo     75 year old female w DM II,  HTN, hypothyroid, spinal stenosis w chronic pain came to ED c/o chest pain, SOB    #Acute chest pain   #Dyspnea  CTA  distal PE not ruled out  dopplers neg DVT  COVID-19 not detected  CXR clear  1. admit to telemetry  2. IV heparin as per protocol  3. serial trop  4. echo  5. lipid profile  6. check v/q scan  cardiology consult     #elevated Cr 1.28  1. trend      #DM II  1. hold glucophage  2 consistent CHO   3. lantus 8 units daily   4. BGM  5. ISS  6. Hb A1c      #HTN  unable to get info re BP meds  1. monitor      #Hypothyroid  1. synthroid 150 mcg qd  2. TSH        #Spinal stenosis w chronic pain  1. morphine ER 60 mg BID w stat dose  2. gabapentin 100 mg TID w                       200 mg q HS w stat dose  3. lidoderm patches    #Hypophosphatemia, hypomagnesemia --replete and monitor     #Preventative measures   heparin SQ-dvt ppx  fall precautions   PT    75 year old female w DM II,  HTN, hypothyroid, spinal stenosis w chronic pain came to ED c/o chest pain, SOB    Assessment and Plan:     #Acute chest pain   Dyspnea  CTA  poor study, no e/o PE   dopplers neg DVT  V/Q scan neg   COVID-19 not detected  CXR clear  Tele: NSR, no events   Mary slightly elevated   BNP OK  d-dimer 5,000 ?   d/c heparin drip, PE /DVT ruled out   EKG: NSR   LDL 46  follow echo  cardiology consult   SOB and CP resolved   no e/o acute infection, afebrile, no leukocytosis, lungs clear   patient endorses she had stress ECHO 1 year ago at [facility] which was normal     #Troponin leak, doubt ACS   cardiology consult  trend trops  repeat EKG     #DM II  A1c 6.5%   lantus 8 units daily, ISS  CHO diet   avoid hypoglycemia       #HTN  BP ok off meds   monitor       #Hypothyroid  TSH 22  follow fT4, tT3  synthroid 150 mcg qd  Endo consult       #Spinal stenosis w chronic pain  1. morphine ER 60 mg BID w stat dose  2. gabapentin 100 mg TID                        200 mg q HS   3. lidoderm patches    #Hypophosphatemia, hypomagnesemia, hypokalemia --replete and monitor     #Constipation, sec to opioids  bowel regimen     #Preventative measures   heparin SQ-dvt ppx  fall precautions   patient ambulatory , independent, no difficulty

## 2021-04-02 NOTE — PROGRESS NOTE ADULT - SUBJECTIVE AND OBJECTIVE BOX
HPI:  75 year old female w DM II,  HTN, hypothyroid, spinal stenosis w chronic pain came to ED c/o chest pain, SOB    Denies fevers  + body aches over past week  Had COVID vaccine 6 days ago    Pt states chest pain started suddenly 1 1/2 days ago while sitting  L upper chest area  graded 8/10   constant  does not know what increases or decreases pain  + associated SOB  generalized weakness since week worse today  has had increased pain to RLE as well  feels terrible pains since she has not gotten her medications today and has been in ED for hours    Pt is also taken an unnamed antibiotic 500 mg 4 times a day        In ED /84     RR 18   T 98.2    95% sat  ED noted + mild hypoxia to 90% on RA noted; O2 improved to 97-98% on nasal cannula    CTA done for d-dimer 5000 not diagnostic  doppler to both legs neg for DVT  on IV heparin  COVID-19 not detected      PAST MEDICAL HX  Angioedema requiring intubation from ACE-I   CVA with no residual deficits  DM II diabetes mellitus  Diverticulosis  HTN hypertension  Hypothyroid  MRSA (methicillin resistant Staphylococcus aureus) infection  Osteoarthritis  Pancreatitis, chronic last episode > 15 yrs ago  Spinal stenosis s/p fusion w chronic pain      SURGICAL HX  H/O right inguinal hernia repair  H/O total knee replacement, bilateral  FH: cholecystectomy  H/O: hysterectomy  Stage I L1-L4 Lumbar Lateral Fusion; S/P L1-L5 XLIF, L5-S1 PLIF, L1-S1  screw and john fixation     FAMILY HX  No history of dementia, strokes, or seizures      SOCIAL HX  denies smoking or drinking; Jehovah Witness       (01 Apr 2021 22:10)      SUBJECTIVE & OBJECTIVE: Pt seen and examined at bedside.     PHYSICAL EXAM:  T(C): 36.3 (04-02-21 @ 17:17), Max: 36.8 (04-02-21 @ 01:29)  HR: 85 (04-02-21 @ 17:17) (56 - 85)  BP: 156/83 (04-02-21 @ 17:17) (114/62 - 156/83)  RR: 18 (04-02-21 @ 17:17) (16 - 18)  SpO2: 100% (04-02-21 @ 17:17) (95% - 100%)  Wt(kg): -- Height (cm): 167.6 (04-02 @ 01:29)  Weight (kg): 81.6 (04-02 @ 01:29)  BMI (kg/m2): 29 (04-02 @ 01:29)  BSA (m2): 1.91 (04-02 @ 01:29)  I&O's Detail    GENERAL: NAD, well-groomed, well-developed  HEAD:  Atraumatic, Normocephalic  EYES: EOMI, PERRLA, conjunctiva and sclera clear  ENMT: Moist mucous membranes  NECK: Supple, No JVD  NERVOUS SYSTEM:  Alert & Oriented X3, Motor Strength 5/5 B/L upper and lower extremities; DTRs 2+ intact and symmetric  CHEST/LUNG: Clear to auscultation bilaterally; No rales, rhonchi, wheezing, or rubs  HEART: Regular rate and rhythm; No murmurs, rubs, or gallops  ABDOMEN: Soft, Nontender, Nondistended; Bowel sounds present  EXTREMITIES:  2+ Peripheral Pulses, No clubbing, cyanosis, or edema    MEDICATIONS  (STANDING):  dextrose 40% Gel 15 Gram(s) Oral once  dextrose 5%. 1000 milliLiter(s) (50 mL/Hr) IV Continuous <Continuous>  dextrose 5%. 1000 milliLiter(s) (100 mL/Hr) IV Continuous <Continuous>  dextrose 50% Injectable 25 Gram(s) IV Push once  dextrose 50% Injectable 12.5 Gram(s) IV Push once  dextrose 50% Injectable 25 Gram(s) IV Push once  gabapentin 100 milliGRAM(s) Oral three times a day  gabapentin 200 milliGRAM(s) Oral at bedtime  glucagon  Injectable 1 milliGRAM(s) IntraMuscular once  heparin   Injectable 5000 Unit(s) SubCutaneous every 12 hours  insulin glargine Injectable (LANTUS) 8 Unit(s) SubCutaneous at bedtime  insulin lispro (ADMELOG) corrective regimen sliding scale   SubCutaneous three times a day before meals  insulin lispro (ADMELOG) corrective regimen sliding scale   SubCutaneous at bedtime  lidocaine   Patch 2 Patch Transdermal every 24 hours  melatonin 3 milliGRAM(s) Oral at bedtime  morphine ER Tablet 60 milliGRAM(s) Oral two times a day  polyethylene glycol 3350 17 Gram(s) Oral daily  potassium phosphate / sodium phosphate Tablet (K-PHOS No. 2) 2 Tablet(s) Oral three times a day  senna 2 Tablet(s) Oral at bedtime    MEDICATIONS  (PRN):  acetaminophen   Tablet .. 650 milliGRAM(s) Oral every 6 hours PRN Temp greater or equal to 38C (100.4F), Mild Pain (1 - 3)  magnesium hydroxide Suspension 30 milliLiter(s) Oral daily PRN Constipation      LABS:                        10.1   5.86  )-----------( 245      ( 02 Apr 2021 12:49 )             33.3     04-02    136  |  101  |  14  ----------------------------<  142<H>  4.2   |  29  |  1.24    Ca    9.1      02 Apr 2021 17:46  Phos  1.6     04-02  Mg     2.0     04-02    TPro  8.1  /  Alb  3.5  /  TBili  0.4  /  DBili  x   /  AST  18  /  ALT  16  /  AlkPhos  99  04-01    PT/INR - ( 01 Apr 2021 17:33 )   PT: 12.0 sec;   INR: 1.04 ratio         PTT - ( 02 Apr 2021 12:49 )  PTT:180.3 sec    Magnesium, Serum: 2.0 mg/dL (04-02 @ 04:02)    CAPILLARY BLOOD GLUCOSE      POCT Blood Glucose.: 145 mg/dL (02 Apr 2021 21:04)  POCT Blood Glucose.: 114 mg/dL (02 Apr 2021 16:09)  POCT Blood Glucose.: 122 mg/dL (02 Apr 2021 11:32)  POCT Blood Glucose.: 109 mg/dL (02 Apr 2021 07:58)  POCT Blood Glucose.: 146 mg/dL (01 Apr 2021 23:24)      CARDIAC MARKERS ( 02 Apr 2021 17:46 )  .366 ng/mL / x     / 131 U/L / x     / x      CARDIAC MARKERS ( 02 Apr 2021 04:02 )  .275 ng/mL / x     / 113 U/L / x     / x      CARDIAC MARKERS ( 01 Apr 2021 13:43 )  <.015 ng/mL / x     / x     / x     / x            RECENT CULTURES:      RADIOLOGY & ADDITIONAL TESTS:  Chest X-ray:  T(C): 36.3 (04-02-21 @ 17:17), Max: 36.8 (04-02-21 @ 01:29)  HR: 85 (04-02-21 @ 17:17) (56 - 85)  BP: 156/83 (04-02-21 @ 17:17) (114/62 - 156/83)  RR: 18 (04-02-21 @ 17:17) (16 - 18)  SpO2: 100% (04-02-21 @ 17:17) (95% - 100%)  Wt(kg): --        Imaging Personally Reviewed:  [ ] YES  [ ] NO    Consultant(s) Notes Reviewed:  [ ] YES  [ ] NO    Care Discussed with Consultants/Other Providers [ ] YES  [ ] NO     HPI:  75 year old female w DM II,  HTN, hypothyroid, spinal stenosis w chronic pain came to ED c/o chest pain, SOB    Denies fevers  + body aches over past week  Had COVID vaccine 6 days ago    Pt states chest pain started suddenly 1 1/2 days ago while sitting  L upper chest area  graded 8/10   constant  does not know what increases or decreases pain  + associated SOB  generalized weakness since week worse today  has had increased pain to RLE as well  feels terrible pains since she has not gotten her medications today and has been in ED for hours    Pt is also taken an unnamed antibiotic 500 mg 4 times a day        In ED /84     RR 18   T 98.2    95% sat  ED noted + mild hypoxia to 90% on RA noted; O2 improved to 97-98% on nasal cannula    CTA done for d-dimer 5000 not diagnostic  doppler to both legs neg for DVT  on IV heparin  COVID-19 not detected      PAST MEDICAL HX  Angioedema requiring intubation from ACE-I   CVA with no residual deficits  DM II diabetes mellitus  Diverticulosis  HTN hypertension  Hypothyroid  MRSA (methicillin resistant Staphylococcus aureus) infection  Osteoarthritis  Pancreatitis, chronic last episode > 15 yrs ago  Spinal stenosis s/p fusion w chronic pain      SURGICAL HX  H/O right inguinal hernia repair  H/O total knee replacement, bilateral  FH: cholecystectomy  H/O: hysterectomy  Stage I L1-L4 Lumbar Lateral Fusion; S/P L1-L5 XLIF, L5-S1 PLIF, L1-S1  screw and john fixation     FAMILY HX  No history of dementia, strokes, or seizures      SOCIAL HX  denies smoking or drinking; Jehovah Witness       (01 Apr 2021 22:10)      SUBJECTIVE & OBJECTIVE: Pt seen and examined at bedside.   no overnight events,.   denies CP/SOB currently.   states CP was left sided, pressure like, lasted a few seconds and self resolved, some assoc SOB, not alleviated or relieved by anything   complaining of some forgetfulness, constipation, dry hair    Denies fever, chills, N/V, dizziness, HA, cough, CP, palpitations, SOB, abdominal pain, dysuria, diarrhea.     PHYSICAL EXAM:  T(C): 36.3 (04-02-21 @ 17:17), Max: 36.8 (04-02-21 @ 01:29)  HR: 85 (04-02-21 @ 17:17) (56 - 85)  BP: 156/83 (04-02-21 @ 17:17) (114/62 - 156/83)  RR: 18 (04-02-21 @ 17:17) (16 - 18)  SpO2: 100% (04-02-21 @ 17:17) (95% - 100%)  Wt(kg): -- Height (cm): 167.6 (04-02 @ 01:29)  Weight (kg): 81.6 (04-02 @ 01:29)  BMI (kg/m2): 29 (04-02 @ 01:29)  BSA (m2): 1.91 (04-02 @ 01:29)  I&O's Detail    GENERAL: NAD, well-groomed, well-developed, no increased WOB   HEAD:  Atraumatic, Normocephalic  EYES: EOMI, PERRLA, conjunctiva and sclera clear  ENMT: Moist mucous membranes  NECK: Supple, No JVD  NERVOUS SYSTEM:  Alert & Oriented X3, no focal neuro deficits   CHEST/LUNG: Clear to auscultation bilaterally; No rales, rhonchi, wheezing, or rubs  HEART: Regular rate and rhythm; No murmurs, rubs, or gallops  ABDOMEN: Soft, Nontender, Nondistended; Bowel sounds present  EXTREMITIES:  2+ Peripheral Pulses b/l, No clubbing, cyanosis, calf tenderness or edema b/l   BACK: well heeled scar from prior spinal fusion surgery     MEDICATIONS  (STANDING):  dextrose 40% Gel 15 Gram(s) Oral once  dextrose 5%. 1000 milliLiter(s) (50 mL/Hr) IV Continuous <Continuous>  dextrose 5%. 1000 milliLiter(s) (100 mL/Hr) IV Continuous <Continuous>  dextrose 50% Injectable 25 Gram(s) IV Push once  dextrose 50% Injectable 12.5 Gram(s) IV Push once  dextrose 50% Injectable 25 Gram(s) IV Push once  gabapentin 100 milliGRAM(s) Oral three times a day  gabapentin 200 milliGRAM(s) Oral at bedtime  glucagon  Injectable 1 milliGRAM(s) IntraMuscular once  heparin   Injectable 5000 Unit(s) SubCutaneous every 12 hours  insulin glargine Injectable (LANTUS) 8 Unit(s) SubCutaneous at bedtime  insulin lispro (ADMELOG) corrective regimen sliding scale   SubCutaneous three times a day before meals  insulin lispro (ADMELOG) corrective regimen sliding scale   SubCutaneous at bedtime  lidocaine   Patch 2 Patch Transdermal every 24 hours  melatonin 3 milliGRAM(s) Oral at bedtime  morphine ER Tablet 60 milliGRAM(s) Oral two times a day  polyethylene glycol 3350 17 Gram(s) Oral daily  potassium phosphate / sodium phosphate Tablet (K-PHOS No. 2) 2 Tablet(s) Oral three times a day  senna 2 Tablet(s) Oral at bedtime    MEDICATIONS  (PRN):  acetaminophen   Tablet .. 650 milliGRAM(s) Oral every 6 hours PRN Temp greater or equal to 38C (100.4F), Mild Pain (1 - 3)  magnesium hydroxide Suspension 30 milliLiter(s) Oral daily PRN Constipation      LABS:                        10.1   5.86  )-----------( 245      ( 02 Apr 2021 12:49 )             33.3     04-02    136  |  101  |  14  ----------------------------<  142<H>  4.2   |  29  |  1.24    Ca    9.1      02 Apr 2021 17:46  Phos  1.6     04-02  Mg     2.0     04-02    TPro  8.1  /  Alb  3.5  /  TBili  0.4  /  DBili  x   /  AST  18  /  ALT  16  /  AlkPhos  99  04-01    PT/INR - ( 01 Apr 2021 17:33 )   PT: 12.0 sec;   INR: 1.04 ratio         PTT - ( 02 Apr 2021 12:49 )  PTT:180.3 sec    Magnesium, Serum: 2.0 mg/dL (04-02 @ 04:02)    CAPILLARY BLOOD GLUCOSE      POCT Blood Glucose.: 145 mg/dL (02 Apr 2021 21:04)  POCT Blood Glucose.: 114 mg/dL (02 Apr 2021 16:09)  POCT Blood Glucose.: 122 mg/dL (02 Apr 2021 11:32)  POCT Blood Glucose.: 109 mg/dL (02 Apr 2021 07:58)  POCT Blood Glucose.: 146 mg/dL (01 Apr 2021 23:24)      CARDIAC MARKERS ( 02 Apr 2021 17:46 )  .366 ng/mL / x     / 131 U/L / x     / x      CARDIAC MARKERS ( 02 Apr 2021 04:02 )  .275 ng/mL / x     / 113 U/L / x     / x      CARDIAC MARKERS ( 01 Apr 2021 13:43 )  <.015 ng/mL / x     / x     / x     / x            RECENT CULTURES:      RADIOLOGY & ADDITIONAL TESTS:  < from: CT Angio Chest w/ IV Cont (04.01.21 @ 18:06) >  INTERPRETATION:  CLINICAL INFORMATION: Hypoxia, chest pain, evaluate for pulmonary embolism    COMPARISON: CT chest abdomen pelvis 8/25/2015.    CONTRAST/COMPLICATIONS:  IV Contrast: Omnipaque 350, 90 cc administered, 10 cc discarded.  90 cc administered  Oral Contrast: None  Complications: None    PROCEDURE:  CT Angiography of the Chest.  Sagittal and coronal reformats were performed as well as 3D (MIP) reconstructions.    FINDINGS: Limited examination due to poor timing of contrast bolus and streak artifact.    LUNGS AND AIRWAYS: Patent central airways.  Lungs are clear.  PLEURA: No pleural effusion.  MEDIASTINUM AND KIRSTEN: No lymphadenopathy.  VESSELS: Limited evaluation for pulmonary embolism secondary to poor bolus timing and streak artifact. No central or proximal segmental pulmonary emboli. Distal to this cannot be evaluated.  HEART: Heart size is normal. No pericardial effusion.  CHEST WALL AND LOWER NECK: Within normal limits. Small hiatal hernia  VISUALIZED UPPER ABDOMEN: Pneumobilia unchanged from the previous exam  BONES: Degenerative changes. Spinal orthopedic hardware.    IMPRESSION:    Extremely limited evaluation for pulmonary embolism secondary to poor bolus timing and streak artifact. No central or proximal segmental pulmonary emboli. Distal to this cannot be evaluated.    < end of copied text >    < from: NM Pulmonary Perfusion Scan (04.02.21 @ 15:09) >  INTERPRETATION:  RADIOPHARMACEUTICAL: 99mTc-MAA       DOSE: 3.0 mCi IV    CLINICAL INFORMATION: 75 year old female with chest pain, referred to evaluate for pulmonary embolus.    TECHNIQUE: Perfusion images of the lungs were obtained following administration of Tc-99m-MAA. Images were obtained in the anterior, posterior, both lateral, and all 4 oblique projections. This study was interpreted in conjunction with a chest radiograph of 4/1/2021.    COMPARISON: No previous lung scan for comparison.    FINDINGS: The study demonstrates heterogenous tracer distribution in both lungs. There are no well-defined segmental perfusion defects.    IMPRESSION:  Very low probability of pulmonary embolus.    < end of copied text >    < from: US Duplex Venous Lower Ext Complete, Bilateral (04.01.21 @ 21:00) >  INTERPRETATION:  CLINICAL INFORMATION: Lower extremity pain.    COMPARISON: None available.    TECHNIQUE: Duplex sonography of the BILATERAL LOWERextremity veins with color and spectral Doppler, with and without compression.    FINDINGS:    RIGHT:  Normal compressibility of the RIGHT common femoral, femoral and popliteal veins.  Doppler examination shows normal spontaneous and phasic flow.  NoRIGHT calf vein thrombosis is detected.    LEFT:  Normal compressibility of the LEFT common femoral, femoral and popliteal veins.  Doppler examination shows normal spontaneous and phasic flow.  No LEFT calf vein thrombosis is detected.    IMPRESSION:  No evidence of deep venous thrombosis in either lower extremity.    < end of copied text >          Imaging Personally Reviewed:  [ ] YES  [ ] NO    Consultant(s) Notes Reviewed:  [x ] YES  [ ] NO    Care Discussed with Consultants/Other Providers [x ] YES  [ ] NO  Care discussed in detail with patient.  All questions and concerns addressed

## 2021-04-03 DIAGNOSIS — E03.9 HYPOTHYROIDISM, UNSPECIFIED: ICD-10-CM

## 2021-04-03 DIAGNOSIS — E11.9 TYPE 2 DIABETES MELLITUS WITHOUT COMPLICATIONS: ICD-10-CM

## 2021-04-03 LAB
ANION GAP SERPL CALC-SCNC: 7 MMOL/L — SIGNIFICANT CHANGE UP (ref 5–17)
BUN SERPL-MCNC: 11 MG/DL — SIGNIFICANT CHANGE UP (ref 7–23)
CALCIUM SERPL-MCNC: 8.5 MG/DL — SIGNIFICANT CHANGE UP (ref 8.5–10.1)
CHLORIDE SERPL-SCNC: 103 MMOL/L — SIGNIFICANT CHANGE UP (ref 96–108)
CK SERPL-CCNC: 150 U/L — SIGNIFICANT CHANGE UP (ref 26–192)
CO2 SERPL-SCNC: 28 MMOL/L — SIGNIFICANT CHANGE UP (ref 22–31)
COVID-19 SPIKE DOMAIN AB INTERP: POSITIVE
COVID-19 SPIKE DOMAIN ANTIBODY RESULT: 74.8 U/ML — HIGH
CREAT SERPL-MCNC: 0.86 MG/DL — SIGNIFICANT CHANGE UP (ref 0.5–1.3)
GLUCOSE BLDC GLUCOMTR-MCNC: 139 MG/DL — HIGH (ref 70–99)
GLUCOSE BLDC GLUCOMTR-MCNC: 218 MG/DL — HIGH (ref 70–99)
GLUCOSE BLDC GLUCOMTR-MCNC: 90 MG/DL — SIGNIFICANT CHANGE UP (ref 70–99)
GLUCOSE BLDC GLUCOMTR-MCNC: 94 MG/DL — SIGNIFICANT CHANGE UP (ref 70–99)
GLUCOSE SERPL-MCNC: 86 MG/DL — SIGNIFICANT CHANGE UP (ref 70–99)
MAGNESIUM SERPL-MCNC: 1.6 MG/DL — SIGNIFICANT CHANGE UP (ref 1.6–2.6)
PHOSPHATE SERPL-MCNC: 3.5 MG/DL — SIGNIFICANT CHANGE UP (ref 2.5–4.5)
POTASSIUM SERPL-MCNC: 4.7 MMOL/L — SIGNIFICANT CHANGE UP (ref 3.5–5.3)
POTASSIUM SERPL-SCNC: 4.7 MMOL/L — SIGNIFICANT CHANGE UP (ref 3.5–5.3)
SARS-COV-2 IGG+IGM SERPL QL IA: 74.8 U/ML — HIGH
SARS-COV-2 IGG+IGM SERPL QL IA: POSITIVE
SODIUM SERPL-SCNC: 138 MMOL/L — SIGNIFICANT CHANGE UP (ref 135–145)
T3 SERPL-MCNC: 68 NG/DL — LOW (ref 80–200)
T4 FREE SERPL-MCNC: 1 NG/DL — SIGNIFICANT CHANGE UP (ref 0.9–1.8)
TROPONIN I SERPL-MCNC: 0.23 NG/ML — HIGH (ref 0.01–0.04)

## 2021-04-03 PROCEDURE — 74018 RADEX ABDOMEN 1 VIEW: CPT | Mod: 26

## 2021-04-03 PROCEDURE — 99223 1ST HOSP IP/OBS HIGH 75: CPT

## 2021-04-03 PROCEDURE — 99233 SBSQ HOSP IP/OBS HIGH 50: CPT

## 2021-04-03 RX ORDER — PANTOPRAZOLE SODIUM 20 MG/1
40 TABLET, DELAYED RELEASE ORAL DAILY
Refills: 0 | Status: DISCONTINUED | OUTPATIENT
Start: 2021-04-03 | End: 2021-04-05

## 2021-04-03 RX ORDER — PANTOPRAZOLE SODIUM 20 MG/1
40 TABLET, DELAYED RELEASE ORAL DAILY
Refills: 0 | Status: DISCONTINUED | OUTPATIENT
Start: 2021-04-03 | End: 2021-04-03

## 2021-04-03 RX ORDER — REGADENOSON 0.08 MG/ML
0.4 INJECTION, SOLUTION INTRAVENOUS ONCE
Refills: 0 | Status: DISCONTINUED | OUTPATIENT
Start: 2021-04-03 | End: 2021-04-04

## 2021-04-03 RX ORDER — LACTULOSE 10 G/15ML
15 SOLUTION ORAL ONCE
Refills: 0 | Status: COMPLETED | OUTPATIENT
Start: 2021-04-03 | End: 2021-04-03

## 2021-04-03 RX ORDER — METOCLOPRAMIDE HCL 10 MG
10 TABLET ORAL EVERY 6 HOURS
Refills: 0 | Status: DISCONTINUED | OUTPATIENT
Start: 2021-04-03 | End: 2021-04-05

## 2021-04-03 RX ORDER — ONDANSETRON 8 MG/1
4 TABLET, FILM COATED ORAL EVERY 6 HOURS
Refills: 0 | Status: DISCONTINUED | OUTPATIENT
Start: 2021-04-03 | End: 2021-04-04

## 2021-04-03 RX ADMIN — SENNA PLUS 2 TABLET(S): 8.6 TABLET ORAL at 21:11

## 2021-04-03 RX ADMIN — HEPARIN SODIUM 5000 UNIT(S): 5000 INJECTION INTRAVENOUS; SUBCUTANEOUS at 05:48

## 2021-04-03 RX ADMIN — ONDANSETRON 4 MILLIGRAM(S): 8 TABLET, FILM COATED ORAL at 16:56

## 2021-04-03 RX ADMIN — SIMVASTATIN 20 MILLIGRAM(S): 20 TABLET, FILM COATED ORAL at 21:11

## 2021-04-03 RX ADMIN — Medication 3 MILLIGRAM(S): at 21:11

## 2021-04-03 RX ADMIN — Medication 2 TABLET(S): at 05:48

## 2021-04-03 RX ADMIN — Medication 1 DROP(S): at 05:49

## 2021-04-03 RX ADMIN — Medication 81 MILLIGRAM(S): at 17:27

## 2021-04-03 RX ADMIN — LACTULOSE 15 GRAM(S): 10 SOLUTION ORAL at 17:26

## 2021-04-03 RX ADMIN — Medication 1 DROP(S): at 23:48

## 2021-04-03 RX ADMIN — MORPHINE SULFATE 60 MILLIGRAM(S): 50 CAPSULE, EXTENDED RELEASE ORAL at 21:10

## 2021-04-03 RX ADMIN — LIDOCAINE 2 PATCH: 4 CREAM TOPICAL at 23:46

## 2021-04-03 RX ADMIN — MORPHINE SULFATE 60 MILLIGRAM(S): 50 CAPSULE, EXTENDED RELEASE ORAL at 22:10

## 2021-04-03 RX ADMIN — Medication 1 DROP(S): at 11:30

## 2021-04-03 RX ADMIN — LIDOCAINE 2 PATCH: 4 CREAM TOPICAL at 17:23

## 2021-04-03 RX ADMIN — Medication 1 DROP(S): at 17:27

## 2021-04-03 RX ADMIN — LIDOCAINE 2 PATCH: 4 CREAM TOPICAL at 08:10

## 2021-04-03 RX ADMIN — PANTOPRAZOLE SODIUM 40 MILLIGRAM(S): 20 TABLET, DELAYED RELEASE ORAL at 21:09

## 2021-04-03 RX ADMIN — GABAPENTIN 100 MILLIGRAM(S): 400 CAPSULE ORAL at 21:12

## 2021-04-03 RX ADMIN — Medication 150 MICROGRAM(S): at 05:48

## 2021-04-03 RX ADMIN — GABAPENTIN 200 MILLIGRAM(S): 400 CAPSULE ORAL at 21:11

## 2021-04-03 RX ADMIN — INSULIN GLARGINE 8 UNIT(S): 100 INJECTION, SOLUTION SUBCUTANEOUS at 21:34

## 2021-04-03 RX ADMIN — PANTOPRAZOLE SODIUM 40 MILLIGRAM(S): 20 TABLET, DELAYED RELEASE ORAL at 00:55

## 2021-04-03 RX ADMIN — MORPHINE SULFATE 60 MILLIGRAM(S): 50 CAPSULE, EXTENDED RELEASE ORAL at 05:48

## 2021-04-03 RX ADMIN — Medication 4: at 16:53

## 2021-04-03 RX ADMIN — GABAPENTIN 100 MILLIGRAM(S): 400 CAPSULE ORAL at 17:27

## 2021-04-03 RX ADMIN — HEPARIN SODIUM 5000 UNIT(S): 5000 INJECTION INTRAVENOUS; SUBCUTANEOUS at 17:26

## 2021-04-03 RX ADMIN — GABAPENTIN 100 MILLIGRAM(S): 400 CAPSULE ORAL at 05:48

## 2021-04-03 RX ADMIN — Medication 1 ENEMA: at 17:26

## 2021-04-03 RX ADMIN — Medication 2 TABLET(S): at 17:24

## 2021-04-03 RX ADMIN — Medication 10 MILLIGRAM(S): at 11:30

## 2021-04-03 NOTE — PROGRESS NOTE ADULT - ASSESSMENT
75 year old female w DM II,  HTN, hypothyroid, spinal stenosis w chronic pain came to ED c/o chest pain, SOB    #Acute chest pain   #Dyspnea  CTA  distal PE not ruled out  dopplers neg DVT  COVID-19 not detected  CXR clear  1. admit to telemetry  2. IV heparin as per protocol  3. serial trop  4. echo  5. lipid profile  6. check v/q scan  cardiology consult     #elevated Cr 1.28  1. trend      #DM II  1. hold glucophage  2 consistent CHO   3. lantus 8 units daily   4. BGM  5. ISS  6. Hb A1c      #HTN  unable to get info re BP meds  1. monitor      #Hypothyroid  1. synthroid 150 mcg qd  2. TSH        #Spinal stenosis w chronic pain  1. morphine ER 60 mg BID w stat dose  2. gabapentin 100 mg TID w                       200 mg q HS w stat dose  3. lidoderm patches    #Hypophosphatemia, hypomagnesemia --replete and monitor     #Preventative measures   heparin SQ-dvt ppx  fall precautions   PT    75 year old female w DM II,  HTN, hypothyroid, spinal stenosis w chronic pain came to ED c/o chest pain, SOB    Assessment and Plan:     #atypical  chest pain , ACs ruled out   hypoxia 90% on RA on admission, now SpO2 98% at rest on room air and on exertion. No clear reason for hypoxia. resolved on its own.   patient is comfortable , no e/o increased work of breathing   CTA  poor study, no e/o PE   dopplers neg DVT  V/Q scan neg   COVID-19 not detected  CXR clear  Tele: NSR, few short dropped beats, of no significance per cardiology, dc tele   Mary slightly elevated   BNP OK  d-dimer 5,000 ?   d/c heparin drip, PE /DVT ruled out   EKG: NSR   LDL 46   echo: pEF, mod pulm HTN, no e/o thrombus; essentially normal echo   cardiology consult appreciated, possible stress test Monday   SOB and CP resolved   no e/o acute infection, afebrile, no leukocytosis, lungs clear   patient endorses she had stress ECHO 1 year ago at [facility] which was normal     #Troponin leak, doubt ACS , most likely demand in setting of acute hypoxia   trops trended down   EKG: NSR, unchanged     #severe Constipation, sec to opioids  bowel regimen   4/3 enema x 1 today, lactulose x 1 PO   reglan prn for nausea/vomiting     #DM II  A1c 6.5%   lantus 8 units daily, ISS  CHO diet   avoid hypoglycemia       #HTN  BP ok off meds   monitor       #Hypothyroid  TSH 22  follow fT4, tT3  synthroid 150 mcg qd; will confirm dose with pharmacy  per patient, PMD recently increased synthroid dose     Endo consult appreciated       #Spinal stenosis w chronic pain  1. morphine ER 60 mg BID w stat dose  2. gabapentin 100 mg TID                        200 mg q HS   3. lidoderm patches    #Hypophosphatemia, hypomagnesemia, hypokalemia --replete and monitor         #Preventative measures   heparin SQ-dvt ppx  fall precautions   patient ambulatory , independent, no difficulty

## 2021-04-03 NOTE — CONSULT NOTE ADULT - PROBLEM SELECTOR RECOMMENDATION 9
on 150 mcg levothyroxine , later decrease to 100 mcg   also Check thyroid function tests in a few days   correct TSH to 2.0 or so   The recovery of TSH may lag behind time wise compared to  thyroid hormones like T4 and T3   The recovery of TSH may lag behind time wise compared to  thyroid hormones like T4 and T3

## 2021-04-03 NOTE — CONSULT NOTE ADULT - PROBLEM SELECTOR RECOMMENDATION 2
Continue with the same regimen while inpatient   stable finger sticks   patient can be discharged on the same regimen or Patient can resume  home regimen upon discharge   Thank You for the courtesy of this consultation !!!

## 2021-04-03 NOTE — CONSULT NOTE ADULT - SUBJECTIVE AND OBJECTIVE BOX
CARDIOLOGY CONSULTATION NOTE                                                                             JUAN LANTIGUA is a 75y Female with prior h/o DM II, HTN, h/o CVA, hypothyroid, spinal stenosis w chronic pain.  Admitted for c/o one week of body aches + generalized malaise (s/p COVID vaccine) and now 1 1/2 days of left sided chest wall pain and associated dyspnea. The pain is 8/10 and constant, non-pleuritic. She also has had increased pain to RLE.  Pt has been on an unnamed antibiotic 500 mg 4 times a day  In the ED /84     RR 18   T 98.2, + mild hypoxia to 90% on RA noted; O2 improved to 97-98% on nasal cannula CTA done for d-dimer 5000 not diagnostic, non diagnostic CTA, V/Q scan was negative, doppler to both legs neg for DVT. COVID-19 not detected   PAST MEDICAL HX Angioedema requiring intubation from ACE-I  CVA with no residual deficits DM II diabetes mellitus Diverticulosis HTN hypertension Hypothyroid MRSA (methicillin resistant Staphylococcus aureus) infection Osteoarthritis Pancreatitis, chronic last episode > 15 yrs ago Spinal stenosis s/p fusion w chronic pain   SURGICAL HX H/O right inguinal hernia repair H/O total knee replacement, bilateral FH: cholecystectomy H/O: hysterectomy Stage I L1-L4 Lumbar Lateral Fusion; S/P L1-L5 XLIF, L5-S1 PLIF, L1-S1 screw and john fixation   FAMILY HX No history of dementia, strokes, or seizures   SOCIAL HX denies smoking or drinking; Jehovah Witness    (2021 22:10)   REVIEW OF SYSTEMS: -----------------------------  CONSTITUTIONAL: No fever, weight loss, or fatigue EYES: No eye pain, visual disturbances, or discharge ENMT:  No difficulty hearing, tinnitus, vertigo; No sinus or throat pain NECK: No pain or stiffness BREASTS: No pain, masses, or nipple discharge RESPIRATORY: No cough, wheezing, chills or hemoptysis; No shortness of breath CARDIOVASCULAR: See HPI GASTROINTESTINAL: No abdominal or epigastric pain. No nausea, vomiting, or hematemesis; No diarrhea or constipation. No melena or hematochezia. GENITOURINARY: No dysuria, frequency, hematuria, or incontinence NEUROLOGICAL: No headaches, memory loss, loss of strength, numbness, or tremors SKIN: No itching, burning, rashes, or lesions  LYMPH NODES: No enlarged glands ENDOCRINE: No heat or cold intolerance; No hair loss MUSCULOSKELETAL: No joint pain or swelling; No muscle, back, or extremity pain PSYCHIATRIC: No depression, anxiety, mood swings, or difficulty sleeping HEME/LYMPH: No easy bruising, or bleeding gums ALLERGY AND IMMUNOLOGIC: No hives or eczema  Home Medications: docusate sodium 100 mg oral capsule: 1 cap(s) orally 3 times a day (2021 22:28) gabapentin 100 mg oral tablet: 1 tab(s) orally 3 times a day (2021 22:29) gabapentin 100 mg oral tablet: 2 tab(s) orally once a day (at bedtime) (2021 22:29) Glucophage 1000 mg oral tablet: 1 tab(s) orally 2 times a day (2021 22:29) Lantus 100 units/mL subcutaneous solution: 10 unit(s) subcutaneous 2 times a day (2021 22:29) morphine 60 mg/12 hours oral tablet, extended release: 1 tab(s) orally 2 times a day (2021 22:29) Synthroid 100 mcg (0.1 mg) oral tablet: 1 tab(s) orally once a day (2021 22:28)   MEDICATIONS  (STANDING): aspirin enteric coated 81 milliGRAM(s) Oral daily gabapentin 100 milliGRAM(s) Oral three times a day gabapentin 200 milliGRAM(s) Oral at bedtime glucagon  Injectable 1 milliGRAM(s) IntraMuscular once heparin   Injectable 5000 Unit(s) SubCutaneous every 12 hours insulin glargine Injectable (LANTUS) 8 Unit(s) SubCutaneous at bedtime levothyroxine 150 MICROGram(s) Oral daily lidocaine   Patch 2 Patch Transdermal every 24 hours melatonin 3 milliGRAM(s) Oral at bedtime morphine ER Tablet 60 milliGRAM(s) Oral two times a day pantoprazole    Tablet 40 milliGRAM(s) Oral daily polyethylene glycol 3350 17 Gram(s) Oral daily potassium phosphate / sodium phosphate Tablet (K-PHOS No. 2) 2 Tablet(s) Oral three times a day prednisoLONE acetate 1% Suspension 1 Drop(s) Right EYE four times a day senna 2 Tablet(s) Oral at bedtime simvastatin 20 milliGRAM(s) Oral at bedtime   ALLERGIES: ACE inhibitors (Anaphylaxis; Angioedema)   FAMILY HISTORY: No pertinent family history in first degree relatives    PHYSICAL EXAMINATION: ----------------------------- T(C): 36.3 (21 @ 04:45), Max: 36.6 (21 @ 00:50) HR: 68 (21 @ 04:45) (64 - 85) BP: 122/81 (21 @ 04:45) (114/62 - 156/83) RR: 18 (21 @ 04:45) (17 - 18) SpO2: 97% (21 @ 04:45) (95% - 100%) Wt(kg): --    Constitutional: well developed, normal appearance, well groomed, well nourished, no deformities and no acute distress.  Eyes: the conjunctiva exhibited no abnormalities and the eyelids demonstrated no xanthelasmas.  HEENT: normal oral mucosa, no oral pallor and no oral cyanosis.  Neck: normal jugular venous A waves present, normal jugular venous V waves present and no jugular venous berg A waves.  Pulmonary: no respiratory distress, normal respiratory rhythm and effort, no accessory muscle use and lungs were clear to auscultation bilaterally.  Cardiovascular: heart rate and rhythm were normal, normal S1 and S2 and no murmur, gallop, rub, heave or thrill are present.  Abdomen: soft, non-tender, no hepato-splenomegaly and no abdominal mass palpated.  Musculoskeletal: the gait could not be assessed..  Extremities: no clubbing of the fingernails, no localized cyanosis, no petechial hemorrhages and no ischemic changes.  Skin: normal skin color and pigmentation, no rash, no venous stasis, no skin lesions, no skin ulcer and no xanthoma was observed.  Psychiatric: oriented to person, place, and time, the affect was normal, the mood was normal and not feeling anxious.   ECG: ------- < from: 12 Lead ECG (21 @ 12:55) >  Ventricular Rate 89 BPM  Atrial Rate 89 BPM  P-R Interval 154 ms  QRS Duration 120 ms  Q-T Interval 378 ms  QTC Calculation(Bazett) 459 ms  P Axis 42 degrees  R Axis -69 degrees  T Axis 59 degrees  Diagnosis Line Normal sinus rhythm Possible Left atrial enlargement Left anterior fascicular block Left ventricular hypertrophy with QRS widening Abnormal ECG No previous ECGs available Confirmed by ORTIZ DE JESUS MD ([id number]) on 2021 2:11:38 PM  < end of copied text >    LABS:  --------   138  |  103  |  11 ----------------------------<  86 4.7   |  28  |  0.86  Ca    8.5      2021 08:00 Phos  3.5     - Mg     1.6       TPro  8.1  /  Alb  3.5  /  TBili  0.4  /  DBili  x   /  AST  18  /  ALT  16  /  AlkPhos  99                         10.1  5.86  )-----------( 245      ( 2021 12:49 )            33.3    PT/INR - ( 2021 17:33 )   PT: 12.0 sec;   INR: 1.04 ratio      PTT - ( 2021 12:49 )  PTT:180.3 sec  04 @ 17:46 CPK total:--, CKMB --, Troponin I - .366 ng/mL<H>  @ 04:02 CPK total:--, CKMB --, Troponin I - .275 ng/mL<H>  @ 13:43 CPK total:--, CKMB --, Troponin I - <.015 ng/mL  110 mg/dL, --, 54 mg/dL, 50 mg/dL    RADIOLOGY REPORTS: ----------------------------- < from: CT Angio Chest w/ IV Cont (21 @ 18:06) >  EXAM:  CT ANGIO CHEST (W)AW IC                         PROCEDURE DATE:  2021      INTERPRETATION:  CLINICAL INFORMATION: Hypoxia, chest pain, evaluate for pulmonary embolism  COMPARISON: CT chest abdomen pelvis 2015.  CONTRAST/COMPLICATIONS: IV Contrast: Omnipaque 350, 90 cc administered, 10 cc discarded.  90 cc administered Oral Contrast: None Complications: None  PROCEDURE: CT Angiography of the Chest. Sagittal and coronal reformats were performed as well as 3D (MIP) reconstructions.  FINDINGS: Limited examination due to poor timing of contrast bolus and streak artifact.  LUNGS AND AIRWAYS: Patent central airways.  Lungs are clear. PLEURA: No pleural effusion. MEDIASTINUM AND KIRSTEN: No lymphadenopathy. VESSELS: Limited evaluation for pulmonary embolism secondary to poor bolus timing and streak artifact. No central or proximal segmental pulmonary emboli. Distal to this cannot be evaluated. HEART: Heart size is normal. No pericardial effusion. CHEST WALL AND LOWER NECK: Within normal limits. Small hiatal hernia VISUALIZED UPPER ABDOMEN: Pneumobilia unchanged from the previous exam BONES: Degenerative changes. Spinal orthopedic hardware.  IMPRESSION:  Extremely limited evaluation for pulmonary embolism secondary to poor bolus timing and streak artifact. No central or proximal segmental pulmonary emboli. Distal to this cannot be evaluated.   < from: NM Pulmonary Perfusion Scan (21 @ 15:09) >  EXAM:  NM PULM PERFUSION IMG                         PROCEDURE DATE:  2021      INTERPRETATION:  RADIOPHARMACEUTICAL: 99mTc-MAA       DOSE: 3.0 mCi IV  CLINICAL INFORMATION: 75 year old female with chest pain, referred to evaluate for pulmonary embolus.  TECHNIQUE: Perfusion images of the lungs were obtained following administration of Tc-99m-MAA. Images were obtained in the anterior, posterior, both lateral, and all 4 oblique projections. This study was interpreted in conjunction with a chest radiograph of 2021.  COMPARISON: No previous lung scan for comparison.  FINDINGS: The study demonstrates heterogenous tracer distribution in both lungs. There are no well-defined segmental perfusion defects.  IMPRESSION:  Very low probability of pulmonary embolus.      KEVIN LYNN MD; Attending Nuclear Medicine This document has been electronically signed. 2021  3:14PM  < end of copied text >     MORALES NEGRON MD; Attending Radiologist This document has been electronically signed.2021  6:37PM  < end of copied text >    ECHOCARDIOGRAM: ---------------------------  < from: TTE Echo Complete w/o Contrast w/ Doppler (21 @ 13:36) >   EXAM:  ECHO TTE WO CON COMP W DOPP      PROCEDURE DATE:  2021     INTERPRETATION:  REPORT: TRANSTHORACIC ECHOCARDIOGRAM REPORT    Patient Name:   JUAN LANTIGUA Patient Location: [facility] Rec #:  [id number]     Accession #:[id number] Account #:                     Height:           65.7 in 167.0 cm YOB: 1945     Weight:           179.9 lb 81.60 kg Patient Age:    75 years       BSA:              1.91 m² Patient Gender: F              BP: 135/73 mmHg   Date of Exam:        2021 1:36:24 PM Sonographer:         LESLY Referring Physician: ZOYA  Procedure:   2D Echo/Doppler/Color Doppler Complete. 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    2D AND M-MODE MEASUREMENTS (normal ranges within parentheses): Left                 Normal   Aorta/Left            Normal Ventricle:                    Atrium: IVSd (2D):    1.34  (0.7-1.1) Aortic Root   3.28  (2.4-3.7)                cm             (2D):           cm LVPWd (2D):   1.22  (0.7-1.1) Left Atrium    3.43  (1.9-4.0)                cm             (2D):           cm LVIDd (2D):   3.80  (3.4-5.7) LA Vol Index   14.3                cm            (A4C):        ml/m² LVIDs (2D):   2.73            LA Vol Index   29.5                cm             (A2C):        ml/m² LV FS (2D):   28.2   (>25%)   LA Vol Index   23.6                 %             (BP):         ml/m² Relative Wall0.64   (<0.42)  Right Thickness                     Ventricle:                               TAPSE:          1.93 cm  LV DIASTOLIC FUNCTION: MV Peak E: 0.56 m/s Decel Time:  483 msec MV Peak A: 1.05 m/s Septal E/e'  9.0 E/A Ratio: 0.54     Lateral E/e' 6.1 Septal e'  0.1 m/s Lateral e' 0.1 m/s  SPECTRAL DOPPLER ANALYSIS (where applicable): Mitral Valve: MV P1/2 Time: 140.13 msec MV Area, PHT: 1.57 cm²  Aortic Valve: AoV Max Hi: 1.47 m/s AoV Peak P.7 mmHg AoV Mean PG: 3.3 mmHg  LVOT Vmax: 1.03 m/s LVOT VTI: 0.162 m LVOT Diameter: 2.09 cm  AoV Area, Vmax: 2.40 cm² AoV Area, VTI: 2.73 cm² AoV Area, Vmn: 2.50 cm²  Tricuspid Valve and PA/RV Systolic Pressure: TR Max Velocity: 3.19 m/s RA Pressure: 5 mmHg RVSP/PASP: 45.8 mmHg   PHYSICIAN INTERPRETATION: Left Ventricle: The left ventricular internal cavity size is normal. Left ventricular wall thickness is increased. Global LV systolic function was normal. Left ventricular ejection fraction, by visual estimation, is 65 to 70%. Right Ventricle: Normal right ventricular size and function. Left Atrium: The left atrium is normal in size. Right Atrium: The right atrium is normal in size. Pericardium: There is no evidence of pericardial effusion. Mitral Valve: Structurally normal mitral valve, with normal leaflet excursion. No evidence of mitral valve regurgitation is seen. Tricuspid Valve: Structurally normal tricuspid valve, with normal leaflet excursion. Moderate tricuspid regurgitation is visualized. Estimated pulmonary artery systolic pressure is 45.8 mmHg assuming a right atrial pressure of 5 mmHg, which is consistent with moderate pulmonary hypertension. Aortic Valve: Normal trileaflet aortic valve with normal opening. Peak transaortic gradientequals 8.7 mmHg, mean transaortic gradient equals 3.3 mmHg, the calculated aortic valve area equals 2.73 cm² by the continuity equation consistent with normally opening aortic valve. No evidence of aortic valve regurgitation is seen. Pulmonic Valve:Structurally normal pulmonic valve, with normal leaflet excursion. Mild pulmonic valve regurgitation. Aorta: The aortic root and ascending aorta are structurally normal, with no evidence of dilitation. Pulmonary Artery: The main pulmonary artery isnormal in size.   Summary:  1. Left ventricular ejection fraction, by visual estimation, is 65 to 70%.  2. Normal global left ventricular systolic function.  3. Increased LV wall thickness.  4. There is mild concentric left ventricular hypertrophy.  5. No evidence of mitral valve regurgitation.  6. Moderate tricuspid regurgitation.  7. Mild pulmonic valve regurgitation.  8. Estimated pulmonary artery systolic pressure is 45.8 mmHg assuming a right atrial pressure of 5 mmHg, which is consistent with moderate pulmonary hypertension.  9. No evidence of any thrombus.   [id number] Madi Mcwilliams MD, FACC Electronically signed on 4/3/2021 at 7:51:50 AM      *** Final ***         MADI MCWILLIAMS MD; Attending Cardiologist This document has been electronically signed. 2021  1:36PM  < end of copied text >  
  Patient is a 75y old  Female who presents with a chief complaint of chest pain possible ACS  poss PE (03 Apr 2021 07:34)      Reason For Consult: Hypothyroidism   diabetes     HPI:  75 year old female w DM II,  HTN, hypothyroid, spinal stenosis w chronic pain came to ED c/o chest pain, SOB    Denies fevers  + body aches over past week  Had COVID vaccine 6 days ago  on 100 mcg of levothyroxine at home   compliance is in question . Also TSH was 22 and T3 is 68. also free T4 is 1.0   HbA1C 6.5 controlled , on Metformin 1 gm BID and Lantus and prandial lispro at home       Pt states chest pain started suddenly 1 1/2 days ago while sitting  L upper chest area  graded 8/10   constant  does not know what increases or decreases pain  + associated SOB  generalized weakness since week worse today  has had increased pain to RLE as well  feels terrible pains since she has not gotten her medications today and has been in ED for hours    Pt is also taken an unnamed antibiotic 500 mg 4 times a day        In ED /84     RR 18   T 98.2    95% sat  ED noted + mild hypoxia to 90% on RA noted; O2 improved to 97-98% on nasal cannula    CTA done for d-dimer 5000 not diagnostic  doppler to both legs neg for DVT  on IV heparin  COVID-19 not detected      PAST MEDICAL HX  Angioedema requiring intubation from ACE-I   CVA with no residual deficits  DM II diabetes mellitus  Diverticulosis  HTN hypertension  Hypothyroid  MRSA (methicillin resistant Staphylococcus aureus) infection  Osteoarthritis  Pancreatitis, chronic last episode > 15 yrs ago  Spinal stenosis s/p fusion w chronic pain      SURGICAL HX  H/O right inguinal hernia repair  H/O total knee replacement, bilateral  FH: cholecystectomy  H/O: hysterectomy  Stage I L1-L4 Lumbar Lateral Fusion; S/P L1-L5 XLIF, L5-S1 PLIF, L1-S1  screw and john fixation     FAMILY HX  No history of dementia, strokes, or seizures      SOCIAL HX  denies smoking or drinking; Jehovah Witness       (01 Apr 2021 22:10)      PAST MEDICAL & SURGICAL HISTORY:  Diabetes    Hypertension    Hypothyroid    Pancreatitis, chronic  last episode &gt; 10 years ago    Diverticulosis    Osteoarthritis    Spinal stenosis    MRSA (methicillin resistant Staphylococcus aureus) infection  1999, infected knee replacement, required multiple revisions and long term IV antibiotics via central line    H/O: hysterectomy    FH: cholecystectomy    H/O total knee replacement, bilateral    H/O right inguinal hernia repair        FAMILY HISTORY:  No pertinent family history in first degree relatives          Social History:    MEDICATIONS  (STANDING):  aspirin enteric coated 81 milliGRAM(s) Oral daily  dextrose 40% Gel 15 Gram(s) Oral once  dextrose 5%. 1000 milliLiter(s) (50 mL/Hr) IV Continuous <Continuous>  dextrose 5%. 1000 milliLiter(s) (100 mL/Hr) IV Continuous <Continuous>  dextrose 50% Injectable 25 Gram(s) IV Push once  dextrose 50% Injectable 12.5 Gram(s) IV Push once  dextrose 50% Injectable 25 Gram(s) IV Push once  gabapentin 100 milliGRAM(s) Oral three times a day  gabapentin 200 milliGRAM(s) Oral at bedtime  glucagon  Injectable 1 milliGRAM(s) IntraMuscular once  heparin   Injectable 5000 Unit(s) SubCutaneous every 12 hours  insulin glargine Injectable (LANTUS) 8 Unit(s) SubCutaneous at bedtime  insulin lispro (ADMELOG) corrective regimen sliding scale   SubCutaneous three times a day before meals  insulin lispro (ADMELOG) corrective regimen sliding scale   SubCutaneous at bedtime  levothyroxine 150 MICROGram(s) Oral daily  lidocaine   Patch 2 Patch Transdermal every 24 hours  melatonin 3 milliGRAM(s) Oral at bedtime  morphine ER Tablet 60 milliGRAM(s) Oral two times a day  pantoprazole  Injectable 40 milliGRAM(s) IV Push daily  polyethylene glycol 3350 17 Gram(s) Oral daily  prednisoLONE acetate 1% Suspension 1 Drop(s) Right EYE four times a day  regadenoson Injectable 0.4 milliGRAM(s) IV Push once  senna 2 Tablet(s) Oral at bedtime  simvastatin 20 milliGRAM(s) Oral at bedtime    MEDICATIONS  (PRN):  acetaminophen   Tablet .. 650 milliGRAM(s) Oral every 6 hours PRN Temp greater or equal to 38C (100.4F), Mild Pain (1 - 3)  magnesium hydroxide Suspension 30 milliLiter(s) Oral daily PRN Constipation  metoclopramide Injectable 10 milliGRAM(s) IV Push every 6 hours PRN nausea/vomiting  ondansetron   Disintegrating Tablet 4 milliGRAM(s) Oral every 6 hours PRN Nausea and/or Vomiting      REVIEW OF SYSTEMS:  CONSTITUTIONAL:  as per HPI  HEENT:  Eyes:  No diplopia or blurred vision. ENT:  No earache, sore throat or runny nose.  CARDIOVASCULAR:  No pressure, squeezing, strangling, tightness, heaviness or aching about the chest, neck, axilla or epigastrium.  RESPIRATORY:  No cough, shortness of breath, PND or orthopnea.  GASTROINTESTINAL:  No nausea, vomiting or diarrhea.  GENITOURINARY:  No dysuria, frequency or urgency. No Blood in urine  MUSCULOSKELETAL:  no joint aches, no muscle pain, myalgia  SKIN:  No change in skin, hair or nails.  NEUROLOGIC:  No paresthesias, fasciculations, seizures or weakness.  PSYCHIATRIC:  No disorder of thought or mood.  ENDOCRINE:  No heat or cold intolerance, polyuria or polydipsia. abnormal weight gain or loss, oral thrush  HEMATOLOGICAL:  No easy bruising or bleeding.     T(C): 36.5 (04-03-21 @ 17:10), Max: 36.7 (04-03-21 @ 11:02)  HR: 86 (04-03-21 @ 17:10) (64 - 98)  BP: 131/79 (04-03-21 @ 17:10) (109/67 - 146/77)  RR: 18 (04-03-21 @ 17:10) (17 - 18)  SpO2: 98% (04-03-21 @ 17:10) (95% - 98%)  Wt(kg): --    PHYSICAL EXAM:  GENERAL: NAD, well-groomed, well-developed  HEAD:  Atraumatic, Normocephalic  EYES: EOMI, PERRLA, conjunctiva and sclera clear  ENMT: No tonsillar erythema, exudates, or enlargement; Moist mucous membranes, Good dentition, No lesions  NECK: Supple, No JVD, Normal thyroid  NERVOUS SYSTEM:  Alert & Oriented X3, Good concentration; Motor Strength 5/5 B/L upper and lower extremities; DTRs 2+ intact and symmetric  CHEST/LUNG: Clear to percussion bilaterally; No rales, rhonchi, wheezing, or rubs  HEART: Regular rate and rhythm; No murmurs, rubs, or gallops  ABDOMEN: Soft, Nontender, Nondistended; Bowel sounds present  EXTREMITIES:  2+ Peripheral Pulses, No clubbing, cyanosis, or edema  LYMPH: No lymphadenopathy noted  SKIN: No rashes or lesions    CAPILLARY BLOOD GLUCOSE      POCT Blood Glucose.: 218 mg/dL (03 Apr 2021 16:26)  POCT Blood Glucose.: 139 mg/dL (03 Apr 2021 11:23)  POCT Blood Glucose.: 94 mg/dL (03 Apr 2021 08:04)  POCT Blood Glucose.: 115 mg/dL (02 Apr 2021 22:34)                            10.1   5.86  )-----------( 245      ( 02 Apr 2021 12:49 )             33.3       CMP:  04-03 @ 08:00  SGPT --  Albumin --   Alk Phos --   Anion Gap 7   SGOT --   Total Bili --   BUN 11   Calcium Total 8.5   CO2 28   Chloride 103   Creatinine 0.86   eGFR if AA 76   eGFR if non AA 66   Glucose 86   Potassium 4.7   Protein --   Sodium 138      Thyroid Function Tests:  04-03 @ 03:00 TSH -- FreeT4 1.0 T3 68 Anti TPO -- Anti Thyroglobulin Ab -- TSI --  04-02 @ 04:02 TSH 22.900 FreeT4 -- T3 -- Anti TPO -- Anti Thyroglobulin Ab -- TSI --      Diabetes Tests:     Parathyroids:     Adrenals:       Radiology:

## 2021-04-03 NOTE — CONSULT NOTE ADULT - ASSESSMENT
`The chart has been reviewed but the patient has not yet been examined.  Full note to follow. 75y Female with prior h/o DM II, HTN, h/o CVA, hypothyroid, spinal stenosis w chronic pain.    Admitted for c/o one week of body aches + generalized malaise (s/p COVID vaccine) and now 1 1/2 days of left sided chest wall pain and associated dyspnea. The pain is 8/10 and constant, non-pleuritic. She also has had increased pain to RLE.   Pt has been on an Keflex 500 mg 4 times a day from [facility], reason unclear.    In the ED /84     RR 18   T 98.2, + mild hypoxia to 90% on RA noted; O2 improved to 97-98% on nasal cannula  CTA done for d-dimer 5000 not diagnostic, non diagnostic CTA, V/Q scan was negative, doppler to both legs neg for DVT.    Chest pain appears atypical. Patient denies h/o CAD, had w/u last year. Low level troponins likely type II MI in setting of acute hypoxemia. Lungs are clear both on P/E and xray so I doubt this is heart failure.     Ambulate and assess for decompensation, will see again in AM. Can d/c telemetry     Left message to speak to her Cardiologist dr. Eder Mercado (T: [phone]) regarding stated evaluation done last year (stress echo?)

## 2021-04-03 NOTE — PROGRESS NOTE ADULT - SUBJECTIVE AND OBJECTIVE BOX
HPI:  75 year old female w DM II,  HTN, hypothyroid, spinal stenosis w chronic pain came to ED c/o chest pain, SOB    Denies fevers  + body aches over past week  Had COVID vaccine 6 days ago    Pt states chest pain started suddenly 1 1/2 days ago while sitting  L upper chest area  graded 8/10   constant  does not know what increases or decreases pain  + associated SOB  generalized weakness since week worse today  has had increased pain to RLE as well  feels terrible pains since she has not gotten her medications today and has been in ED for hours    Pt is also taken an unnamed antibiotic 500 mg 4 times a day        In ED /84     RR 18   T 98.2    95% sat  ED noted + mild hypoxia to 90% on RA noted; O2 improved to 97-98% on nasal cannula    CTA done for d-dimer 5000 not diagnostic  doppler to both legs neg for DVT  on IV heparin  COVID-19 not detected      PAST MEDICAL HX  Angioedema requiring intubation from ACE-I   CVA with no residual deficits  DM II diabetes mellitus  Diverticulosis  HTN hypertension  Hypothyroid  MRSA (methicillin resistant Staphylococcus aureus) infection  Osteoarthritis  Pancreatitis, chronic last episode > 15 yrs ago  Spinal stenosis s/p fusion w chronic pain      SURGICAL HX  H/O right inguinal hernia repair  H/O total knee replacement, bilateral  FH: cholecystectomy  H/O: hysterectomy  Stage I L1-L4 Lumbar Lateral Fusion; S/P L1-L5 XLIF, L5-S1 PLIF, L1-S1  screw and john fixation     FAMILY HX  No history of dementia, strokes, or seizures      SOCIAL HX  denies smoking or drinking; Jehovah Witness       (01 Apr 2021 22:10)      SUBJECTIVE & OBJECTIVE: Pt seen and examined at bedside.   no overnight events  + constipation    Denies fever, chills, N/V, dizziness, HA, cough, CP, palpitations, SOB, abdominal pain, dysuria.     PHYSICAL EXAM:  Vital Signs Last 24 Hrs  T(C): 36.5 (03 Apr 2021 17:10), Max: 36.7 (03 Apr 2021 11:02)  T(F): 97.7 (03 Apr 2021 17:10), Max: 98.1 (03 Apr 2021 11:02)  HR: 86 (03 Apr 2021 17:10) (64 - 98)  BP: 131/79 (03 Apr 2021 17:10) (109/67 - 146/77)  BP(mean): 96 (03 Apr 2021 17:10) (96 - 96)  RR: 18 (03 Apr 2021 17:10) (17 - 18)  SpO2: 98% (03 Apr 2021 17:10) (95% - 98%)    GENERAL: NAD, well-groomed, well-developed  HEAD:  Atraumatic, Normocephalic  EYES: EOMI, PERRLA, conjunctiva and sclera clear  ENMT: Moist mucous membranes  NECK: Supple, No JVD  NERVOUS SYSTEM:  Alert & Oriented X3, Motor Strength 5/5 B/L upper and lower extremities; DTRs 2+ intact and symmetric  CHEST/LUNG: Clear to auscultation bilaterally; No rales, rhonchi, wheezing, or rubs  HEART: Regular rate and rhythm; No murmurs, rubs, or gallops  ABDOMEN: Soft, Nontender, Nondistended; Bowel sounds present  EXTREMITIES:  2+ Peripheral Pulses, No clubbing, cyanosis, or edema    MEDICATIONS  (STANDING):  dextrose 40% Gel 15 Gram(s) Oral once  dextrose 5%. 1000 milliLiter(s) (50 mL/Hr) IV Continuous <Continuous>  dextrose 5%. 1000 milliLiter(s) (100 mL/Hr) IV Continuous <Continuous>  dextrose 50% Injectable 25 Gram(s) IV Push once  dextrose 50% Injectable 12.5 Gram(s) IV Push once  dextrose 50% Injectable 25 Gram(s) IV Push once  gabapentin 100 milliGRAM(s) Oral three times a day  gabapentin 200 milliGRAM(s) Oral at bedtime  glucagon  Injectable 1 milliGRAM(s) IntraMuscular once  heparin   Injectable 5000 Unit(s) SubCutaneous every 12 hours  insulin glargine Injectable (LANTUS) 8 Unit(s) SubCutaneous at bedtime  insulin lispro (ADMELOG) corrective regimen sliding scale   SubCutaneous three times a day before meals  insulin lispro (ADMELOG) corrective regimen sliding scale   SubCutaneous at bedtime  lidocaine   Patch 2 Patch Transdermal every 24 hours  melatonin 3 milliGRAM(s) Oral at bedtime  morphine ER Tablet 60 milliGRAM(s) Oral two times a day  polyethylene glycol 3350 17 Gram(s) Oral daily  potassium phosphate / sodium phosphate Tablet (K-PHOS No. 2) 2 Tablet(s) Oral three times a day  senna 2 Tablet(s) Oral at bedtime    MEDICATIONS  (PRN):  acetaminophen   Tablet .. 650 milliGRAM(s) Oral every 6 hours PRN Temp greater or equal to 38C (100.4F), Mild Pain (1 - 3)  magnesium hydroxide Suspension 30 milliLiter(s) Oral daily PRN Constipation      LABS:                        10.1   5.86  )-----------( 245      ( 02 Apr 2021 12:49 )             33.3   04-03    138  |  103  |  11  ----------------------------<  86  4.7   |  28  |  0.86    Ca    8.5      03 Apr 2021 08:00  Phos  3.5     04-03  Mg     1.6     04-03        CAPILLARY BLOOD GLUCOSE      POCT Blood Glucose.: 145 mg/dL (02 Apr 2021 21:04)  POCT Blood Glucose.: 114 mg/dL (02 Apr 2021 16:09)  POCT Blood Glucose.: 122 mg/dL (02 Apr 2021 11:32)  POCT Blood Glucose.: 109 mg/dL (02 Apr 2021 07:58)  POCT Blood Glucose.: 146 mg/dL (01 Apr 2021 23:24)      CARDIAC MARKERS ( 02 Apr 2021 17:46 )  .366 ng/mL / x     / 131 U/L / x     / x      CARDIAC MARKERS ( 02 Apr 2021 04:02 )  .275 ng/mL / x     / 113 U/L / x     / x      CARDIAC MARKERS ( 01 Apr 2021 13:43 )  <.015 ng/mL / x     / x     / x     / x            RECENT CULTURES:      RADIOLOGY & ADDITIONAL TESTS:          Imaging Personally Reviewed:  [ ] YES  [ ] NO    Consultant(s) Notes Reviewed:  [ x] YES  [ ] NO    Care Discussed with Consultants/Other Providers [ x] YES  [ ] NO  Care discussed in detail with patient.  All questions and concerns addressed   HPI:  75 year old female w DM II,  HTN, hypothyroid, spinal stenosis w chronic pain came to ED c/o chest pain, SOB    Denies fevers  + body aches over past week  Had COVID vaccine 6 days ago    Pt states chest pain started suddenly 1 1/2 days ago while sitting  L upper chest area  graded 8/10   constant  does not know what increases or decreases pain  + associated SOB  generalized weakness since week worse today  has had increased pain to RLE as well  feels terrible pains since she has not gotten her medications today and has been in ED for hours    Pt is also taken an unnamed antibiotic 500 mg 4 times a day        In ED /84     RR 18   T 98.2    95% sat  ED noted + mild hypoxia to 90% on RA noted; O2 improved to 97-98% on nasal cannula    CTA done for d-dimer 5000 not diagnostic  doppler to both legs neg for DVT  on IV heparin  COVID-19 not detected      PAST MEDICAL HX  Angioedema requiring intubation from ACE-I   CVA with no residual deficits  DM II diabetes mellitus  Diverticulosis  HTN hypertension  Hypothyroid  MRSA (methicillin resistant Staphylococcus aureus) infection  Osteoarthritis  Pancreatitis, chronic last episode > 15 yrs ago  Spinal stenosis s/p fusion w chronic pain      SURGICAL HX  H/O right inguinal hernia repair  H/O total knee replacement, bilateral  FH: cholecystectomy  H/O: hysterectomy  Stage I L1-L4 Lumbar Lateral Fusion; S/P L1-L5 XLIF, L5-S1 PLIF, L1-S1  screw and john fixation     FAMILY HX  No history of dementia, strokes, or seizures      SOCIAL HX  denies smoking or drinking; Jehovah Witness       (01 Apr 2021 22:10)      SUBJECTIVE & OBJECTIVE: Pt seen and examined at bedside.   no overnight events  + constipation   mild nausea this AM, per RN vomited her breakfast NBNB, given reglan, no further vomiting    Denies fever, chills, dizziness, HA, cough, CP, palpitations, SOB, abdominal pain, dysuria.     PHYSICAL EXAM:  Vital Signs Last 24 Hrs  T(C): 36.5 (03 Apr 2021 17:10), Max: 36.7 (03 Apr 2021 11:02)  T(F): 97.7 (03 Apr 2021 17:10), Max: 98.1 (03 Apr 2021 11:02)  HR: 86 (03 Apr 2021 17:10) (64 - 98)  BP: 131/79 (03 Apr 2021 17:10) (109/67 - 146/77)  BP(mean): 96 (03 Apr 2021 17:10) (96 - 96)  RR: 18 (03 Apr 2021 17:10) (17 - 18)  SpO2: 98% (03 Apr 2021 17:10) (95% - 98%) on room air     GENERAL: NAD, well-groomed, well-developed, no increased WOB   HEAD:  Atraumatic, Normocephalic  EYES: EOMI, PERRLA, conjunctiva and sclera clear  ENMT: Moist mucous membranes  NECK: Supple, No JVD  NERVOUS SYSTEM:  Alert & Oriented X3, no focal neuro deficits   CHEST/LUNG: Clear to auscultation bilaterally; No rales, rhonchi, wheezing, or rubs  HEART: Regular rate and rhythm; No murmurs, rubs, or gallops  ABDOMEN: Soft, Nontender, Nondistended; Bowel sounds present  EXTREMITIES:  2+ Peripheral Pulses b/l, No clubbing, cyanosis, calf tenderness or edema b/l   BACK: well heeled scar from prior spinal fusion surgery       MEDICATIONS  (STANDING):  dextrose 40% Gel 15 Gram(s) Oral once  dextrose 5%. 1000 milliLiter(s) (50 mL/Hr) IV Continuous <Continuous>  dextrose 5%. 1000 milliLiter(s) (100 mL/Hr) IV Continuous <Continuous>  dextrose 50% Injectable 25 Gram(s) IV Push once  dextrose 50% Injectable 12.5 Gram(s) IV Push once  dextrose 50% Injectable 25 Gram(s) IV Push once  gabapentin 100 milliGRAM(s) Oral three times a day  gabapentin 200 milliGRAM(s) Oral at bedtime  glucagon  Injectable 1 milliGRAM(s) IntraMuscular once  heparin   Injectable 5000 Unit(s) SubCutaneous every 12 hours  insulin glargine Injectable (LANTUS) 8 Unit(s) SubCutaneous at bedtime  insulin lispro (ADMELOG) corrective regimen sliding scale   SubCutaneous three times a day before meals  insulin lispro (ADMELOG) corrective regimen sliding scale   SubCutaneous at bedtime  lidocaine   Patch 2 Patch Transdermal every 24 hours  melatonin 3 milliGRAM(s) Oral at bedtime  morphine ER Tablet 60 milliGRAM(s) Oral two times a day  polyethylene glycol 3350 17 Gram(s) Oral daily  potassium phosphate / sodium phosphate Tablet (K-PHOS No. 2) 2 Tablet(s) Oral three times a day  senna 2 Tablet(s) Oral at bedtime    MEDICATIONS  (PRN):  acetaminophen   Tablet .. 650 milliGRAM(s) Oral every 6 hours PRN Temp greater or equal to 38C (100.4F), Mild Pain (1 - 3)  magnesium hydroxide Suspension 30 milliLiter(s) Oral daily PRN Constipation      LABS:                        10.1   5.86  )-----------( 245      ( 02 Apr 2021 12:49 )             33.3   04-03    138  |  103  |  11  ----------------------------<  86  4.7   |  28  |  0.86    Ca    8.5      03 Apr 2021 08:00  Phos  3.5     04-03  Mg     1.6     04-03        CARDIAC MARKERS ( 02 Apr 2021 17:46 )  .366 ng/mL / x     / 131 U/L / x     / x      CARDIAC MARKERS ( 02 Apr 2021 04:02 )  .275 ng/mL / x     / 113 U/L / x     / x      CARDIAC MARKERS ( 01 Apr 2021 13:43 )  <.015 ng/mL / x     / x     / x     / x            RECENT CULTURES:      RADIOLOGY & ADDITIONAL TESTS:    < from: CT Angio Chest w/ IV Cont (04.01.21 @ 18:06) >  INTERPRETATION:  CLINICAL INFORMATION: Hypoxia, chest pain, evaluate for pulmonary embolism    COMPARISON: CT chest abdomen pelvis 8/25/2015.    CONTRAST/COMPLICATIONS:  IV Contrast: Omnipaque 350, 90 cc administered, 10 cc discarded.  90 cc administered  Oral Contrast: None  Complications: None    PROCEDURE:  CT Angiography of the Chest.  Sagittal and coronal reformats were performed as well as 3D (MIP) reconstructions.    FINDINGS: Limited examination due to poor timing of contrast bolus and streak artifact.    LUNGS AND AIRWAYS: Patent central airways.  Lungs are clear.  PLEURA: No pleural effusion.  MEDIASTINUM AND KIRSTEN: No lymphadenopathy.  VESSELS: Limited evaluation for pulmonary embolism secondary to poor bolus timing and streak artifact. No central or proximal segmental pulmonary emboli. Distal to this cannot be evaluated.  HEART: Heart size is normal. No pericardial effusion.  CHEST WALL AND LOWER NECK: Within normal limits. Small hiatal hernia  VISUALIZED UPPER ABDOMEN: Pneumobilia unchanged from the previous exam  BONES: Degenerative changes. Spinal orthopedic hardware.    IMPRESSION:    Extremely limited evaluation for pulmonary embolism secondary to poor bolus timing and streak artifact. No central or proximal segmental pulmonary emboli. Distal to this cannot be evaluated.    < end of copied text >    < from: NM Pulmonary Perfusion Scan (04.02.21 @ 15:09) >  INTERPRETATION:  RADIOPHARMACEUTICAL: 99mTc-MAA       DOSE: 3.0 mCi IV    CLINICAL INFORMATION: 75 year old female with chest pain, referred to evaluate for pulmonary embolus.    TECHNIQUE: Perfusion images of the lungs were obtained following administration of Tc-99m-MAA. Images were obtained in the anterior, posterior, both lateral, and all 4 oblique projections. This study was interpreted in conjunction with a chest radiograph of 4/1/2021.    COMPARISON: No previous lung scan for comparison.    FINDINGS: The study demonstrates heterogenous tracer distribution in both lungs. There are no well-defined segmental perfusion defects.    IMPRESSION:  Very low probability of pulmonary embolus.    < end of copied text >    < from: US Duplex Venous Lower Ext Complete, Bilateral (04.01.21 @ 21:00) >  INTERPRETATION:  CLINICAL INFORMATION: Lower extremity pain.    COMPARISON: None available.    TECHNIQUE: Duplex sonography of the BILATERAL LOWERextremity veins with color and spectral Doppler, with and without compression.    FINDINGS:    RIGHT:  Normal compressibility of the RIGHT common femoral, femoral and popliteal veins.  Doppler examination shows normal spontaneous and phasic flow.  NoRIGHT calf vein thrombosis is detected.    LEFT:  Normal compressibility of the LEFT common femoral, femoral and popliteal veins.  Doppler examination shows normal spontaneous and phasic flow.  No LEFT calf vein thrombosis is detected.    IMPRESSION:  No evidence of deep venous thrombosis in either lower extremity.    < end of copied text >    Summary:   1. Left ventricular ejection fraction, by visual estimation, is 65 to 70%.   2. Normal global left ventricular systolic function.   3. Increased LV wall thickness.   4. There is mild concentric left ventricular hypertrophy.   5. No evidence of mitral valve regurgitation.   6. Moderate tricuspid regurgitation.   7. Mild pulmonic valve regurgitation.   8. Estimated pulmonary artery systolic pressure is 45.8 mmHg assuming a right atrial pressure of 5 mmHg, which is consistent with moderate pulmonary hypertension.   9. No evidence of any thrombus.        Imaging Personally Reviewed:  [ ] YES  [ ] NO    Consultant(s) Notes Reviewed:  [ x] YES  [ ] NO    Care Discussed with Consultants/Other Providers [ x] YES  [ ] NO  Care discussed in detail with patient.  All questions and concerns addressed

## 2021-04-04 LAB
GLUCOSE BLDC GLUCOMTR-MCNC: 106 MG/DL — HIGH (ref 70–99)
GLUCOSE BLDC GLUCOMTR-MCNC: 194 MG/DL — HIGH (ref 70–99)
GLUCOSE BLDC GLUCOMTR-MCNC: 92 MG/DL — SIGNIFICANT CHANGE UP (ref 70–99)
GLUCOSE BLDC GLUCOMTR-MCNC: 98 MG/DL — SIGNIFICANT CHANGE UP (ref 70–99)
T4 FREE SERPL-MCNC: 0.8 NG/DL — LOW (ref 0.9–1.8)

## 2021-04-04 PROCEDURE — 99233 SBSQ HOSP IP/OBS HIGH 50: CPT

## 2021-04-04 RX ORDER — LACTULOSE 10 G/15ML
30 SOLUTION ORAL ONCE
Refills: 0 | Status: COMPLETED | OUTPATIENT
Start: 2021-04-04 | End: 2021-04-04

## 2021-04-04 RX ORDER — ONDANSETRON 8 MG/1
4 TABLET, FILM COATED ORAL EVERY 6 HOURS
Refills: 0 | Status: DISCONTINUED | OUTPATIENT
Start: 2021-04-04 | End: 2021-04-05

## 2021-04-04 RX ORDER — LEVOTHYROXINE SODIUM 125 MCG
175 TABLET ORAL DAILY
Refills: 0 | Status: DISCONTINUED | OUTPATIENT
Start: 2021-04-05 | End: 2021-04-05

## 2021-04-04 RX ADMIN — INSULIN GLARGINE 8 UNIT(S): 100 INJECTION, SOLUTION SUBCUTANEOUS at 21:35

## 2021-04-04 RX ADMIN — GABAPENTIN 100 MILLIGRAM(S): 400 CAPSULE ORAL at 14:16

## 2021-04-04 RX ADMIN — MORPHINE SULFATE 60 MILLIGRAM(S): 50 CAPSULE, EXTENDED RELEASE ORAL at 18:12

## 2021-04-04 RX ADMIN — Medication 150 MICROGRAM(S): at 06:05

## 2021-04-04 RX ADMIN — Medication 1 ENEMA: at 14:16

## 2021-04-04 RX ADMIN — Medication 1 DROP(S): at 11:34

## 2021-04-04 RX ADMIN — Medication 1 DROP(S): at 06:05

## 2021-04-04 RX ADMIN — PANTOPRAZOLE SODIUM 40 MILLIGRAM(S): 20 TABLET, DELAYED RELEASE ORAL at 11:34

## 2021-04-04 RX ADMIN — GABAPENTIN 200 MILLIGRAM(S): 400 CAPSULE ORAL at 21:36

## 2021-04-04 RX ADMIN — HEPARIN SODIUM 5000 UNIT(S): 5000 INJECTION INTRAVENOUS; SUBCUTANEOUS at 06:05

## 2021-04-04 RX ADMIN — GABAPENTIN 100 MILLIGRAM(S): 400 CAPSULE ORAL at 06:05

## 2021-04-04 RX ADMIN — Medication 81 MILLIGRAM(S): at 11:34

## 2021-04-04 RX ADMIN — LIDOCAINE 2 PATCH: 4 CREAM TOPICAL at 21:35

## 2021-04-04 RX ADMIN — HEPARIN SODIUM 5000 UNIT(S): 5000 INJECTION INTRAVENOUS; SUBCUTANEOUS at 17:27

## 2021-04-04 RX ADMIN — GABAPENTIN 100 MILLIGRAM(S): 400 CAPSULE ORAL at 21:36

## 2021-04-04 RX ADMIN — MORPHINE SULFATE 60 MILLIGRAM(S): 50 CAPSULE, EXTENDED RELEASE ORAL at 06:05

## 2021-04-04 RX ADMIN — LACTULOSE 30 GRAM(S): 10 SOLUTION ORAL at 14:16

## 2021-04-04 RX ADMIN — MORPHINE SULFATE 60 MILLIGRAM(S): 50 CAPSULE, EXTENDED RELEASE ORAL at 17:27

## 2021-04-04 RX ADMIN — LIDOCAINE 2 PATCH: 4 CREAM TOPICAL at 06:04

## 2021-04-04 RX ADMIN — Medication 650 MILLIGRAM(S): at 11:43

## 2021-04-04 RX ADMIN — Medication 3 MILLIGRAM(S): at 21:36

## 2021-04-04 RX ADMIN — LIDOCAINE 2 PATCH: 4 CREAM TOPICAL at 11:35

## 2021-04-04 RX ADMIN — POLYETHYLENE GLYCOL 3350 17 GRAM(S): 17 POWDER, FOR SOLUTION ORAL at 11:34

## 2021-04-04 RX ADMIN — SENNA PLUS 2 TABLET(S): 8.6 TABLET ORAL at 21:36

## 2021-04-04 RX ADMIN — Medication 1 DROP(S): at 17:27

## 2021-04-04 RX ADMIN — Medication 650 MILLIGRAM(S): at 12:30

## 2021-04-04 RX ADMIN — ONDANSETRON 4 MILLIGRAM(S): 8 TABLET, FILM COATED ORAL at 14:16

## 2021-04-04 RX ADMIN — Medication 1 DROP(S): at 21:37

## 2021-04-04 RX ADMIN — SIMVASTATIN 20 MILLIGRAM(S): 20 TABLET, FILM COATED ORAL at 21:36

## 2021-04-04 RX ADMIN — Medication 2: at 11:34

## 2021-04-04 NOTE — PROGRESS NOTE ADULT - ASSESSMENT
75 year old female w DM II,  HTN, hypothyroid, spinal stenosis w chronic pain came to ED c/o chest pain, SOB    #Acute chest pain   #Dyspnea  CTA  distal PE not ruled out  dopplers neg DVT  COVID-19 not detected  CXR clear  1. admit to telemetry  2. IV heparin as per protocol  3. serial trop  4. echo  5. lipid profile  6. check v/q scan  cardiology consult     #elevated Cr 1.28  1. trend      #DM II  1. hold glucophage  2 consistent CHO   3. lantus 8 units daily   4. BGM  5. ISS  6. Hb A1c      #HTN  unable to get info re BP meds  1. monitor      #Hypothyroid  1. synthroid 150 mcg qd  2. TSH        #Spinal stenosis w chronic pain  1. morphine ER 60 mg BID w stat dose  2. gabapentin 100 mg TID w                       200 mg q HS w stat dose  3. lidoderm patches    #Hypophosphatemia, hypomagnesemia --replete and monitor     #Preventative measures   heparin SQ-dvt ppx  fall precautions   PT    75 year old female w DM II,  HTN, hypothyroid, spinal stenosis w chronic pain came to ED c/o chest pain, SOB    Assessment and Plan:     #atypical  chest pain , ACs ruled out   hypoxia 90% on RA on admission, now SpO2 98% at rest on room air and on exertion. No clear reason for hypoxia. resolved on its own.   patient is comfortable , no e/o increased work of breathing   CTA  poor study, no e/o PE   dopplers neg DVT  V/Q scan neg   COVID-19 not detected  CXR clear  Tele: NSR, few short dropped beats, of no significance per cardiology, dc tele   Mary slightly elevated   BNP OK  d-dimer 5,000 ?   d/c heparin drip, PE /DVT ruled out   EKG: NSR   LDL 46   echo: pEF, mod pulm HTN, no e/o thrombus; essentially normal echo   SOB and CP resolved   no e/o acute infection, afebrile, no leukocytosis, lungs clear   followed by Dr. Eder Mercado at [facility] ; Dr. Mcwilliams was able to speak to her cardiology team, negative stress echo last year and that she is only being followed for mild PVD. follow-up with primary cardiologist in 5-7 days post discharge.  no need for stress test or any further cardiac work-up here           #Troponin leak, doubt ACS , most likely demand in setting of acute hypoxia   trops trended down   EKG: NSR, unchanged     #severe Constipation, sec to opioids  bowel regimen   4/3 enema x 1 today, lactulose x 1 PO   4/4 enema x 1 and lactulose PO again today   reglan prn for nausea/vomiting     #DM II  A1c 6.5%   lantus 8 units daily, ISS  CHO diet   avoid hypoglycemia       #HTN  BP ok off meds   monitor       #Hypothyroid  TSH 22  tT3  and fT4 low   confirmed , patient filled synthroid 175mcg daily 2/2021   discussed with endo, continue with synthroid 175mcg daily for now, discharge on same  follow TFTs in few days after discharge   patient aware to follow-up with PMD       #Spinal stenosis w chronic pain  1. morphine ER 60 mg BID w stat dose  2. gabapentin 100 mg TID                        200 mg q HS   3. lidoderm patches    #Hypophosphatemia, hypomagnesemia, hypokalemia --repleted         #Preventative measures   heparin SQ-dvt ppx  fall precautions   patient ambulatory , independent, no difficulty

## 2021-04-04 NOTE — PROGRESS NOTE ADULT - ASSESSMENT
75y Female with prior h/o DM II, HTN, h/o CVA, hypothyroid, spinal stenosis w chronic pain.  Admitted for c/o body aches + generalized malaise (s/p COVID vaccine) and now 1 1/2 days of left sided chest wall pain and associated dyspnea. The pain is 8/10 and constant, non-pleuritic. She also has had increased pain to RLE.   In the ED, mildly hypoxemic to 90% on RA noted; O2 improved to 97-98% on nasal cannula  CTA done for d-dimer 5000 not diagnostic, non diagnostic CTA, V/Q scan was negative, doppler to both legs neg for DVT.    Chest pain appears atypical. Patient denies h/o CAD; followed by Dr. Eder Mercado at [facility] spoke with his partner yesterday who confrims that she did have a negative stress echo last year and that she is only being followed for mild PVD.  These low level troponins likely type II MI in setting of acute hypoxemia. Lungs are clear both on P/E and xray  doubt this is heart failure.   If asymptomatic can d/c from CV perspective to f/u with primary cardiologist in 5-7 days post discharge.

## 2021-04-04 NOTE — PROGRESS NOTE ADULT - SUBJECTIVE AND OBJECTIVE BOX
Patient is a 75y old  Female who presents with a chief complaint of chest pain possible ACS  poss PE (04 Apr 2021 20:51)      Interval History: continued on 175 mcg   at home was on 150 mcg   TSH 22 , hence 150 was low . compliance in question     MEDICATIONS  (STANDING):  aspirin enteric coated 81 milliGRAM(s) Oral daily  dextrose 40% Gel 15 Gram(s) Oral once  dextrose 5%. 1000 milliLiter(s) (50 mL/Hr) IV Continuous <Continuous>  dextrose 5%. 1000 milliLiter(s) (100 mL/Hr) IV Continuous <Continuous>  dextrose 50% Injectable 25 Gram(s) IV Push once  dextrose 50% Injectable 12.5 Gram(s) IV Push once  dextrose 50% Injectable 25 Gram(s) IV Push once  gabapentin 100 milliGRAM(s) Oral three times a day  gabapentin 200 milliGRAM(s) Oral at bedtime  glucagon  Injectable 1 milliGRAM(s) IntraMuscular once  heparin   Injectable 5000 Unit(s) SubCutaneous every 12 hours  insulin glargine Injectable (LANTUS) 8 Unit(s) SubCutaneous at bedtime  insulin lispro (ADMELOG) corrective regimen sliding scale   SubCutaneous three times a day before meals  insulin lispro (ADMELOG) corrective regimen sliding scale   SubCutaneous at bedtime  lidocaine   Patch 2 Patch Transdermal every 24 hours  melatonin 3 milliGRAM(s) Oral at bedtime  morphine ER Tablet 60 milliGRAM(s) Oral two times a day  pantoprazole  Injectable 40 milliGRAM(s) IV Push daily  polyethylene glycol 3350 17 Gram(s) Oral daily  prednisoLONE acetate 1% Suspension 1 Drop(s) Right EYE four times a day  senna 2 Tablet(s) Oral at bedtime  simvastatin 20 milliGRAM(s) Oral at bedtime    MEDICATIONS  (PRN):  acetaminophen   Tablet .. 650 milliGRAM(s) Oral every 6 hours PRN Temp greater or equal to 38C (100.4F), Mild Pain (1 - 3)  magnesium hydroxide Suspension 30 milliLiter(s) Oral daily PRN Constipation  metoclopramide Injectable 10 milliGRAM(s) IV Push every 6 hours PRN nausea/vomiting  ondansetron Injectable 4 milliGRAM(s) IV Push every 6 hours PRN Nausea and/or Vomiting      Allergies    ACE inhibitors (Anaphylaxis; Angioedema)    Intolerances        REVIEW OF SYSTEMS:  CONSTITUTIONAL: no changes      Vital Signs Last 24 Hrs  T(C): 36.6 (04 Apr 2021 16:45), Max: 36.6 (04 Apr 2021 00:27)  T(F): 97.8 (04 Apr 2021 16:45), Max: 97.9 (04 Apr 2021 00:27)  HR: 65 (04 Apr 2021 16:45) (65 - 81)  BP: 123/80 (04 Apr 2021 16:45) (103/71 - 134/84)  BP(mean): --  RR: 18 (04 Apr 2021 16:45) (18 - 18)  SpO2: 96% (04 Apr 2021 16:45) (95% - 100%)    PHYSICAL EXAM:  GENERAL: as per the progress notes of Primary Team     LABS:        CAPILLARY BLOOD GLUCOSE      POCT Blood Glucose.: 98 mg/dL (04 Apr 2021 21:33)  POCT Blood Glucose.: 92 mg/dL (04 Apr 2021 16:21)  POCT Blood Glucose.: 194 mg/dL (04 Apr 2021 11:28)  POCT Blood Glucose.: 106 mg/dL (04 Apr 2021 07:33)    Lipid panel:   CARDIAC MARKERS ( 03 Apr 2021 14:39 )  .234 ng/mL / x     / 150 U/L / x     / x              Thyroid:04-04 @ 11:17 TSH -- <<Free T4>> 0.8 <<T3>> -- <<TG Ab>> -- <<ATPOAB>> -- <<TBG>> -- <<TSI>> -- Reverse T3 --    Diabetes Tests:  Parathyroid Panel:  Adrenals:  RADIOLOGY & ADDITIONAL TESTS:    Imaging Personally Reviewed:  [ ] YES  [ ] NO    Consultant(s) Notes Reviewed:  [ ] YES  [ ] NO    Care Discussed with Consultants/Other Providers [ ] YES  [ ] NO

## 2021-04-04 NOTE — PROGRESS NOTE ADULT - TIME BILLING
min spent reviewing chart, examining patient, discussing plan with patient and family

## 2021-04-04 NOTE — PROGRESS NOTE ADULT - SUBJECTIVE AND OBJECTIVE BOX
CARDIOLOGY PROGRESS NOTE                                                                             JUAN LANTIGUA is a 75y Female with prior h/o DM II, HTN, h/o CVA, hypothyroid, spinal stenosis w chronic pain.  Admitted for c/o one week of body aches + generalized malaise (s/p COVID vaccine) and now 1 1/2 days of left sided chest wall pain and associated dyspnea. The pain is 8/10 and constant, non-pleuritic. She also has had increased pain to RLE.  Pt has been on Keflex 500 mg 4 times a day  In the ED /84     RR 18   T 98.2, + mild hypoxia to 90% on RA noted; O2 improved to 97-98% on nasal cannula CTA done for d-dimer 5000 not diagnostic, non diagnostic CTA, V/Q scan was negative, doppler to both legs neg for DVT. COVID-19 not detected   PAST MEDICAL HX Angioedema requiring intubation from ACE-I  CVA with no residual deficits DM II diabetes mellitus Diverticulosis HTN hypertension Hypothyroid MRSA (methicillin resistant Staphylococcus aureus) infection Osteoarthritis Pancreatitis, chronic last episode > 15 yrs ago Spinal stenosis s/p fusion w chronic pain   SURGICAL HX H/O right inguinal hernia repair H/O total knee replacement, bilateral FH: cholecystectomy H/O: hysterectomy Stage I L1-L4 Lumbar Lateral Fusion; S/P L1-L5 XLIF, L5-S1 PLIF, L1-S1 screw and john fixation   FAMILY HX No history of dementia, strokes, or seizures   SOCIAL HX denies smoking or drinking; Jehovah Witness    (2021 22:10)   REVIEW OF SYSTEMS: -----------------------------  CONSTITUTIONAL: No fever, weight loss, or fatigue EYES: No eye pain, visual disturbances, or discharge ENMT:  No difficulty hearing, tinnitus, vertigo; No sinus or throat pain NECK: No pain or stiffness BREASTS: No pain, masses, or nipple discharge RESPIRATORY: No cough, wheezing, chills or hemoptysis; No shortness of breath CARDIOVASCULAR: See HPI GASTROINTESTINAL: No abdominal or epigastric pain. No nausea, vomiting, or hematemesis; No diarrhea or constipation. No melena or hematochezia. GENITOURINARY: No dysuria, frequency, hematuria, or incontinence NEUROLOGICAL: No headaches, memory loss, loss of strength, numbness, or tremors SKIN: No itching, burning, rashes, or lesions  LYMPH NODES: No enlarged glands ENDOCRINE: No heat or cold intolerance; No hair loss MUSCULOSKELETAL: No joint pain or swelling; No muscle, back, or extremity pain PSYCHIATRIC: No depression, anxiety, mood swings, or difficulty sleeping HEME/LYMPH: No easy bruising, or bleeding gums ALLERGY AND IMMUNOLOGIC: No hives or eczema  Home Medications: docusate sodium 100 mg oral capsule: 1 cap(s) orally 3 times a day (2021 22:28) gabapentin 100 mg oral tablet: 1 tab(s) orally 3 times a day (2021 22:29) gabapentin 100 mg oral tablet: 2 tab(s) orally once a day (at bedtime) (2021 22:29) Glucophage 1000 mg oral tablet: 1 tab(s) orally 2 times a day (2021 22:29) Lantus 100 units/mL subcutaneous solution: 10 unit(s) subcutaneous 2 times a day (2021 22:29) morphine 60 mg/12 hours oral tablet, extended release: 1 tab(s) orally 2 times a day (2021 22:29) Synthroid 100 mcg (0.1 mg) oral tablet: 1 tab(s) orally once a day (2021 22:28)    MEDICATIONS  (STANDING): aspirin enteric coated 81 milliGRAM(s) Oral daily gabapentin 100 milliGRAM(s) Oral three times a day gabapentin 200 milliGRAM(s) Oral at bedtime glucagon  Injectable 1 milliGRAM(s) IntraMuscular once heparin   Injectable 5000 Unit(s) SubCutaneous every 12 hours insulin glargine Injectable (LANTUS) 8 Unit(s) SubCutaneous at bedtime levothyroxine 150 MICROGram(s) Oral daily lidocaine   Patch 2 Patch Transdermal every 24 hours melatonin 3 milliGRAM(s) Oral at bedtime morphine ER Tablet 60 milliGRAM(s) Oral two times a day pantoprazole  Injectable 40 milliGRAM(s) IV Push daily polyethylene glycol 3350 17 Gram(s) Oral daily prednisoLONE acetate 1% Suspension 1 Drop(s) Right EYE four times a day regadenoson Injectable 0.4 milliGRAM(s) IV Push once senna 2 Tablet(s) Oral at bedtime simvastatin 20 milliGRAM(s) Oral at bedtime   ALLERGIES: ACE inhibitors (Anaphylaxis; Angioedema)   FAMILY HISTORY: No pertinent family history in first degree relatives    PHYSICAL EXAMINATION: ----------------------------- Vital Signs Last 24 Hrs T(C): 36.2 (2021 05:27), Max: 36.7 (2021 11:02) T(F): 97.2 (2021 05:27), Max: 98.1 (2021 11:02) HR: 74 (2021 05:27) (73 - 98) BP: 106/67 (2021 05:27) (106/67 - 134/84) BP(mean): 96 (2021 17:10) (96 - 96) RR: 18 (2021 05:27) (17 - 18) SpO2: 100% (2021 05:27) (95% - 100%)   Constitutional: well developed, normal appearance, well groomed, well nourished, no deformities and no acute distress.  Eyes: the conjunctiva exhibited no abnormalities and the eyelids demonstrated no xanthelasmas.  HEENT: normal oral mucosa, no oral pallor and no oral cyanosis.  Neck: normal jugular venous A waves present, normal jugular venous V waves present and no jugular venous berg A waves.  Pulmonary: no respiratory distress, normal respiratory rhythm and effort, no accessory muscle use and lungs were clear to auscultation bilaterally.  Cardiovascular: heart rate and rhythm were normal, normal S1 and S2 and no murmur, gallop, rub, heave or thrill are present.  Abdomen: soft, non-tender, no hepato-splenomegaly and no abdominal mass palpated.  Musculoskeletal: the gait could not be assessed..  Extremities: no clubbing of the fingernails, no localized cyanosis, no petechial hemorrhages and no ischemic changes.  Skin: normal skin color and pigmentation, no rash, no venous stasis, no skin lesions, no skin ulcer and no xanthoma was observed.  Psychiatric: oriented to person, place, and time, the affect was normal, the mood was normal and not feeling anxious.   ECG: ------- < from: 12 Lead ECG (21 @ 12:55) >  Ventricular Rate 89 BPM  Atrial Rate 89 BPM  P-R Interval 154 ms  QRS Duration 120 ms  Q-T Interval 378 ms  QTC Calculation(Bazett) 459 ms  P Axis 42 degrees  R Axis -69 degrees  T Axis 59 degrees  Diagnosis Line Normal sinus rhythm Possible Left atrial enlargement Left anterior fascicular block Left ventricular hypertrophy with QRS widening Abnormal ECG No previous ECGs available Confirmed by ORTIZ DE JESUS MD ([id number]) on 2021 2:11:38 PM  < end of copied text >    LABS:  --------   138  |  103  |  11 ----------------------------<  86 4.7   |  28  |  0.86  Ca    8.5      2021 08:00 Phos  3.5     04- Mg     1.6       TPro  8.1  /  Alb  3.5  /  TBili  0.4  /  DBili  x   /  AST  18  /  ALT  16  /  AlkPhos  99                         10.1  5.86  )-----------( 245      ( 2021 12:49 )            33.3    PT/INR - ( 2021 17:33 )   PT: 12.0 sec;   INR: 1.04 ratio      PTT - ( 2021 12:49 )  PTT:180.3 sec   @ 17:46 CPK total:--, CKMB --, Troponin I - .366 ng/mL<H>  @ 04:02 CPK total:--, CKMB --, Troponin I - .275 ng/mL<H>  @ 13:43 CPK total:--, CKMB --, Troponin I - <.015 ng/mL  110 mg/dL, --, 54 mg/dL, 50 mg/dL    RADIOLOGY REPORTS: ----------------------------- < from: CT Angio Chest w/ IV Cont (21 @ 18:06) >  EXAM:  CT ANGIO CHEST (W)AW IC                         PROCEDURE DATE:  2021      INTERPRETATION:  CLINICAL INFORMATION: Hypoxia, chest pain, evaluate for pulmonary embolism  COMPARISON: CT chest abdomen pelvis 2015.  CONTRAST/COMPLICATIONS: IV Contrast: Omnipaque 350, 90 cc administered, 10 cc discarded.  90 cc administered Oral Contrast: None Complications: None  PROCEDURE: CT Angiography of the Chest. Sagittal and coronal reformats were performed as well as 3D (MIP) reconstructions.  FINDINGS: Limited examination due to poor timing of contrast bolus and streak artifact.  LUNGS AND AIRWAYS: Patent central airways.  Lungs are clear. PLEURA: No pleural effusion. MEDIASTINUM AND KIRSTEN: No lymphadenopathy. VESSELS: Limited evaluation for pulmonary embolism secondary to poor bolus timing and streak artifact. No central or proximal segmental pulmonary emboli. Distal to this cannot be evaluated. HEART: Heart size is normal. No pericardial effusion. CHEST WALL AND LOWER NECK: Within normal limits. Small hiatal hernia VISUALIZED UPPER ABDOMEN: Pneumobilia unchanged from the previous exam BONES: Degenerative changes. Spinal orthopedic hardware.  IMPRESSION:  Extremely limited evaluation for pulmonary embolism secondary to poor bolus timing and streak artifact. No central or proximal segmental pulmonary emboli. Distal to this cannot be evaluated.   < from: NM Pulmonary Perfusion Scan (21 @ 15:09) >  EXAM:  NM PULM PERFUSION IMG                         PROCEDURE DATE:  2021      INTERPRETATION:  RADIOPHARMACEUTICAL: 99mTc-MAA       DOSE: 3.0 mCi IV  CLINICAL INFORMATION: 75 year old female with chest pain, referred to evaluate for pulmonary embolus.  TECHNIQUE: Perfusion images of the lungs were obtained following administration of Tc-99m-MAA. Images were obtained in the anterior, posterior, both lateral, and all 4 oblique projections. This study was interpreted in conjunction with a chest radiograph of 2021.  COMPARISON: No previous lung scan for comparison.  FINDINGS: The study demonstrates heterogenous tracer distribution in both lungs. There are no well-defined segmental perfusion defects.  IMPRESSION:  Very low probability of pulmonary embolus.      KEVIN LYNN MD; Attending Nuclear Medicine This document has been electronically signed. 2021  3:14PM  < end of copied text >     MORALES NEGRON MD; Attending Radiologist This document has been electronically signed.2021  6:37PM  < end of copied text >    ECHOCARDIOGRAM: ---------------------------  < from: TTE Echo Complete w/o Contrast w/ Doppler (21 @ 13:36) >   EXAM:  ECHO TTE WO CON COMP W DOPP      PROCEDURE DATE:  2021     INTERPRETATION:  REPORT: TRANSTHORACIC ECHOCARDIOGRAM REPORT    Patient Name:   JUAN LANTIGUA Patient Location: [facility] Rec #:  [id number]     Accession #:[id number] Account #:                     Height:           65.7 in 167.0 cm YOB: 1945     Weight:           179.9 lb 81.60 kg Patient Age:    75 years       BSA:              1.91 m² Patient Gender: F              BP: 135/73 mmHg   Date of Exam:        2021 1:36:24 PM Sonographer:         LESLY Referring Physician: ZOYA  Procedure:   2D Echo/Doppler/Color Doppler Complete. 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    2D AND M-MODE MEASUREMENTS (normal ranges within parentheses): Left                 Normal   Aorta/Left            Normal Ventricle:                    Atrium: IVSd (2D):    1.34  (0.7-1.1) Aortic Root   3.28  (2.4-3.7)                cm             (2D):           cm LVPWd (2D):   1.22  (0.7-1.1) Left Atrium    3.43  (1.9-4.0)                cm             (2D):           cm LVIDd (2D):   3.80  (3.4-5.7) LA Vol Index   14.3                cm            (A4C):        ml/m² LVIDs (2D):   2.73            LA Vol Index   29.5                cm             (A2C):        ml/m² LV FS (2D):   28.2   (>25%)   LA Vol Index   23.6                 %             (BP):         ml/m² Relative Wall0.64   (<0.42)  Right Thickness                     Ventricle:                               TAPSE:          1.93 cm  LV DIASTOLIC FUNCTION: MV Peak E: 0.56 m/s Decel Time:  483 msec MV Peak A: 1.05 m/s Septal E/e'  9.0 E/A Ratio: 0.54     Lateral E/e' 6.1 Septal e'  0.1 m/s Lateral e' 0.1 m/s  SPECTRAL DOPPLER ANALYSIS (where applicable): Mitral Valve: MV P1/2 Time: 140.13 msec MV Area, PHT: 1.57 cm²  Aortic Valve: AoV Max Hi: 1.47 m/s AoV Peak P.7 mmHg AoV Mean PG: 3.3 mmHg  LVOT Vmax: 1.03 m/s LVOT VTI: 0.162 m LVOT Diameter: 2.09 cm  AoV Area, Vmax: 2.40 cm² AoV Area, VTI: 2.73 cm² AoV Area, Vmn: 2.50 cm²  Tricuspid Valve and PA/RV Systolic Pressure: TR Max Velocity: 3.19 m/s RA Pressure: 5 mmHg RVSP/PASP: 45.8 mmHg   PHYSICIAN INTERPRETATION: Left Ventricle: The left ventricular internal cavity size is normal. Left ventricular wall thickness is increased. Global LV systolic function was normal. Left ventricular ejection fraction, by visual estimation, is 65 to 70%. Right Ventricle: Normal right ventricular size and function. Left Atrium: The left atrium is normal in size. Right Atrium: The right atrium is normal in size. Pericardium: There is no evidence of pericardial effusion. Mitral Valve: Structurally normal mitral valve, with normal leaflet excursion. No evidence of mitral valve regurgitation is seen. Tricuspid Valve: Structurally normal tricuspid valve, with normal leaflet excursion. Moderate tricuspid regurgitation is visualized. Estimated pulmonary artery systolic pressure is 45.8 mmHg assuming a right atrial pressure of 5 mmHg, which is consistent with moderate pulmonary hypertension. Aortic Valve: Normal trileaflet aortic valve with normal opening. Peak transaortic gradientequals 8.7 mmHg, mean transaortic gradient equals 3.3 mmHg, the calculated aortic valve area equals 2.73 cm² by the continuity equation consistent with normally opening aortic valve. No evidence of aortic valve regurgitation is seen. Pulmonic Valve:Structurally normal pulmonic valve, with normal leaflet excursion. Mild pulmonic valve regurgitation. Aorta: The aortic root and ascending aorta are structurally normal, with no evidence of dilitation. Pulmonary Artery: The main pulmonary artery isnormal in size.   Summary:  1. Left ventricular ejection fraction, by visual estimation, is 65 to 70%.  2. Normal global left ventricular systolic function.  3. Increased LV wall thickness.  4. There is mild concentric left ventricular hypertrophy.  5. No evidence of mitral valve regurgitation.  6. Moderate tricuspid regurgitation.  7. Mild pulmonic valve regurgitation.  8. Estimated pulmonary artery systolic pressure is 45.8 mmHg assuming a right atrial pressure of 5 mmHg, which is consistent with moderate pulmonary hypertension.  9. No evidence of any thrombus.   [id number] Madi Mcwilliams MD, [facility] Electronically signed on 4/3/2021 at 7:51:50 AM      *** Final ***         MADI MCWILLIAMS MD; Attending Cardiologist This document has been electronically signed. 2021  1:36PM  < end of copied text >     CARDIOLOGY PROGRESS NOTE                                                                             JUAN LANTIGUA is a 75y Female with prior h/o DM II, HTN, h/o CVA, hypothyroid, spinal stenosis w chronic pain.  Admitted for c/o one week of body aches + generalized malaise (s/p COVID vaccine) and now 1 1/2 days of left sided chest wall pain and associated dyspnea. The pain is 8/10 and constant, non-pleuritic. She also has had increased pain to RLE.  Pt has been on Keflex 500 mg 4 times a day  In the ED /84     RR 18   T 98.2, + mild hypoxia to 90% on RA noted; O2 improved to 97-98% on nasal cannula CTA done for d-dimer 5000 not diagnostic, non diagnostic CTA, V/Q scan was negative, doppler to both legs neg for DVT. COVID-19 not detected   PAST MEDICAL HX Angioedema requiring intubation from ACE-I  CVA with no residual deficits DM II diabetes mellitus Diverticulosis HTN hypertension Hypothyroid MRSA (methicillin resistant Staphylococcus aureus) infection Osteoarthritis Pancreatitis, chronic last episode > 15 yrs ago Spinal stenosis s/p fusion w chronic pain   SURGICAL HX H/O right inguinal hernia repair H/O total knee replacement, bilateral FH: cholecystectomy H/O: hysterectomy Stage I L1-L4 Lumbar Lateral Fusion; S/P L1-L5 XLIF, L5-S1 PLIF, L1-S1 screw and john fixation   FAMILY HX No history of dementia, strokes, or seizures   SOCIAL HX denies smoking or drinking; Jehovah Witness    (2021 22:10)   REVIEW OF SYSTEMS: -----------------------------  CONSTITUTIONAL: No fever, weight loss, or fatigue EYES: No eye pain, visual disturbances, or discharge ENMT:  No difficulty hearing, tinnitus, vertigo; No sinus or throat pain NECK: No pain or stiffness BREASTS: No pain, masses, or nipple discharge RESPIRATORY: No cough, wheezing, chills or hemoptysis; No shortness of breath CARDIOVASCULAR: See HPI GASTROINTESTINAL: No abdominal or epigastric pain. No nausea, vomiting, or hematemesis; No diarrhea or constipation. No melena or hematochezia. GENITOURINARY: No dysuria, frequency, hematuria, or incontinence NEUROLOGICAL: No headaches, memory loss, loss of strength, numbness, or tremors SKIN: No itching, burning, rashes, or lesions  LYMPH NODES: No enlarged glands ENDOCRINE: No heat or cold intolerance; No hair loss MUSCULOSKELETAL: No joint pain or swelling; No muscle, back, or extremity pain PSYCHIATRIC: No depression, anxiety, mood swings, or difficulty sleeping HEME/LYMPH: No easy bruising, or bleeding gums ALLERGY AND IMMUNOLOGIC: No hives or eczema  Home Medications: docusate sodium 100 mg oral capsule: 1 cap(s) orally 3 times a day (2021 22:28) gabapentin 100 mg oral tablet: 1 tab(s) orally 3 times a day (2021 22:29) gabapentin 100 mg oral tablet: 2 tab(s) orally once a day (at bedtime) (2021 22:29) Glucophage 1000 mg oral tablet: 1 tab(s) orally 2 times a day (2021 22:29) Lantus 100 units/mL subcutaneous solution: 10 unit(s) subcutaneous 2 times a day (2021 22:29) morphine 60 mg/12 hours oral tablet, extended release: 1 tab(s) orally 2 times a day (2021 22:29) Synthroid 100 mcg (0.1 mg) oral tablet: 1 tab(s) orally once a day (2021 22:28)    MEDICATIONS  (STANDING): aspirin enteric coated 81 milliGRAM(s) Oral daily gabapentin 100 milliGRAM(s) Oral three times a day gabapentin 200 milliGRAM(s) Oral at bedtime glucagon  Injectable 1 milliGRAM(s) IntraMuscular once heparin   Injectable 5000 Unit(s) SubCutaneous every 12 hours insulin glargine Injectable (LANTUS) 8 Unit(s) SubCutaneous at bedtime levothyroxine 150 MICROGram(s) Oral daily lidocaine   Patch 2 Patch Transdermal every 24 hours melatonin 3 milliGRAM(s) Oral at bedtime morphine ER Tablet 60 milliGRAM(s) Oral two times a day pantoprazole  Injectable 40 milliGRAM(s) IV Push daily polyethylene glycol 3350 17 Gram(s) Oral daily prednisoLONE acetate 1% Suspension 1 Drop(s) Right EYE four times a day regadenoson Injectable 0.4 milliGRAM(s) IV Push once senna 2 Tablet(s) Oral at bedtime simvastatin 20 milliGRAM(s) Oral at bedtime   ALLERGIES: ACE inhibitors (Anaphylaxis; Angioedema)   FAMILY HISTORY: No pertinent family history in first degree relatives    PHYSICAL EXAMINATION: ----------------------------- Vital Signs Last 24 Hrs T(C): 36.2 (2021 05:27), Max: 36.7 (2021 11:02) T(F): 97.2 (2021 05:27), Max: 98.1 (2021 11:02) HR: 74 (2021 05:27) (73 - 98) BP: 106/67 (2021 05:27) (106/67 - 134/84) BP(mean): 96 (2021 17:10) (96 - 96) RR: 18 (2021 05:27) (17 - 18) SpO2: 100% (2021 05:27) (95% - 100%)   Constitutional: well developed, normal appearance, well groomed, well nourished, no deformities and no acute distress.  Eyes: the conjunctiva exhibited no abnormalities and the eyelids demonstrated no xanthelasmas.  HEENT: normal oral mucosa, no oral pallor and no oral cyanosis.  Neck: normal jugular venous A waves present, normal jugular venous V waves present and no jugular venous berg A waves.  Pulmonary: no respiratory distress, normal respiratory rhythm and effort, no accessory muscle use and lungs were clear to auscultation bilaterally.  Cardiovascular: heart rate and rhythm were normal, normal S1 and S2 and no murmur, gallop, rub, heave or thrill are present.  Abdomen: soft, non-tender, no hepato-splenomegaly and no abdominal mass palpated.  Musculoskeletal: the gait could not be assessed..  Extremities: no clubbing of the fingernails, no localized cyanosis, no petechial hemorrhages and no ischemic changes.  Skin: normal skin color and pigmentation, no rash, no venous stasis, no skin lesions, no skin ulcer and no xanthoma was observed.  Psychiatric: oriented to person, place, and time, the affect was normal, the mood was normal and not feeling anxious.   ECG: ------- < from: 12 Lead ECG (21 @ 12:55) >  Ventricular Rate 89 BPM  Atrial Rate 89 BPM  P-R Interval 154 ms  QRS Duration 120 ms  Q-T Interval 378 ms  QTC Calculation(Bazett) 459 ms  P Axis 42 degrees  R Axis -69 degrees  T Axis 59 degrees  Diagnosis Line Normal sinus rhythm Possible Left atrial enlargement Left anterior fascicular block Left ventricular hypertrophy with QRS widening Abnormal ECG No previous ECGs available Confirmed by ORTIZ DE JESUS MD ([id number]) on 2021 2:11:38 PM  < end of copied text >    LABS:  --------   138  |  103  |  11 ----------------------------<  86 4.7   |  28  |  0.86  Ca    8.5      2021 08:00 Phos  3.5     04- Mg     1.6       TPro  8.1  /  Alb  3.5  /  TBili  0.4  /  DBili  x   /  AST  18  /  ALT  16  /  AlkPhos  99                         10.1  5.86  )-----------( 245      ( 2021 12:49 )            33.3    PT/INR - ( 2021 17:33 )   PT: 12.0 sec;   INR: 1.04 ratio      PTT - ( 2021 12:49 )  PTT:180.3 sec   @ 17:46 CPK total:--, CKMB --, Troponin I - .366 ng/mL<H>  @ 04:02 CPK total:--, CKMB --, Troponin I - .275 ng/mL<H>  @ 13:43 CPK total:--, CKMB --, Troponin I - <.015 ng/mL  110 mg/dL, --, 54 mg/dL, 50 mg/dL    RADIOLOGY REPORTS: ----------------------------- < from: CT Angio Chest w/ IV Cont (21 @ 18:06) >  EXAM:  CT ANGIO CHEST (W)AW IC                         PROCEDURE DATE:  2021      INTERPRETATION:  CLINICAL INFORMATION: Hypoxia, chest pain, evaluate for pulmonary embolism  COMPARISON: CT chest abdomen pelvis 2015.  CONTRAST/COMPLICATIONS: IV Contrast: Omnipaque 350, 90 cc administered, 10 cc discarded.  90 cc administered Oral Contrast: None Complications: None  PROCEDURE: CT Angiography of the Chest. Sagittal and coronal reformats were performed as well as 3D (MIP) reconstructions.  FINDINGS: Limited examination due to poor timing of contrast bolus and streak artifact.  LUNGS AND AIRWAYS: Patent central airways.  Lungs are clear. PLEURA: No pleural effusion. MEDIASTINUM AND KIRSTEN: No lymphadenopathy. VESSELS: Limited evaluation for pulmonary embolism secondary to poor bolus timing and streak artifact. No central or proximal segmental pulmonary emboli. Distal to this cannot be evaluated. HEART: Heart size is normal. No pericardial effusion. CHEST WALL AND LOWER NECK: Within normal limits. Small hiatal hernia VISUALIZED UPPER ABDOMEN: Pneumobilia unchanged from the previous exam BONES: Degenerative changes. Spinal orthopedic hardware.  IMPRESSION:  Extremely limited evaluation for pulmonary embolism secondary to poor bolus timing and streak artifact. No central or proximal segmental pulmonary emboli. Distal to this cannot be evaluated.   < from: NM Pulmonary Perfusion Scan (21 @ 15:09) >  EXAM:  NM PULM PERFUSION IMG                         PROCEDURE DATE:  2021      INTERPRETATION:  RADIOPHARMACEUTICAL: 99mTc-MAA       DOSE: 3.0 mCi IV  CLINICAL INFORMATION: 75 year old female with chest pain, referred to evaluate for pulmonary embolus.  TECHNIQUE: Perfusion images of the lungs were obtained following administration of Tc-99m-MAA. Images were obtained in the anterior, posterior, both lateral, and all 4 oblique projections. This study was interpreted in conjunction with a chest radiograph of 2021.  COMPARISON: No previous lung scan for comparison.  FINDINGS: The study demonstrates heterogenous tracer distribution in both lungs. There are no well-defined segmental perfusion defects.  IMPRESSION:  Very low probability of pulmonary embolus.      KEVIN LYNN MD; Attending Nuclear Medicine This document has been electronically signed. 2021  3:14PM  < end of copied text >     MORALES NEGRON MD; Attending Radiologist This document has been electronically signed.2021  6:37PM  < end of copied text >    ECHOCARDIOGRAM: ---------------------------  < from: TTE Echo Complete w/o Contrast w/ Doppler (21 @ 13:36) >   EXAM:  ECHO TTE WO CON COMP W DOPP      PROCEDURE DATE:  2021     INTERPRETATION:  REPORT: TRANSTHORACIC ECHOCARDIOGRAM REPORT    Patient Name:   JUAN LANTIGUA Patient Location: [facility] Rec #:  [id number]     Accession #:[id number] Account #:                     Height:           65.7 in 167.0 cm YOB: 1945     Weight:           179.9 lb 81.60 kg Patient Age:    75 years       BSA:              1.91 m² Patient Gender: F              BP: 135/73 mmHg   Date of Exam:        2021 1:36:24 PM Sonographer:         LESLY Referring Physician: ZOYA  Procedure:   2D Echo/Doppler/Color Doppler Complete. 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    2D AND M-MODE MEASUREMENTS (normal ranges within parentheses): Left                 Normal   Aorta/Left            Normal Ventricle:                    Atrium: IVSd (2D):    1.34  (0.7-1.1) Aortic Root   3.28  (2.4-3.7)                cm             (2D):           cm LVPWd (2D):   1.22  (0.7-1.1) Left Atrium    3.43  (1.9-4.0)                cm             (2D):           cm LVIDd (2D):   3.80  (3.4-5.7) LA Vol Index   14.3                cm            (A4C):        ml/m² LVIDs (2D):   2.73            LA Vol Index   29.5                cm             (A2C):        ml/m² LV FS (2D):   28.2   (>25%)   LA Vol Index   23.6                 %             (BP):         ml/m² Relative Wall0.64   (<0.42)  Right Thickness                     Ventricle:                               TAPSE:          1.93 cm  LV DIASTOLIC FUNCTION: MV Peak E: 0.56 m/s Decel Time:  483 msec MV Peak A: 1.05 m/s Septal E/e'  9.0 E/A Ratio: 0.54     Lateral E/e' 6.1 Septal e'  0.1 m/s Lateral e' 0.1 m/s  SPECTRAL DOPPLER ANALYSIS (where applicable): Mitral Valve: MV P1/2 Time: 140.13 msec MV Area, PHT: 1.57 cm²  Aortic Valve: AoV Max Hi: 1.47 m/s AoV Peak P.7 mmHg AoV Mean PG: 3.3 mmHg  LVOT Vmax: 1.03 m/s LVOT VTI: 0.162 m LVOT Diameter: 2.09 cm  AoV Area, Vmax: 2.40 cm² AoV Area, VTI: 2.73 cm² AoV Area, Vmn: 2.50 cm²  Tricuspid Valve and PA/RV Systolic Pressure: TR Max Velocity: 3.19 m/s RA Pressure: 5 mmHg RVSP/PASP: 45.8 mmHg   PHYSICIAN INTERPRETATION: Left Ventricle: The left ventricular internal cavity size is normal. Left ventricular wall thickness is increased. Global LV systolic function was normal. Left ventricular ejection fraction, by visual estimation, is 65 to 70%. Right Ventricle: Normal right ventricular size and function. Left Atrium: The left atrium is normal in size. Right Atrium: The right atrium is normal in size. Pericardium: There is no evidence of pericardial effusion. Mitral Valve: Structurally normal mitral valve, with normal leaflet excursion. No evidence of mitral valve regurgitation is seen. Tricuspid Valve: Structurally normal tricuspid valve, with normal leaflet excursion. Moderate tricuspid regurgitation is visualized. Estimated pulmonary artery systolic pressure is 45.8 mmHg assuming a right atrial pressure of 5 mmHg, which is consistent with moderate pulmonary hypertension. Aortic Valve: Normal trileaflet aortic valve with normal opening. Peak transaortic gradientequals 8.7 mmHg, mean transaortic gradient equals 3.3 mmHg, the calculated aortic valve area equals 2.73 cm² by the continuity equation consistent with normally opening aortic valve. No evidence of aortic valve regurgitation is seen. Pulmonic Valve:Structurally normal pulmonic valve, with normal leaflet excursion. Mild pulmonic valve regurgitation. Aorta: The aortic root and ascending aorta are structurally normal, with no evidence of dilitation. Pulmonary Artery: The main pulmonary artery isnormal in size.   Summary:  1. Left ventricular ejection fraction, by visual estimation, is 65 to 70%.  2. Normal global left ventricular systolic function.  3. Increased LV wall thickness.  4. There is mild concentric left ventricular hypertrophy.  5. No evidence of mitral valve regurgitation.  6. Moderate tricuspid regurgitation.  7. Mild pulmonic valve regurgitation.  8. Estimated pulmonary artery systolic pressure is 45.8 mmHg assuming a right atrial pressure of 5 mmHg, which is consistent with moderate pulmonary hypertension.  9. No evidence of any thrombus.   [id number] Madi Mcwilliams MD, [facility] Electronically signed on 4/3/2021 at 7:51:50 AM      *** Final ***         MADI MCWILLIAMS MD; Attending Cardiologist This document has been electronically signed. 2021  1:36PM  < end of copied text >

## 2021-04-04 NOTE — PROGRESS NOTE ADULT - SUBJECTIVE AND OBJECTIVE BOX
HPI:  75 year old female w DM II,  HTN, hypothyroid, spinal stenosis w chronic pain came to ED c/o chest pain, SOB    Denies fevers  + body aches over past week  Had COVID vaccine 6 days ago    Pt states chest pain started suddenly 1 1/2 days ago while sitting  L upper chest area  graded 8/10   constant  does not know what increases or decreases pain  + associated SOB  generalized weakness since week worse today  has had increased pain to RLE as well  feels terrible pains since she has not gotten her medications today and has been in ED for hours    Pt is also taken an unnamed antibiotic 500 mg 4 times a day        In ED /84     RR 18   T 98.2    95% sat  ED noted + mild hypoxia to 90% on RA noted; O2 improved to 97-98% on nasal cannula    CTA done for d-dimer 5000 not diagnostic  doppler to both legs neg for DVT  on IV heparin  COVID-19 not detected      PAST MEDICAL HX  Angioedema requiring intubation from ACE-I   CVA with no residual deficits  DM II diabetes mellitus  Diverticulosis  HTN hypertension  Hypothyroid  MRSA (methicillin resistant Staphylococcus aureus) infection  Osteoarthritis  Pancreatitis, chronic last episode > 15 yrs ago  Spinal stenosis s/p fusion w chronic pain      SURGICAL HX  H/O right inguinal hernia repair  H/O total knee replacement, bilateral  FH: cholecystectomy  H/O: hysterectomy  Stage I L1-L4 Lumbar Lateral Fusion; S/P L1-L5 XLIF, L5-S1 PLIF, L1-S1  screw and john fixation     FAMILY HX  No history of dementia, strokes, or seizures      SOCIAL HX  denies smoking or drinking; Jehovah Witness       (01 Apr 2021 22:10)      SUBJECTIVE & OBJECTIVE: Pt seen and examined at bedside.   no overnight events  + BM, but states not as large as she wished      Denies fever, chills, N/V, dizziness, HA, cough, CP, palpitations, SOB, abdominal pain, dysuria.     PHYSICAL EXAM:  Vital Signs Last 24 Hrs  T(C): 36.6 (04 Apr 2021 16:45), Max: 36.6 (04 Apr 2021 00:27)  T(F): 97.8 (04 Apr 2021 16:45), Max: 97.9 (04 Apr 2021 00:27)  HR: 65 (04 Apr 2021 16:45) (65 - 81)  BP: 123/80 (04 Apr 2021 16:45) (103/71 - 134/84)  BP(mean): --  RR: 18 (04 Apr 2021 16:45) (18 - 18)  SpO2: 96% (04 Apr 2021 16:45) (95% - 100%)    GENERAL: NAD, well-groomed, well-developed  HEAD:  Atraumatic, Normocephalic  EYES: EOMI, PERRLA, conjunctiva and sclera clear  ENMT: Moist mucous membranes  NECK: Supple, No JVD  NERVOUS SYSTEM:  Alert & Oriented X3, Motor Strength 5/5 B/L upper and lower extremities; DTRs 2+ intact and symmetric  CHEST/LUNG: Clear to auscultation bilaterally; No rales, rhonchi, wheezing, or rubs  HEART: Regular rate and rhythm; No murmurs, rubs, or gallops  ABDOMEN: Soft, Nontender, Nondistended; Bowel sounds present  EXTREMITIES:  2+ Peripheral Pulses, No clubbing, cyanosis, or edema    MEDICATIONS  (STANDING):  dextrose 40% Gel 15 Gram(s) Oral once  dextrose 5%. 1000 milliLiter(s) (50 mL/Hr) IV Continuous <Continuous>  dextrose 5%. 1000 milliLiter(s) (100 mL/Hr) IV Continuous <Continuous>  dextrose 50% Injectable 25 Gram(s) IV Push once  dextrose 50% Injectable 12.5 Gram(s) IV Push once  dextrose 50% Injectable 25 Gram(s) IV Push once  gabapentin 100 milliGRAM(s) Oral three times a day  gabapentin 200 milliGRAM(s) Oral at bedtime  glucagon  Injectable 1 milliGRAM(s) IntraMuscular once  heparin   Injectable 5000 Unit(s) SubCutaneous every 12 hours  insulin glargine Injectable (LANTUS) 8 Unit(s) SubCutaneous at bedtime  insulin lispro (ADMELOG) corrective regimen sliding scale   SubCutaneous three times a day before meals  insulin lispro (ADMELOG) corrective regimen sliding scale   SubCutaneous at bedtime  lidocaine   Patch 2 Patch Transdermal every 24 hours  melatonin 3 milliGRAM(s) Oral at bedtime  morphine ER Tablet 60 milliGRAM(s) Oral two times a day  polyethylene glycol 3350 17 Gram(s) Oral daily  potassium phosphate / sodium phosphate Tablet (K-PHOS No. 2) 2 Tablet(s) Oral three times a day  senna 2 Tablet(s) Oral at bedtime    MEDICATIONS  (PRN):  acetaminophen   Tablet .. 650 milliGRAM(s) Oral every 6 hours PRN Temp greater or equal to 38C (100.4F), Mild Pain (1 - 3)  magnesium hydroxide Suspension 30 milliLiter(s) Oral daily PRN Constipation      LABS:             04-03    138  |  103  |  11  ----------------------------<  86  4.7   |  28  |  0.86    Ca    8.5      03 Apr 2021 08:00  Phos  3.5     04-03  Mg     1.6     04-03        CAPILLARY BLOOD GLUCOSE      POCT Blood Glucose.: 145 mg/dL (02 Apr 2021 21:04)  POCT Blood Glucose.: 114 mg/dL (02 Apr 2021 16:09)  POCT Blood Glucose.: 122 mg/dL (02 Apr 2021 11:32)  POCT Blood Glucose.: 109 mg/dL (02 Apr 2021 07:58)  POCT Blood Glucose.: 146 mg/dL (01 Apr 2021 23:24)      CARDIAC MARKERS ( 02 Apr 2021 17:46 )  .366 ng/mL / x     / 131 U/L / x     / x      CARDIAC MARKERS ( 02 Apr 2021 04:02 )  .275 ng/mL / x     / 113 U/L / x     / x      CARDIAC MARKERS ( 01 Apr 2021 13:43 )  <.015 ng/mL / x     / x     / x     / x            RECENT CULTURES:      RADIOLOGY & ADDITIONAL TESTS:          Imaging Personally Reviewed:  [ ] YES  [ ] NO    Consultant(s) Notes Reviewed:  [ x] YES  [ ] NO    Care Discussed with Consultants/Other Providers [ x] YES  [ ] NO  Care discussed in detail with patient.  All questions and concerns addressed   HPI:  75 year old female w DM II,  HTN, hypothyroid, spinal stenosis w chronic pain came to ED c/o chest pain, SOB    Denies fevers  + body aches over past week  Had COVID vaccine 6 days ago    Pt states chest pain started suddenly 1 1/2 days ago while sitting  L upper chest area  graded 8/10   constant  does not know what increases or decreases pain  + associated SOB  generalized weakness since week worse today  has had increased pain to RLE as well  feels terrible pains since she has not gotten her medications today and has been in ED for hours    Pt is also taken an unnamed antibiotic 500 mg 4 times a day        In ED /84     RR 18   T 98.2    95% sat  ED noted + mild hypoxia to 90% on RA noted; O2 improved to 97-98% on nasal cannula    CTA done for d-dimer 5000 not diagnostic  doppler to both legs neg for DVT  on IV heparin  COVID-19 not detected      PAST MEDICAL HX  Angioedema requiring intubation from ACE-I   CVA with no residual deficits  DM II diabetes mellitus  Diverticulosis  HTN hypertension  Hypothyroid  MRSA (methicillin resistant Staphylococcus aureus) infection  Osteoarthritis  Pancreatitis, chronic last episode > 15 yrs ago  Spinal stenosis s/p fusion w chronic pain      SURGICAL HX  H/O right inguinal hernia repair  H/O total knee replacement, bilateral  FH: cholecystectomy  H/O: hysterectomy  Stage I L1-L4 Lumbar Lateral Fusion; S/P L1-L5 XLIF, L5-S1 PLIF, L1-S1  screw and john fixation     FAMILY HX  No history of dementia, strokes, or seizures      SOCIAL HX  denies smoking or drinking; Jehovah Witness       (01 Apr 2021 22:10)      SUBJECTIVE & OBJECTIVE: Pt seen and examined at bedside.   no overnight events  + BM, but states not as large as she wished   no further nausea or vomiting      Denies fever, chills, N/V, dizziness, HA, cough, CP, palpitations, SOB, abdominal pain, dysuria.     PHYSICAL EXAM:  Vital Signs Last 24 Hrs  T(C): 36.6 (04 Apr 2021 16:45), Max: 36.6 (04 Apr 2021 00:27)  T(F): 97.8 (04 Apr 2021 16:45), Max: 97.9 (04 Apr 2021 00:27)  HR: 65 (04 Apr 2021 16:45) (65 - 81)  BP: 123/80 (04 Apr 2021 16:45) (103/71 - 134/84)  BP(mean): --  RR: 18 (04 Apr 2021 16:45) (18 - 18)  SpO2: 96% (04 Apr 2021 16:45) (95% - 100%) on room air     GENERAL: NAD, well-groomed, well-developed, no increased WOB   HEAD:  Atraumatic, Normocephalic  EYES: EOMI, PERRLA, conjunctiva and sclera clear  ENMT: Moist mucous membranes  NECK: Supple, No JVD  NERVOUS SYSTEM:  Alert & Oriented X3, no focal neuro deficits   CHEST/LUNG: Clear to auscultation bilaterally; No rales, rhonchi, wheezing, or rubs  HEART: Regular rate and rhythm; No murmurs, rubs, or gallops  ABDOMEN: Soft, Nontender, Nondistended; Bowel sounds present  EXTREMITIES:  2+ Peripheral Pulses b/l, No clubbing, cyanosis, calf tenderness or edema b/l   BACK: well heeled scar from prior spinal fusion surgery       MEDICATIONS  (STANDING):  dextrose 40% Gel 15 Gram(s) Oral once  dextrose 5%. 1000 milliLiter(s) (50 mL/Hr) IV Continuous <Continuous>  dextrose 5%. 1000 milliLiter(s) (100 mL/Hr) IV Continuous <Continuous>  dextrose 50% Injectable 25 Gram(s) IV Push once  dextrose 50% Injectable 12.5 Gram(s) IV Push once  dextrose 50% Injectable 25 Gram(s) IV Push once  gabapentin 100 milliGRAM(s) Oral three times a day  gabapentin 200 milliGRAM(s) Oral at bedtime  glucagon  Injectable 1 milliGRAM(s) IntraMuscular once  heparin   Injectable 5000 Unit(s) SubCutaneous every 12 hours  insulin glargine Injectable (LANTUS) 8 Unit(s) SubCutaneous at bedtime  insulin lispro (ADMELOG) corrective regimen sliding scale   SubCutaneous three times a day before meals  insulin lispro (ADMELOG) corrective regimen sliding scale   SubCutaneous at bedtime  lidocaine   Patch 2 Patch Transdermal every 24 hours  melatonin 3 milliGRAM(s) Oral at bedtime  morphine ER Tablet 60 milliGRAM(s) Oral two times a day  polyethylene glycol 3350 17 Gram(s) Oral daily  potassium phosphate / sodium phosphate Tablet (K-PHOS No. 2) 2 Tablet(s) Oral three times a day  senna 2 Tablet(s) Oral at bedtime    MEDICATIONS  (PRN):  acetaminophen   Tablet .. 650 milliGRAM(s) Oral every 6 hours PRN Temp greater or equal to 38C (100.4F), Mild Pain (1 - 3)  magnesium hydroxide Suspension 30 milliLiter(s) Oral daily PRN Constipation      LABS:             04-03    138  |  103  |  11  ----------------------------<  86  4.7   |  28  |  0.86    Ca    8.5      03 Apr 2021 08:00  Phos  3.5     04-03  Mg     1.6     04-03              CARDIAC MARKERS ( 02 Apr 2021 17:46 )  .366 ng/mL / x     / 131 U/L / x     / x      CARDIAC MARKERS ( 02 Apr 2021 04:02 )  .275 ng/mL / x     / 113 U/L / x     / x      CARDIAC MARKERS ( 01 Apr 2021 13:43 )  <.015 ng/mL / x     / x     / x     / x            RECENT CULTURES:      RADIOLOGY & ADDITIONAL TESTS:    < from: CT Angio Chest w/ IV Cont (04.01.21 @ 18:06) >  INTERPRETATION:  CLINICAL INFORMATION: Hypoxia, chest pain, evaluate for pulmonary embolism    COMPARISON: CT chest abdomen pelvis 8/25/2015.    CONTRAST/COMPLICATIONS:  IV Contrast: Omnipaque 350, 90 cc administered, 10 cc discarded.  90 cc administered  Oral Contrast: None  Complications: None    PROCEDURE:  CT Angiography of the Chest.  Sagittal and coronal reformats were performed as well as 3D (MIP) reconstructions.    FINDINGS: Limited examination due to poor timing of contrast bolus and streak artifact.    LUNGS AND AIRWAYS: Patent central airways.  Lungs are clear.  PLEURA: No pleural effusion.  MEDIASTINUM AND KIRSTEN: No lymphadenopathy.  VESSELS: Limited evaluation for pulmonary embolism secondary to poor bolus timing and streak artifact. No central or proximal segmental pulmonary emboli. Distal to this cannot be evaluated.  HEART: Heart size is normal. No pericardial effusion.  CHEST WALL AND LOWER NECK: Within normal limits. Small hiatal hernia  VISUALIZED UPPER ABDOMEN: Pneumobilia unchanged from the previous exam  BONES: Degenerative changes. Spinal orthopedic hardware.    IMPRESSION:    Extremely limited evaluation for pulmonary embolism secondary to poor bolus timing and streak artifact. No central or proximal segmental pulmonary emboli. Distal to this cannot be evaluated.    < end of copied text >    < from: NM Pulmonary Perfusion Scan (04.02.21 @ 15:09) >  INTERPRETATION:  RADIOPHARMACEUTICAL: 99mTc-MAA       DOSE: 3.0 mCi IV    CLINICAL INFORMATION: 75 year old female with chest pain, referred to evaluate for pulmonary embolus.    TECHNIQUE: Perfusion images of the lungs were obtained following administration of Tc-99m-MAA. Images were obtained in the anterior, posterior, both lateral, and all 4 oblique projections. This study was interpreted in conjunction with a chest radiograph of 4/1/2021.    COMPARISON: No previous lung scan for comparison.    FINDINGS: The study demonstrates heterogenous tracer distribution in both lungs. There are no well-defined segmental perfusion defects.    IMPRESSION:  Very low probability of pulmonary embolus.    < end of copied text >    < from: US Duplex Venous Lower Ext Complete, Bilateral (04.01.21 @ 21:00) >  INTERPRETATION:  CLINICAL INFORMATION: Lower extremity pain.    COMPARISON: None available.    TECHNIQUE: Duplex sonography of the BILATERAL LOWERextremity veins with color and spectral Doppler, with and without compression.    FINDINGS:    RIGHT:  Normal compressibility of the RIGHT common femoral, femoral and popliteal veins.  Doppler examination shows normal spontaneous and phasic flow.  NoRIGHT calf vein thrombosis is detected.    LEFT:  Normal compressibility of the LEFT common femoral, femoral and popliteal veins.  Doppler examination shows normal spontaneous and phasic flow.  No LEFT calf vein thrombosis is detected.    IMPRESSION:  No evidence of deep venous thrombosis in either lower extremity.    < end of copied text >    Summary:   1. Left ventricular ejection fraction, by visual estimation, is 65 to 70%.   2. Normal global left ventricular systolic function.   3. Increased LV wall thickness.   4. There is mild concentric left ventricular hypertrophy.   5. No evidence of mitral valve regurgitation.   6. Moderate tricuspid regurgitation.   7. Mild pulmonic valve regurgitation.   8. Estimated pulmonary artery systolic pressure is 45.8 mmHg assuming a right atrial pressure of 5 mmHg, which is consistent with moderate pulmonary hypertension.   9. No evidence of any thrombus.              Imaging Personally Reviewed:  [ ] YES  [ ] NO    Consultant(s) Notes Reviewed:  [ x] YES  [ ] NO    Care Discussed with Consultants/Other Providers [ x] YES  [ ] NO  Care discussed in detail with patient.  All questions and concerns addressed

## 2021-04-05 ENCOUNTER — TRANSCRIPTION ENCOUNTER (OUTPATIENT)
Age: 76
End: 2021-04-05

## 2021-04-05 VITALS
DIASTOLIC BLOOD PRESSURE: 74 MMHG | OXYGEN SATURATION: 97 % | SYSTOLIC BLOOD PRESSURE: 129 MMHG | RESPIRATION RATE: 19 BRPM | TEMPERATURE: 98 F | HEART RATE: 88 BPM

## 2021-04-05 PROCEDURE — 99239 HOSP IP/OBS DSCHRG MGMT >30: CPT

## 2021-04-05 RX ORDER — DOCUSATE SODIUM 100 MG
1 CAPSULE ORAL
Qty: 90 | Refills: 0
Start: 2021-04-05 | End: 2021-05-04

## 2021-04-05 RX ORDER — PANTOPRAZOLE SODIUM 20 MG/1
1 TABLET, DELAYED RELEASE ORAL
Qty: 30 | Refills: 0
Start: 2021-04-05 | End: 2021-05-04

## 2021-04-05 RX ORDER — SIMVASTATIN 20 MG/1
1 TABLET, FILM COATED ORAL
Qty: 30 | Refills: 0
Start: 2021-04-05 | End: 2021-05-04

## 2021-04-05 RX ORDER — GABAPENTIN 400 MG/1
2 CAPSULE ORAL
Qty: 0 | Refills: 0 | DISCHARGE
Start: 2021-04-05

## 2021-04-05 RX ORDER — MAGNESIUM HYDROXIDE 400 MG/1
30 TABLET, CHEWABLE ORAL
Qty: 0 | Refills: 0 | DISCHARGE
Start: 2021-04-05

## 2021-04-05 RX ORDER — ASPIRIN/CALCIUM CARB/MAGNESIUM 324 MG
1 TABLET ORAL
Qty: 0 | Refills: 0 | DISCHARGE
Start: 2021-04-05

## 2021-04-05 RX ORDER — LANOLIN ALCOHOL/MO/W.PET/CERES
1 CREAM (GRAM) TOPICAL
Qty: 0 | Refills: 0 | DISCHARGE
Start: 2021-04-05

## 2021-04-05 RX ORDER — METFORMIN HYDROCHLORIDE 850 MG/1
1 TABLET ORAL
Qty: 60 | Refills: 0
Start: 2021-04-05 | End: 2021-05-04

## 2021-04-05 RX ORDER — LEVOTHYROXINE SODIUM 125 MCG
1 TABLET ORAL
Qty: 30 | Refills: 0
Start: 2021-04-05 | End: 2021-05-04

## 2021-04-05 RX ORDER — POLYETHYLENE GLYCOL 3350 17 G/17G
17 POWDER, FOR SOLUTION ORAL
Qty: 0 | Refills: 0 | DISCHARGE
Start: 2021-04-05

## 2021-04-05 RX ORDER — LEVOTHYROXINE SODIUM 125 MCG
1 TABLET ORAL
Qty: 0 | Refills: 0 | DISCHARGE

## 2021-04-05 RX ORDER — PREDNISOLONE SODIUM PHOSPHATE 1 %
1 DROPS OPHTHALMIC (EYE)
Qty: 0 | Refills: 0 | DISCHARGE
Start: 2021-04-05

## 2021-04-05 RX ORDER — INSULIN GLARGINE 100 [IU]/ML
10 INJECTION, SOLUTION SUBCUTANEOUS
Qty: 0 | Refills: 0 | DISCHARGE

## 2021-04-05 RX ORDER — LIDOCAINE 4 G/100G
1 CREAM TOPICAL
Qty: 30 | Refills: 0
Start: 2021-04-05 | End: 2021-05-04

## 2021-04-05 RX ORDER — ACETAMINOPHEN 500 MG
3 TABLET ORAL
Qty: 0 | Refills: 0 | DISCHARGE
Start: 2021-04-05

## 2021-04-05 RX ORDER — GABAPENTIN 400 MG/1
2 CAPSULE ORAL
Qty: 0 | Refills: 0 | DISCHARGE

## 2021-04-05 RX ORDER — LACTULOSE 10 G/15ML
10 SOLUTION ORAL
Qty: 1 | Refills: 0
Start: 2021-04-05

## 2021-04-05 RX ORDER — GABAPENTIN 400 MG/1
1 CAPSULE ORAL
Qty: 0 | Refills: 0 | DISCHARGE
Start: 2021-04-05

## 2021-04-05 RX ORDER — METFORMIN HYDROCHLORIDE 850 MG/1
1 TABLET ORAL
Qty: 0 | Refills: 0 | DISCHARGE

## 2021-04-05 RX ORDER — GABAPENTIN 400 MG/1
1 CAPSULE ORAL
Qty: 0 | Refills: 0 | DISCHARGE

## 2021-04-05 RX ADMIN — LIDOCAINE 2 PATCH: 4 CREAM TOPICAL at 10:45

## 2021-04-05 RX ADMIN — Medication 1 DROP(S): at 05:10

## 2021-04-05 RX ADMIN — Medication 175 MICROGRAM(S): at 05:11

## 2021-04-05 RX ADMIN — Medication 81 MILLIGRAM(S): at 11:16

## 2021-04-05 RX ADMIN — HEPARIN SODIUM 5000 UNIT(S): 5000 INJECTION INTRAVENOUS; SUBCUTANEOUS at 05:10

## 2021-04-05 RX ADMIN — MORPHINE SULFATE 60 MILLIGRAM(S): 50 CAPSULE, EXTENDED RELEASE ORAL at 05:10

## 2021-04-05 RX ADMIN — MORPHINE SULFATE 60 MILLIGRAM(S): 50 CAPSULE, EXTENDED RELEASE ORAL at 05:40

## 2021-04-05 RX ADMIN — LIDOCAINE 2 PATCH: 4 CREAM TOPICAL at 07:35

## 2021-04-05 RX ADMIN — GABAPENTIN 100 MILLIGRAM(S): 400 CAPSULE ORAL at 05:10

## 2021-04-05 NOTE — DISCHARGE NOTE NURSING/CASE MANAGEMENT/SOCIAL WORK - PATIENT PORTAL LINK FT
You can access the FollowMyHealth Patient Portal offered by [facility] by registering at the following website: [email or web address]. By joining [facility]’s FollowMyHealth portal, you will also be able to view your health information using other applications (apps) compatible with our system.

## 2021-04-05 NOTE — PROGRESS NOTE ADULT - PROBLEM SELECTOR PLAN 1
can be discharged on 175 mcg   The recovery of TSH may lag behind time wise compared to  thyroid hormones like T4 and T3   Check thyroid function tests in a few days after discharge
Continue with the same regimen while inpatient   stable finger sticks  patient seen earlier in the day  can be discharged on the current regimen or Patient can resume  home regimen upon discharge

## 2021-04-05 NOTE — DISCHARGE NOTE PROVIDER - NSDCMRMEDTOKEN_GEN_ALL_CORE_FT
acetaminophen 325 mg oral tablet: 3 tab(s) orally every 8 hours, As Needed - 3)  aspirin 81 mg oral delayed release tablet: 1 tab(s) orally once a day  docusate sodium 100 mg oral capsule: 1 cap(s) orally 3 times a day  gabapentin 100 mg oral capsule: 1 cap(s) orally 3 times a day  gabapentin 100 mg oral capsule: 2 cap(s) orally once a day (at bedtime)  lactulose 10 g oral powder for reconstitution: 10 gram(s) orally once a day as needed for constipation  Lantus 100 units/mL subcutaneous solution: 8 unit(s) subcutaneous once a day  levothyroxine 175 mcg (0.175 mg) oral tablet: 1 tab(s) orally once a day  lidocaine 5% topical film: Apply topically to affected area once a day   melatonin 3 mg oral tablet: 1 tab(s) orally once a day (at bedtime)  metFORMIN 1000 mg oral tablet: 1 tab(s) orally 2 times a day   morphine 60 mg/12 hours oral tablet, extended release: 1 tab(s) orally 2 times a day  pantoprazole 40 mg oral delayed release tablet: 1 tab(s) orally once a day   polyethylene glycol 3350 oral powder for reconstitution: 17 gram(s) orally once a day  prednisoLONE acetate 1% ophthalmic suspension: 1 drop(s) to each affected eye 4 times a day  simvastatin 20 mg oral tablet: 1 tab(s) orally once a day (at bedtime)

## 2021-04-05 NOTE — DISCHARGE NOTE PROVIDER - NSDCCPCAREPLAN_GEN_ALL_CORE_FT
PRINCIPAL DISCHARGE DIAGNOSIS  Diagnosis: Constipation  Assessment and Plan of Treatment: +BMs, continue bowel regimen      SECONDARY DISCHARGE DIAGNOSES  Diagnosis: Hypothyroidism, unspecified type  Assessment and Plan of Treatment: TSH 22, discharged on synthroid 175mcg daily, needs repeat TFTs in few days post discharge    Diagnosis: Type 2 diabetes mellitus without complication, with long-term current use of insulin  Assessment and Plan of Treatment: decreased lantus to 8u once daily    Diagnosis: Hypoxia  Assessment and Plan of Treatment: SpO2 90% on arrival. during stay remained SpO2 98% on room air, work-up neg for PE/DVT, or pneumonia. ambulating well, no more hypoxia even on exertion.    Diagnosis: Essential hypertension  Assessment and Plan of Treatment: blood pressure was well controlled off medications. follow-up with PCP on whether to restart blood pressure medications, based on home and outpatient office readings

## 2021-04-05 NOTE — DISCHARGE NOTE PROVIDER - HOSPITAL COURSE
75 year old female w DM II,  HTN, hypothyroid, spinal stenosis w chronic pain came to ED c/o chest pain, SOB    DISCHARGE DIAGNOSES:     #atypical  chest pain , ACs ruled out   hypoxia 90% on RA on admission, now SpO2 98% at rest on room air and on exertion. No clear reason for hypoxia. resolved on its own.   patient is comfortable , no e/o increased work of breathing   CTA  poor study, no e/o PE   dopplers neg DVT  V/Q scan neg   COVID-19 not detected  CXR clear  Tele: NSR, few short dropped beats, of no significance per cardiology, dc tele   Mary slightly elevated   BNP OK  d-dimer 5,000 ?   d/c heparin drip, PE /DVT ruled out   EKG: NSR   LDL 46   echo: pEF, mod pulm HTN, no e/o thrombus; essentially normal echo   SOB and CP resolved   no e/o acute infection, afebrile, no leukocytosis, lungs clear   followed by Dr. Eder Mercado at [facility] ; Dr. Mcwilliams was able to speak to her cardiology team, negative stress echo last year and that she is only being followed for mild PVD. follow-up with primary cardiologist in 5-7 days post discharge.  no need for stress test or any further cardiac work-up here       #Troponin leak, doubt ACS , most likely demand in setting of acute hypoxia   trops trended down   EKG: NSR, unchanged     #severe Constipation, sec to opioids  bowel regimen   4/3 enema x 1 today, lactulose x 1 PO   4/4 enema x 1 and lactulose PO again   reglan prn for nausea/vomiting     #DM II  A1c 6.5%   lantus 8 units daily, Metformin 1g bid       #HTN  BP ok off meds   d/c off meds, follow-up with PCP re restarting if needed      #Hypothyroid  TSH 22  tT3  and fT4 low   confirmed , patient filled synthroid 175mcg daily 2/2021   discussed with endo, continue with synthroid 175mcg daily for now, discharge on same  follow TFTs in few days after discharge   patient aware to follow-up with PMD       #Spinal stenosis w chronic pain  1. morphine ER 60 mg BID w stat dose  2. gabapentin 100 mg TID                        200 mg q HS   3. lidoderm patches    #Hypophosphatemia, hypomagnesemia, hypokalemia --repleted       Discharge time : 40 min     RETURN PARAMETERS DISCUSSED WITH PATIENT, PATIENT EXPRESSED UNDERSTANDING AND IS AGREEABLE. DISCUSSED WITH PATIENT ON REFRAINING FROM DRIVING UNTIL FOLLOW-UP/ CLEARED BY PMD. PATIENT EXPRESSED UNDERSTANDING.    75 year old female w DM II,  HTN, hypothyroid, spinal stenosis w chronic pain came to ED c/o chest pain, SOB    DISCHARGE DIAGNOSES:     #atypical  chest pain , ACs ruled out   hypoxia 90% on RA on admission, now SpO2 98% at rest on room air and on exertion. No clear reason for hypoxia. resolved on its own.   patient is comfortable , no e/o increased work of breathing   CTA  poor study, no e/o PE   dopplers neg DVT  V/Q scan neg   COVID-19 not detected  CXR clear  Tele: NSR, few short dropped beats, of no significance per cardiology, dc tele   Mary slightly elevated   BNP OK  d-dimer 5,000 ?   d/c heparin drip, PE /DVT ruled out   EKG: NSR   LDL 46   echo: pEF, mod pulm HTN, no e/o thrombus; essentially normal echo   SOB and CP resolved   no e/o acute infection, afebrile, no leukocytosis, lungs clear   followed by Dr. Eder Meracdo at [facility] ; Dr. Mcwilliams was able to speak to her cardiology team, negative stress echo last year and that she is only being followed for mild PVD. follow-up with primary cardiologist in 5-7 days post discharge.  no need for stress test or any further cardiac work-up here       #Troponin leak, doubt ACS , most likely demand in setting of acute hypoxia   trops trended down   EKG: NSR, unchanged     #severe Constipation, sec to opioids  bowel regimen   4/3 enema x 1 today, lactulose x 1 PO   4/4 enema x 1 and lactulose PO again   reglan prn for nausea/vomiting     #DM II  A1c 6.5%   lantus 8 units daily, Metformin 1g bid       #HTN  BP ok off meds   d/c off meds, follow-up with PCP re restarting if needed      #Hypothyroidism , uncontrolled   elevated TSH 22  tT3  and fT4 low   confirmed , patient filled synthroid 175mcg daily 2/2021   discussed with endo, continue with synthroid 175mcg daily for now, discharge on same  follow TFTs in few days after discharge   patient aware to follow-up with PMD       #Spinal stenosis w chronic pain  1. morphine ER 60 mg BID w stat dose  2. gabapentin 100 mg TID                        200 mg q HS   3. lidoderm patches    #Hypophosphatemia, hypomagnesemia, hypokalemia --repleted       Discharge time : 40 min     RETURN PARAMETERS DISCUSSED WITH PATIENT, PATIENT EXPRESSED UNDERSTANDING AND IS AGREEABLE. DISCUSSED WITH PATIENT ON REFRAINING FROM DRIVING UNTIL FOLLOW-UP/ CLEARED BY PMD. PATIENT EXPRESSED UNDERSTANDING.

## 2021-04-05 NOTE — PROGRESS NOTE ADULT - REASON FOR ADMISSION
chest pain possible ACS  poss PE

## 2021-04-05 NOTE — PROGRESS NOTE ADULT - SUBJECTIVE AND OBJECTIVE BOX
Patient is a 75y old  Female who presents with a chief complaint of chest pain possible ACS  poss PE (05 Apr 2021 10:25)      Interval History:patient with hypothyroidism, currently doing well    MEDICATIONS  (STANDING):  aspirin enteric coated 81 milliGRAM(s) Oral daily  dextrose 40% Gel 15 Gram(s) Oral once  dextrose 5%. 1000 milliLiter(s) (50 mL/Hr) IV Continuous <Continuous>  dextrose 5%. 1000 milliLiter(s) (100 mL/Hr) IV Continuous <Continuous>  dextrose 50% Injectable 25 Gram(s) IV Push once  dextrose 50% Injectable 12.5 Gram(s) IV Push once  dextrose 50% Injectable 25 Gram(s) IV Push once  gabapentin 100 milliGRAM(s) Oral three times a day  gabapentin 200 milliGRAM(s) Oral at bedtime  glucagon  Injectable 1 milliGRAM(s) IntraMuscular once  heparin   Injectable 5000 Unit(s) SubCutaneous every 12 hours  insulin glargine Injectable (LANTUS) 8 Unit(s) SubCutaneous at bedtime  insulin lispro (ADMELOG) corrective regimen sliding scale   SubCutaneous three times a day before meals  insulin lispro (ADMELOG) corrective regimen sliding scale   SubCutaneous at bedtime  levothyroxine 175 MICROGram(s) Oral daily  lidocaine   Patch 2 Patch Transdermal every 24 hours  melatonin 3 milliGRAM(s) Oral at bedtime  morphine ER Tablet 60 milliGRAM(s) Oral two times a day  pantoprazole  Injectable 40 milliGRAM(s) IV Push daily  polyethylene glycol 3350 17 Gram(s) Oral daily  prednisoLONE acetate 1% Suspension 1 Drop(s) Right EYE four times a day  senna 2 Tablet(s) Oral at bedtime  simvastatin 20 milliGRAM(s) Oral at bedtime    MEDICATIONS  (PRN):  acetaminophen   Tablet .. 650 milliGRAM(s) Oral every 6 hours PRN Temp greater or equal to 38C (100.4F), Mild Pain (1 - 3)  magnesium hydroxide Suspension 30 milliLiter(s) Oral daily PRN Constipation  metoclopramide Injectable 10 milliGRAM(s) IV Push every 6 hours PRN nausea/vomiting  ondansetron Injectable 4 milliGRAM(s) IV Push every 6 hours PRN Nausea and/or Vomiting      Allergies    ACE inhibitors (Anaphylaxis; Angioedema)    Intolerances        REVIEW OF SYSTEMS:  CONSTITUTIONAL: no changes  EYES: No eye pain, visual disturbances, or discharge  ENMT:  No difficulty hearing, No sinus or throat pain  NECK: No pain or stiffness  RESPIRATORY: No cough, wheezing, chills or hemoptysis; No shortness of breath  CARDIOVASCULAR: No chest pain, palpitations or leg swelling  GASTROINTESTINAL: No abdominal or epigastric pain. No nausea, vomiting, or hematemesis; No diarrhea or constipation. No melena or hematochezia.  GENITOURINARY: No dysuria, frequency, hematuria, or incontinence  NEUROLOGICAL: No headaches, memory loss, loss of strength, numbness, or tremors  SKIN: No itching, burning, rashes, or lesions   ENDOCRINE: No heat or cold intolerance; No hair loss  MUSCULOSKELETAL: No joint pain or swelling; No muscle, back, or extremity pain  PSYCHIATRIC: No depression, anxiety, mood swings, or difficulty sleeping  HEME/LYMPH: No easy bruising, or bleeding gums  ALLERY AND IMMUNOLOGIC: No hives or eczema    Vital Signs Last 24 Hrs  T(C): 36.6 (05 Apr 2021 11:34), Max: 36.6 (05 Apr 2021 00:33)  T(F): 97.8 (05 Apr 2021 11:34), Max: 97.9 (05 Apr 2021 00:33)  HR: 88 (05 Apr 2021 11:34) (66 - 88)  BP: 129/74 (05 Apr 2021 11:34) (125/80 - 135/75)  BP(mean): --  RR: 19 (05 Apr 2021 11:34) (18 - 19)  SpO2: 97% (05 Apr 2021 11:34) (95% - 98%)    PHYSICAL EXAM:  GENERAL:   HEAD: Atraumatic, Normocephalic  EYES: PERRLA, conjunctiva and sclera clear  ENMT: No tonsillar erythema, exudates, or enlargement; Moist mucous membranes, Good dentition, No lesions  NECK: Supple, No JVD, Normal thyroid  NERVOUS SYSTEM:  Alert & Oriented X3, Good concentration; Motor Strength 5/5 B/L upper and lower extremities  CHEST/LUNG: Clear to auscultaion bilaterally; No rales, rhonchi, wheezing, or rubs  HEART: Regular rate and rhythm; No murmurs, rubs, or gallops  ABDOMEN: Soft, Nontender, Nondistended; Bowel sounds present  EXTREMITIES:  2+ Peripheral Pulses, no edema  SKIN: No rashes or lesions    LABS:        CAPILLARY BLOOD GLUCOSE      POCT Blood Glucose.: 96 mg/dL (05 Apr 2021 07:42)  POCT Blood Glucose.: 98 mg/dL (04 Apr 2021 21:33)    Lipid panel:           Thyroid:  Diabetes Tests:  Parathyroid Panel:  Adrenals:  RADIOLOGY & ADDITIONAL TESTS:    Imaging Personally Reviewed:  [ ] YES  [ ] NO    Consultant(s) Notes Reviewed:  [ ] YES  [ ] NO    Care Discussed with Consultants/Other Providers [ ] YES  [ ] NO

## 2021-04-05 NOTE — DISCHARGE NOTE PROVIDER - NSDCFUADDINST_GEN_ALL_CORE_FT
It is important to see your primary physician as well as other necessary consultants within the next week to perform a comprehensive medical review.  Call their offices for an appointment as soon as you leave the hospital.  If you do not have a primary physician or cant reach him/her, contact the [facility] Physician Referral Service at [phone].  Your medical issues appear to be stable at this time, but if your symptoms recur or worsen, contact your physicians and/or return to the hospital if necessary.  If you encounter any issues or questions with your medication, call your physicians before stopping the medication.

## 2021-04-05 NOTE — PROGRESS NOTE ADULT - PROBLEM SELECTOR PROBLEM 1
Type 2 diabetes mellitus without complication, with long-term current use of insulin
Hypothyroidism, unspecified type

## 2021-04-05 NOTE — PROGRESS NOTE ADULT - PROBLEM SELECTOR PLAN 2
Continue with the same dose and can be discharged on the same dose of levothyroxine   Check thyroid function Serum Testosterone in a few days as outpatient  The recovery of TSH may lag behind time wise compared to  thyroid hormones like T4 and T3

## 2021-04-16 ENCOUNTER — NON-APPOINTMENT (OUTPATIENT)
Age: 76
End: 2021-04-16

## 2021-04-16 ENCOUNTER — APPOINTMENT (OUTPATIENT)
Dept: OPHTHALMOLOGY | Facility: CLINIC | Age: 76
End: 2021-04-16
Payer: MEDICARE

## 2021-04-16 PROCEDURE — 99072 ADDL SUPL MATRL&STAF TM PHE: CPT

## 2021-04-16 PROCEDURE — 92012 INTRM OPH EXAM EST PATIENT: CPT

## 2021-05-14 ENCOUNTER — NON-APPOINTMENT (OUTPATIENT)
Age: 76
End: 2021-05-14

## 2021-05-14 ENCOUNTER — APPOINTMENT (OUTPATIENT)
Dept: OPHTHALMOLOGY | Facility: CLINIC | Age: 76
End: 2021-05-14
Payer: MEDICARE

## 2021-05-14 PROCEDURE — 99072 ADDL SUPL MATRL&STAF TM PHE: CPT

## 2021-05-14 PROCEDURE — 92250 FUNDUS PHOTOGRAPHY W/I&R: CPT

## 2021-05-14 PROCEDURE — 92025 CPTRIZED CORNEAL TOPOGRAPHY: CPT

## 2021-05-14 PROCEDURE — 92012 INTRM OPH EXAM EST PATIENT: CPT

## 2021-06-11 ENCOUNTER — NON-APPOINTMENT (OUTPATIENT)
Age: 76
End: 2021-06-11

## 2021-06-11 ENCOUNTER — APPOINTMENT (OUTPATIENT)
Dept: OPHTHALMOLOGY | Facility: CLINIC | Age: 76
End: 2021-06-11
Payer: MEDICARE

## 2021-06-11 PROCEDURE — 92012 INTRM OPH EXAM EST PATIENT: CPT

## 2021-06-11 PROCEDURE — 99072 ADDL SUPL MATRL&STAF TM PHE: CPT

## 2021-06-21 ENCOUNTER — APPOINTMENT (OUTPATIENT)
Dept: OPHTHALMOLOGY | Facility: CLINIC | Age: 76
End: 2021-06-21
Payer: MEDICARE

## 2021-06-21 ENCOUNTER — NON-APPOINTMENT (OUTPATIENT)
Age: 76
End: 2021-06-21

## 2021-06-21 PROCEDURE — 99072 ADDL SUPL MATRL&STAF TM PHE: CPT

## 2021-06-21 PROCEDURE — 92012 INTRM OPH EXAM EST PATIENT: CPT

## 2021-07-26 NOTE — H&P ADULT - VASCULAR
Patient is a 89y old  Female who presents with a chief complaint of SOB (25 Jul 2021 08:14)      HPI:  89 F, wheel chair bound, from home with PMH of Afib on Eliquis, ESRD on HD(Monday, Tuesday, Thursday, Saturday), DM on insulin, HTN, Home O2 3L presented with shortness of breath, vomiting x1 day. Pt was drinking tea from home when she had a vomiting episode. Her daughter at bedside describes "all this fluid came out of her mouth." As per daughter, pt has been drinking a lot of fluid over the past day. No new medications, no hospitalizations or ER visits in the interim as per daughter. No fever, no chest pain, no palpitations, no diarrhea, no dysuria. Pt is oliguric. Pt had COVID in March 2021, and as such daughter reports she did not yet get vaccinated. (22 Jul 2021 03:15)  Found to have uncont dm. Pt admits to taking basal bolus insulin given by her daughter. Does not recall fsg or if she has hypos. Currently eating well .      PAST MEDICAL & SURGICAL HISTORY:  HTN (hypertension)    HLD (hyperlipidemia)    CKD (chronic kidney disease)    Afib    DM (diabetes mellitus)    Artificial pacemaker           MEDICATIONS  (STANDING):  albuterol/ipratropium for Nebulization 3 milliLiter(s) Nebulizer every 6 hours  aMIOdarone    Tablet 200 milliGRAM(s) Oral daily  apixaban 2.5 milliGRAM(s) Oral every 12 hours  ascorbic acid 500 milliGRAM(s) Oral daily  atorvastatin 40 milliGRAM(s) Oral at bedtime  chlorhexidine 2% Cloths 1 Application(s) Topical <User Schedule>  epoetin maria d-epbx (RETACRIT) Injectable 4000 Unit(s) IV Push <User Schedule>  insulin lispro (ADMELOG) corrective regimen sliding scale   SubCutaneous three times a day before meals  metoprolol tartrate 25 milliGRAM(s) Oral two times a day  zinc sulfate 220 milliGRAM(s) Oral daily    MEDICATIONS  (PRN):  ALBUTerol    90 MICROgram(s) HFA Inhaler 2 Puff(s) Inhalation every 6 hours PRN Shortness of Breath and/or Wheezing  bisacodyl Suppository 10 milliGRAM(s) Rectal daily PRN Constipation  midodrine. 10 milliGRAM(s) Oral <User Schedule> PRN DIALYSIS  sodium chloride 0.9% lock flush 10 milliLiter(s) IV Push every 1 hour PRN Pre/post blood products, medications, blood draw, and to maintain line patency      FAMILY HISTORY:      SOCIAL HISTORY:      REVIEW OF SYSTEMS:  CONSTITUTIONAL: No fever, weight loss, or fatigue  EYES: No eye pain, visual disturbances, or discharge  ENT:  No difficulty hearing, tinnitus, vertigo; No sinus or throat pain  NECK: No pain or stiffness  RESPIRATORY: No cough, wheezing, chills or hemoptysis; No Shortness of Breath  CARDIOVASCULAR: No chest pain, palpitations, passing out, dizziness, or leg swelling  GASTROINTESTINAL: No abdominal or epigastric pain. No nausea, vomiting, or hematemesis; No diarrhea or constipation. No melena or hematochezia.  GENITOURINARY: No dysuria, frequency, hematuria, or incontinence  NEUROLOGICAL: No headaches, memory loss, loss of strength, numbness, or tremors  SKIN: No itching, burning, rashes, or lesions   LYMPH Nodes: No enlarged glands  ENDOCRINE: No heat or cold intolerance; No hair loss  MUSCULOSKELETAL: No joint pain or swelling; No muscle, back, or extremity pain  PSYCHIATRIC: No depression, anxiety, mood swings, or difficulty sleeping  HEME/LYMPH: No easy bruising, or bleeding gums  ALLERGY AND IMMUNOLOGIC: No hives or eczema	        Vital Signs Last 24 Hrs  T(C): 37.4 (26 Jul 2021 05:15), Max: 37.4 (26 Jul 2021 05:15)  T(F): 99.4 (26 Jul 2021 05:15), Max: 99.4 (26 Jul 2021 05:15)  HR: 79 (26 Jul 2021 05:30) (72 - 141)  BP: 111/72 (26 Jul 2021 05:15) (110/50 - 157/31)  BP(mean): 62 (25 Jul 2021 18:00) (51 - 112)  RR: 19 (26 Jul 2021 05:30) (18 - 34)  SpO2: 95% (26 Jul 2021 05:30) (91% - 98%)      Constitutional:      HEENT: nad    Neck:  No JVD, bruits or thyromegaly    Respiratory:  Clear without rales or rhonchi    Cardiovascular:  RR without murmur, rub or gallop.    Gastrointestinal: Soft without hepatosplenomegaly.    Extremities: without cyanosis, clubbing or edema.    Neurological:  Oriented   x 3     . No gross sensory or motor defects.        LABS:                        8.3    7.74  )-----------( 177      ( 26 Jul 2021 04:31 )             26.8     07-26    136  |  101  |  38<H>  ----------------------------<  294<H>  4.4   |  24  |  5.70<H>    Ca    9.0      26 Jul 2021 04:31  Phos  2.6     07-26  Mg     2.4     07-26    TPro  6.6  /  Alb  2.6<L>  /  TBili  0.6  /  DBili  x   /  AST  12  /  ALT  18  /  AlkPhos  117  07-26            CAPILLARY BLOOD GLUCOSE      POCT Blood Glucose.: 389 mg/dL (26 Jul 2021 07:37)  POCT Blood Glucose.: 277 mg/dL (25 Jul 2021 21:22)  POCT Blood Glucose.: 306 mg/dL (25 Jul 2021 16:29)      RADIOLOGY & ADDITIONAL STUDIES: Equal and normal pulses (carotid, femoral, dorsalis pedis)

## 2021-08-02 ENCOUNTER — NON-APPOINTMENT (OUTPATIENT)
Age: 76
End: 2021-08-02

## 2021-08-02 ENCOUNTER — APPOINTMENT (OUTPATIENT)
Dept: OPHTHALMOLOGY | Facility: CLINIC | Age: 76
End: 2021-08-02
Payer: MEDICARE

## 2021-08-02 PROCEDURE — 92012 INTRM OPH EXAM EST PATIENT: CPT

## 2021-08-02 PROCEDURE — 92025 CPTRIZED CORNEAL TOPOGRAPHY: CPT

## 2021-08-30 ENCOUNTER — NON-APPOINTMENT (OUTPATIENT)
Age: 76
End: 2021-08-30

## 2021-08-30 ENCOUNTER — APPOINTMENT (OUTPATIENT)
Dept: OPHTHALMOLOGY | Facility: CLINIC | Age: 76
End: 2021-08-30
Payer: MEDICARE

## 2021-08-30 PROCEDURE — 92012 INTRM OPH EXAM EST PATIENT: CPT

## 2021-08-30 PROCEDURE — 92025 CPTRIZED CORNEAL TOPOGRAPHY: CPT

## 2021-08-30 PROCEDURE — 92136 OPHTHALMIC BIOMETRY: CPT

## 2021-11-03 DIAGNOSIS — Z01.818 ENCOUNTER FOR OTHER PREPROCEDURAL EXAMINATION: ICD-10-CM

## 2021-11-06 ENCOUNTER — APPOINTMENT (OUTPATIENT)
Dept: DISASTER EMERGENCY | Facility: CLINIC | Age: 76
End: 2021-11-06

## 2021-11-07 LAB — SARS-COV-2 N GENE NPH QL NAA+PROBE: NOT DETECTED

## 2021-11-09 ENCOUNTER — NON-APPOINTMENT (OUTPATIENT)
Age: 76
End: 2021-11-09

## 2021-11-09 ENCOUNTER — OUTPATIENT (OUTPATIENT)
Dept: OUTPATIENT SERVICES | Facility: HOSPITAL | Age: 76
LOS: 1 days | Discharge: HOME | End: 2021-11-09

## 2021-11-09 DIAGNOSIS — Z82.8 FAMILY HISTORY OF OTHER DISABILITIES AND CHRONIC DISEASES LEADING TO DISABLEMENT, NOT ELSEWHERE CLASSIFIED: Chronic | ICD-10-CM

## 2021-11-09 DIAGNOSIS — Z90.710 ACQUIRED ABSENCE OF BOTH CERVIX AND UTERUS: Chronic | ICD-10-CM

## 2021-11-09 DIAGNOSIS — Z98.89 OTHER SPECIFIED POSTPROCEDURAL STATES: Chronic | ICD-10-CM

## 2021-11-09 DIAGNOSIS — Z96.653 PRESENCE OF ARTIFICIAL KNEE JOINT, BILATERAL: Chronic | ICD-10-CM

## 2021-11-10 ENCOUNTER — APPOINTMENT (OUTPATIENT)
Dept: OPHTHALMOLOGY | Facility: CLINIC | Age: 76
End: 2021-11-10
Payer: MEDICARE

## 2021-11-10 ENCOUNTER — NON-APPOINTMENT (OUTPATIENT)
Age: 76
End: 2021-11-10

## 2021-11-10 PROCEDURE — 92012 INTRM OPH EXAM EST PATIENT: CPT

## 2021-11-11 ENCOUNTER — NON-APPOINTMENT (OUTPATIENT)
Age: 76
End: 2021-11-11

## 2021-11-14 DIAGNOSIS — K44.9 DIAPHRAGMATIC HERNIA WITHOUT OBSTRUCTION OR GANGRENE: ICD-10-CM

## 2021-11-14 DIAGNOSIS — M06.9 RHEUMATOID ARTHRITIS, UNSPECIFIED: ICD-10-CM

## 2021-11-14 DIAGNOSIS — K21.9 GASTRO-ESOPHAGEAL REFLUX DISEASE WITHOUT ESOPHAGITIS: ICD-10-CM

## 2021-11-14 DIAGNOSIS — I25.10 ATHEROSCLEROTIC HEART DISEASE OF NATIVE CORONARY ARTERY WITHOUT ANGINA PECTORIS: ICD-10-CM

## 2021-11-14 DIAGNOSIS — E66.9 OBESITY, UNSPECIFIED: ICD-10-CM

## 2021-11-14 DIAGNOSIS — M19.90 UNSPECIFIED OSTEOARTHRITIS, UNSPECIFIED SITE: ICD-10-CM

## 2021-11-14 DIAGNOSIS — H27.01 APHAKIA, RIGHT EYE: ICD-10-CM

## 2021-11-14 DIAGNOSIS — Z79.82 LONG TERM (CURRENT) USE OF ASPIRIN: ICD-10-CM

## 2021-11-14 DIAGNOSIS — Z98.1 ARTHRODESIS STATUS: ICD-10-CM

## 2021-11-14 DIAGNOSIS — E03.9 HYPOTHYROIDISM, UNSPECIFIED: ICD-10-CM

## 2021-11-14 DIAGNOSIS — Z94.7 CORNEAL TRANSPLANT STATUS: ICD-10-CM

## 2021-11-14 DIAGNOSIS — Z79.4 LONG TERM (CURRENT) USE OF INSULIN: ICD-10-CM

## 2021-11-14 DIAGNOSIS — I10 ESSENTIAL (PRIMARY) HYPERTENSION: ICD-10-CM

## 2021-11-14 DIAGNOSIS — Z86.718 PERSONAL HISTORY OF OTHER VENOUS THROMBOSIS AND EMBOLISM: ICD-10-CM

## 2021-11-14 DIAGNOSIS — E11.9 TYPE 2 DIABETES MELLITUS WITHOUT COMPLICATIONS: ICD-10-CM

## 2021-12-20 ENCOUNTER — NON-APPOINTMENT (OUTPATIENT)
Age: 76
End: 2021-12-20

## 2021-12-20 ENCOUNTER — APPOINTMENT (OUTPATIENT)
Dept: OPHTHALMOLOGY | Facility: CLINIC | Age: 76
End: 2021-12-20
Payer: MEDICARE

## 2021-12-20 PROCEDURE — 92012 INTRM OPH EXAM EST PATIENT: CPT

## 2021-12-31 ENCOUNTER — INPATIENT (INPATIENT)
Facility: HOSPITAL | Age: 76
LOS: 2 days | Discharge: HOME | End: 2022-01-03
Attending: INTERNAL MEDICINE | Admitting: INTERNAL MEDICINE
Payer: MEDICARE

## 2021-12-31 VITALS
RESPIRATION RATE: 20 BRPM | WEIGHT: 190.04 LBS | OXYGEN SATURATION: 97 % | HEART RATE: 114 BPM | SYSTOLIC BLOOD PRESSURE: 118 MMHG | TEMPERATURE: 98 F | DIASTOLIC BLOOD PRESSURE: 84 MMHG | HEIGHT: 66 IN

## 2021-12-31 DIAGNOSIS — Z96.653 PRESENCE OF ARTIFICIAL KNEE JOINT, BILATERAL: Chronic | ICD-10-CM

## 2021-12-31 DIAGNOSIS — Z90.710 ACQUIRED ABSENCE OF BOTH CERVIX AND UTERUS: Chronic | ICD-10-CM

## 2021-12-31 DIAGNOSIS — Z82.8 FAMILY HISTORY OF OTHER DISABILITIES AND CHRONIC DISEASES LEADING TO DISABLEMENT, NOT ELSEWHERE CLASSIFIED: Chronic | ICD-10-CM

## 2021-12-31 DIAGNOSIS — Z98.89 OTHER SPECIFIED POSTPROCEDURAL STATES: Chronic | ICD-10-CM

## 2021-12-31 LAB
ALBUMIN SERPL ELPH-MCNC: 3.4 G/DL — SIGNIFICANT CHANGE UP (ref 3.3–5)
ALP SERPL-CCNC: 100 U/L — SIGNIFICANT CHANGE UP (ref 40–120)
ALT FLD-CCNC: 13 U/L — SIGNIFICANT CHANGE UP (ref 12–78)
ANION GAP SERPL CALC-SCNC: 6 MMOL/L — SIGNIFICANT CHANGE UP (ref 5–17)
APPEARANCE UR: CLEAR — SIGNIFICANT CHANGE UP
AST SERPL-CCNC: 16 U/L — SIGNIFICANT CHANGE UP (ref 15–37)
BACTERIA # UR AUTO: ABNORMAL
BASOPHILS # BLD AUTO: 0.02 K/UL — SIGNIFICANT CHANGE UP (ref 0–0.2)
BASOPHILS NFR BLD AUTO: 0.4 % — SIGNIFICANT CHANGE UP (ref 0–2)
BILIRUB SERPL-MCNC: 0.4 MG/DL — SIGNIFICANT CHANGE UP (ref 0.2–1.2)
BILIRUB UR-MCNC: NEGATIVE — SIGNIFICANT CHANGE UP
BUN SERPL-MCNC: 6 MG/DL — LOW (ref 7–23)
CALCIUM SERPL-MCNC: 8.5 MG/DL — SIGNIFICANT CHANGE UP (ref 8.5–10.1)
CHLORIDE SERPL-SCNC: 104 MMOL/L — SIGNIFICANT CHANGE UP (ref 96–108)
CO2 SERPL-SCNC: 27 MMOL/L — SIGNIFICANT CHANGE UP (ref 22–31)
COLOR SPEC: YELLOW — SIGNIFICANT CHANGE UP
CREAT SERPL-MCNC: 1.03 MG/DL — SIGNIFICANT CHANGE UP (ref 0.5–1.3)
D DIMER BLD IA.RAPID-MCNC: 234 NG/ML DDU — HIGH
DIFF PNL FLD: NEGATIVE — SIGNIFICANT CHANGE UP
EOSINOPHIL # BLD AUTO: 0.04 K/UL — SIGNIFICANT CHANGE UP (ref 0–0.5)
EOSINOPHIL NFR BLD AUTO: 0.7 % — SIGNIFICANT CHANGE UP (ref 0–6)
EPI CELLS # UR: SIGNIFICANT CHANGE UP
FLUAV AG NPH QL: SIGNIFICANT CHANGE UP
FLUBV AG NPH QL: SIGNIFICANT CHANGE UP
GLUCOSE BLDC GLUCOMTR-MCNC: 174 MG/DL — HIGH (ref 70–99)
GLUCOSE SERPL-MCNC: 110 MG/DL — HIGH (ref 70–99)
GLUCOSE UR QL: NEGATIVE MG/DL — SIGNIFICANT CHANGE UP
HCT VFR BLD CALC: 30.5 % — LOW (ref 34.5–45)
HGB BLD-MCNC: 9.1 G/DL — LOW (ref 11.5–15.5)
IMM GRANULOCYTES NFR BLD AUTO: 0.2 % — SIGNIFICANT CHANGE UP (ref 0–1.5)
KETONES UR-MCNC: NEGATIVE — SIGNIFICANT CHANGE UP
LACTATE SERPL-SCNC: 1.4 MMOL/L — SIGNIFICANT CHANGE UP (ref 0.7–2)
LEUKOCYTE ESTERASE UR-ACNC: ABNORMAL
LYMPHOCYTES # BLD AUTO: 2.6 K/UL — SIGNIFICANT CHANGE UP (ref 1–3.3)
LYMPHOCYTES # BLD AUTO: 48 % — HIGH (ref 13–44)
MCHC RBC-ENTMCNC: 23.2 PG — LOW (ref 27–34)
MCHC RBC-ENTMCNC: 29.8 GM/DL — LOW (ref 32–36)
MCV RBC AUTO: 77.6 FL — LOW (ref 80–100)
MONOCYTES # BLD AUTO: 0.65 K/UL — SIGNIFICANT CHANGE UP (ref 0–0.9)
MONOCYTES NFR BLD AUTO: 12 % — SIGNIFICANT CHANGE UP (ref 2–14)
NEUTROPHILS # BLD AUTO: 2.1 K/UL — SIGNIFICANT CHANGE UP (ref 1.8–7.4)
NEUTROPHILS NFR BLD AUTO: 38.7 % — LOW (ref 43–77)
NITRITE UR-MCNC: NEGATIVE — SIGNIFICANT CHANGE UP
NRBC # BLD: 0 /100 WBCS — SIGNIFICANT CHANGE UP (ref 0–0)
PH UR: 6 — SIGNIFICANT CHANGE UP (ref 5–8)
PLATELET # BLD AUTO: 303 K/UL — SIGNIFICANT CHANGE UP (ref 150–400)
POTASSIUM SERPL-MCNC: 4.3 MMOL/L — SIGNIFICANT CHANGE UP (ref 3.5–5.3)
POTASSIUM SERPL-SCNC: 4.3 MMOL/L — SIGNIFICANT CHANGE UP (ref 3.5–5.3)
PROT SERPL-MCNC: 7.7 GM/DL — SIGNIFICANT CHANGE UP (ref 6–8.3)
PROT UR-MCNC: 30 MG/DL
RBC # BLD: 3.93 M/UL — SIGNIFICANT CHANGE UP (ref 3.8–5.2)
RBC # FLD: 21.2 % — HIGH (ref 10.3–14.5)
SARS-COV-2 RNA SPEC QL NAA+PROBE: SIGNIFICANT CHANGE UP
SODIUM SERPL-SCNC: 137 MMOL/L — SIGNIFICANT CHANGE UP (ref 135–145)
SP GR SPEC: 1.01 — SIGNIFICANT CHANGE UP (ref 1.01–1.02)
TROPONIN I, HIGH SENSITIVITY RESULT: 11 NG/L — SIGNIFICANT CHANGE UP
UROBILINOGEN FLD QL: NEGATIVE MG/DL — SIGNIFICANT CHANGE UP
WBC # BLD: 5.42 K/UL — SIGNIFICANT CHANGE UP (ref 3.8–10.5)
WBC # FLD AUTO: 5.42 K/UL — SIGNIFICANT CHANGE UP (ref 3.8–10.5)
WBC UR QL: ABNORMAL

## 2021-12-31 PROCEDURE — 74176 CT ABD & PELVIS W/O CONTRAST: CPT | Mod: 26,MA

## 2021-12-31 PROCEDURE — 93010 ELECTROCARDIOGRAM REPORT: CPT

## 2021-12-31 PROCEDURE — 99222 1ST HOSP IP/OBS MODERATE 55: CPT

## 2021-12-31 PROCEDURE — 99285 EMERGENCY DEPT VISIT HI MDM: CPT

## 2021-12-31 PROCEDURE — 71045 X-RAY EXAM CHEST 1 VIEW: CPT | Mod: 26

## 2021-12-31 RX ORDER — ENOXAPARIN SODIUM 100 MG/ML
40 INJECTION SUBCUTANEOUS DAILY
Refills: 0 | Status: DISCONTINUED | OUTPATIENT
Start: 2021-12-31 | End: 2022-01-03

## 2021-12-31 RX ORDER — INSULIN LISPRO 100/ML
VIAL (ML) SUBCUTANEOUS
Refills: 0 | Status: DISCONTINUED | OUTPATIENT
Start: 2021-12-31 | End: 2022-01-03

## 2021-12-31 RX ORDER — DEXTROSE 50 % IN WATER 50 %
25 SYRINGE (ML) INTRAVENOUS ONCE
Refills: 0 | Status: DISCONTINUED | OUTPATIENT
Start: 2021-12-31 | End: 2022-01-03

## 2021-12-31 RX ORDER — MORPHINE SULFATE 50 MG/1
2 CAPSULE, EXTENDED RELEASE ORAL ONCE
Refills: 0 | Status: DISCONTINUED | OUTPATIENT
Start: 2021-12-31 | End: 2021-12-31

## 2021-12-31 RX ORDER — DEXTROSE 50 % IN WATER 50 %
12.5 SYRINGE (ML) INTRAVENOUS ONCE
Refills: 0 | Status: DISCONTINUED | OUTPATIENT
Start: 2021-12-31 | End: 2022-01-03

## 2021-12-31 RX ORDER — ACETAMINOPHEN 500 MG
650 TABLET ORAL ONCE
Refills: 0 | Status: COMPLETED | OUTPATIENT
Start: 2021-12-31 | End: 2021-12-31

## 2021-12-31 RX ORDER — INSULIN LISPRO 100/ML
VIAL (ML) SUBCUTANEOUS AT BEDTIME
Refills: 0 | Status: DISCONTINUED | OUTPATIENT
Start: 2021-12-31 | End: 2022-01-03

## 2021-12-31 RX ORDER — SODIUM CHLORIDE 9 MG/ML
1000 INJECTION, SOLUTION INTRAVENOUS
Refills: 0 | Status: DISCONTINUED | OUTPATIENT
Start: 2021-12-31 | End: 2022-01-03

## 2021-12-31 RX ORDER — ACETAMINOPHEN 500 MG
975 TABLET ORAL ONCE
Refills: 0 | Status: COMPLETED | OUTPATIENT
Start: 2021-12-31 | End: 2021-12-31

## 2021-12-31 RX ORDER — INSULIN GLARGINE 100 [IU]/ML
10 INJECTION, SOLUTION SUBCUTANEOUS AT BEDTIME
Refills: 0 | Status: DISCONTINUED | OUTPATIENT
Start: 2021-12-31 | End: 2022-01-03

## 2021-12-31 RX ORDER — ONDANSETRON 8 MG/1
4 TABLET, FILM COATED ORAL EVERY 8 HOURS
Refills: 0 | Status: DISCONTINUED | OUTPATIENT
Start: 2021-12-31 | End: 2022-01-03

## 2021-12-31 RX ORDER — DEXTROSE 50 % IN WATER 50 %
15 SYRINGE (ML) INTRAVENOUS ONCE
Refills: 0 | Status: DISCONTINUED | OUTPATIENT
Start: 2021-12-31 | End: 2022-01-03

## 2021-12-31 RX ORDER — ACETAMINOPHEN 500 MG
650 TABLET ORAL EVERY 6 HOURS
Refills: 0 | Status: DISCONTINUED | OUTPATIENT
Start: 2021-12-31 | End: 2022-01-03

## 2021-12-31 RX ORDER — CEFTRIAXONE 500 MG/1
1000 INJECTION, POWDER, FOR SOLUTION INTRAMUSCULAR; INTRAVENOUS ONCE
Refills: 0 | Status: COMPLETED | OUTPATIENT
Start: 2021-12-31 | End: 2021-12-31

## 2021-12-31 RX ORDER — SODIUM CHLORIDE 9 MG/ML
1000 INJECTION INTRAMUSCULAR; INTRAVENOUS; SUBCUTANEOUS ONCE
Refills: 0 | Status: COMPLETED | OUTPATIENT
Start: 2021-12-31 | End: 2021-12-31

## 2021-12-31 RX ORDER — GLUCAGON INJECTION, SOLUTION 0.5 MG/.1ML
1 INJECTION, SOLUTION SUBCUTANEOUS ONCE
Refills: 0 | Status: DISCONTINUED | OUTPATIENT
Start: 2021-12-31 | End: 2022-01-03

## 2021-12-31 RX ADMIN — SODIUM CHLORIDE 2000 MILLILITER(S): 9 INJECTION INTRAMUSCULAR; INTRAVENOUS; SUBCUTANEOUS at 19:02

## 2021-12-31 RX ADMIN — Medication 975 MILLIGRAM(S): at 19:01

## 2021-12-31 RX ADMIN — MORPHINE SULFATE 2 MILLIGRAM(S): 50 CAPSULE, EXTENDED RELEASE ORAL at 19:01

## 2021-12-31 RX ADMIN — CEFTRIAXONE 100 MILLIGRAM(S): 500 INJECTION, POWDER, FOR SOLUTION INTRAMUSCULAR; INTRAVENOUS at 21:54

## 2021-12-31 RX ADMIN — Medication 650 MILLIGRAM(S): at 21:01

## 2021-12-31 NOTE — H&P ADULT - ASSESSMENT
75 y/o obese female w/ PMHx of HTN, HLD, Chronic back pain, Peripheral Neuropathy, Hypothyroidism, Microcytic anemia, presents to the ED c/o worsening back and abdominal pain. Pt being admitted for UTI    1) UTI  - c/w Rocephin  - f/u urine culture  - Pyridium  - CT scan, no renal stone    2) CBP  - pt is on Morphine 60mg BID; reports while at [facility] she did not get any; likely why pain worse from baseline  - pt would like to be tapered off; will place on 30mg BID, cont to monitor pain level  - outpt f/u w/ pain management for continued taper    3) DM type 2  - FS qAC and HS w/ Lantus and SSI- f/u A1c    4) Microcytic anemia  - chronic stable at baseline  - f/u anemia studies  - cont to monitor  - pt is a [employer], will never want a blood transfusion    5) Hypothyroidism  - c/w synthroid    6) DVT ppx - Lovenox subq    Pt DNR but not DNI

## 2021-12-31 NOTE — ED PROVIDER NOTE - OBJECTIVE STATEMENT
77yo female with pmh of DM., HTN, HLD, hypothyroidism presents for lower abd pain with back pain x few days. States she was recently hospitalized for urine infection and discharged just two days ago. She completed her antibiotic course but is still having pain in the lower abdomen and R lower back. Also reports generalized malaise. Denies dysuria, urinary retention, nausea/vomiting. Pt also reports having some chest pain that is intermittent and on the L side x weeks. Last episode last night. Currently not having pain. Denies associated sob, dizziness, headache, and other associated sx.

## 2021-12-31 NOTE — H&P ADULT - HISTORY OF PRESENT ILLNESS
75 y/o obese female w/ PMHx of HTN, HLD, Chronic back pain, Peripheral Neuropathy, Hypothyroidism presents to the ED c/o worsening back and abdominal pain. Pt reports 1 wk ago she went in the [facility] due to dysuria and same pain, was admitted for UTI and "told to leave the hospital" by her doctor. Pt reports she had worsening abdominal pain      75 y/o obese female w/ PMHx of HTN, HLD, Chronic back pain, Peripheral Neuropathy, Hypothyroidism, Microcytic anemia, presents to the ED c/o worsening back and abdominal pain. Pt reports 1 wk ago she went in the [facility] due to dysuria and same pain, was admitted for UTI and "told to leave the hospital" by her doctor. Pt reports she had worsening abdominal pain, dysuria despite abx thus presented back to the ED. Pt also reports n/v, dec PO intake for the past 2 days, and sub fevers. Pt denies diarrhea, cp, or dizziness.    In ED pt tachy to 114, T max 100.3 rest of vitals stable. For sig Labs see below. CT renal stone

## 2021-12-31 NOTE — ED PROVIDER NOTE - NS ED ROS FT
Constitutional: (-) Fever, (-) Anorexia, (-) Generalized Malaise  Eyes: (-)Discharge, (-) Irritation,  (-) Visual changes  EARS: (-) Ear Pain, (-) Apparent hearing changes  NOSE: (-) Congestion, (-) Bloody nose  MOUTH/THROAT: (-) Vocal Changes, (-) Drooling, (-) Sore throat  NECK: (-) Lumps, (-) Stiffness, (-) Pain  CV: (-) Chest Pain, (-) Palpitations, (-) Edema   RESP:  (-) Cough, (-) SOB, (-) ALEJO,  (-) Wheezing  GI: (-) Nausea, (-) Vomiting, (+) Abdominal Pain, (-) Diarrhea, (-) Constipation, (-) Bloody stools  : (-) Dysuria, (-) Frequency, (-) Hematuria, (-) Incontinence  MSK: (-) Joint Pain, (+) Back Pain, (-) Deformities  SKIN: (-) Wounds, (-) Color change, (-)Rash, (-) Swelling  NEURO:(-) Headache, (-) Dizziness, (-) Numbness/Tingling,  (-)LOC

## 2021-12-31 NOTE — H&P ADULT - NSHPLABSRESULTS_GEN_ALL_CORE
T(C): 37.2 (22 @ 07:50), Max: 37.9 (21 @ 20:12)  HR: 86 (22 @ 07:50) (80 - 114)  BP: 157/92 (22 @ 07:50) (118/84 - 163/95)  RR: 18 (22 @ 07:50) (15 - 20)  SpO2: 96% (22 @ 07:50) (96% - 98%)                        9.9    4.73  )-----------( 303      ( 2022 08:30 )             33.1         137  |  104  |  6<L>  ----------------------------<  110<H>  4.3   |  27  |  1.03    Ca    8.5      31 Dec 2021 19:38    TPro  7.7  /  Alb  3.4  /  TBili  0.4  /  DBili  x   /  AST  16  /  ALT  13  /  AlkPhos  100      LIVER FUNCTIONS - ( 31 Dec 2021 19:38 )  Alb: 3.4 g/dL / Pro: 7.7 gm/dL / ALK PHOS: 100 U/L / ALT: 13 U/L / AST: 16 U/L / GGT: x             Urinalysis Basic - ( 31 Dec 2021 20:58 )    Color: Yellow / Appearance: Clear / S.010 / pH: x  Gluc: x / Ketone: Negative  / Bili: Negative / Urobili: Negative mg/dL   Blood: x / Protein: 30 mg/dL / Nitrite: Negative   Leuk Esterase: Small / RBC: x / WBC 6-10   Sq Epi: x / Non Sq Epi: Few / Bacteria: Many      < from: CT Renal Stone Hunt (21 @ 19:34) >    IMPRESSION:  No evidence of obstructive uropathy.        --- End of Report ---    < end of copied text >      acetaminophen     Tablet .. 650 milliGRAM(s) Oral every 6 hours PRN  aspirin enteric coated 81 milliGRAM(s) Oral daily  dextrose 40% Gel 15 Gram(s) Oral once  dextrose 5%. 1000 milliLiter(s) IV Continuous <Continuous>  dextrose 5%. 1000 milliLiter(s) IV Continuous <Continuous>  dextrose 50% Injectable 25 Gram(s) IV Push once  dextrose 50% Injectable 12.5 Gram(s) IV Push once  dextrose 50% Injectable 25 Gram(s) IV Push once  enoxaparin Injectable 40 milliGRAM(s) SubCutaneous daily  gabapentin 600 milliGRAM(s) Oral three times a day  glucagon  Injectable 1 milliGRAM(s) IntraMuscular once  insulin glargine Injectable (LANTUS) 10 Unit(s) SubCutaneous at bedtime  insulin lispro (ADMELOG) corrective regimen sliding scale   SubCutaneous three times a day before meals  insulin lispro (ADMELOG) corrective regimen sliding scale   SubCutaneous at bedtime  levothyroxine 175 MICROGram(s) Oral daily  melatonin 6 milliGRAM(s) Oral at bedtime PRN  morphine ER Tablet 30 milliGRAM(s) Oral two times a day  ondansetron Injectable 4 milliGRAM(s) IV Push every 8 hours PRN  pantoprazole    Tablet 40 milliGRAM(s) Oral before breakfast  phenazopyridine 100 milliGRAM(s) Oral every 8 hours  polyethylene glycol 3350 17 Gram(s) Oral daily PRN  simvastatin 20 milliGRAM(s) Oral at bedtime

## 2021-12-31 NOTE — ED ADULT TRIAGE NOTE - AS HEIGHT TYPE
Alert and oriented to person, place and time, memory intact, behavior appropriate to situation, PERRL.
stated

## 2021-12-31 NOTE — ED PROVIDER NOTE - CLINICAL SUMMARY MEDICAL DECISION MAKING FREE TEXT BOX
75yo female with pmh of DM., HTN, HLD, hypothyroidism presents for lower abd pain with back pain x few days. + bladder tenderness and R CVA tenderness. Will get rectal temp, labs and UA. Anticipate admission for peristent uti

## 2021-12-31 NOTE — ED PROVIDER NOTE - PHYSICAL EXAMINATION
PE:   GEN: Awake, alert, interactive, NAD, non-toxic appearing.   HEAD AND NECK: NC/AT. Airway patent. Neck supple.   EYES: Clear b/l. PERRL  CARDIAC: RRR. S1, S2. No evident pedal edema.    RESP: Normal respiratory effort with no use of accessory muscles or retractions. Clear throughout on auscultation.  ABD: soft, non-distended, + bladder tenderness. No rebound, no guarding. + R CVA tenderness  NEURO: AOx3, CN II-XII grossly intact, no focal deficits.   MSK: Moving all extremities with no apparent deformities.   SKIN: Warm, dry, intact normal color

## 2021-12-31 NOTE — ED ADULT TRIAGE NOTE - CHIEF COMPLAINT QUOTE
generalized weakness, fever x 1 week on and off, with n/v/d. was seen and treated in [place] last week for UTI. also complaining of lower abdominal and lower back pain  hx of dm, htn, hypothyroidism

## 2021-12-31 NOTE — H&P ADULT - NSHPPHYSICALEXAM_GEN_ALL_CORE
PHYSICAL EXAM:    Vital Signs Last 24 Hrs  T(C): 37.2 (01 Jan 2022 07:50), Max: 37.9 (31 Dec 2021 20:12)  T(F): 98.9 (01 Jan 2022 07:50), Max: 100.3 (31 Dec 2021 20:12)  HR: 86 (01 Jan 2022 07:50) (80 - 114)  BP: 157/92 (01 Jan 2022 07:50) (118/84 - 163/95)  BP(mean): 114 (01 Jan 2022 07:50) (114 - 114)  RR: 18 (01 Jan 2022 07:50) (15 - 20)  SpO2: 96% (01 Jan 2022 07:50) (96% - 98%)    GENERAL: Pt lying in bed comfortably in NAD  HEENT:  Atraumatic, EOMI, PERRL, conjunctiva and sclera clear, MMM  NECK: Supple, No JVD  CHEST/LUNG: Clear to auscultation bilaterally; No rales, rhonchi, wheezing or rubs. Unlabored respirations  HEART: Regular rate and rhythm; No murmurs, rubs, or gallops  ABDOMEN: Bowel sounds present; Soft, Nontender, Nondistended. No guarding or rigidity    EXTREMITIES:  2+ Peripheral Pulses, brisk capillary refill. No clubbing, cyanosis, or edema  NEUROLOGICAL:  Alert & Oriented X3, speech clear. Answers questions appropriately. Full and equal strength B/L upper and lower extremities. No deficits   MSK: FROM x 4 extremities   SKIN: No rashes or lesions

## 2022-01-01 ENCOUNTER — APPOINTMENT (OUTPATIENT)
Dept: DISASTER EMERGENCY | Facility: CLINIC | Age: 77
End: 2022-01-01

## 2022-01-01 LAB
A1C WITH ESTIMATED AVERAGE GLUCOSE RESULT: 6.6 % — HIGH (ref 4–5.6)
ALBUMIN SERPL ELPH-MCNC: 3.7 G/DL — SIGNIFICANT CHANGE UP (ref 3.3–5)
ALP SERPL-CCNC: 108 U/L — SIGNIFICANT CHANGE UP (ref 40–120)
ALT FLD-CCNC: 14 U/L — SIGNIFICANT CHANGE UP (ref 12–78)
ANION GAP SERPL CALC-SCNC: 6 MMOL/L — SIGNIFICANT CHANGE UP (ref 5–17)
AST SERPL-CCNC: 17 U/L — SIGNIFICANT CHANGE UP (ref 15–37)
BASOPHILS # BLD AUTO: 0.02 K/UL — SIGNIFICANT CHANGE UP (ref 0–0.2)
BASOPHILS NFR BLD AUTO: 0.4 % — SIGNIFICANT CHANGE UP (ref 0–2)
BILIRUB SERPL-MCNC: 0.3 MG/DL — SIGNIFICANT CHANGE UP (ref 0.2–1.2)
BUN SERPL-MCNC: 5 MG/DL — LOW (ref 7–23)
CALCIUM SERPL-MCNC: 9.2 MG/DL — SIGNIFICANT CHANGE UP (ref 8.5–10.1)
CHLORIDE SERPL-SCNC: 105 MMOL/L — SIGNIFICANT CHANGE UP (ref 96–108)
CO2 SERPL-SCNC: 26 MMOL/L — SIGNIFICANT CHANGE UP (ref 22–31)
CREAT SERPL-MCNC: 0.78 MG/DL — SIGNIFICANT CHANGE UP (ref 0.5–1.3)
EOSINOPHIL # BLD AUTO: 0.09 K/UL — SIGNIFICANT CHANGE UP (ref 0–0.5)
EOSINOPHIL NFR BLD AUTO: 1.9 % — SIGNIFICANT CHANGE UP (ref 0–6)
ESTIMATED AVERAGE GLUCOSE: 143 MG/DL — HIGH (ref 68–114)
FERRITIN SERPL-MCNC: 53 NG/ML — SIGNIFICANT CHANGE UP (ref 15–150)
FOLATE SERPL-MCNC: >20 NG/ML — SIGNIFICANT CHANGE UP
GLUCOSE BLDC GLUCOMTR-MCNC: 105 MG/DL — HIGH (ref 70–99)
GLUCOSE BLDC GLUCOMTR-MCNC: 106 MG/DL — HIGH (ref 70–99)
GLUCOSE BLDC GLUCOMTR-MCNC: 117 MG/DL — HIGH (ref 70–99)
GLUCOSE BLDC GLUCOMTR-MCNC: 152 MG/DL — HIGH (ref 70–99)
GLUCOSE SERPL-MCNC: 111 MG/DL — HIGH (ref 70–99)
HCG SERPL-ACNC: <1 MIU/ML — SIGNIFICANT CHANGE UP
HCT VFR BLD CALC: 33.1 % — LOW (ref 34.5–45)
HGB BLD-MCNC: 9.9 G/DL — LOW (ref 11.5–15.5)
IMM GRANULOCYTES NFR BLD AUTO: 0.2 % — SIGNIFICANT CHANGE UP (ref 0–1.5)
IRON SATN MFR SERPL: 25 UG/DL — LOW (ref 30–160)
IRON SATN MFR SERPL: 6 % — LOW (ref 14–50)
LYMPHOCYTES # BLD AUTO: 2.05 K/UL — SIGNIFICANT CHANGE UP (ref 1–3.3)
LYMPHOCYTES # BLD AUTO: 43.3 % — SIGNIFICANT CHANGE UP (ref 13–44)
MCHC RBC-ENTMCNC: 23.1 PG — LOW (ref 27–34)
MCHC RBC-ENTMCNC: 29.9 GM/DL — LOW (ref 32–36)
MCV RBC AUTO: 77.3 FL — LOW (ref 80–100)
MONOCYTES # BLD AUTO: 0.53 K/UL — SIGNIFICANT CHANGE UP (ref 0–0.9)
MONOCYTES NFR BLD AUTO: 11.2 % — SIGNIFICANT CHANGE UP (ref 2–14)
NEUTROPHILS # BLD AUTO: 2.03 K/UL — SIGNIFICANT CHANGE UP (ref 1.8–7.4)
NEUTROPHILS NFR BLD AUTO: 43 % — SIGNIFICANT CHANGE UP (ref 43–77)
NRBC # BLD: 0 /100 WBCS — SIGNIFICANT CHANGE UP (ref 0–0)
PLATELET # BLD AUTO: 303 K/UL — SIGNIFICANT CHANGE UP (ref 150–400)
POTASSIUM SERPL-MCNC: 4.1 MMOL/L — SIGNIFICANT CHANGE UP (ref 3.5–5.3)
POTASSIUM SERPL-SCNC: 4.1 MMOL/L — SIGNIFICANT CHANGE UP (ref 3.5–5.3)
PROT SERPL-MCNC: 8.1 GM/DL — SIGNIFICANT CHANGE UP (ref 6–8.3)
RBC # BLD: 4.28 M/UL — SIGNIFICANT CHANGE UP (ref 3.8–5.2)
RBC # FLD: 21.2 % — HIGH (ref 10.3–14.5)
SODIUM SERPL-SCNC: 137 MMOL/L — SIGNIFICANT CHANGE UP (ref 135–145)
TIBC SERPL-MCNC: 392 UG/DL — SIGNIFICANT CHANGE UP (ref 220–430)
TROPONIN I, HIGH SENSITIVITY RESULT: 13.2 NG/L — SIGNIFICANT CHANGE UP
UIBC SERPL-MCNC: 368 UG/DL — SIGNIFICANT CHANGE UP (ref 110–370)
WBC # BLD: 4.73 K/UL — SIGNIFICANT CHANGE UP (ref 3.8–10.5)
WBC # FLD AUTO: 4.73 K/UL — SIGNIFICANT CHANGE UP (ref 3.8–10.5)

## 2022-01-01 PROCEDURE — 99232 SBSQ HOSP IP/OBS MODERATE 35: CPT

## 2022-01-01 RX ORDER — AMLODIPINE BESYLATE 2.5 MG/1
5 TABLET ORAL DAILY
Refills: 0 | Status: DISCONTINUED | OUTPATIENT
Start: 2022-01-01 | End: 2022-01-03

## 2022-01-01 RX ORDER — MORPHINE SULFATE 50 MG/1
30 CAPSULE, EXTENDED RELEASE ORAL
Refills: 0 | Status: DISCONTINUED | OUTPATIENT
Start: 2022-01-01 | End: 2022-01-03

## 2022-01-01 RX ORDER — PHENAZOPYRIDINE HCL 100 MG
100 TABLET ORAL EVERY 8 HOURS
Refills: 0 | Status: DISCONTINUED | OUTPATIENT
Start: 2022-01-01 | End: 2022-01-03

## 2022-01-01 RX ORDER — FAMOTIDINE 10 MG/ML
20 INJECTION INTRAVENOUS ONCE
Refills: 0 | Status: COMPLETED | OUTPATIENT
Start: 2022-01-01 | End: 2022-01-01

## 2022-01-01 RX ORDER — INSULIN GLARGINE 100 [IU]/ML
8 INJECTION, SOLUTION SUBCUTANEOUS
Qty: 0 | Refills: 0 | DISCHARGE

## 2022-01-01 RX ORDER — GABAPENTIN 400 MG/1
600 CAPSULE ORAL THREE TIMES A DAY
Refills: 0 | Status: DISCONTINUED | OUTPATIENT
Start: 2022-01-01 | End: 2022-01-03

## 2022-01-01 RX ORDER — LEVOTHYROXINE SODIUM 125 MCG
175 TABLET ORAL DAILY
Refills: 0 | Status: DISCONTINUED | OUTPATIENT
Start: 2022-01-01 | End: 2022-01-03

## 2022-01-01 RX ORDER — PANTOPRAZOLE SODIUM 20 MG/1
40 TABLET, DELAYED RELEASE ORAL
Refills: 0 | Status: DISCONTINUED | OUTPATIENT
Start: 2022-01-01 | End: 2022-01-03

## 2022-01-01 RX ORDER — SIMVASTATIN 20 MG/1
20 TABLET, FILM COATED ORAL AT BEDTIME
Refills: 0 | Status: DISCONTINUED | OUTPATIENT
Start: 2022-01-01 | End: 2022-01-03

## 2022-01-01 RX ORDER — ASPIRIN/CALCIUM CARB/MAGNESIUM 324 MG
81 TABLET ORAL DAILY
Refills: 0 | Status: DISCONTINUED | OUTPATIENT
Start: 2022-01-01 | End: 2022-01-03

## 2022-01-01 RX ORDER — OXYCODONE HYDROCHLORIDE 5 MG/1
5 TABLET ORAL EVERY 6 HOURS
Refills: 0 | Status: DISCONTINUED | OUTPATIENT
Start: 2022-01-01 | End: 2022-01-03

## 2022-01-01 RX ORDER — POLYETHYLENE GLYCOL 3350 17 G/17G
17 POWDER, FOR SOLUTION ORAL DAILY
Refills: 0 | Status: DISCONTINUED | OUTPATIENT
Start: 2022-01-01 | End: 2022-01-03

## 2022-01-01 RX ORDER — LANOLIN ALCOHOL/MO/W.PET/CERES
6 CREAM (GRAM) TOPICAL AT BEDTIME
Refills: 0 | Status: DISCONTINUED | OUTPATIENT
Start: 2022-01-01 | End: 2022-01-03

## 2022-01-01 RX ADMIN — MORPHINE SULFATE 30 MILLIGRAM(S): 50 CAPSULE, EXTENDED RELEASE ORAL at 21:27

## 2022-01-01 RX ADMIN — Medication 100 MILLIGRAM(S): at 14:34

## 2022-01-01 RX ADMIN — Medication 650 MILLIGRAM(S): at 11:19

## 2022-01-01 RX ADMIN — PANTOPRAZOLE SODIUM 40 MILLIGRAM(S): 20 TABLET, DELAYED RELEASE ORAL at 08:03

## 2022-01-01 RX ADMIN — MORPHINE SULFATE 2 MILLIGRAM(S): 50 CAPSULE, EXTENDED RELEASE ORAL at 00:01

## 2022-01-01 RX ADMIN — Medication 81 MILLIGRAM(S): at 12:14

## 2022-01-01 RX ADMIN — Medication 100 MILLIGRAM(S): at 21:28

## 2022-01-01 RX ADMIN — INSULIN GLARGINE 10 UNIT(S): 100 INJECTION, SOLUTION SUBCUTANEOUS at 00:34

## 2022-01-01 RX ADMIN — Medication 650 MILLIGRAM(S): at 03:22

## 2022-01-01 RX ADMIN — Medication 175 MICROGRAM(S): at 06:01

## 2022-01-01 RX ADMIN — ENOXAPARIN SODIUM 40 MILLIGRAM(S): 100 INJECTION SUBCUTANEOUS at 12:14

## 2022-01-01 RX ADMIN — SIMVASTATIN 20 MILLIGRAM(S): 20 TABLET, FILM COATED ORAL at 21:28

## 2022-01-01 RX ADMIN — Medication 650 MILLIGRAM(S): at 11:49

## 2022-01-01 RX ADMIN — GABAPENTIN 600 MILLIGRAM(S): 400 CAPSULE ORAL at 14:21

## 2022-01-01 RX ADMIN — AMLODIPINE BESYLATE 5 MILLIGRAM(S): 2.5 TABLET ORAL at 11:19

## 2022-01-01 RX ADMIN — OXYCODONE HYDROCHLORIDE 5 MILLIGRAM(S): 5 TABLET ORAL at 15:52

## 2022-01-01 RX ADMIN — Medication 1: at 17:15

## 2022-01-01 RX ADMIN — INSULIN GLARGINE 10 UNIT(S): 100 INJECTION, SOLUTION SUBCUTANEOUS at 22:27

## 2022-01-01 RX ADMIN — FAMOTIDINE 20 MILLIGRAM(S): 10 INJECTION INTRAVENOUS at 01:25

## 2022-01-01 RX ADMIN — GABAPENTIN 600 MILLIGRAM(S): 400 CAPSULE ORAL at 21:28

## 2022-01-01 RX ADMIN — MORPHINE SULFATE 30 MILLIGRAM(S): 50 CAPSULE, EXTENDED RELEASE ORAL at 06:01

## 2022-01-01 RX ADMIN — OXYCODONE HYDROCHLORIDE 5 MILLIGRAM(S): 5 TABLET ORAL at 15:22

## 2022-01-01 NOTE — PATIENT PROFILE ADULT - NSPROREFERSVCHOMEDIABETES_GEN_A_NUR
no Principal Discharge DX:	Gastroenteritis  Assessment and plan of treatment:	FOBT neg. Stable. Improving. Workup acceptable except for CRP 25.   F/U with GI.

## 2022-01-01 NOTE — PATIENT PROFILE ADULT - FALL HARM RISK - HARM RISK INTERVENTIONS

## 2022-01-01 NOTE — PROGRESS NOTE ADULT - ASSESSMENT
77 y/o obese female w/ PMHx of HTN, HLD, Chronic back pain, Peripheral Neuropathy, Hypothyroidism, Microcytic anemia, presents to the ED c/o worsening back and abdominal pain. Pt being admitted for UTI    1) UTI  - c/w Rocephin  - f/u urine culture  - Pyridium  - CT scan, no renal stone    2) CBP  - pt is on Morphine 60mg BID; reports while at [facility] she did not get any; likely why pain worse from baseline  - pt would like to be tapered off; will place on 30mg BID, cont to monitor pain level  - Add oxy prn   - outpt f/u w/ pain management for continued taper    3) DM type 2  - FS qAC and HS w/ Lantus and SSI- f/u A1c    4) Microcytic anemia  - chronic stable at baseline  - f/u anemia studies  - cont to monitor  - pt is a [employer], will never want a blood transfusion    5) Hypothyroidism  - c/w synthroid    6) DVT ppx - Lovenox subq    Pt DNR but not DNI

## 2022-01-02 LAB
CULTURE RESULTS: SIGNIFICANT CHANGE UP
GLUCOSE BLDC GLUCOMTR-MCNC: 104 MG/DL — HIGH (ref 70–99)
GLUCOSE BLDC GLUCOMTR-MCNC: 121 MG/DL — HIGH (ref 70–99)
GLUCOSE BLDC GLUCOMTR-MCNC: 159 MG/DL — HIGH (ref 70–99)
GLUCOSE BLDC GLUCOMTR-MCNC: 172 MG/DL — HIGH (ref 70–99)
SPECIMEN SOURCE: SIGNIFICANT CHANGE UP

## 2022-01-02 PROCEDURE — 99232 SBSQ HOSP IP/OBS MODERATE 35: CPT

## 2022-01-02 RX ADMIN — OXYCODONE HYDROCHLORIDE 5 MILLIGRAM(S): 5 TABLET ORAL at 11:35

## 2022-01-02 RX ADMIN — Medication 1: at 12:34

## 2022-01-02 RX ADMIN — Medication 81 MILLIGRAM(S): at 13:24

## 2022-01-02 RX ADMIN — Medication 175 MICROGRAM(S): at 06:51

## 2022-01-02 RX ADMIN — GABAPENTIN 600 MILLIGRAM(S): 400 CAPSULE ORAL at 05:52

## 2022-01-02 RX ADMIN — MORPHINE SULFATE 30 MILLIGRAM(S): 50 CAPSULE, EXTENDED RELEASE ORAL at 23:00

## 2022-01-02 RX ADMIN — INSULIN GLARGINE 10 UNIT(S): 100 INJECTION, SOLUTION SUBCUTANEOUS at 23:15

## 2022-01-02 RX ADMIN — ONDANSETRON 4 MILLIGRAM(S): 8 TABLET, FILM COATED ORAL at 18:56

## 2022-01-02 RX ADMIN — Medication 6 MILLIGRAM(S): at 22:49

## 2022-01-02 RX ADMIN — PANTOPRAZOLE SODIUM 40 MILLIGRAM(S): 20 TABLET, DELAYED RELEASE ORAL at 06:30

## 2022-01-02 RX ADMIN — Medication 100 MILLIGRAM(S): at 05:52

## 2022-01-02 RX ADMIN — Medication 100 MILLIGRAM(S): at 13:32

## 2022-01-02 RX ADMIN — ENOXAPARIN SODIUM 40 MILLIGRAM(S): 100 INJECTION SUBCUTANEOUS at 13:24

## 2022-01-02 RX ADMIN — MORPHINE SULFATE 30 MILLIGRAM(S): 50 CAPSULE, EXTENDED RELEASE ORAL at 05:52

## 2022-01-02 RX ADMIN — GABAPENTIN 600 MILLIGRAM(S): 400 CAPSULE ORAL at 21:24

## 2022-01-02 RX ADMIN — GABAPENTIN 600 MILLIGRAM(S): 400 CAPSULE ORAL at 13:39

## 2022-01-02 RX ADMIN — OXYCODONE HYDROCHLORIDE 5 MILLIGRAM(S): 5 TABLET ORAL at 12:05

## 2022-01-02 RX ADMIN — SIMVASTATIN 20 MILLIGRAM(S): 20 TABLET, FILM COATED ORAL at 21:25

## 2022-01-02 RX ADMIN — Medication 100 MILLIGRAM(S): at 21:24

## 2022-01-02 RX ADMIN — AMLODIPINE BESYLATE 5 MILLIGRAM(S): 2.5 TABLET ORAL at 05:52

## 2022-01-02 RX ADMIN — MORPHINE SULFATE 30 MILLIGRAM(S): 50 CAPSULE, EXTENDED RELEASE ORAL at 22:48

## 2022-01-02 RX ADMIN — MORPHINE SULFATE 30 MILLIGRAM(S): 50 CAPSULE, EXTENDED RELEASE ORAL at 06:22

## 2022-01-02 NOTE — PROGRESS NOTE ADULT - SUBJECTIVE AND OBJECTIVE BOX
Patient is a 76y old  Female who presents with a chief complaint of Incompletely treated UTI (31 Dec 2021 22:30)       INTERVAL HPI/OVERNIGHT EVENTS: still with some dysuria.       REVIEW OF SYSTEMS:   Remaining ROS negative    Home Medications:  acetaminophen 325 mg oral tablet: 3 tab(s) orally every 8 hours, As Needed - 3) (31 Dec 2021 17:29)  aspirin 81 mg oral delayed release tablet: 1 tab(s) orally once a day (31 Dec 2021 17:29)  gabapentin 100 mg oral capsule: 1 cap(s) orally 3 times a day (31 Dec 2021 17:29)  melatonin 3 mg oral tablet: 1 tab(s) orally once a day (at bedtime) (2021 10:30)  morphine 60 mg/12 hours oral tablet, extended release: 1 tab(s) orally 2 times a day (2021 22:29)  polyethylene glycol 3350 oral powder for reconstitution: 17 gram(s) orally once a day (2021 10:30)  prednisoLONE acetate 1% ophthalmic suspension: 1 drop(s) to each affected eye 4 times a day (2021 10:30)        MEDICATIONS  (STANDING):  amLODIPine   Tablet 5 milliGRAM(s) Oral daily  aspirin enteric coated 81 milliGRAM(s) Oral daily  dextrose 40% Gel 15 Gram(s) Oral once  dextrose 5%. 1000 milliLiter(s) (50 mL/Hr) IV Continuous <Continuous>  dextrose 5%. 1000 milliLiter(s) (100 mL/Hr) IV Continuous <Continuous>  dextrose 50% Injectable 25 Gram(s) IV Push once  dextrose 50% Injectable 12.5 Gram(s) IV Push once  dextrose 50% Injectable 25 Gram(s) IV Push once  enoxaparin Injectable 40 milliGRAM(s) SubCutaneous daily  gabapentin 600 milliGRAM(s) Oral three times a day  glucagon  Injectable 1 milliGRAM(s) IntraMuscular once  insulin glargine Injectable (LANTUS) 10 Unit(s) SubCutaneous at bedtime  insulin lispro (ADMELOG) corrective regimen sliding scale   SubCutaneous three times a day before meals  insulin lispro (ADMELOG) corrective regimen sliding scale   SubCutaneous at bedtime  levothyroxine 175 MICROGram(s) Oral daily  morphine ER Tablet 30 milliGRAM(s) Oral two times a day  pantoprazole    Tablet 40 milliGRAM(s) Oral before breakfast  phenazopyridine 100 milliGRAM(s) Oral every 8 hours  simvastatin 20 milliGRAM(s) Oral at bedtime    MEDICATIONS  (PRN):  acetaminophen     Tablet .. 650 milliGRAM(s) Oral every 6 hours PRN Temp greater or equal to 38C (100.4F), Mild Pain (1 - 3)  melatonin 6 milliGRAM(s) Oral at bedtime PRN Insomnia  ondansetron Injectable 4 milliGRAM(s) IV Push every 8 hours PRN Nausea and/or Vomiting  oxyCODONE    IR 5 milliGRAM(s) Oral every 6 hours PRN Severe Pain (7 - 10)  polyethylene glycol 3350 17 Gram(s) Oral daily PRN Constipation      Allergies    ACE inhibitors (Anaphylaxis; Angioedema)    Intolerances        Vital Signs Last 24 Hrs  T(C): 36.9 (2022 10:15), Max: 37.9 (31 Dec 2021 20:12)  T(F): 98.5 (2022 10:15), Max: 100.3 (31 Dec 2021 20:12)  HR: 93 (2022 10:15) (80 - 114)  BP: 172/97 (2022 10:15) (118/84 - 172/97)  BP(mean): 114 (2022 07:50) (114 - 114)  RR: 16 (2022 10:15) (15 - 20)  SpO2: 96% (2022 10:15) (96% - 98%)    PHYSICAL EXAM:  GENERAL: NAD  HEAD:  Atraumatic, Normocephalic  EYES: EOMI, PERRLA, conjunctiva and sclera clear  ENT: O/P Clear  NECK: Supple, No JVD  NERVOUS SYSTEM:  No focal deficits  CHEST/LUNG: Clear to percussion bilaterally; No rales, rhonchi, wheezing  HEART: Regular rate and rhythm; No murmurs, rubs, or gallops  ABDOMEN: Soft, Nontender, Nondistended; Bowel sounds present  EXTREMITIES:  2+ Peripheral Pulses, No clubbing, cyanosis, or edema  SKIN: No rashes or lesions    LABS:                        9.9    4.73  )-----------( 303      ( 2022 08:30 )             33.1         137  |  105  |  5<L>  ----------------------------<  111<H>  4.1   |  26  |  0.78    Ca    9.2      2022 08:30    TPro  8.1  /  Alb  3.7  /  TBili  0.3  /  DBili  x   /  AST  17  /  ALT  14  /  AlkPhos  108        Urinalysis Basic - ( 31 Dec 2021 20:58 )    Color: Yellow / Appearance: Clear / S.010 / pH: x  Gluc: x / Ketone: Negative  / Bili: Negative / Urobili: Negative mg/dL   Blood: x / Protein: 30 mg/dL / Nitrite: Negative   Leuk Esterase: Small / RBC: x / WBC 6-10   Sq Epi: x / Non Sq Epi: Few / Bacteria: Many      CAPILLARY BLOOD GLUCOSE      POCT Blood Glucose.: 105 mg/dL (2022 07:39)  POCT Blood Glucose.: 174 mg/dL (31 Dec 2021 23:39)      RADIOLOGY & ADDITIONAL TESTS:    Imaging Personally Reviewed:  [ ] YES  [ ] NO    Consultant(s) Notes Reviewed:  [ ] YES  [ ] NO    Care Discussed with Consultants/Other Providers [ ] YES  [ ] NO  
Patient is a 76y old  Female who presents with a chief complaint of Incompletely treated UTI (31 Dec 2021 22:30)       INTERVAL HPI/OVERNIGHT EVENTS: still with some dysuria.       REVIEW OF SYSTEMS:   Remaining ROS negative    Home Medications:  acetaminophen 325 mg oral tablet: 3 tab(s) orally every 8 hours, As Needed - 3) (31 Dec 2021 17:29)  aspirin 81 mg oral delayed release tablet: 1 tab(s) orally once a day (31 Dec 2021 17:29)  gabapentin 100 mg oral capsule: 1 cap(s) orally 3 times a day (31 Dec 2021 17:29)  melatonin 3 mg oral tablet: 1 tab(s) orally once a day (at bedtime) (2021 10:30)  morphine 60 mg/12 hours oral tablet, extended release: 1 tab(s) orally 2 times a day (2021 22:29)  polyethylene glycol 3350 oral powder for reconstitution: 17 gram(s) orally once a day (2021 10:30)  prednisoLONE acetate 1% ophthalmic suspension: 1 drop(s) to each affected eye 4 times a day (2021 10:30)        MEDICATIONS  (STANDING):  amLODIPine   Tablet 5 milliGRAM(s) Oral daily  aspirin enteric coated 81 milliGRAM(s) Oral daily  dextrose 40% Gel 15 Gram(s) Oral once  dextrose 5%. 1000 milliLiter(s) (50 mL/Hr) IV Continuous <Continuous>  dextrose 5%. 1000 milliLiter(s) (100 mL/Hr) IV Continuous <Continuous>  dextrose 50% Injectable 25 Gram(s) IV Push once  dextrose 50% Injectable 12.5 Gram(s) IV Push once  dextrose 50% Injectable 25 Gram(s) IV Push once  enoxaparin Injectable 40 milliGRAM(s) SubCutaneous daily  gabapentin 600 milliGRAM(s) Oral three times a day  glucagon  Injectable 1 milliGRAM(s) IntraMuscular once  insulin glargine Injectable (LANTUS) 10 Unit(s) SubCutaneous at bedtime  insulin lispro (ADMELOG) corrective regimen sliding scale   SubCutaneous three times a day before meals  insulin lispro (ADMELOG) corrective regimen sliding scale   SubCutaneous at bedtime  levothyroxine 175 MICROGram(s) Oral daily  morphine ER Tablet 30 milliGRAM(s) Oral two times a day  pantoprazole    Tablet 40 milliGRAM(s) Oral before breakfast  phenazopyridine 100 milliGRAM(s) Oral every 8 hours  simvastatin 20 milliGRAM(s) Oral at bedtime    MEDICATIONS  (PRN):  acetaminophen     Tablet .. 650 milliGRAM(s) Oral every 6 hours PRN Temp greater or equal to 38C (100.4F), Mild Pain (1 - 3)  melatonin 6 milliGRAM(s) Oral at bedtime PRN Insomnia  ondansetron Injectable 4 milliGRAM(s) IV Push every 8 hours PRN Nausea and/or Vomiting  oxyCODONE    IR 5 milliGRAM(s) Oral every 6 hours PRN Severe Pain (7 - 10)  polyethylene glycol 3350 17 Gram(s) Oral daily PRN Constipation      Allergies    ACE inhibitors (Anaphylaxis; Angioedema)    Intolerances        Vital Signs Last 24 Hrs  T(C): 36.9 (2022 10:15), Max: 37.9 (31 Dec 2021 20:12)  T(F): 98.5 (2022 10:15), Max: 100.3 (31 Dec 2021 20:12)  HR: 93 (2022 10:15) (80 - 114)  BP: 172/97 (2022 10:15) (118/84 - 172/97)  BP(mean): 114 (2022 07:50) (114 - 114)  RR: 16 (2022 10:15) (15 - 20)  SpO2: 96% (2022 10:15) (96% - 98%)    PHYSICAL EXAM:  GENERAL: NAD  HEAD:  Atraumatic, Normocephalic  EYES: EOMI, PERRLA, conjunctiva and sclera clear  ENT: O/P Clear  NECK: Supple, No JVD  NERVOUS SYSTEM:  No focal deficits  CHEST/LUNG: Clear to percussion bilaterally; No rales, rhonchi, wheezing  HEART: Regular rate and rhythm; No murmurs, rubs, or gallops  ABDOMEN: Soft, Nontender, Nondistended; Bowel sounds present  EXTREMITIES:  2+ Peripheral Pulses, No clubbing, cyanosis, or edema  SKIN: No rashes or lesions    LABS:                        9.9    4.73  )-----------( 303      ( 2022 08:30 )             33.1         137  |  105  |  5<L>  ----------------------------<  111<H>  4.1   |  26  |  0.78    Ca    9.2      2022 08:30    TPro  8.1  /  Alb  3.7  /  TBili  0.3  /  DBili  x   /  AST  17  /  ALT  14  /  AlkPhos  108        Urinalysis Basic - ( 31 Dec 2021 20:58 )    Color: Yellow / Appearance: Clear / S.010 / pH: x  Gluc: x / Ketone: Negative  / Bili: Negative / Urobili: Negative mg/dL   Blood: x / Protein: 30 mg/dL / Nitrite: Negative   Leuk Esterase: Small / RBC: x / WBC 6-10   Sq Epi: x / Non Sq Epi: Few / Bacteria: Many      CAPILLARY BLOOD GLUCOSE      POCT Blood Glucose.: 105 mg/dL (2022 07:39)  POCT Blood Glucose.: 174 mg/dL (31 Dec 2021 23:39)      RADIOLOGY & ADDITIONAL TESTS:    Imaging Personally Reviewed:  [ ] YES  [ ] NO    Consultant(s) Notes Reviewed:  [ ] YES  [ ] NO    Care Discussed with Consultants/Other Providers [ ] YES  [ ] NO

## 2022-01-02 NOTE — PHYSICAL THERAPY INITIAL EVALUATION ADULT - TRANSFER TRAINING, PT EVAL
To be able to perform transfers Independently, including bed to chair, chair to bed and chair to chair In order to facilitate safety with appropriate technique in 2-4 weeks

## 2022-01-02 NOTE — PHYSICAL THERAPY INITIAL EVALUATION ADULT - PERTINENT HX OF CURRENT PROBLEM, REHAB EVAL
This is the hx of JAMEL a 77 y/o female patient who was admitted to [facility] due to complications of UTI affecting medical condition and with subsequent affection on functional mobility

## 2022-01-02 NOTE — PROGRESS NOTE ADULT - ASSESSMENT
77 y/o obese female w/ PMHx of HTN, HLD, Chronic back pain, Peripheral Neuropathy, Hypothyroidism, Microcytic anemia, presents to the ED c/o worsening back and abdominal pain. Pt being admitted for UTI    1) UTI  - c/w Rocephin  - f/u urine culture  - Pyridium  - CT scan, no renal stone    2) CBP  - pt is on Morphine 60mg BID; reports while at [facility] she did not get any; likely why pain worse from baseline  - pt would like to be tapered off; will place on 30mg BID, cont to monitor pain level  - c/w oxy prn   - outpt f/u w/ pain management for continued taper    3) DM type 2  - FS qAC and HS w/ Lantus and SSI    4) Microcytic anemia  - chronic stable at baseline  - f/u anemia studies  - cont to monitor  - pt is a [employer], will never want a blood transfusion    5) Hypothyroidism  - c/w synthroid    6) DVT ppx - Lovenox subq    Pt DNR but not DNI    PT eval pending, likely DC tomorrow

## 2022-01-02 NOTE — PHYSICAL THERAPY INITIAL EVALUATION ADULT - PLANNED THERAPY INTERVENTIONS, PT EVAL
To be able to perform stair negotiation with Cane device and R hand rails Independently to improve access and exiting from home and access to bedrooms, basement and bathrooms by 2 weeks/balance training/bed mobility training/gait training/strengthening/transfer training

## 2022-01-02 NOTE — PHYSICAL THERAPY INITIAL EVALUATION ADULT - ADDITIONAL COMMENTS
as per patient, pt lives on apt with 3 stairs from the street level with R HR, pt also has a ramp In case. pt was independent using a rolling walker and use rollator outside. pt has [employer]HA M-F 4 hours a day to help to cook, showering and setting up clothes + shopping.

## 2022-01-02 NOTE — PHYSICAL THERAPY INITIAL EVALUATION ADULT - GAIT TRAINING, PT EVAL
To be able to perform ambulation independently using walker for 100 feet, using proper technique using AD, with proper posture and functional distance at home in 2 weeks.

## 2022-01-02 NOTE — PHYSICAL THERAPY INITIAL EVALUATION ADULT - DID THE PATIENT HAVE SURGERY?
This is the hx of JAMEL a 75 y/o female patient who was admitted to [facility] due to complications of UTI affecting medical condition and with subsequent affection on functional mobility./n/a

## 2022-01-03 ENCOUNTER — TRANSCRIPTION ENCOUNTER (OUTPATIENT)
Age: 77
End: 2022-01-03

## 2022-01-03 VITALS
DIASTOLIC BLOOD PRESSURE: 68 MMHG | HEART RATE: 68 BPM | OXYGEN SATURATION: 98 % | RESPIRATION RATE: 18 BRPM | SYSTOLIC BLOOD PRESSURE: 102 MMHG | TEMPERATURE: 98 F

## 2022-01-03 LAB
GLUCOSE BLDC GLUCOMTR-MCNC: 114 MG/DL — HIGH (ref 70–99)
GLUCOSE BLDC GLUCOMTR-MCNC: 95 MG/DL — SIGNIFICANT CHANGE UP (ref 70–99)

## 2022-01-03 PROCEDURE — 99239 HOSP IP/OBS DSCHRG MGMT >30: CPT

## 2022-01-03 RX ORDER — FAMOTIDINE 10 MG/ML
20 INJECTION INTRAVENOUS ONCE
Refills: 0 | Status: COMPLETED | OUTPATIENT
Start: 2022-01-03 | End: 2022-01-03

## 2022-01-03 RX ADMIN — Medication 100 MILLIGRAM(S): at 05:57

## 2022-01-03 RX ADMIN — FAMOTIDINE 20 MILLIGRAM(S): 10 INJECTION INTRAVENOUS at 01:09

## 2022-01-03 RX ADMIN — OXYCODONE HYDROCHLORIDE 5 MILLIGRAM(S): 5 TABLET ORAL at 14:15

## 2022-01-03 RX ADMIN — PANTOPRAZOLE SODIUM 40 MILLIGRAM(S): 20 TABLET, DELAYED RELEASE ORAL at 06:23

## 2022-01-03 RX ADMIN — AMLODIPINE BESYLATE 5 MILLIGRAM(S): 2.5 TABLET ORAL at 05:56

## 2022-01-03 RX ADMIN — GABAPENTIN 600 MILLIGRAM(S): 400 CAPSULE ORAL at 14:15

## 2022-01-03 RX ADMIN — MORPHINE SULFATE 30 MILLIGRAM(S): 50 CAPSULE, EXTENDED RELEASE ORAL at 06:24

## 2022-01-03 RX ADMIN — Medication 81 MILLIGRAM(S): at 11:40

## 2022-01-03 RX ADMIN — MORPHINE SULFATE 30 MILLIGRAM(S): 50 CAPSULE, EXTENDED RELEASE ORAL at 05:56

## 2022-01-03 RX ADMIN — Medication 175 MICROGRAM(S): at 05:56

## 2022-01-03 RX ADMIN — Medication 100 MILLIGRAM(S): at 14:15

## 2022-01-03 RX ADMIN — GABAPENTIN 600 MILLIGRAM(S): 400 CAPSULE ORAL at 05:56

## 2022-01-03 RX ADMIN — ENOXAPARIN SODIUM 40 MILLIGRAM(S): 100 INJECTION SUBCUTANEOUS at 11:40

## 2022-01-03 NOTE — DISCHARGE NOTE PROVIDER - NSDCMRMEDTOKEN_GEN_ALL_CORE_FT
acetaminophen 325 mg oral tablet: 3 tab(s) orally every 8 hours, As Needed - 3)  aspirin 81 mg oral delayed release tablet: 1 tab(s) orally once a day  docusate sodium 100 mg oral capsule: 1 cap(s) orally 3 times a day  gabapentin 100 mg oral capsule: 1 cap(s) orally 3 times a day  lactulose 10 g oral powder for reconstitution: 10 gram(s) orally once a day as needed for constipation  levothyroxine 175 mcg (0.175 mg) oral tablet: 1 tab(s) orally once a day  lidocaine 5% topical film: Apply topically to affected area once a day   melatonin 3 mg oral tablet: 1 tab(s) orally once a day (at bedtime)  metFORMIN 1000 mg oral tablet: 1 tab(s) orally 2 times a day   morphine 60 mg/12 hours oral tablet, extended release: 1 tab(s) orally 2 times a day  pantoprazole 40 mg oral delayed release tablet: 1 tab(s) orally once a day   polyethylene glycol 3350 oral powder for reconstitution: 17 gram(s) orally once a day  prednisoLONE acetate 1% ophthalmic suspension: 1 drop(s) to each affected eye 4 times a day  simvastatin 20 mg oral tablet: 1 tab(s) orally once a day (at bedtime)

## 2022-01-03 NOTE — DISCHARGE NOTE PROVIDER - NSDCFUSCHEDAPPT_GEN_ALL_CORE_FT
JUAN LANTIGUA ; 01/04/2022 ; [facility] PreAdmits  JUAN LANTIGUA ; 03/01/2022 ; [facility]PP Katya Kailash [address]

## 2022-01-03 NOTE — DISCHARGE NOTE NURSING/CASE MANAGEMENT/SOCIAL WORK - NSDCPEFALRISK_GEN_ALL_CORE
For information on Fall & Injury Prevention, visit: [email or web address] OR  [email or web address] OR  https://www.cdc.gov/steadi/patient.html

## 2022-01-03 NOTE — DISCHARGE NOTE NURSING/CASE MANAGEMENT/SOCIAL WORK - NSDCVIVACCINE_GEN_ALL_CORE_FT
Tdap; 26-Aug-2015 14:33; Lakeisha Lee (DAVID); [employer]; [id number]; IntraMuscular; Deltoid Right.; 0.5 milliLiter(s); VIS (VIS Published: 09-May-2013, VIS Presented: 26-Aug-2015);

## 2022-01-03 NOTE — DISCHARGE NOTE PROVIDER - HOSPITAL COURSE
75 y/o obese female w/ PMHx of HTN, HLD, Chronic back pain, Peripheral Neuropathy, Hypothyroidism, Microcytic anemia, presents to the ED c/o worsening back and abdominal pain. Pt being admitted for UTI    1) UTI  - finished Rocephin course    2) CBP  - pt is on Morphine 60mg BID; reports while at [facility] she did not get any; likely why pain worse from baseline  - pt would like to be tapered off; will place on 30mg BID, cont to monitor pain level  - c/w oxy prn   - outpt f/u w/ pain management for continued taper    3) DM type 2  - FS qAC and HS w/ Lantus and SSI    4)Anemia of chronic disease    5) Hypothyroidism  - c/w synthroid    Pt DNR but not DNI    PT eval home PT, stable for DC home and follow up outpatient with PCP.

## 2022-01-03 NOTE — DISCHARGE NOTE PROVIDER - NSDCCPCAREPLAN_GEN_ALL_CORE_FT
PRINCIPAL DISCHARGE DIAGNOSIS  Diagnosis: Urinary tract infection  Assessment and Plan of Treatment:

## 2022-01-03 NOTE — DISCHARGE NOTE NURSING/CASE MANAGEMENT/SOCIAL WORK - PATIENT PORTAL LINK FT
You can access the FollowMyHealth Patient Portal offered by [facility] by registering at the following website: [email or web address]. By joining [email or web address] FollowMyHealth portal, you will also be able to view your health information using other applications (apps) compatible with our system.

## 2022-01-08 DIAGNOSIS — Z79.82 LONG TERM (CURRENT) USE OF ASPIRIN: ICD-10-CM

## 2022-01-08 DIAGNOSIS — E11.42 TYPE 2 DIABETES MELLITUS WITH DIABETIC POLYNEUROPATHY: ICD-10-CM

## 2022-01-08 DIAGNOSIS — M19.90 UNSPECIFIED OSTEOARTHRITIS, UNSPECIFIED SITE: ICD-10-CM

## 2022-01-08 DIAGNOSIS — Z86.14 PERSONAL HISTORY OF METHICILLIN RESISTANT STAPHYLOCOCCUS AUREUS INFECTION: ICD-10-CM

## 2022-01-08 DIAGNOSIS — Z79.4 LONG TERM (CURRENT) USE OF INSULIN: ICD-10-CM

## 2022-01-08 DIAGNOSIS — G89.29 OTHER CHRONIC PAIN: ICD-10-CM

## 2022-01-08 DIAGNOSIS — Z90.49 ACQUIRED ABSENCE OF OTHER SPECIFIED PARTS OF DIGESTIVE TRACT: ICD-10-CM

## 2022-01-08 DIAGNOSIS — D50.9 IRON DEFICIENCY ANEMIA, UNSPECIFIED: ICD-10-CM

## 2022-01-08 DIAGNOSIS — E03.9 HYPOTHYROIDISM, UNSPECIFIED: ICD-10-CM

## 2022-01-08 DIAGNOSIS — N39.0 URINARY TRACT INFECTION, SITE NOT SPECIFIED: ICD-10-CM

## 2022-01-08 DIAGNOSIS — B97.89 OTHER VIRAL AGENTS AS THE CAUSE OF DISEASES CLASSIFIED ELSEWHERE: ICD-10-CM

## 2022-01-08 DIAGNOSIS — E78.5 HYPERLIPIDEMIA, UNSPECIFIED: ICD-10-CM

## 2022-01-08 DIAGNOSIS — M48.00 SPINAL STENOSIS, SITE UNSPECIFIED: ICD-10-CM

## 2022-01-08 DIAGNOSIS — Z66 DO NOT RESUSCITATE: ICD-10-CM

## 2022-01-08 DIAGNOSIS — I10 ESSENTIAL (PRIMARY) HYPERTENSION: ICD-10-CM

## 2022-01-08 DIAGNOSIS — Z88.8 ALLERGY STATUS TO OTHER DRUGS, MEDICAMENTS AND BIOLOGICAL SUBSTANCES: ICD-10-CM

## 2022-01-08 DIAGNOSIS — Z79.899 OTHER LONG TERM (CURRENT) DRUG THERAPY: ICD-10-CM

## 2022-01-08 DIAGNOSIS — Z90.711 ACQUIRED ABSENCE OF UTERUS WITH REMAINING CERVICAL STUMP: ICD-10-CM

## 2022-01-08 DIAGNOSIS — R53.1 WEAKNESS: ICD-10-CM

## 2022-01-08 DIAGNOSIS — Z96.653 PRESENCE OF ARTIFICIAL KNEE JOINT, BILATERAL: ICD-10-CM

## 2022-01-08 DIAGNOSIS — M54.9 DORSALGIA, UNSPECIFIED: ICD-10-CM

## 2022-01-10 ENCOUNTER — INPATIENT (INPATIENT)
Facility: HOSPITAL | Age: 77
LOS: 0 days | Discharge: HOME | End: 2022-01-11
Attending: HOSPITALIST | Admitting: HOSPITALIST
Payer: MEDICARE

## 2022-01-10 VITALS
RESPIRATION RATE: 20 BRPM | HEART RATE: 127 BPM | TEMPERATURE: 98 F | DIASTOLIC BLOOD PRESSURE: 85 MMHG | WEIGHT: 195.11 LBS | HEIGHT: 65 IN | SYSTOLIC BLOOD PRESSURE: 121 MMHG | OXYGEN SATURATION: 97 %

## 2022-01-10 DIAGNOSIS — Z82.8 FAMILY HISTORY OF OTHER DISABILITIES AND CHRONIC DISEASES LEADING TO DISABLEMENT, NOT ELSEWHERE CLASSIFIED: Chronic | ICD-10-CM

## 2022-01-10 DIAGNOSIS — Z90.710 ACQUIRED ABSENCE OF BOTH CERVIX AND UTERUS: Chronic | ICD-10-CM

## 2022-01-10 DIAGNOSIS — Z98.89 OTHER SPECIFIED POSTPROCEDURAL STATES: Chronic | ICD-10-CM

## 2022-01-10 DIAGNOSIS — Z96.653 PRESENCE OF ARTIFICIAL KNEE JOINT, BILATERAL: Chronic | ICD-10-CM

## 2022-01-10 LAB
ALBUMIN SERPL ELPH-MCNC: 3.6 G/DL — SIGNIFICANT CHANGE UP (ref 3.3–5)
ALP SERPL-CCNC: 97 U/L — SIGNIFICANT CHANGE UP (ref 40–120)
ALT FLD-CCNC: 12 U/L — SIGNIFICANT CHANGE UP (ref 12–78)
ANION GAP SERPL CALC-SCNC: 9 MMOL/L — SIGNIFICANT CHANGE UP (ref 5–17)
APPEARANCE UR: CLEAR — SIGNIFICANT CHANGE UP
AST SERPL-CCNC: 17 U/L — SIGNIFICANT CHANGE UP (ref 15–37)
BACTERIA # UR AUTO: ABNORMAL
BASOPHILS # BLD AUTO: 0.02 K/UL — SIGNIFICANT CHANGE UP (ref 0–0.2)
BASOPHILS NFR BLD AUTO: 0.5 % — SIGNIFICANT CHANGE UP (ref 0–2)
BILIRUB SERPL-MCNC: 0.5 MG/DL — SIGNIFICANT CHANGE UP (ref 0.2–1.2)
BILIRUB UR-MCNC: NEGATIVE — SIGNIFICANT CHANGE UP
BUN SERPL-MCNC: 8 MG/DL — SIGNIFICANT CHANGE UP (ref 7–23)
CALCIUM SERPL-MCNC: 9.1 MG/DL — SIGNIFICANT CHANGE UP (ref 8.5–10.1)
CHLORIDE SERPL-SCNC: 102 MMOL/L — SIGNIFICANT CHANGE UP (ref 96–108)
CO2 SERPL-SCNC: 26 MMOL/L — SIGNIFICANT CHANGE UP (ref 22–31)
COLOR SPEC: YELLOW — SIGNIFICANT CHANGE UP
CREAT SERPL-MCNC: 0.71 MG/DL — SIGNIFICANT CHANGE UP (ref 0.5–1.3)
DIFF PNL FLD: NEGATIVE — SIGNIFICANT CHANGE UP
EOSINOPHIL # BLD AUTO: 0.02 K/UL — SIGNIFICANT CHANGE UP (ref 0–0.5)
EOSINOPHIL NFR BLD AUTO: 0.5 % — SIGNIFICANT CHANGE UP (ref 0–6)
EPI CELLS # UR: SIGNIFICANT CHANGE UP
GLUCOSE SERPL-MCNC: 138 MG/DL — HIGH (ref 70–99)
GLUCOSE UR QL: NEGATIVE MG/DL — SIGNIFICANT CHANGE UP
HCT VFR BLD CALC: 28.9 % — LOW (ref 34.5–45)
HGB BLD-MCNC: 8.8 G/DL — LOW (ref 11.5–15.5)
IMM GRANULOCYTES NFR BLD AUTO: 0 % — SIGNIFICANT CHANGE UP (ref 0–1.5)
KETONES UR-MCNC: ABNORMAL
LACTATE SERPL-SCNC: 1.4 MMOL/L — SIGNIFICANT CHANGE UP (ref 0.7–2)
LEUKOCYTE ESTERASE UR-ACNC: ABNORMAL
LYMPHOCYTES # BLD AUTO: 1.54 K/UL — SIGNIFICANT CHANGE UP (ref 1–3.3)
LYMPHOCYTES # BLD AUTO: 36.2 % — SIGNIFICANT CHANGE UP (ref 13–44)
MCHC RBC-ENTMCNC: 23.4 PG — LOW (ref 27–34)
MCHC RBC-ENTMCNC: 30.4 GM/DL — LOW (ref 32–36)
MCV RBC AUTO: 76.9 FL — LOW (ref 80–100)
MONOCYTES # BLD AUTO: 0.53 K/UL — SIGNIFICANT CHANGE UP (ref 0–0.9)
MONOCYTES NFR BLD AUTO: 12.5 % — SIGNIFICANT CHANGE UP (ref 2–14)
NEUTROPHILS # BLD AUTO: 2.14 K/UL — SIGNIFICANT CHANGE UP (ref 1.8–7.4)
NEUTROPHILS NFR BLD AUTO: 50.3 % — SIGNIFICANT CHANGE UP (ref 43–77)
NITRITE UR-MCNC: NEGATIVE — SIGNIFICANT CHANGE UP
NRBC # BLD: 0 /100 WBCS — SIGNIFICANT CHANGE UP (ref 0–0)
PH UR: 6.5 — SIGNIFICANT CHANGE UP (ref 5–8)
PLATELET # BLD AUTO: 389 K/UL — SIGNIFICANT CHANGE UP (ref 150–400)
POTASSIUM SERPL-MCNC: 4 MMOL/L — SIGNIFICANT CHANGE UP (ref 3.5–5.3)
POTASSIUM SERPL-SCNC: 4 MMOL/L — SIGNIFICANT CHANGE UP (ref 3.5–5.3)
PROT SERPL-MCNC: 7.6 GM/DL — SIGNIFICANT CHANGE UP (ref 6–8.3)
PROT UR-MCNC: 15 MG/DL
RAPID RVP RESULT: SIGNIFICANT CHANGE UP
RBC # BLD: 3.76 M/UL — LOW (ref 3.8–5.2)
RBC # FLD: 20.8 % — HIGH (ref 10.3–14.5)
SARS-COV-2 RNA SPEC QL NAA+PROBE: SIGNIFICANT CHANGE UP
SODIUM SERPL-SCNC: 137 MMOL/L — SIGNIFICANT CHANGE UP (ref 135–145)
SP GR SPEC: 1.01 — SIGNIFICANT CHANGE UP (ref 1.01–1.02)
UROBILINOGEN FLD QL: NEGATIVE MG/DL — SIGNIFICANT CHANGE UP
WBC # BLD: 4.25 K/UL — SIGNIFICANT CHANGE UP (ref 3.8–10.5)
WBC # FLD AUTO: 4.25 K/UL — SIGNIFICANT CHANGE UP (ref 3.8–10.5)
WBC UR QL: SIGNIFICANT CHANGE UP

## 2022-01-10 PROCEDURE — 74176 CT ABD & PELVIS W/O CONTRAST: CPT | Mod: 26,MA

## 2022-01-10 PROCEDURE — 99285 EMERGENCY DEPT VISIT HI MDM: CPT

## 2022-01-10 RX ORDER — ACETAMINOPHEN 500 MG
975 TABLET ORAL ONCE
Refills: 0 | Status: COMPLETED | OUTPATIENT
Start: 2022-01-10 | End: 2022-01-10

## 2022-01-10 RX ORDER — SODIUM CHLORIDE 9 MG/ML
1000 INJECTION INTRAMUSCULAR; INTRAVENOUS; SUBCUTANEOUS ONCE
Refills: 0 | Status: COMPLETED | OUTPATIENT
Start: 2022-01-10 | End: 2022-01-10

## 2022-01-10 RX ORDER — MEROPENEM 1 G/30ML
1000 INJECTION INTRAVENOUS ONCE
Refills: 0 | Status: COMPLETED | OUTPATIENT
Start: 2022-01-10 | End: 2022-01-10

## 2022-01-10 RX ORDER — IBUPROFEN 200 MG
600 TABLET ORAL ONCE
Refills: 0 | Status: COMPLETED | OUTPATIENT
Start: 2022-01-10 | End: 2022-01-10

## 2022-01-10 RX ADMIN — Medication 600 MILLIGRAM(S): at 20:52

## 2022-01-10 RX ADMIN — MEROPENEM 200 MILLIGRAM(S): 1 INJECTION INTRAVENOUS at 21:28

## 2022-01-10 RX ADMIN — Medication 975 MILLIGRAM(S): at 20:52

## 2022-01-10 RX ADMIN — SODIUM CHLORIDE 1000 MILLILITER(S): 9 INJECTION INTRAMUSCULAR; INTRAVENOUS; SUBCUTANEOUS at 20:52

## 2022-01-10 NOTE — ED PROVIDER NOTE - CLINICAL SUMMARY MEDICAL DECISION MAKING FREE TEXT BOX
75 yo F with likely mdro uti  -basic labs, lactate, blood cx, rvp/covid, ua, cx, rectal temp, iv, hydration bolus, ekg, antbx prn  -fu results, reeval 75 yo F with likely mdro uti  -basic labs, lactate, blood cx, rvp/covid, ua, cx, rectal temp, iv, hydration bolus, ekg, antbx, CT renal, pain control  -fu results, reeval, admit likely given failure of treatment multiple times

## 2022-01-10 NOTE — ED PROVIDER NOTE - PHYSICAL EXAMINATION
Vitals: tachy at 127, otherwise WNL  Gen: AAOx3, NAD, sitting uncomfortably in stretcher, non-toxic   Head: ncat, perrla, eomi b/l  Neck: supple, no lymphadenopathy, no midline deviation  Heart: rrr, no m/r/g  Lungs: CTA b/l, no rales/ronchi/wheezes  Abd: soft, nontender, non-distended, no rebound or guarding  Ext: no clubbing/cyanosis/edema  Neuro: sensation and muscle strength intact b/l Vitals: tachy at 127, otherwise WNL  Gen: AAOx3, sitting uncomfortably in stretcher, non-toxic, but in distress due to pain  Head: ncat, perrla, eomi b/l  Neck: supple, no lymphadenopathy, no midline deviation  Heart: rrr, no m/r/g  Lungs: CTA b/l, no rales/ronchi/wheezes  Abd: soft, + suprapubic tenderness, + b/l CVA tenderness, non-distended, no rebound or guarding  Ext: no clubbing/cyanosis/edema  Neuro: sensation and muscle strength intact b/l

## 2022-01-10 NOTE — ED PROVIDER NOTE - OBJECTIVE STATEMENT
75 yo F presents to the ED c/o worsening back and abdominal pain. Pt reports 1 wk ago she went in the [facility] due to dysuria and same pain, was admitted for UTI  ROS: negative for fever, cough, headache, chest pain, shortness of breath, abd pain, nausea, vomiting, diarrhea, rash, paresthesia, and weakness--all other systems reviewed are negative.   PMH:  recurrent UTI?, HTN, HLD, Chronic back pain, Peripheral Neuropathy, Hypothyroidism, Microcytic anemia; Meds: See EMR for list; SH: Denies smoking/drinking/drug use 77 yo F presents to the ED c/o worsening back and abdominal pain. Pt reports weeks ago she went in the [facility] due to dysuria and same pain, was admitted for UTI, pt. was later admitted here for similar reasons, sent home, then UTI came back.  Pt. says it feels like a bad UTI again, this time also with back and lower abd pain.  fever of 101 measured at home before arrival   ROS: negative for cough, headache, chest pain, shortness of breath, nausea, vomiting, diarrhea, rash, paresthesia, and focal weakness--all other systems reviewed are negative.   PMH:  recurrent UTI?, HTN, HLD, Chronic back pain, Peripheral Neuropathy, Hypothyroidism, Microcytic anemia; Meds: See EMR for list; SH: Denies smoking/drinking/drug use 75 yo F presents to the ED c/o worsening back and abdominal pain. Pt reports weeks ago she went in the [facility] due to dysuria and same pain, was admitted for UTI, pt. was later admitted here for similar reasons, sent home, then UTI came back.  Pt. says it feels like a bad UTI again, this time also with back and lower abd pain.  fever of 101 measured at home before arrival.  ROS: negative for cough, headache, chest pain, shortness of breath, nausea, vomiting, diarrhea, rash, paresthesia, and focal weakness--all other systems reviewed are negative.   PMH:  recurrent UTI?, HTN, HLD, Chronic back pain, Peripheral Neuropathy, Hypothyroidism, Microcytic anemia; Meds: See EMR for list; SH: Denies smoking/drinking/drug use

## 2022-01-10 NOTE — ED PROVIDER NOTE - CARE PLAN
1 Principal Discharge DX:	Acute UTI   Principal Discharge DX:	Acute UTI  Secondary Diagnosis:	Sepsis, unspecified organism

## 2022-01-10 NOTE — ED ADULT NURSE REASSESSMENT NOTE - COMFORT CARE
wants to go inside ED, covid pandemic in progress, wait for ER/wait time explained
wait time explained

## 2022-01-10 NOTE — ED ADULT NURSE NOTE - OBJECTIVE STATEMENT
pt is 77 y/o female c/c of bilateral lower abdominal pain with frequent and painful urination that started this morning. pt is AAOX4. pt states she had fever Tmax 101.6 oral today around noon and took 500mg tylenol. pt afebrile at this time. pt was admitted last week for UTI. PMH of HTN, diabetes, Hyperthyroidism.

## 2022-01-11 ENCOUNTER — TRANSCRIPTION ENCOUNTER (OUTPATIENT)
Age: 77
End: 2022-01-11

## 2022-01-11 VITALS
OXYGEN SATURATION: 99 % | HEART RATE: 91 BPM | RESPIRATION RATE: 18 BRPM | TEMPERATURE: 98 F | DIASTOLIC BLOOD PRESSURE: 77 MMHG | SYSTOLIC BLOOD PRESSURE: 138 MMHG

## 2022-01-11 DIAGNOSIS — E03.9 HYPOTHYROIDISM, UNSPECIFIED: ICD-10-CM

## 2022-01-11 DIAGNOSIS — E78.5 HYPERLIPIDEMIA, UNSPECIFIED: ICD-10-CM

## 2022-01-11 DIAGNOSIS — Z29.9 ENCOUNTER FOR PROPHYLACTIC MEASURES, UNSPECIFIED: ICD-10-CM

## 2022-01-11 DIAGNOSIS — N30.00 ACUTE CYSTITIS WITHOUT HEMATURIA: ICD-10-CM

## 2022-01-11 DIAGNOSIS — M54.9 DORSALGIA, UNSPECIFIED: ICD-10-CM

## 2022-01-11 LAB
GLUCOSE BLDC GLUCOMTR-MCNC: 110 MG/DL — HIGH (ref 70–99)
GLUCOSE BLDC GLUCOMTR-MCNC: 155 MG/DL — HIGH (ref 70–99)

## 2022-01-11 PROCEDURE — 99222 1ST HOSP IP/OBS MODERATE 55: CPT

## 2022-01-11 PROCEDURE — 93010 ELECTROCARDIOGRAM REPORT: CPT

## 2022-01-11 PROCEDURE — 99235 HOSP IP/OBS SAME DATE MOD 70: CPT

## 2022-01-11 PROCEDURE — 71045 X-RAY EXAM CHEST 1 VIEW: CPT | Mod: 26

## 2022-01-11 RX ORDER — MORPHINE SULFATE 50 MG/1
60 CAPSULE, EXTENDED RELEASE ORAL EVERY 8 HOURS
Refills: 0 | Status: DISCONTINUED | OUTPATIENT
Start: 2022-01-11 | End: 2022-01-11

## 2022-01-11 RX ORDER — INSULIN LISPRO 100/ML
VIAL (ML) SUBCUTANEOUS
Refills: 0 | Status: DISCONTINUED | OUTPATIENT
Start: 2022-01-11 | End: 2022-01-11

## 2022-01-11 RX ORDER — GLUCAGON INJECTION, SOLUTION 0.5 MG/.1ML
1 INJECTION, SOLUTION SUBCUTANEOUS ONCE
Refills: 0 | Status: DISCONTINUED | OUTPATIENT
Start: 2022-01-11 | End: 2022-01-11

## 2022-01-11 RX ORDER — ACETAMINOPHEN 500 MG
650 TABLET ORAL ONCE
Refills: 0 | Status: DISCONTINUED | OUTPATIENT
Start: 2022-01-11 | End: 2022-01-11

## 2022-01-11 RX ORDER — DEXTROSE 50 % IN WATER 50 %
25 SYRINGE (ML) INTRAVENOUS ONCE
Refills: 0 | Status: DISCONTINUED | OUTPATIENT
Start: 2022-01-11 | End: 2022-01-11

## 2022-01-11 RX ORDER — SIMVASTATIN 20 MG/1
20 TABLET, FILM COATED ORAL AT BEDTIME
Refills: 0 | Status: DISCONTINUED | OUTPATIENT
Start: 2022-01-11 | End: 2022-01-11

## 2022-01-11 RX ORDER — SENNA PLUS 8.6 MG/1
2 TABLET ORAL AT BEDTIME
Refills: 0 | Status: DISCONTINUED | OUTPATIENT
Start: 2022-01-11 | End: 2022-01-11

## 2022-01-11 RX ORDER — LACTULOSE 10 G/15ML
10 SOLUTION ORAL DAILY
Refills: 0 | Status: DISCONTINUED | OUTPATIENT
Start: 2022-01-11 | End: 2022-01-11

## 2022-01-11 RX ORDER — LIDOCAINE 4 G/100G
1 CREAM TOPICAL DAILY
Refills: 0 | Status: DISCONTINUED | OUTPATIENT
Start: 2022-01-11 | End: 2022-01-11

## 2022-01-11 RX ORDER — SODIUM CHLORIDE 9 MG/ML
1000 INJECTION, SOLUTION INTRAVENOUS
Refills: 0 | Status: DISCONTINUED | OUTPATIENT
Start: 2022-01-11 | End: 2022-01-11

## 2022-01-11 RX ORDER — DEXTROSE 50 % IN WATER 50 %
15 SYRINGE (ML) INTRAVENOUS ONCE
Refills: 0 | Status: DISCONTINUED | OUTPATIENT
Start: 2022-01-11 | End: 2022-01-11

## 2022-01-11 RX ORDER — LEVOTHYROXINE SODIUM 125 MCG
175 TABLET ORAL DAILY
Refills: 0 | Status: DISCONTINUED | OUTPATIENT
Start: 2022-01-11 | End: 2022-01-11

## 2022-01-11 RX ORDER — PREDNISOLONE SODIUM PHOSPHATE 1 %
1 DROPS OPHTHALMIC (EYE)
Refills: 0 | Status: DISCONTINUED | OUTPATIENT
Start: 2022-01-11 | End: 2022-01-11

## 2022-01-11 RX ORDER — ASPIRIN/CALCIUM CARB/MAGNESIUM 324 MG
81 TABLET ORAL DAILY
Refills: 0 | Status: DISCONTINUED | OUTPATIENT
Start: 2022-01-11 | End: 2022-01-11

## 2022-01-11 RX ORDER — DEXTROSE 50 % IN WATER 50 %
12.5 SYRINGE (ML) INTRAVENOUS ONCE
Refills: 0 | Status: DISCONTINUED | OUTPATIENT
Start: 2022-01-11 | End: 2022-01-11

## 2022-01-11 RX ORDER — HEPARIN SODIUM 5000 [USP'U]/ML
5000 INJECTION INTRAVENOUS; SUBCUTANEOUS EVERY 12 HOURS
Refills: 0 | Status: DISCONTINUED | OUTPATIENT
Start: 2022-01-11 | End: 2022-01-11

## 2022-01-11 RX ORDER — PANTOPRAZOLE SODIUM 20 MG/1
40 TABLET, DELAYED RELEASE ORAL
Refills: 0 | Status: DISCONTINUED | OUTPATIENT
Start: 2022-01-11 | End: 2022-01-11

## 2022-01-11 RX ADMIN — MORPHINE SULFATE 60 MILLIGRAM(S): 50 CAPSULE, EXTENDED RELEASE ORAL at 10:43

## 2022-01-11 RX ADMIN — MEROPENEM 1000 MILLIGRAM(S): 1 INJECTION INTRAVENOUS at 00:43

## 2022-01-11 RX ADMIN — LIDOCAINE 1 PATCH: 4 CREAM TOPICAL at 13:09

## 2022-01-11 RX ADMIN — HEPARIN SODIUM 5000 UNIT(S): 5000 INJECTION INTRAVENOUS; SUBCUTANEOUS at 05:04

## 2022-01-11 RX ADMIN — SODIUM CHLORIDE 1000 MILLILITER(S): 9 INJECTION INTRAMUSCULAR; INTRAVENOUS; SUBCUTANEOUS at 00:43

## 2022-01-11 RX ADMIN — Medication 975 MILLIGRAM(S): at 00:43

## 2022-01-11 RX ADMIN — MORPHINE SULFATE 60 MILLIGRAM(S): 50 CAPSULE, EXTENDED RELEASE ORAL at 01:48

## 2022-01-11 RX ADMIN — Medication 81 MILLIGRAM(S): at 13:09

## 2022-01-11 RX ADMIN — Medication 1: at 13:11

## 2022-01-11 RX ADMIN — Medication 1 DROP(S): at 13:10

## 2022-01-11 RX ADMIN — Medication 175 MICROGRAM(S): at 05:06

## 2022-01-11 RX ADMIN — Medication 1 DROP(S): at 05:06

## 2022-01-11 RX ADMIN — PANTOPRAZOLE SODIUM 40 MILLIGRAM(S): 20 TABLET, DELAYED RELEASE ORAL at 05:11

## 2022-01-11 NOTE — H&P ADULT - ASSESSMENT
Patient is a 76F with a PMH of chronic LBP, HTN, HLD, hypothyroidism, anemia who presents to the ED for abdominal pain and dysuria.  Patient states that for the last 2-3 weeks she has had continued abdominal pain and dysuria.  Was admitted to [facility] and was treated for UTI there, states that her pain persisted after she was discharged.  Admitted to [facility] on 12/31 for similar complaints.  Received a full course of IV rocephin and was discharged home.  Patient continues to complain of lower abdomen pain and dysuria.  States she had a fever of 101 at home today.  Patient also complains of chronic back pain, states she is due for her prescribed morphine.  Patient has no other complaints.  Tachycardic in ED to 127, however heart rate improved with IV fluids.  Labs benign.  Will admit to med surg.    IMPROVE VTE Individual Risk Assessment          RISK                                                          Points  [  ] Previous VTE                                                3  [  ] Thrombophilia                                             2  [  ] Lower limb paralysis                                    2        (unable to hold up >15 seconds)    [  ] Current Cancer                                             2         (within 6 months)  [  ] Immobilization > 24 hrs                              1  [  ] ICU/CCU stay > 24 hours                            1  [  ] Age > 60                                                    1    IMPROVE VTE Score - 1

## 2022-01-11 NOTE — H&P ADULT - PROBLEM SELECTOR PLAN 1
Patient complaining of persistent dysuria and fever.    Tachycardia improved in ED.  UA with minimal WBCs and leuk esterase  Consider interstitial cystitis?  Last urine culture was negative for bacteria.    received one dose meropenem in ED.  Will hold furhter antibiotics for now  Monitor for fever.

## 2022-01-11 NOTE — DISCHARGE NOTE PROVIDER - NSDCCPCAREPLAN_GEN_ALL_CORE_FT
PRINCIPAL DISCHARGE DIAGNOSIS  Diagnosis: Acute UTI  Assessment and Plan of Treatment: resolved will give levaquin for thrree days      SECONDARY DISCHARGE DIAGNOSES  Diagnosis: Sepsis, unspecified organism  Assessment and Plan of Treatment:

## 2022-01-11 NOTE — DISCHARGE NOTE PROVIDER - HOSPITAL COURSE
HPI:  Patient is a 76F with a PMH of chronic LBP, HTN, HLD, hypothyroidism, anemia who presents to the ED for abdominal pain and dysuria.  Patient states that for the last 2-3 weeks she has had continued abdominal pain and dysuria.  Was admitted to [facility] and was treated for UTI there, states that her pain persisted after she was discharged.  Admitted to [facility] on 12/31 for similar complaints.  Received a full course of IV rocephin and was discharged home.  Patient continues to complain of lower abdomen pain and dysuria.  States she had a fever of 101 at home today.  Patient also complains of chronic back pain, states she is due for her prescribed morphine.  Patient has no other complaints.  Tachycardic in ED to 127, however heart rate improved with IV fluids.  Labs benign.  Will admit to med surg.   (11 Jan 2022 01:23)    under observation heart rate normalized   pain has improved and patient is able to tolerate diet   < from: Xray Chest 1 View- PORTABLE-Urgent (Xray Chest 1 View- PORTABLE-Urgent .) (01.11.22 @ 01:04) >    MPRESSION:  No acute radiographic findings and no change    < end of copied text >  < from: CT Renal Stone Hunt (01.10.22 @ 22:11) >      BOWEL: No bowel obstruction. Appendix is unremarkable. Colonic   diverticulosis without evidence of diverticulitis.  PERITONEUM: No ascites.  VESSELS: Normal caliber abdominal aorta. Moderate atherosclerosis.  RETROPERITONEUM/LYMPH NODES: No lymphadenopathy.  ABDOMINAL WALL: Within normal limits.  BONES: Status post L1 L5 laminectomy with posterior fusion of L1-S1.   Degenerative changes.    IMPRESSION:    No radiodense calculus. No hydroureteronephrosis.    < end of copied text >      Patinet to follow up with her PCP

## 2022-01-11 NOTE — DISCHARGE NOTE NURSING/CASE MANAGEMENT/SOCIAL WORK - NSDCVIVACCINE_GEN_ALL_CORE_FT
Tdap; 26-Aug-2015 14:33; Lakeisha Lee (DAVID); [employer]; [id number]; IntraMuscular; Deltoid Right.; 0.5 milliLiter(s); VIS (VIS Published: 09-May-2013, VIS Presented: 26-Aug-2015);

## 2022-01-11 NOTE — DISCHARGE NOTE PROVIDER - ATTENDING DISCHARGE PHYSICAL EXAMINATION:
No
GENERAL: NAD, well-groomed, well-developed  HEAD:  Atraumatic, Normocephalic  EYES: EOMI, PERRLA, conjunctiva and sclera clear  ENMT: No tonsillar erythema, exudates, or enlargement; Moist mucous membranes, Good dentition, No lesions  NECK: Supple, No JVD, Normal thyroid  NERVOUS SYSTEM:  Alert & Oriented X3, Good concentration; Motor Strength 5/5 B/L upper and lower extremities; DTRs 2+ intact and symmetric  CHEST/LUNG: Clear to percussion bilaterally; No rales, rhonchi, wheezing, or rubs  HEART: Regular rate and rhythm; No murmurs, rubs, or gallops  ABDOMEN: Soft, Nontender, Nondistended; Bowel sounds present  EXTREMITIES:  2+ Peripheral Pulses, No clubbing, cyanosis, or edema  LYMPH: No lymphadenopathy noted  SKIN: No rashes or lesions

## 2022-01-11 NOTE — DISCHARGE NOTE PROVIDER - NSDCQMPCI_CARD_ALL_CORE
79 yr old female arrives to ED after having a pacemaker placed Jan 28th. Pt went home on Wednesday. Pt called the cardiologist yesterday because she had concerns about swelling to her pacemaker site. Minimal discomfort around site. Dressing in place. On Eliquis. Also complains of dysuria with urination. No

## 2022-01-11 NOTE — H&P ADULT - HISTORY OF PRESENT ILLNESS
Patient is a 76F with a PMH of chronic LBP, HTN, HLD, hypothyroidism, anemia who presents to the ED for abdominal pain and dysuria.  Patient states that for the last 2-3 weeks she has had continued abdominal pain and dysuria.  Was admitted to [facility] and was treated for UTI there, states that her pain persisted after she was discharged.  Admitted to [facility] on 12/31 for similar complaints.  Received a full course of IV rocephin and was discharged home.  Patient continues to complain of lower abdomen pain and dysuria.  States she had a fever of 101 at home today.  Patient also complains of chronic back pain, states she is due for her prescribed morphine.  Patient has no other complaints.  Tachycardic in ED to 127, however heart rate improved with IV fluids.  Labs benign.  Will admit to med surg.

## 2022-01-11 NOTE — H&P ADULT - NSHPPHYSICALEXAM_GEN_ALL_CORE
Physical exam:  General: patient in no acute distress, resting comfortably  Head:  Atraumatic, Normocephalic  Eyes: EOMI, PERRLA, clear sclera  Neck: Supple, thyroid nontender, non enlarged  Cardio: S1/S2 +ve, regular rate and rhythm, no M/G/R  Resp: clear to ausculation bilaterally, no rales or wheezes  GI: abdomen soft, mild tenderness on deep palpation of abdomen, no guarding, BS +ve x 4  : no specific suprapubic tenderness  Ext: no significant pedal edema  Neuro: CN 2-12 intact, no significant motor or sensory deficits.  Skin: No rashes or lesions

## 2022-01-11 NOTE — DISCHARGE NOTE PROVIDER - NSDCMRMEDTOKEN_GEN_ALL_CORE_FT
acetaminophen 325 mg oral tablet: 3 tab(s) orally every 8 hours, As Needed - 3)  aspirin 81 mg oral delayed release tablet: 1 tab(s) orally once a day  docusate sodium 100 mg oral capsule: 1 cap(s) orally 3 times a day  gabapentin 100 mg oral capsule: 1 cap(s) orally 3 times a day  lactulose 10 g oral powder for reconstitution: 10 gram(s) orally once a day as needed for constipation  levoFLOXacin 500 mg oral tablet: 1 tab(s) orally once a day   levothyroxine 175 mcg (0.175 mg) oral tablet: 1 tab(s) orally once a day  lidocaine 5% topical film: Apply topically to affected area once a day   melatonin 3 mg oral tablet: 1 tab(s) orally once a day (at bedtime)  metFORMIN 1000 mg oral tablet: 1 tab(s) orally 2 times a day   morphine 60 mg/12 hours oral tablet, extended release: 1 tab(s) orally 2 times a day  pantoprazole 40 mg oral delayed release tablet: 1 tab(s) orally once a day   polyethylene glycol 3350 oral powder for reconstitution: 17 gram(s) orally once a day  prednisoLONE acetate 1% ophthalmic suspension: 1 drop(s) to each affected eye 4 times a day  simvastatin 20 mg oral tablet: 1 tab(s) orally once a day (at bedtime)

## 2022-01-11 NOTE — DISCHARGE NOTE NURSING/CASE MANAGEMENT/SOCIAL WORK - PATIENT PORTAL LINK FT
You can access the FollowMyHealth Patient Portal offered by [facility] by registering at the following website: [email or web address]. By joining [employer]’s FollowMyHealth portal, you will also be able to view your health information using other applications (apps) compatible with our system.

## 2022-01-11 NOTE — DISCHARGE NOTE NURSING/CASE MANAGEMENT/SOCIAL WORK - NSDCPEFALRISK_GEN_ALL_CORE
For information on Fall & Injury Prevention, visit: [email or web address] OR  [email or web address] OR  https://www.cdc.gov/steadi/patient.html

## 2022-01-11 NOTE — H&P ADULT - PROBLEM SELECTOR PLAN 3
-- get coronary angiogram   -- get ultrasound of the neck   -- pre treat with prednisone/benadryl  -- stop eliquis 2 days before procedure.   -- see me 3 months.   
synthroid

## 2022-01-12 ENCOUNTER — APPOINTMENT (OUTPATIENT)
Dept: OPHTHALMOLOGY | Facility: CLINIC | Age: 77
End: 2022-01-12

## 2022-01-12 LAB
CULTURE RESULTS: SIGNIFICANT CHANGE UP
SPECIMEN SOURCE: SIGNIFICANT CHANGE UP

## 2022-01-16 LAB
CULTURE RESULTS: SIGNIFICANT CHANGE UP
CULTURE RESULTS: SIGNIFICANT CHANGE UP
SPECIMEN SOURCE: SIGNIFICANT CHANGE UP
SPECIMEN SOURCE: SIGNIFICANT CHANGE UP

## 2022-01-18 DIAGNOSIS — Z90.49 ACQUIRED ABSENCE OF OTHER SPECIFIED PARTS OF DIGESTIVE TRACT: ICD-10-CM

## 2022-01-18 DIAGNOSIS — G89.29 OTHER CHRONIC PAIN: ICD-10-CM

## 2022-01-18 DIAGNOSIS — E03.9 HYPOTHYROIDISM, UNSPECIFIED: ICD-10-CM

## 2022-01-18 DIAGNOSIS — A41.9 SEPSIS, UNSPECIFIED ORGANISM: ICD-10-CM

## 2022-01-18 DIAGNOSIS — Z96.653 PRESENCE OF ARTIFICIAL KNEE JOINT, BILATERAL: ICD-10-CM

## 2022-01-18 DIAGNOSIS — M54.9 DORSALGIA, UNSPECIFIED: ICD-10-CM

## 2022-01-18 DIAGNOSIS — Z79.82 LONG TERM (CURRENT) USE OF ASPIRIN: ICD-10-CM

## 2022-01-18 DIAGNOSIS — E78.5 HYPERLIPIDEMIA, UNSPECIFIED: ICD-10-CM

## 2022-01-18 DIAGNOSIS — Z86.14 PERSONAL HISTORY OF METHICILLIN RESISTANT STAPHYLOCOCCUS AUREUS INFECTION: ICD-10-CM

## 2022-01-18 DIAGNOSIS — Z79.84 LONG TERM (CURRENT) USE OF ORAL HYPOGLYCEMIC DRUGS: ICD-10-CM

## 2022-01-18 DIAGNOSIS — N39.0 URINARY TRACT INFECTION, SITE NOT SPECIFIED: ICD-10-CM

## 2022-01-18 DIAGNOSIS — M19.90 UNSPECIFIED OSTEOARTHRITIS, UNSPECIFIED SITE: ICD-10-CM

## 2022-01-18 DIAGNOSIS — K57.90 DIVERTICULOSIS OF INTESTINE, PART UNSPECIFIED, WITHOUT PERFORATION OR ABSCESS WITHOUT BLEEDING: ICD-10-CM

## 2022-01-18 DIAGNOSIS — Z79.52 LONG TERM (CURRENT) USE OF SYSTEMIC STEROIDS: ICD-10-CM

## 2022-01-18 DIAGNOSIS — Z90.710 ACQUIRED ABSENCE OF BOTH CERVIX AND UTERUS: ICD-10-CM

## 2022-01-18 DIAGNOSIS — I10 ESSENTIAL (PRIMARY) HYPERTENSION: ICD-10-CM

## 2022-01-18 DIAGNOSIS — D64.9 ANEMIA, UNSPECIFIED: ICD-10-CM

## 2022-01-18 DIAGNOSIS — E11.9 TYPE 2 DIABETES MELLITUS WITHOUT COMPLICATIONS: ICD-10-CM

## 2022-02-18 NOTE — PROGRESS NOTE ADULT - ASSESSMENT
73 y/o female with Angioedema requiring nasal fiberoptic intubation. Extubated yesterday. Laryngoscope done at bedside showed subglottic edema. Patient also noted with mild epistaxis from the right nare 71 y/o female with Angioedema requiring nasal fiberoptic intubation. Extubated yesterday. Laryngoscope done at bedside showed some subglottic edema. Patient also noted with mild epistaxis from the right nare which has resolved. Methotrexate Counseling:  Patient counseled regarding adverse effects of methotrexate including but not limited to nausea, vomiting, abnormalities in liver function tests. Patients may develop mouth sores, rash, diarrhea, and abnormalities in blood counts. The patient understands that monitoring is required including LFT's and blood counts.  There is a rare possibility of scarring of the liver and lung problems that can occur when taking methotrexate. Persistent nausea, loss of appetite, pale stools, dark urine, cough, and shortness of breath should be reported immediately. Patient advised to discontinue methotrexate treatment at least three months before attempting to become pregnant.  I discussed the need for folate supplements while taking methotrexate.  These supplements can decrease side effects during methotrexate treatment. The patient verbalized understanding of the proper use and possible adverse effects of methotrexate.  All of the patient's questions and concerns were addressed.

## 2022-03-10 ENCOUNTER — INPATIENT (INPATIENT)
Facility: HOSPITAL | Age: 77
LOS: 4 days | Discharge: HOME | End: 2022-03-15
Attending: FAMILY MEDICINE | Admitting: FAMILY MEDICINE
Payer: MEDICARE

## 2022-03-10 VITALS
OXYGEN SATURATION: 99 % | SYSTOLIC BLOOD PRESSURE: 136 MMHG | DIASTOLIC BLOOD PRESSURE: 89 MMHG | RESPIRATION RATE: 19 BRPM | HEIGHT: 65 IN | TEMPERATURE: 98 F | WEIGHT: 210.1 LBS | HEART RATE: 136 BPM

## 2022-03-10 DIAGNOSIS — Z98.89 OTHER SPECIFIED POSTPROCEDURAL STATES: Chronic | ICD-10-CM

## 2022-03-10 DIAGNOSIS — Z96.653 PRESENCE OF ARTIFICIAL KNEE JOINT, BILATERAL: Chronic | ICD-10-CM

## 2022-03-10 DIAGNOSIS — Z82.8 FAMILY HISTORY OF OTHER DISABILITIES AND CHRONIC DISEASES LEADING TO DISABLEMENT, NOT ELSEWHERE CLASSIFIED: Chronic | ICD-10-CM

## 2022-03-10 DIAGNOSIS — Z90.710 ACQUIRED ABSENCE OF BOTH CERVIX AND UTERUS: Chronic | ICD-10-CM

## 2022-03-10 LAB
ALBUMIN SERPL ELPH-MCNC: 4.4 G/DL — SIGNIFICANT CHANGE UP (ref 3.3–5)
ALP SERPL-CCNC: 124 U/L — HIGH (ref 40–120)
ALT FLD-CCNC: 18 U/L — SIGNIFICANT CHANGE UP (ref 12–78)
ANION GAP SERPL CALC-SCNC: 13 MMOL/L — SIGNIFICANT CHANGE UP (ref 5–17)
APPEARANCE UR: CLEAR — SIGNIFICANT CHANGE UP
APTT BLD: 26.6 SEC — LOW (ref 27.5–35.5)
AST SERPL-CCNC: 32 U/L — SIGNIFICANT CHANGE UP (ref 15–37)
BACTERIA # UR AUTO: NEGATIVE — SIGNIFICANT CHANGE UP
BASOPHILS # BLD AUTO: 0 K/UL — SIGNIFICANT CHANGE UP (ref 0–0.2)
BASOPHILS NFR BLD AUTO: 0 % — SIGNIFICANT CHANGE UP (ref 0–2)
BILIRUB DIRECT SERPL-MCNC: 0.2 MG/DL — SIGNIFICANT CHANGE UP (ref 0–0.3)
BILIRUB INDIRECT FLD-MCNC: 0.6 MG/DL — SIGNIFICANT CHANGE UP (ref 0.2–1)
BILIRUB SERPL-MCNC: 0.8 MG/DL — SIGNIFICANT CHANGE UP (ref 0.2–1.2)
BILIRUB UR-MCNC: NEGATIVE — SIGNIFICANT CHANGE UP
BUN SERPL-MCNC: 10 MG/DL — SIGNIFICANT CHANGE UP (ref 7–23)
CALCIUM SERPL-MCNC: 9.9 MG/DL — SIGNIFICANT CHANGE UP (ref 8.5–10.1)
CHLORIDE SERPL-SCNC: 101 MMOL/L — SIGNIFICANT CHANGE UP (ref 96–108)
CO2 SERPL-SCNC: 23 MMOL/L — SIGNIFICANT CHANGE UP (ref 22–31)
COLOR SPEC: YELLOW — SIGNIFICANT CHANGE UP
CREAT SERPL-MCNC: 1.13 MG/DL — SIGNIFICANT CHANGE UP (ref 0.5–1.3)
DIFF PNL FLD: ABNORMAL
EGFR: 50 ML/MIN/1.73M2 — LOW
EOSINOPHIL # BLD AUTO: 0 K/UL — SIGNIFICANT CHANGE UP (ref 0–0.5)
EOSINOPHIL NFR BLD AUTO: 0 % — SIGNIFICANT CHANGE UP (ref 0–6)
EPI CELLS # UR: SIGNIFICANT CHANGE UP
FLUAV AG NPH QL: SIGNIFICANT CHANGE UP
FLUBV AG NPH QL: SIGNIFICANT CHANGE UP
GLUCOSE BLDC GLUCOMTR-MCNC: 207 MG/DL — HIGH (ref 70–99)
GLUCOSE SERPL-MCNC: 371 MG/DL — HIGH (ref 70–99)
GLUCOSE UR QL: 1000 MG/DL
HCT VFR BLD CALC: 33.1 % — LOW (ref 34.5–45)
HGB BLD-MCNC: 10.5 G/DL — LOW (ref 11.5–15.5)
IMM GRANULOCYTES NFR BLD AUTO: 0.4 % — SIGNIFICANT CHANGE UP (ref 0–1.5)
INR BLD: 1.1 RATIO — SIGNIFICANT CHANGE UP (ref 0.88–1.16)
KETONES UR-MCNC: ABNORMAL
LACTATE SERPL-SCNC: 2 MMOL/L — SIGNIFICANT CHANGE UP (ref 0.7–2)
LACTATE SERPL-SCNC: 2.1 MMOL/L — HIGH (ref 0.7–2)
LEUKOCYTE ESTERASE UR-ACNC: NEGATIVE — SIGNIFICANT CHANGE UP
LIDOCAIN IGE QN: 85 U/L — SIGNIFICANT CHANGE UP (ref 73–393)
LYMPHOCYTES # BLD AUTO: 0.68 K/UL — LOW (ref 1–3.3)
LYMPHOCYTES # BLD AUTO: 9.4 % — LOW (ref 13–44)
MCHC RBC-ENTMCNC: 23.8 PG — LOW (ref 27–34)
MCHC RBC-ENTMCNC: 31.7 G/DL — LOW (ref 32–36)
MCV RBC AUTO: 75.1 FL — LOW (ref 80–100)
MONOCYTES # BLD AUTO: 0.64 K/UL — SIGNIFICANT CHANGE UP (ref 0–0.9)
MONOCYTES NFR BLD AUTO: 8.9 % — SIGNIFICANT CHANGE UP (ref 2–14)
NEUTROPHILS # BLD AUTO: 5.87 K/UL — SIGNIFICANT CHANGE UP (ref 1.8–7.4)
NEUTROPHILS NFR BLD AUTO: 81.3 % — HIGH (ref 43–77)
NITRITE UR-MCNC: NEGATIVE — SIGNIFICANT CHANGE UP
NRBC # BLD: 0 /100 WBCS — SIGNIFICANT CHANGE UP (ref 0–0)
PH UR: 7 — SIGNIFICANT CHANGE UP (ref 5–8)
PLATELET # BLD AUTO: 405 K/UL — HIGH (ref 150–400)
POTASSIUM SERPL-MCNC: 2.4 MMOL/L — CRITICAL LOW (ref 3.5–5.3)
POTASSIUM SERPL-SCNC: 2.4 MMOL/L — CRITICAL LOW (ref 3.5–5.3)
PROT SERPL-MCNC: 9.6 GM/DL — HIGH (ref 6–8.3)
PROT UR-MCNC: 100 MG/DL
PROTHROM AB SERPL-ACNC: 13.1 SEC — SIGNIFICANT CHANGE UP (ref 10.5–13.4)
RBC # BLD: 4.41 M/UL — SIGNIFICANT CHANGE UP (ref 3.8–5.2)
RBC # FLD: 18.5 % — HIGH (ref 10.3–14.5)
RBC CASTS # UR COMP ASSIST: NEGATIVE /HPF — SIGNIFICANT CHANGE UP (ref 0–4)
SARS-COV-2 RNA SPEC QL NAA+PROBE: SIGNIFICANT CHANGE UP
SODIUM SERPL-SCNC: 137 MMOL/L — SIGNIFICANT CHANGE UP (ref 135–145)
SP GR SPEC: 1.01 — SIGNIFICANT CHANGE UP (ref 1.01–1.02)
TROPONIN I, HIGH SENSITIVITY RESULT: 272.8 NG/L — HIGH
TROPONIN I, HIGH SENSITIVITY RESULT: 533.7 NG/L — HIGH
UROBILINOGEN FLD QL: NEGATIVE MG/DL — SIGNIFICANT CHANGE UP
WBC # BLD: 7.22 K/UL — SIGNIFICANT CHANGE UP (ref 3.8–10.5)
WBC # FLD AUTO: 7.22 K/UL — SIGNIFICANT CHANGE UP (ref 3.8–10.5)
WBC UR QL: SIGNIFICANT CHANGE UP

## 2022-03-10 PROCEDURE — 99285 EMERGENCY DEPT VISIT HI MDM: CPT

## 2022-03-10 PROCEDURE — 71045 X-RAY EXAM CHEST 1 VIEW: CPT | Mod: 26

## 2022-03-10 PROCEDURE — 93010 ELECTROCARDIOGRAM REPORT: CPT

## 2022-03-10 PROCEDURE — 74177 CT ABD & PELVIS W/CONTRAST: CPT | Mod: 26,MA

## 2022-03-10 PROCEDURE — 99223 1ST HOSP IP/OBS HIGH 75: CPT

## 2022-03-10 RX ORDER — DEXTROSE 50 % IN WATER 50 %
25 SYRINGE (ML) INTRAVENOUS ONCE
Refills: 0 | Status: DISCONTINUED | OUTPATIENT
Start: 2022-03-10 | End: 2022-03-15

## 2022-03-10 RX ORDER — PANTOPRAZOLE SODIUM 20 MG/1
40 TABLET, DELAYED RELEASE ORAL
Refills: 0 | Status: DISCONTINUED | OUTPATIENT
Start: 2022-03-11 | End: 2022-03-15

## 2022-03-10 RX ORDER — CEFTRIAXONE 500 MG/1
1000 INJECTION, POWDER, FOR SOLUTION INTRAMUSCULAR; INTRAVENOUS EVERY 24 HOURS
Refills: 0 | Status: DISCONTINUED | OUTPATIENT
Start: 2022-03-11 | End: 2022-03-12

## 2022-03-10 RX ORDER — VANCOMYCIN HCL 1 G
500 VIAL (EA) INTRAVENOUS ONCE
Refills: 0 | Status: COMPLETED | OUTPATIENT
Start: 2022-03-10 | End: 2022-03-10

## 2022-03-10 RX ORDER — SODIUM CHLORIDE 9 MG/ML
1000 INJECTION, SOLUTION INTRAVENOUS
Refills: 0 | Status: DISCONTINUED | OUTPATIENT
Start: 2022-03-10 | End: 2022-03-12

## 2022-03-10 RX ORDER — GLUCAGON INJECTION, SOLUTION 0.5 MG/.1ML
1 INJECTION, SOLUTION SUBCUTANEOUS ONCE
Refills: 0 | Status: DISCONTINUED | OUTPATIENT
Start: 2022-03-10 | End: 2022-03-15

## 2022-03-10 RX ORDER — DEXTROSE 50 % IN WATER 50 %
12.5 SYRINGE (ML) INTRAVENOUS ONCE
Refills: 0 | Status: DISCONTINUED | OUTPATIENT
Start: 2022-03-10 | End: 2022-03-15

## 2022-03-10 RX ORDER — FAMOTIDINE 10 MG/ML
20 INJECTION INTRAVENOUS ONCE
Refills: 0 | Status: COMPLETED | OUTPATIENT
Start: 2022-03-10 | End: 2022-03-10

## 2022-03-10 RX ORDER — LANOLIN ALCOHOL/MO/W.PET/CERES
3 CREAM (GRAM) TOPICAL AT BEDTIME
Refills: 0 | Status: DISCONTINUED | OUTPATIENT
Start: 2022-03-10 | End: 2022-03-15

## 2022-03-10 RX ORDER — MAGNESIUM SULFATE 500 MG/ML
2 VIAL (ML) INJECTION ONCE
Refills: 0 | Status: COMPLETED | OUTPATIENT
Start: 2022-03-10 | End: 2022-03-10

## 2022-03-10 RX ORDER — LIDOCAINE 4 G/100G
1 CREAM TOPICAL DAILY
Refills: 0 | Status: DISCONTINUED | OUTPATIENT
Start: 2022-03-10 | End: 2022-03-15

## 2022-03-10 RX ORDER — METRONIDAZOLE 500 MG
500 TABLET ORAL EVERY 8 HOURS
Refills: 0 | Status: DISCONTINUED | OUTPATIENT
Start: 2022-03-10 | End: 2022-03-12

## 2022-03-10 RX ORDER — POLYETHYLENE GLYCOL 3350 17 G/17G
17 POWDER, FOR SOLUTION ORAL DAILY
Refills: 0 | Status: DISCONTINUED | OUTPATIENT
Start: 2022-03-10 | End: 2022-03-12

## 2022-03-10 RX ORDER — POTASSIUM CHLORIDE 20 MEQ
10 PACKET (EA) ORAL ONCE
Refills: 0 | Status: COMPLETED | OUTPATIENT
Start: 2022-03-10 | End: 2022-03-10

## 2022-03-10 RX ORDER — POTASSIUM CHLORIDE 20 MEQ
10 PACKET (EA) ORAL
Refills: 0 | Status: COMPLETED | OUTPATIENT
Start: 2022-03-10 | End: 2022-03-10

## 2022-03-10 RX ORDER — SODIUM CHLORIDE 9 MG/ML
1000 INJECTION, SOLUTION INTRAVENOUS
Refills: 0 | Status: DISCONTINUED | OUTPATIENT
Start: 2022-03-10 | End: 2022-03-15

## 2022-03-10 RX ORDER — PREDNISOLONE SODIUM PHOSPHATE 1 %
1 DROPS OPHTHALMIC (EYE)
Refills: 0 | Status: DISCONTINUED | OUTPATIENT
Start: 2022-03-10 | End: 2022-03-15

## 2022-03-10 RX ORDER — MORPHINE SULFATE 50 MG/1
4 CAPSULE, EXTENDED RELEASE ORAL ONCE
Refills: 0 | Status: DISCONTINUED | OUTPATIENT
Start: 2022-03-10 | End: 2022-03-10

## 2022-03-10 RX ORDER — ENOXAPARIN SODIUM 100 MG/ML
40 INJECTION SUBCUTANEOUS EVERY 24 HOURS
Refills: 0 | Status: DISCONTINUED | OUTPATIENT
Start: 2022-03-10 | End: 2022-03-15

## 2022-03-10 RX ORDER — DILTIAZEM HCL 120 MG
24 CAPSULE, EXT RELEASE 24 HR ORAL ONCE
Refills: 0 | Status: DISCONTINUED | OUTPATIENT
Start: 2022-03-10 | End: 2022-03-10

## 2022-03-10 RX ORDER — VANCOMYCIN HCL 1 G
500 VIAL (EA) INTRAVENOUS EVERY 6 HOURS
Refills: 0 | Status: DISCONTINUED | OUTPATIENT
Start: 2022-03-10 | End: 2022-03-10

## 2022-03-10 RX ORDER — ASPIRIN/CALCIUM CARB/MAGNESIUM 324 MG
81 TABLET ORAL DAILY
Refills: 0 | Status: DISCONTINUED | OUTPATIENT
Start: 2022-03-11 | End: 2022-03-15

## 2022-03-10 RX ORDER — LEVOTHYROXINE SODIUM 125 MCG
175 TABLET ORAL DAILY
Refills: 0 | Status: DISCONTINUED | OUTPATIENT
Start: 2022-03-11 | End: 2022-03-15

## 2022-03-10 RX ORDER — METRONIDAZOLE 500 MG
500 TABLET ORAL ONCE
Refills: 0 | Status: COMPLETED | OUTPATIENT
Start: 2022-03-10 | End: 2022-03-10

## 2022-03-10 RX ORDER — CEFTRIAXONE 500 MG/1
1000 INJECTION, POWDER, FOR SOLUTION INTRAMUSCULAR; INTRAVENOUS ONCE
Refills: 0 | Status: COMPLETED | OUTPATIENT
Start: 2022-03-10 | End: 2022-03-10

## 2022-03-10 RX ORDER — DEXTROSE 50 % IN WATER 50 %
15 SYRINGE (ML) INTRAVENOUS ONCE
Refills: 0 | Status: DISCONTINUED | OUTPATIENT
Start: 2022-03-10 | End: 2022-03-15

## 2022-03-10 RX ORDER — POTASSIUM CHLORIDE 20 MEQ
10 PACKET (EA) ORAL ONCE
Refills: 0 | Status: DISCONTINUED | OUTPATIENT
Start: 2022-03-10 | End: 2022-03-10

## 2022-03-10 RX ORDER — MORPHINE SULFATE 50 MG/1
60 CAPSULE, EXTENDED RELEASE ORAL
Refills: 0 | Status: DISCONTINUED | OUTPATIENT
Start: 2022-03-10 | End: 2022-03-15

## 2022-03-10 RX ORDER — ONDANSETRON 8 MG/1
4 TABLET, FILM COATED ORAL ONCE
Refills: 0 | Status: COMPLETED | OUTPATIENT
Start: 2022-03-10 | End: 2022-03-10

## 2022-03-10 RX ORDER — ACETAMINOPHEN 500 MG
650 TABLET ORAL ONCE
Refills: 0 | Status: COMPLETED | OUTPATIENT
Start: 2022-03-10 | End: 2022-03-10

## 2022-03-10 RX ORDER — ONDANSETRON 8 MG/1
4 TABLET, FILM COATED ORAL EVERY 8 HOURS
Refills: 0 | Status: DISCONTINUED | OUTPATIENT
Start: 2022-03-10 | End: 2022-03-15

## 2022-03-10 RX ORDER — INSULIN LISPRO 100/ML
VIAL (ML) SUBCUTANEOUS
Refills: 0 | Status: DISCONTINUED | OUTPATIENT
Start: 2022-03-10 | End: 2022-03-15

## 2022-03-10 RX ORDER — SODIUM CHLORIDE 9 MG/ML
2000 INJECTION, SOLUTION INTRAVENOUS ONCE
Refills: 0 | Status: COMPLETED | OUTPATIENT
Start: 2022-03-10 | End: 2022-03-10

## 2022-03-10 RX ORDER — SODIUM CHLORIDE 9 MG/ML
500 INJECTION, SOLUTION INTRAVENOUS ONCE
Refills: 0 | Status: COMPLETED | OUTPATIENT
Start: 2022-03-10 | End: 2022-03-10

## 2022-03-10 RX ORDER — GABAPENTIN 400 MG/1
100 CAPSULE ORAL THREE TIMES A DAY
Refills: 0 | Status: DISCONTINUED | OUTPATIENT
Start: 2022-03-10 | End: 2022-03-15

## 2022-03-10 RX ADMIN — Medication 25 GRAM(S): at 17:06

## 2022-03-10 RX ADMIN — SODIUM CHLORIDE 75 MILLILITER(S): 9 INJECTION, SOLUTION INTRAVENOUS at 23:02

## 2022-03-10 RX ADMIN — Medication 1 DROP(S): at 19:42

## 2022-03-10 RX ADMIN — SODIUM CHLORIDE 500 MILLILITER(S): 9 INJECTION, SOLUTION INTRAVENOUS at 16:42

## 2022-03-10 RX ADMIN — CEFTRIAXONE 100 MILLIGRAM(S): 500 INJECTION, POWDER, FOR SOLUTION INTRAMUSCULAR; INTRAVENOUS at 13:27

## 2022-03-10 RX ADMIN — Medication 650 MILLIGRAM(S): at 15:04

## 2022-03-10 RX ADMIN — SODIUM CHLORIDE 2000 MILLILITER(S): 9 INJECTION, SOLUTION INTRAVENOUS at 17:16

## 2022-03-10 RX ADMIN — SODIUM CHLORIDE 500 MILLILITER(S): 9 INJECTION, SOLUTION INTRAVENOUS at 15:06

## 2022-03-10 RX ADMIN — MORPHINE SULFATE 4 MILLIGRAM(S): 50 CAPSULE, EXTENDED RELEASE ORAL at 13:22

## 2022-03-10 RX ADMIN — MORPHINE SULFATE 4 MILLIGRAM(S): 50 CAPSULE, EXTENDED RELEASE ORAL at 14:00

## 2022-03-10 RX ADMIN — SODIUM CHLORIDE 2000 MILLILITER(S): 9 INJECTION, SOLUTION INTRAVENOUS at 13:19

## 2022-03-10 RX ADMIN — Medication 500 MILLIGRAM(S): at 19:42

## 2022-03-10 RX ADMIN — Medication 650 MILLIGRAM(S): at 16:30

## 2022-03-10 RX ADMIN — FAMOTIDINE 20 MILLIGRAM(S): 10 INJECTION INTRAVENOUS at 13:20

## 2022-03-10 RX ADMIN — GABAPENTIN 100 MILLIGRAM(S): 400 CAPSULE ORAL at 23:04

## 2022-03-10 RX ADMIN — Medication 10 MILLIEQUIVALENT(S): at 17:07

## 2022-03-10 RX ADMIN — ONDANSETRON 4 MILLIGRAM(S): 8 TABLET, FILM COATED ORAL at 18:37

## 2022-03-10 RX ADMIN — Medication 100 MILLIEQUIVALENT(S): at 15:06

## 2022-03-10 RX ADMIN — Medication 100 MILLIEQUIVALENT(S): at 15:55

## 2022-03-10 RX ADMIN — Medication 2: at 23:01

## 2022-03-10 RX ADMIN — Medication 1 DROP(S): at 23:05

## 2022-03-10 RX ADMIN — SODIUM CHLORIDE 75 MILLILITER(S): 9 INJECTION, SOLUTION INTRAVENOUS at 23:05

## 2022-03-10 RX ADMIN — Medication 100 MILLIGRAM(S): at 18:48

## 2022-03-10 RX ADMIN — ENOXAPARIN SODIUM 40 MILLIGRAM(S): 100 INJECTION SUBCUTANEOUS at 18:37

## 2022-03-10 RX ADMIN — MORPHINE SULFATE 60 MILLIGRAM(S): 50 CAPSULE, EXTENDED RELEASE ORAL at 18:40

## 2022-03-10 RX ADMIN — SODIUM CHLORIDE 75 MILLILITER(S): 9 INJECTION, SOLUTION INTRAVENOUS at 23:01

## 2022-03-10 RX ADMIN — Medication 100 MILLIEQUIVALENT(S): at 17:15

## 2022-03-10 RX ADMIN — ONDANSETRON 4 MILLIGRAM(S): 8 TABLET, FILM COATED ORAL at 13:21

## 2022-03-10 RX ADMIN — Medication 3 MILLIGRAM(S): at 23:04

## 2022-03-10 RX ADMIN — MORPHINE SULFATE 60 MILLIGRAM(S): 50 CAPSULE, EXTENDED RELEASE ORAL at 20:00

## 2022-03-10 NOTE — H&P ADULT - NSHPPHYSICALEXAM_GEN_ALL_CORE
Vital Signs Last 24 Hrs  T(C): 38.1 (10 Mar 2022 14:20), Max: 38.1 (10 Mar 2022 14:20)  T(F): 100.5 (10 Mar 2022 14:20), Max: 100.5 (10 Mar 2022 14:20)  HR: 121 (10 Mar 2022 15:10) (121 - 136)  BP: 181/88 (10 Mar 2022 15:10) (136/89 - 181/88)  BP(mean): --  RR: 19 (10 Mar 2022 11:56) (19 - 19)  SpO2: 99% (10 Mar 2022 11:56) (99% - 99%)    PHYSICAL EXAM:  GENERAL: NAD  HEAD:  Atraumatic, Normocephalic  EYES: EOMI, PERRLA, conjunctiva and sclera clear  ENT: O/P Clear  NECK: Supple, No JVD  NERVOUS SYSTEM:  No focal deficits  CHEST/LUNG: Clear to percussion bilaterally; No rales, rhonchi, wheezing  HEART: Regular rate and rhythm; No murmurs, rubs, or gallops  ABDOMEN: Soft, Nontender, Nondistended; Bowel sounds present  EXTREMITIES:  2+ Peripheral Pulses, No clubbing, cyanosis, or edema  SKIN: No rashes or lesions Vital Signs Last 24 Hrs  T(C): 38.1 (10 Mar 2022 14:20), Max: 38.1 (10 Mar 2022 14:20)  T(F): 100.5 (10 Mar 2022 14:20), Max: 100.5 (10 Mar 2022 14:20)  HR: 121 (10 Mar 2022 15:10) (121 - 136)  BP: 181/88 (10 Mar 2022 15:10) (136/89 - 181/88)  BP(mean): --  RR: 19 (10 Mar 2022 11:56) (19 - 19)  SpO2: 99% (10 Mar 2022 11:56) (99% - 99%)    PHYSICAL EXAM:  GENERAL: Appears nauseated  HEAD:  Atraumatic, Normocephalic  EYES: EOMI, PERRLA, conjunctiva and sclera clear  ENT: O/P Clear  NECK: Supple, No JVD  NERVOUS SYSTEM:  No focal deficits  CHEST/LUNG: Clear to percussion bilaterally; No rales, rhonchi, wheezing  HEART: Regular rate; sl. tachy  ABDOMEN: Soft, Tender LLQ > RLQ, Nondistended; Bowel sounds present  EXTREMITIES:  2+ Peripheral Pulses, No clubbing, cyanosis, or edema  SKIN: No rashes or lesions

## 2022-03-10 NOTE — ED PROVIDER NOTE - OBJECTIVE STATEMENT
75 yo f pmhx sig, HTN, HLD, hypothyroidism, anemia, recently treated for UTI with abx last abx was 1 wk ago pw gradual onset of diffuse abd pain with nausea and nbnb vomiting developed over the course of the last 1 wk with no cp, no sob, no palpitations.    I have reviewed available current nursing and previous documentation of past medical, surgical, family, and/or social history.

## 2022-03-10 NOTE — ED ADULT TRIAGE NOTE - CHIEF COMPLAINT QUOTE
c/o abd pain X a few days with N/V/D  seen recently for UTI  Seems confused but friend states normally off

## 2022-03-10 NOTE — ED ADULT TRIAGE NOTE - NS ED TRIAGE AVPU SCALE
Patient called and stated that she needs to have linaclotide (LINZESS) 290 MCG and Dexlansoprazole (DEXILANT) 30 MG CAPSULE DELAYED RELEASE. Would like to have refilled as soon as possible and pharmacy confirmed.   
Refills sent to pharmacy.  
Alert-The patient is alert, awake and responds to voice. The patient is oriented to time, place, and person. The triage nurse is able to obtain subjective information.

## 2022-03-10 NOTE — PATIENT PROFILE ADULT - FALL HARM RISK - TYPE OF ASSESSMENT
----- Message from Mel Potter sent at 2/1/2022 12:17 PM CST -----  Patient calling back regarding his referral to schedule ariella. Call back number [phone]. Tks      Admission

## 2022-03-10 NOTE — ED PROVIDER NOTE - PHYSICAL EXAMINATION
Physical Exam    Vital Signs: I have reviewed the initial vital signs.  Constitutional: well-nourished, appears stated age, no acute distress  Eyes: PERRLA, and symmetrical lids.  ENT: Neck supple with no adenopathy, moist MM.  Cardiovascular: regular rate, regular rhythm, well-perfused extremities  Respiratory: unlabored respiratory effort, clear to auscultation bilaterally  Gastrointestinal: soft, +diffuse abd ttp, no guarding, no rebound   Musculoskeletal: supple neck, no lower extremity edema  Integumentary: warm, dry, no rash  Neurologic: extremities’ motor and sensory functions grossly intact  Psychiatric: A&Ox3, appropriate mood, appropriate affect

## 2022-03-10 NOTE — PATIENT PROFILE ADULT - FALL HARM RISK - HARM RISK INTERVENTIONS

## 2022-03-10 NOTE — ED PROVIDER NOTE - NS ED ROS FT
Review of Systems    Constitutional: (-) fever  Eyes/ENT: (-) change in vision, (-) sore throat, (-) ear pain  Cardiovascular: (-) chest pain, (-) palpitation   Respiratory: (-) cough, (-) shortness of breath  Gastrointestinal: SEE HPI  Musculoskeletal: (-) neck pain, (-) back pain, (-) joint pain  Integumentary: (-) rash, (-) edema  Neurological: (-) headache, (-) altered mental status  Heme/Lymph: (-) easy bruising (-) easy bleeding (-) lymphadenopathy  Allergic/Immunologic: (-) pruritus

## 2022-03-10 NOTE — ED ADULT NURSE REASSESSMENT NOTE - NS ED NURSE REASSESS COMMENT FT1
Patient with runs of TULIO with "S with cough patient slows rate. Second IV inserted to Right AC by MD via US. Repeat laCTATE DRAWN AND SENT TO lAB.
Patient went into TULIO with rapid ventricular response. EKG done. Patient needs another IV line Physcian to attempt.

## 2022-03-10 NOTE — H&P ADULT - ASSESSMENT
Colitis - w/ multiple recent courses of ABX, concerning for C. diff    Given IV Rocephin and Flagyl in ER    Started on po Vanco for suspected C. diff    C. diff ordered/pending    Consider GI consult    Tachycardia/Sepsis    Treat underlying sepsis    IVF given    Blood Cxs pending    Urine Cx pending    Hypokalemia    Being repleted IV    Elevated Troponin -- likely due to demand ischemia   Trend number    DM, HTN, Lipids, Hypothyroid   Continue usual meds, except Metformin   SSI    Chronic LBP   Continue MS-Contin Admit to Telemetry    Colitis - w/ multiple recent courses of ABX, concerning for C. diff    Given IV Rocephin and Flagyl in ER; will continue for now pending Cx results    Started on po Vanco for suspected C. diff    C. diff ordered/pending    GI consult called ([facility])    Zofran    Tachycardia/Sepsis    Treat underlying sepsis    IVF given    Blood Cxs pending    Urine Cx pending    Hypokalemia/Hypomag    Being repleted IV    Elevated Troponin -- likely due to demand ischemia   Trend number    DM, HTN, Lipids, Hypothyroid   Continue usual meds, except Metformin   SSI    Chronic LBP   Continue MS-Contin    Brief Afib   Went into Afib for a minute or so; quickly returned to sinus   Per pt, has had Hx of intermittent Afib   Her regular cardio is Dr. Luna    GI prophylaxis - none  DVT prophylaxis - Lovenox  Skin Assessment - intact    Invasive lines, tubes, implants, etc.   Central lines: no   Herrera: no   Pacer/defibrillator/other implants: no   Tracheostomy/ shunt etc: no    I attest that the patient's current medications (prescription and over-the-counter) have been reviewed and updated, if appropriate, as provided to me during this encounter.    Patient has been screened for Hypertension. If appropriate, recommendations were made regarding lifestyle modifications and/or medications.  The patient and/or caregiver was instructed to follow-up with their PCP in 1-2 weeks post-discharge for blood pressure monitoring.    Advanced directive:    Full code    HCP - spencerer Lencho Gutierrez    Patient and/or family were educated or attempted to be educated and will deliberate about advanced care planning.   Surrogate decision-maker:      Denny Mancia MD   Admit to Telemetry    Colitis - w/ multiple recent courses of ABX, concerning for C. diff    Given IV Rocephin and Flagyl in ER; will continue for now pending Cx results    Started on po Vanco for suspected C. diff    C. diff ordered/pending    GI consult called ([facility])    Zofran    Tachycardia/Sepsis    Treat underlying sepsis    IVF given    Blood Cxs pending    Urine Cx pending    Hypokalemia/Hypomag    Being repleted IV    Elevated Troponin -- likely due to demand ischemia   Trend number    DM, HTN, Lipids, Hypothyroid   Continue usual meds, except Metformin   SSI    Chronic LBP   Continue MS-Contin    Brief Afib   Went into Afib for a minute or so; quickly returned to sinus   Per pt, has had Hx of intermittent Afib   Her regular cardio is Dr. Luna    GI prophylaxis - Protonix  DVT prophylaxis - Lovenox  Skin Assessment - intact    Invasive lines, tubes, implants, etc.   Central lines: no   Herrera: no   Pacer/defibrillator/other implants: no   Tracheostomy/ shunt etc: no    I attest that the patient's current medications (prescription and over-the-counter) have been reviewed and updated, if appropriate, as provided to me during this encounter.    Patient has been screened for Hypertension. If appropriate, recommendations were made regarding lifestyle modifications and/or medications.  The patient and/or caregiver was instructed to follow-up with their PCP in 1-2 weeks post-discharge for blood pressure monitoring.    Advanced directive:    Full code    HCP - spencerer Lencho Gutierrez    Patient and/or family were educated or attempted to be educated and will deliberate about advanced care planning.   Surrogate decision-maker:      Denny Mancia MD

## 2022-03-10 NOTE — ED ADULT NURSE NOTE - OBJECTIVE STATEMENT
Pt brought in by a friend to hospital for abd pain nausea vomiting since yesterday labs obtained meds given as ordered nursing care continues.

## 2022-03-10 NOTE — ED PROVIDER NOTE - PROGRESS NOTE DETAILS
Pt found to be in afib w rvr with normotensive ecg in progress and Cardizem ordered -- pt bp is 180/100

## 2022-03-10 NOTE — ED PROVIDER NOTE - CARE PLAN
Principal Discharge DX:	Acute colitis  Secondary Diagnosis:	Sepsis  Secondary Diagnosis:	Hypokalemia  Secondary Diagnosis:	Elevated troponin   1

## 2022-03-10 NOTE — H&P ADULT - HISTORY OF PRESENT ILLNESS
76 y.o. F w/ Hx DM, HTN, Lipids, Hypothyroid, chronic LBP/spinal stenosis, chronic pancreatitis, anemia had several recent episodes of UTI -- treated at [facility] in 12/2021, then here in 12/2021 and 1/2022. Was again treated as outpatient, completing course of ABX aboud a week ago; Now returns w/ several days of N/V/D and diffuse abdominal pain.

## 2022-03-11 ENCOUNTER — APPOINTMENT (OUTPATIENT)
Dept: OPHTHALMOLOGY | Facility: CLINIC | Age: 77
End: 2022-03-11

## 2022-03-11 LAB
-  COAGULASE NEGATIVE STAPHYLOCOCCUS: SIGNIFICANT CHANGE UP
A1C WITH ESTIMATED AVERAGE GLUCOSE RESULT: 6.1 % — HIGH (ref 4–5.6)
ALBUMIN SERPL ELPH-MCNC: 3.6 G/DL — SIGNIFICANT CHANGE UP (ref 3.3–5)
ALP SERPL-CCNC: 94 U/L — SIGNIFICANT CHANGE UP (ref 40–120)
ALT FLD-CCNC: 19 U/L — SIGNIFICANT CHANGE UP (ref 12–78)
ANION GAP SERPL CALC-SCNC: 11 MMOL/L — SIGNIFICANT CHANGE UP (ref 5–17)
AST SERPL-CCNC: 49 U/L — HIGH (ref 15–37)
BILIRUB SERPL-MCNC: 0.6 MG/DL — SIGNIFICANT CHANGE UP (ref 0.2–1.2)
BUN SERPL-MCNC: 11 MG/DL — SIGNIFICANT CHANGE UP (ref 7–23)
CALCIUM SERPL-MCNC: 9 MG/DL — SIGNIFICANT CHANGE UP (ref 8.5–10.1)
CHLORIDE SERPL-SCNC: 105 MMOL/L — SIGNIFICANT CHANGE UP (ref 96–108)
CO2 SERPL-SCNC: 24 MMOL/L — SIGNIFICANT CHANGE UP (ref 22–31)
CREAT SERPL-MCNC: 1.47 MG/DL — HIGH (ref 0.5–1.3)
CRP SERPL-MCNC: <3 MG/L — SIGNIFICANT CHANGE UP
EGFR: 37 ML/MIN/1.73M2 — LOW
ESTIMATED AVERAGE GLUCOSE: 128 MG/DL — HIGH (ref 68–114)
GLUCOSE BLDC GLUCOMTR-MCNC: 140 MG/DL — HIGH (ref 70–99)
GLUCOSE SERPL-MCNC: 120 MG/DL — HIGH (ref 70–99)
GRAM STN FLD: SIGNIFICANT CHANGE UP
HCT VFR BLD CALC: 32.8 % — LOW (ref 34.5–45)
HGB BLD-MCNC: 10 G/DL — LOW (ref 11.5–15.5)
MAGNESIUM SERPL-MCNC: 2.3 MG/DL — SIGNIFICANT CHANGE UP (ref 1.6–2.6)
MCHC RBC-ENTMCNC: 23.9 PG — LOW (ref 27–34)
MCHC RBC-ENTMCNC: 30.5 G/DL — LOW (ref 32–36)
MCV RBC AUTO: 78.3 FL — LOW (ref 80–100)
METHOD TYPE: SIGNIFICANT CHANGE UP
NRBC # BLD: 0 /100 WBCS — SIGNIFICANT CHANGE UP (ref 0–0)
PLATELET # BLD AUTO: 362 K/UL — SIGNIFICANT CHANGE UP (ref 150–400)
POTASSIUM SERPL-MCNC: 2.9 MMOL/L — CRITICAL LOW (ref 3.5–5.3)
POTASSIUM SERPL-SCNC: 2.9 MMOL/L — CRITICAL LOW (ref 3.5–5.3)
PROT SERPL-MCNC: 7.9 GM/DL — SIGNIFICANT CHANGE UP (ref 6–8.3)
RBC # BLD: 4.19 M/UL — SIGNIFICANT CHANGE UP (ref 3.8–5.2)
RBC # FLD: 19 % — HIGH (ref 10.3–14.5)
SODIUM SERPL-SCNC: 140 MMOL/L — SIGNIFICANT CHANGE UP (ref 135–145)
SPECIMEN SOURCE: SIGNIFICANT CHANGE UP
TROPONIN I, HIGH SENSITIVITY RESULT: 576.1 NG/L — HIGH
WBC # BLD: 8.49 K/UL — SIGNIFICANT CHANGE UP (ref 3.8–10.5)
WBC # FLD AUTO: 8.49 K/UL — SIGNIFICANT CHANGE UP (ref 3.8–10.5)

## 2022-03-11 PROCEDURE — 99223 1ST HOSP IP/OBS HIGH 75: CPT

## 2022-03-11 PROCEDURE — 99233 SBSQ HOSP IP/OBS HIGH 50: CPT

## 2022-03-11 RX ORDER — VANCOMYCIN HCL 1 G
125 VIAL (EA) INTRAVENOUS EVERY 6 HOURS
Refills: 0 | Status: DISCONTINUED | OUTPATIENT
Start: 2022-03-11 | End: 2022-03-12

## 2022-03-11 RX ORDER — POTASSIUM CHLORIDE 20 MEQ
10 PACKET (EA) ORAL
Refills: 0 | Status: COMPLETED | OUTPATIENT
Start: 2022-03-11 | End: 2022-03-11

## 2022-03-11 RX ORDER — POTASSIUM CHLORIDE 20 MEQ
40 PACKET (EA) ORAL ONCE
Refills: 0 | Status: COMPLETED | OUTPATIENT
Start: 2022-03-11 | End: 2022-03-11

## 2022-03-11 RX ADMIN — GABAPENTIN 100 MILLIGRAM(S): 400 CAPSULE ORAL at 15:04

## 2022-03-11 RX ADMIN — Medication 1 DROP(S): at 17:17

## 2022-03-11 RX ADMIN — Medication 175 MICROGRAM(S): at 05:40

## 2022-03-11 RX ADMIN — ENOXAPARIN SODIUM 40 MILLIGRAM(S): 100 INJECTION SUBCUTANEOUS at 17:29

## 2022-03-11 RX ADMIN — LIDOCAINE 1 PATCH: 4 CREAM TOPICAL at 19:45

## 2022-03-11 RX ADMIN — Medication 1 DROP(S): at 23:42

## 2022-03-11 RX ADMIN — Medication 1 DROP(S): at 05:41

## 2022-03-11 RX ADMIN — GABAPENTIN 100 MILLIGRAM(S): 400 CAPSULE ORAL at 21:53

## 2022-03-11 RX ADMIN — Medication 100 MILLIEQUIVALENT(S): at 15:05

## 2022-03-11 RX ADMIN — Medication 40 MILLIEQUIVALENT(S): at 09:26

## 2022-03-11 RX ADMIN — Medication 100 MILLIGRAM(S): at 21:53

## 2022-03-11 RX ADMIN — Medication 81 MILLIGRAM(S): at 11:22

## 2022-03-11 RX ADMIN — Medication 125 MILLIGRAM(S): at 05:41

## 2022-03-11 RX ADMIN — GABAPENTIN 100 MILLIGRAM(S): 400 CAPSULE ORAL at 05:38

## 2022-03-11 RX ADMIN — Medication 125 MILLIGRAM(S): at 11:23

## 2022-03-11 RX ADMIN — LIDOCAINE 1 PATCH: 4 CREAM TOPICAL at 23:03

## 2022-03-11 RX ADMIN — SODIUM CHLORIDE 75 MILLILITER(S): 9 INJECTION, SOLUTION INTRAVENOUS at 17:16

## 2022-03-11 RX ADMIN — MORPHINE SULFATE 60 MILLIGRAM(S): 50 CAPSULE, EXTENDED RELEASE ORAL at 06:48

## 2022-03-11 RX ADMIN — MORPHINE SULFATE 60 MILLIGRAM(S): 50 CAPSULE, EXTENDED RELEASE ORAL at 17:14

## 2022-03-11 RX ADMIN — MORPHINE SULFATE 60 MILLIGRAM(S): 50 CAPSULE, EXTENDED RELEASE ORAL at 05:38

## 2022-03-11 RX ADMIN — Medication 100 MILLIGRAM(S): at 05:36

## 2022-03-11 RX ADMIN — POLYETHYLENE GLYCOL 3350 17 GRAM(S): 17 POWDER, FOR SOLUTION ORAL at 11:22

## 2022-03-11 RX ADMIN — Medication 100 MILLIEQUIVALENT(S): at 09:33

## 2022-03-11 RX ADMIN — CEFTRIAXONE 100 MILLIGRAM(S): 500 INJECTION, POWDER, FOR SOLUTION INTRAMUSCULAR; INTRAVENOUS at 07:58

## 2022-03-11 RX ADMIN — Medication 125 MILLIGRAM(S): at 23:43

## 2022-03-11 RX ADMIN — Medication 100 MILLIGRAM(S): at 15:05

## 2022-03-11 RX ADMIN — LIDOCAINE 1 PATCH: 4 CREAM TOPICAL at 11:22

## 2022-03-11 RX ADMIN — PANTOPRAZOLE SODIUM 40 MILLIGRAM(S): 20 TABLET, DELAYED RELEASE ORAL at 05:41

## 2022-03-11 RX ADMIN — Medication 100 MILLIEQUIVALENT(S): at 12:19

## 2022-03-11 RX ADMIN — Medication 1 DROP(S): at 11:21

## 2022-03-11 RX ADMIN — Medication 125 MILLIGRAM(S): at 17:30

## 2022-03-11 RX ADMIN — Medication 3 MILLIGRAM(S): at 21:53

## 2022-03-11 NOTE — CONSULT NOTE ADULT - SUBJECTIVE AND OBJECTIVE BOX
HPI:  76 y.o. F w/ Hx DM, HTN, Lipids, Hypothyroid, chronic LBP/spinal stenosis, chronic pancreatitis, anemia had several recent episodes of UTI -- treated at [facility] in 2021, then here in 2021 and 2022. Was again treated as outpatient, completing course of ABX aboud a week ago; Now returns w/ several days of N/V/D and diffuse abdominal pain. (10 Mar 2022 16:37)  ------ As Above ------ Patient seen earlier today. Patient is a poor historian.   Patient presented with multiple loose BMs off and on over the past two weeks. Patient recently treated for a UTI. Vague abdominal pain (+) Non bloody. No one else with the same symptoms.   Last colonoscopy was a long time ago.   No BM's over night.   Left sided colitis by CT scan  / see labs       PAST MEDICAL & SURGICAL HISTORY:  Diabetes    Hypertension    Hypothyroid    Pancreatitis, chronic  last episode &gt; 10 years ago    Diverticulosis    Osteoarthritis    Spinal stenosis    MRSA (methicillin resistant Staphylococcus aureus) infection  , infected knee replacement, required multiple revisions and long term IV antibiotics via central line    H/O: hysterectomy    FH: cholecystectomy    H/O total knee replacement, bilateral    H/O right inguinal hernia repair        MEDICATIONS  (STANDING):  aspirin enteric coated 81 milliGRAM(s) Oral daily  cefTRIAXone   IVPB 1000 milliGRAM(s) IV Intermittent every 24 hours  dextrose 40% Gel 15 Gram(s) Oral once  dextrose 5%. 1000 milliLiter(s) (50 mL/Hr) IV Continuous <Continuous>  dextrose 5%. 1000 milliLiter(s) (100 mL/Hr) IV Continuous <Continuous>  dextrose 50% Injectable 25 Gram(s) IV Push once  dextrose 50% Injectable 12.5 Gram(s) IV Push once  dextrose 50% Injectable 25 Gram(s) IV Push once  enoxaparin Injectable 40 milliGRAM(s) SubCutaneous every 24 hours  gabapentin 100 milliGRAM(s) Oral three times a day  glucagon  Injectable 1 milliGRAM(s) IntraMuscular once  insulin lispro (ADMELOG) corrective regimen sliding scale   SubCutaneous three times a day before meals  levothyroxine 175 MICROGram(s) Oral daily  lidocaine   4% Patch 1 Patch Transdermal daily  melatonin 3 milliGRAM(s) Oral at bedtime  metroNIDAZOLE  IVPB 500 milliGRAM(s) IV Intermittent every 8 hours  morphine ER Tablet 60 milliGRAM(s) Oral two times a day  pantoprazole    Tablet 40 milliGRAM(s) Oral before breakfast  polyethylene glycol 3350 17 Gram(s) Oral daily  potassium chloride  10 mEq/100 mL IVPB 10 milliEquivalent(s) IV Intermittent every 1 hour  prednisoLONE acetate 1% Suspension 1 Drop(s) Both EYES four times a day  sodium chloride 0.9% 1000 milliLiter(s) (75 mL/Hr) IV Continuous <Continuous>  vancomycin    Solution 125 milliGRAM(s) Oral every 6 hours    MEDICATIONS  (PRN):  ondansetron Injectable 4 milliGRAM(s) IV Push every 8 hours PRN Nausea and/or Vomiting      Allergies    ACE inhibitors (Anaphylaxis; Angioedema)    Intolerances        FAMILY HISTORY:  Family history of CVA (Mother)        REVIEW OF SYSTEMS:    CONSTITUTIONAL: No fever, weight loss  EYES: No eye pain, visual disturbances, or discharge  ENMT:  No difficulty hearing, tinnitus, vertigo; No sinus or throat pain  NECK: No pain or stiffness  BREASTS: No pain, masses, or nipple discharge  RESPIRATORY: No cough, wheezing, chills or hemoptysis; No shortness of breath  CARDIOVASCULAR: No chest pain, palpitations, dizziness, or leg swelling  GASTROINTESTINAL: See above   GENITOURINARY: No dysuria, frequency, hematuria, or incontinence  NEUROLOGICAL: No headaches, memory loss, loss of strength, numbness, or tremors  SKIN: No itching, burning, rashes, or lesions   LYMPH NODES: No enlarged glands  ENDOCRINE: No heat or cold intolerance; No hair loss  MUSCULOSKELETAL: No joint pain or swelling; No muscle, back, or extremity pain  PSYCHIATRIC: No depression, anxiety, mood swings, or difficulty sleeping  HEME/LYMPH: No easy bruising, or bleeding gums  ALLERGY AND IMMUNOLOGIC: No hives or eczema          SOCIAL HISTORY:    FAMILY HISTORY:  Family history of CVA (Mother)        Vital Signs Last 24 Hrs  T(C): 37.2 (11 Mar 2022 10:17), Max: 38.1 (10 Mar 2022 14:20)  T(F): 99 (11 Mar 2022 10:17), Max: 100.5 (10 Mar 2022 14:20)  HR: 98 (11 Mar 2022 10:17) (98 - 165)  BP: 113/74 (11 Mar 2022 10:17) (113/74 - 192/96)  BP(mean): --  RR: 18 (11 Mar 2022 10:17) (18 - 25)  SpO2: 95% (11 Mar 2022 10:17) (95% - 99%)    PHYSICAL EXAM:    GENERAL: NAD, well-groomed, well-developed  HEAD:  Atraumatic, Normocephalic  EYES: EOMI, PERRLA, conjunctiva and sclera clear  NECK: Supple, No JVD, Normal thyroid  NERVOUS SYSTEM:  Alert & Oriented X3, Good concentration; Motor Strength 5/5 B/L upper and lower extremities;   CHEST/LUNG: Clear to percussion bilaterally; No rales, rhonchi, wheezing, or rubs  HEART: Regular rate and rhythm; No murmurs, rubs, or gallops  ABDOMEN: Soft, Nontender, Nondistended; Bowel sounds present  EXTREMITIES:  2+ Peripheral Pulses, No clubbing, cyanosis, or edema  LYMPH: No lymphadenopathy noted   RECTAL: Refused   SKIN: No rashes or lesions    LABS:                        10.0   8.49  )-----------( 362      ( 11 Mar 2022 06:46 )             32.8       CBC:  03-11 @ 06:46  WBC  8.49  HGB 10.0  HCT 32.8 Plate 362  MCV 78.3  03-10 @ 13:55  WBC  7.22  HGB 10.5  HCT 33.1 Plate 405  MCV 75.1           11 Mar 2022 06:46    140    |  105    |  11     ----------------------------<  120    2.9     |  24     |  1.47   10 Mar 2022 13:55    137    |  101    |  10     ----------------------------<  371    2.4     |  23     |  1.13     Ca    9.0        11 Mar 2022 06:46  Ca    9.9        10 Mar 2022 13:55  Mg     2.3       11 Mar 2022 06:46    TPro  7.9    /  Alb  3.6    /  TBili  0.6    /  DBili  x      /  AST  49     /  ALT  19     /  AlkPhos  94     11 Mar 2022 06:46  TPro  9.6    /  Alb  4.4    /  TBili  0.8    /  DBili  0.2    /  AST  32     /  ALT  18     /  AlkPhos  124    10 Mar 2022 13:55    PT/INR - ( 10 Mar 2022 13:55 )   PT: 13.1 sec;   INR: 1.10 ratio         PTT - ( 10 Mar 2022 13:55 )  PTT:26.6 sec  Urinalysis Basic - ( 10 Mar 2022 15:34 )    Color: Yellow / Appearance: Clear / S.010 / pH: x  Gluc: x / Ketone: Moderate  / Bili: Negative / Urobili: Negative mg/dL   Blood: x / Protein: 100 mg/dL / Nitrite: Negative   Leuk Esterase: Negative / RBC: Negative /HPF / WBC 0-2   Sq Epi: x / Non Sq Epi: Occasional / Bacteria: Negative      C-Reactive Protein, Serum: <3 mg/L ( @ 08:48)      RADIOLOGY & ADDITIONAL STUDIES:  < from: CT Abdomen and Pelvis w/ IV Cont (03.10.22 @ 15:53) >    ACC: [id number] EXAM:  CT ABDOMEN AND PELVIS IC                          PROCEDURE DATE:  03/10/2022          INTERPRETATION:  CLINICAL INFORMATION: Abdominal pain nausea and   vomiting. Treated for past week with antibiotics for urinary tract   infection.    COMPARISON: CT scan abdomen pelvis 1/10/2022.    CONTRAST/COMPLICATIONS:  IV Contrast: Omnipaque 350  95 cc  Oral Contrast: None.  Complications: None reported at time of exam completion.    PROCEDURE:  CT of the Abdomen and Pelvis was performed.  Sagittal and coronal reformats were performed.    FINDINGS:    LOWER CHEST: Small hiatal hernia.    Streak artifact degrades image quality limiting evaluation.    LIVER: Within normal limits.  BILE DUCTS: Pneumobilia.  GALLBLADDER: Cholecystectomy.  SPLEEN: Within normal limits.  PANCREAS: Within normal limits.  ADRENALS: 1.5 cm left adrenal nodule, stable.  KIDNEYS/URETERS:  Extensive streak artifact partly related to patient's metallic hardware   degrades image quality limiting evaluation.    No hydroureteronephrosis or perinephric fluid collection noted.  Small cyst midpole right kidney.  Small indeterminate hyperdense lesion upper pole left kidney likely   reflects hyperdense cyst as noted on prior CT exam.    BLADDER: Within normallimits.  REPRODUCTIVE ORGANS: Hysterectomy.    BOWEL:  Evaluation of the stomach is limited without distention.  Colonic diverticulosis.  There is a long segment of bowel wall thickening with submucosal edema   left colon extending from splenic flexure, descending, sigmoid colon to   rectum.  No bowel obstruction.  PERITONEUM: No ascites.    VESSELS: Atherosclerotic changes.  RETROPERITONEUM/LYMPH NODES: No lymphadenopathy.    ABDOMINAL WALL: Within normal limits.    BONES: Posterior and interbody fusion L1-S1.    IMPRESSION:    Technically limited study.    Long segment left-sided colitis extending from splenic flexure to rectum.    Other findings as discussed above.        --- End of Report ---            ESTEFANY JANE MD; Attending Radiologist  This document has been electronically signed. Mar 10 2022  4:21PM    < end of copied text >

## 2022-03-11 NOTE — CONSULT NOTE ADULT - SUBJECTIVE AND OBJECTIVE BOX
CHIEF COMPLAINT:  Patient is a 76y old  Female who presents with a chief complaint of Colitis (11 Mar 2022 12:40)      HPI:  76 y.o. F w/ Hx DM, HTN, Lipids, Hypothyroid, chronic LBP/spinal stenosis, chronic pancreatitis, anemia had several recent episodes of UTI -- treated at [facility] in 2021, then here in 2021 and 2022. Was again treated as outpatient, completing course of ABX aboud a week ago; Now returns w/ several days of N/V/D and diffuse abdominal pain. (10 Mar 2022 16:37)    ALLERGIES:  ACE inhibitors (Anaphylaxis; Angioedema)    Home Medications:  acetaminophen 325 mg oral tablet: 3 tab(s) orally every 8 hours, As Needed - 3) (10 Dimitrios 2022 18:54)  aspirin 81 mg oral delayed release tablet: 1 tab(s) orally once a day (10 Dimitrios 2022 18:54)  gabapentin 100 mg oral capsule: 1 cap(s) orally 3 times a day (10 Dimitrios 2022 18:54)  melatonin 3 mg oral tablet: 1 tab(s) orally once a day (at bedtime) (2021 10:30)  morphine 60 mg/12 hours oral tablet, extended release: 1 tab(s) orally 2 times a day (2021 22:29)  polyethylene glycol 3350 oral powder for reconstitution: 17 gram(s) orally once a day (2021 10:30)  prednisoLONE acetate 1% ophthalmic suspension: 1 drop(s) to each affected eye 4 times a day (2021 10:30)    PAST MEDICAL & SURGICAL HISTORY:  Diabetes    Hypertension    Hypothyroid    Pancreatitis, chronic  last episode &gt; 10 years ago    Diverticulosis    Osteoarthritis    Spinal stenosis    MRSA (methicillin resistant Staphylococcus aureus) infection  , infected knee replacement, required multiple revisions and long term IV antibiotics via central line    H/O: hysterectomy    FH: cholecystectomy    H/O total knee replacement, bilateral    H/O right inguinal hernia repair          FAMILY HISTORY:  Family history of CVA (Mother)        SOCIAL HISTORY:    REVIEW OF SYSTEMS:  General:  No wt loss, fevers, chills, night sweats  Eyes:  Good vision, no reported pain  ENT:  No sore throat, pain, runny nose, dysphagia  CV:  No pain, palpitations, hypo/hypertension  Resp:  No dyspnea, cough, tachypnea, wheezing  GI:  No pain, nausea, vomiting, diarrhea, constipation  :  No pain, bleeding, incontinence, nocturia  Muscle:  No pain, weakness  Breast:  No pain, abscess, mass, discharge  Neuro:  No weakness, tingling, memory problems  Psych:  No fatigue, insomnia, mood problems, depression  Endocrine:  No polyuria, polydipsia, cold/heat intolerance  Heme:  No petechiae, ecchymosis, easy bruisability  Skin:  No rash, tattoos, scars, edema    PHYSICAL EXAM:  Vital Signs:  Vital Signs Last 24 Hrs  T(C): 37.2 (11 Mar 2022 10:17), Max: 38.1 (10 Mar 2022 14:20)  T(F): 99 (11 Mar 2022 10:), Max: 100.5 (10 Mar 2022 14:20)  HR: 98 (11 Mar 2022 10:) (98 - 165)  BP: 113/74 (11 Mar 2022 10:17) (113/74 - 192/96)  RR: 18 (11 Mar 2022 10:) (18 - 25)  SpO2: 95% (11 Mar 2022 10:) (95% - 99%)    Tele:     Constitutional: well developed, normal appearance, well groomed, well nourished, no deformities and no acute distress.   Eyes: the conjunctiva exhibited no abnormalities and the eyelids demonstrated no xanthelasmas.   HEENT: normal oral mucosa, no oral pallor and no oral cyanosis.   Neck: normal jugular venous A waves present, normal jugular venous V waves present and no jugular venous berg A waves.   Pulmonary: no respiratory distress, normal respiratory rhythm and effort, no accessory muscle use and lungs were clear to auscultation bilaterally.   Cardiovascular: heart rate and rhythm were normal, normal S1 and S2 and no murmur, gallop, rub, heave or thrill are present.   Abdomen: soft, non-tender, no hepato-splenomegaly and no abdominal mass palpated.   Musculoskeletal: the gait could not be assessed..   Extremities: no clubbing of the fingernails, no localized cyanosis, no petechial hemorrhages and no ischemic changes.   Skin: normal skin color and pigmentation, no rash, no venous stasis, no skin lesions, no skin ulcer and no xanthoma was observed.   Psychiatric: oriented to person, place, and time, the affect was normal, the mood was normal and not feeling anxious.      LABORATORY:                          10.0   8.49  )-----------( 362      ( 11 Mar 2022 06:46 )             32.8     03-11    140  |  105  |  11  ----------------------------<  120<H>  2.9<LL>   |  24  |  1.47<H>    Ca    9.0      11 Mar 2022 06:46  Mg     2.3     03-11    TPro  7.9  /  Alb  3.6  /  TBili  0.6  /  DBili  x   /  AST  49<H>  /  ALT  19  /  AlkPhos  94  03-11          CAPILLARY BLOOD GLUCOSE      POCT Blood Glucose.: 146 mg/dL (11 Mar 2022 11:02)  POCT Blood Glucose.: 140 mg/dL (11 Mar 2022 07:55)  POCT Blood Glucose.: 207 mg/dL (10 Mar 2022 22:39)    LIVER FUNCTIONS - ( 11 Mar 2022 06:46 )  Alb: 3.6 g/dL / Pro: 7.9 gm/dL / ALK PHOS: 94 U/L / ALT: 19 U/L / AST: 49 U/L / GGT: x           PT/INR - ( 10 Mar 2022 13:55 )   PT: 13.1 sec;   INR: 1.10 ratio         PTT - ( 10 Mar 2022 13:55 )  PTT:26.6 sec  Urinalysis Basic - ( 10 Mar 2022 15:34 )    Color: Yellow / Appearance: Clear / S.010 / pH: x  Gluc: x / Ketone: Moderate  / Bili: Negative / Urobili: Negative mg/dL   Blood: x / Protein: 100 mg/dL / Nitrite: Negative   Leuk Esterase: Negative / RBC: Negative /HPF / WBC 0-2   Sq Epi: x / Non Sq Epi: Occasional / Bacteria: Negative      BNP    IMAGING:  < from: 12 Lead ECG (03.10.22 @ 14:51) >   Sinus tachycardia  Possible Left atrial enlargement  Left anterior fascicular block  Left ventricular hypertrophy with repolarization abnormality  Abnormal ECG  When compared with ECG of 2022 02:34,  premature ventricular complexes are no longer present  ST now depressed in Lateral leads    < end of copied text >    ASSESSMENT:  76 y.o. F w/ Hx DM, HTN, Lipids, Hypothyroid, chronic LBP/spinal stenosis, chronic pancreatitis, anemia had several recent episodes of UTI -- treated at [facility] in 2021, then here in 2021 and 2022. Was again treated as outpatient, completing course of ABX aboud a week ago; Now returns w/ several days of N/V/D and diffuse abdominal pain. (10 Mar 2022 16:37)    PLAN:     MEDICATIONS  (STANDING):  aspirin enteric coated 81 milliGRAM(s) Oral daily  cefTRIAXone   IVPB 1000 milliGRAM(s) IV Intermittent every 24 hours  enoxaparin Injectable 40 milliGRAM(s) SubCutaneous every 24 hours  gabapentin 100 milliGRAM(s) Oral three times a day  glucagon  Injectable 1 milliGRAM(s) IntraMuscular once  insulin lispro (ADMELOG) corrective regimen sliding scale   SubCutaneous three times a day before meals  levothyroxine 175 MICROGram(s) Oral daily  lidocaine   4% Patch 1 Patch Transdermal daily  melatonin 3 milliGRAM(s) Oral at bedtime  metroNIDAZOLE  IVPB 500 milliGRAM(s) IV Intermittent every 8 hours  morphine ER Tablet 60 milliGRAM(s) Oral two times a day  pantoprazole    Tablet 40 milliGRAM(s) Oral before breakfast  polyethylene glycol 3350 17 Gram(s) Oral daily  potassium chloride  10 mEq/100 mL IVPB 10 milliEquivalent(s) IV Intermittent every 1 hour  prednisoLONE acetate 1% Suspension 1 Drop(s) Both EYES four times a day  sodium chloride 0.9% 1000 milliLiter(s) (75 mL/Hr) IV Continuous <Continuous>  vancomycin    Solution 125 milliGRAM(s) Oral every 6 hours      Lj Meza MD, FACC, OTILIA, KENNY, FACP  Director, Heart Failure Services  [facility]  , Department of Cardiology  [facility]     CHIEF COMPLAINT:  Patient is a 76y old  Female who presents with a chief complaint of Colitis (11 Mar 2022 12:40)      HPI:  Pleasant 76-year-old with hypertension, type 2 diabetes, dyslipidemia, hypothyroidism, spinal stenosis and chronic back pain, anemia, chronic pancreatitis, admitted with abdominal pain and colitis. Possible AFib.    ALLERGIES:  ACE inhibitors (Anaphylaxis; Angioedema)    Home Medications:  acetaminophen 325 mg oral tablet: 3 tab(s) orally every 8 hours, As Needed - 3) (10 Dimitrios 2022 18:54)  aspirin 81 mg oral delayed release tablet: 1 tab(s) orally once a day (10 Dimitrios 2022 18:54)  gabapentin 100 mg oral capsule: 1 cap(s) orally 3 times a day (10 Dimitrios 2022 18:54)  melatonin 3 mg oral tablet: 1 tab(s) orally once a day (at bedtime) (2021 10:30)  morphine 60 mg/12 hours oral tablet, extended release: 1 tab(s) orally 2 times a day (2021 22:29)  polyethylene glycol 3350 oral powder for reconstitution: 17 gram(s) orally once a day (2021 10:30)  prednisoLONE acetate 1% ophthalmic suspension: 1 drop(s) to each affected eye 4 times a day (2021 10:30)    PAST MEDICAL & SURGICAL HISTORY:  Diabetes    Hypertension    Hypothyroid    Pancreatitis, chronic  last episode &gt; 10 years ago    Diverticulosis    Osteoarthritis    Spinal stenosis    MRSA (methicillin resistant Staphylococcus aureus) infection  , infected knee replacement, required multiple revisions and long term IV antibiotics via central line    H/O: hysterectomy    FH: cholecystectomy    H/O total knee replacement, bilateral    H/O right inguinal hernia repair          FAMILY HISTORY:  Family history of CVA (Mother)        SOCIAL HISTORY:  non-smoker    REVIEW OF SYSTEMS:  General:  No wt loss, fevers, chills, night sweats  Eyes:  Good vision, no reported pain  ENT:  No sore throat, pain, runny nose, dysphagia  CV:  No pain, palpitations, hypo/hypertension  Resp:  No dyspnea, cough, tachypnea, wheezing  GI:  No pain, nausea, vomiting, diarrhea, constipation  :  No pain, bleeding, incontinence, nocturia  Muscle:  No pain, weakness  Breast:  No pain, abscess, mass, discharge  Neuro:  No weakness, tingling, memory problems  Psych:  No fatigue, insomnia, mood problems, depression  Endocrine:  No polyuria, polydipsia, cold/heat intolerance  Heme:  No petechiae, ecchymosis, easy bruisability  Skin:  No rash, edema    PHYSICAL EXAM:  Vital Signs:  Vital Signs Last 24 Hrs  T(C): 37.2 (11 Mar 2022 10:17), Max: 38.1 (10 Mar 2022 14:20)  T(F): 99 (11 Mar 2022 10:17), Max: 100.5 (10 Mar 2022 14:20)  HR: 98 (11 Mar 2022 10:) (98 - 165)  BP: 113/74 (11 Mar 2022 10:) (113/74 - 192/96)  RR: 18 (11 Mar 2022 10:) (18 - 25)  SpO2: 95% (11 Mar 2022 10:) (95% - 99%)    Tele: SR, PVCs    Constitutional: well developed, normal appearance, well groomed, well nourished, no deformities and no acute distress.   Eyes: the conjunctiva exhibited no abnormalities and the eyelids demonstrated no xanthelasmas.   HEENT: normal oral mucosa, no oral pallor and no oral cyanosis.   Neck: normal jugular venous A waves present, normal jugular venous V waves present and no jugular venous berg A waves.   Pulmonary: no respiratory distress, normal respiratory rhythm and effort, no accessory muscle use and lungs were clear to auscultation bilaterally.   Cardiovascular: heart rate and rhythm were normal, normal S1 and S2 and no murmur, gallop, rub, heave or thrill are present.   Abdomen: soft, non-tender  Musculoskeletal: the gait could not be assessed.   Extremities: no clubbing of the fingernails, no localized cyanosis, no petechial hemorrhages and no ischemic changes.   Skin: normal skin color and pigmentation, no rash, no venous stasis, no skin lesions, no skin ulcer and no xanthoma was observed.   Psychiatric: oriented to person, place, and time, the affect was normal, the mood was normal and not feeling anxious.      LABORATORY:                          10.0   8.49  )-----------( 362      ( 11 Mar 2022 06:46 )             32.8     03-11    140  |  105  |  11  ----------------------------<  120<H>  2.9<LL>   |  24  |  1.47<H>    Ca    9.0      11 Mar 2022 06:46  Mg     2.3     03-11    TPro  7.9  /  Alb  3.6  /  TBili  0.6  /  DBili  x   /  AST  49<H>  /  ALT  19  /  AlkPhos  94  -        CAPILLARY BLOOD GLUCOSE    POCT Blood Glucose.: 146 mg/dL (11 Mar 2022 11:02)  POCT Blood Glucose.: 140 mg/dL (11 Mar 2022 07:55)  POCT Blood Glucose.: 207 mg/dL (10 Mar 2022 22:39)    LIVER FUNCTIONS - ( 11 Mar 2022 06:46 )  Alb: 3.6 g/dL / Pro: 7.9 gm/dL / ALK PHOS: 94 U/L / ALT: 19 U/L / AST: 49 U/L / GGT: x           PT/INR - ( 10 Mar 2022 13:55 )   PT: 13.1 sec;   INR: 1.10 ratio         PTT - ( 10 Mar 2022 13:55 )  PTT:26.6 sec  Urinalysis Basic - ( 10 Mar 2022 15:34 )    Color: Yellow / Appearance: Clear / S.010 / pH: x  Gluc: x / Ketone: Moderate  / Bili: Negative / Urobili: Negative mg/dL   Blood: x / Protein: 100 mg/dL / Nitrite: Negative   Leuk Esterase: Negative / RBC: Negative /HPF / WBC 0-2   Sq Epi: x / Non Sq Epi: Occasional / Bacteria: Negative      IMAGING:  < from: 12 Lead ECG (03.10.22 @ 14:51) >   Sinus tachycardia  Possible Left atrial enlargement  Left anterior fascicular block  Left ventricular hypertrophy with repolarization abnormality  Abnormal ECG  When compared with ECG of 2022 02:34,  premature ventricular complexes are no longer present  ST now depressed in Lateral leads    < end of copied text >    < from: TTE Echo Complete w/o Contrast w/ Doppler (21 @ 13:36) >  Summary:   1. Left ventricular ejection fraction, by visual estimation, is 65 to 70%.   2. Normal global left ventricular systolic function.   3. Increased LV wall thickness.   4. There is mild concentric left ventricular hypertrophy.   5. No evidence of mitral valve regurgitation.   6. Moderate tricuspid regurgitation.   7. Mild pulmonic valve regurgitation.   8. Estimated pulmonary artery systolic pressure is 45.8 mmHg assuming a right atrial pressure of 5 mmHg, which is consistent with moderate pulmonary hypertension.   9. No evidence of any thrombus.    < end of copied text >    ASSESSMENT:  Pleasant 76-year-old with hypertension, type 2 diabetes, dyslipidemia, hypothyroidism, spinal stenosis and chronic back pain, anemia, chronic pancreatitis, admitted with abdominal pain and colitis. Possible AFib.    PLAN:     Continue antibiotics for colitis management.  DVT prevention with Lovenox.  Continue aspirin for stroke risk reduction.  Possible atrial fibrillation on telemetry perhaps brought on by underlying colitis issues.  Given anemia and current sinus rhythm would avoid anticoagulation at the present time.  Continue telemetry cardiac monitoring for now. f/u labs. Updated echocardiogram ordered.    Lj Meza MD, FACC, OTILIA, KENNY, FACP  Director, Heart Failure Services  [facility]  , Department of Cardiology  [facility]     CHIEF COMPLAINT:  Patient is a 76y old  Female who presents with a chief complaint of Colitis (11 Mar 2022 12:40)      HPI:  Pleasant 76-year-old with hypertension, type 2 diabetes, dyslipidemia, hypothyroidism, spinal stenosis and chronic back pain, anemia, chronic pancreatitis, admitted with abdominal pain and colitis. Possible AFib.    ALLERGIES:  ACE inhibitors (Anaphylaxis; Angioedema)    Home Medications:  acetaminophen 325 mg oral tablet: 3 tab(s) orally every 8 hours, As Needed - 3) (10 Dimitrios 2022 18:54)  aspirin 81 mg oral delayed release tablet: 1 tab(s) orally once a day (10 Dimitrios 2022 18:54)  gabapentin 100 mg oral capsule: 1 cap(s) orally 3 times a day (10 Dimitrios 2022 18:54)  melatonin 3 mg oral tablet: 1 tab(s) orally once a day (at bedtime) (2021 10:30)  morphine 60 mg/12 hours oral tablet, extended release: 1 tab(s) orally 2 times a day (2021 22:29)  polyethylene glycol 3350 oral powder for reconstitution: 17 gram(s) orally once a day (2021 10:30)  prednisoLONE acetate 1% ophthalmic suspension: 1 drop(s) to each affected eye 4 times a day (2021 10:30)    PAST MEDICAL & SURGICAL HISTORY:  Diabetes    Hypertension    Hypothyroid    Pancreatitis, chronic  last episode &gt; 10 years ago    Diverticulosis    Osteoarthritis    Spinal stenosis    MRSA (methicillin resistant Staphylococcus aureus) infection  , infected knee replacement, required multiple revisions and long term IV antibiotics via central line    H/O: hysterectomy    FH: cholecystectomy    H/O total knee replacement, bilateral    H/O right inguinal hernia repair          FAMILY HISTORY:  Family history of CVA (Mother)        SOCIAL HISTORY:  non-smoker    REVIEW OF SYSTEMS:  General:  No wt loss, fevers, chills, night sweats  Eyes:  Good vision, no reported pain  ENT:  No sore throat, pain, runny nose, dysphagia  CV:  No pain, palpitations, hypo/hypertension  Resp:  No dyspnea, cough, tachypnea, wheezing  GI:  No pain, nausea, vomiting, diarrhea, constipation  :  No pain, bleeding, incontinence, nocturia  Muscle:  No pain, weakness  Breast:  No pain, abscess, mass, discharge  Neuro:  No weakness, tingling, memory problems  Psych:  No fatigue, insomnia, mood problems, depression  Endocrine:  No polyuria, polydipsia, cold/heat intolerance  Heme:  No petechiae, ecchymosis, easy bruisability  Skin:  No rash, edema    PHYSICAL EXAM:  Vital Signs:  Vital Signs Last 24 Hrs  T(C): 37.2 (11 Mar 2022 10:17), Max: 38.1 (10 Mar 2022 14:20)  T(F): 99 (11 Mar 2022 10:17), Max: 100.5 (10 Mar 2022 14:20)  HR: 98 (11 Mar 2022 10:) (98 - 165)  BP: 113/74 (11 Mar 2022 10:) (113/74 - 192/96)  RR: 18 (11 Mar 2022 10:) (18 - 25)  SpO2: 95% (11 Mar 2022 10:) (95% - 99%)    Tele: SR, PVCs    Constitutional: well developed, normal appearance, well groomed, well nourished, no deformities and no acute distress.   Eyes: the conjunctiva exhibited no abnormalities and the eyelids demonstrated no xanthelasmas.   HEENT: normal oral mucosa, no oral pallor and no oral cyanosis.   Neck: normal jugular venous A waves present, normal jugular venous V waves present and no jugular venous berg A waves.   Pulmonary: no respiratory distress, normal respiratory rhythm and effort, no accessory muscle use and lungs were clear to auscultation bilaterally.   Cardiovascular: heart rate and rhythm were normal, normal S1 and S2 and no murmur, gallop, rub, heave or thrill are present.   Abdomen: soft, non-tender  Musculoskeletal: the gait could not be assessed.   Extremities: no clubbing of the fingernails, no localized cyanosis, no petechial hemorrhages and no ischemic changes.   Skin: normal skin color and pigmentation, no rash, no venous stasis, no skin lesions, no skin ulcer and no xanthoma was observed.   Psychiatric: oriented to person, place, and time, the affect was normal, the mood was normal and not feeling anxious.      LABORATORY:                          10.0   8.49  )-----------( 362      ( 11 Mar 2022 06:46 )             32.8     03-11    140  |  105  |  11  ----------------------------<  120<H>  2.9<LL>   |  24  |  1.47<H>    Ca    9.0      11 Mar 2022 06:46  Mg     2.3     03-11    TPro  7.9  /  Alb  3.6  /  TBili  0.6  /  DBili  x   /  AST  49<H>  /  ALT  19  /  AlkPhos  94  -        CAPILLARY BLOOD GLUCOSE    POCT Blood Glucose.: 146 mg/dL (11 Mar 2022 11:02)  POCT Blood Glucose.: 140 mg/dL (11 Mar 2022 07:55)  POCT Blood Glucose.: 207 mg/dL (10 Mar 2022 22:39)    LIVER FUNCTIONS - ( 11 Mar 2022 06:46 )  Alb: 3.6 g/dL / Pro: 7.9 gm/dL / ALK PHOS: 94 U/L / ALT: 19 U/L / AST: 49 U/L / GGT: x           PT/INR - ( 10 Mar 2022 13:55 )   PT: 13.1 sec;   INR: 1.10 ratio         PTT - ( 10 Mar 2022 13:55 )  PTT:26.6 sec  Urinalysis Basic - ( 10 Mar 2022 15:34 )    Color: Yellow / Appearance: Clear / S.010 / pH: x  Gluc: x / Ketone: Moderate  / Bili: Negative / Urobili: Negative mg/dL   Blood: x / Protein: 100 mg/dL / Nitrite: Negative   Leuk Esterase: Negative / RBC: Negative /HPF / WBC 0-2   Sq Epi: x / Non Sq Epi: Occasional / Bacteria: Negative      IMAGING:  < from: 12 Lead ECG (03.10.22 @ 14:51) >   Sinus tachycardia  Possible Left atrial enlargement  Left anterior fascicular block  Left ventricular hypertrophy with repolarization abnormality  Abnormal ECG  When compared with ECG of 2022 02:34,  premature ventricular complexes are no longer present  ST now depressed in Lateral leads    < end of copied text >    < from: TTE Echo Complete w/o Contrast w/ Doppler (21 @ 13:36) >  Summary:   1. Left ventricular ejection fraction, by visual estimation, is 65 to 70%.   2. Normal global left ventricular systolic function.   3. Increased LV wall thickness.   4. There is mild concentric left ventricular hypertrophy.   5. No evidence of mitral valve regurgitation.   6. Moderate tricuspid regurgitation.   7. Mild pulmonic valve regurgitation.   8. Estimated pulmonary artery systolic pressure is 45.8 mmHg assuming a right atrial pressure of 5 mmHg, which is consistent with moderate pulmonary hypertension.   9. No evidence of any thrombus.    < end of copied text >    ASSESSMENT:  Pleasant 76-year-old with hypertension, type 2 diabetes, dyslipidemia, hypothyroidism, spinal stenosis and chronic back pain, anemia, chronic pancreatitis, admitted with abdominal pain and colitis. Possible AFib.  Noted troponin elevation likely on the basis of ischemic demand process.    PLAN:     Continue antibiotics for colitis management.  DVT prevention with Lovenox.  Continue aspirin for stroke risk reduction.  Possible atrial fibrillation on telemetry perhaps brought on by underlying colitis issues.  Given anemia and current sinus rhythm would avoid anticoagulation at the present time.  Continue telemetry cardiac monitoring for now. f/u labs. Updated echocardiogram ordered. May need eventual ischemic workup.    Lj Meza MD, FACC, KENNY BINGHAM, FACP  Director, Heart Failure Services  [facility]  , Department of Cardiology  [facility]

## 2022-03-11 NOTE — PROVIDER CONTACT NOTE (CRITICAL VALUE NOTIFICATION) - TEST AND RESULT REPORTED:
potassium 2.9
Blood culture aerobic bottle gram + cocci and closters
potassium 2.4  Troponin 272.8
Lactate 2.1

## 2022-03-11 NOTE — PROGRESS NOTE ADULT - ASSESSMENT
75 y/o F with PMH of dm2, htn, hld, chronic back pain/ spinal stenosis on chronic narcotic analgesics, hx chronic pancreatitis , anemia , recurrent UTIs, just completed abx course a wk ago p/w c/o N/V/D and diffuse abd pain.   CT c/w left sided colitis, adrenal nodule, renal cyst     1-Acute left colitis/ s/o C.Diff given recent abx course, c/f sepsis POA  clear liquids  started on IV CTX/ Flagyl, empiric PO vanco pending stool C Diff (no BM today as of yet)  GI eval/ Dr. Davidson  Fu cx      2- Hypokalemia  supplement lytes, fu labs in am     3- elevated trops in setting of sepsis, (denies CP,SOB) likely demand ischemia  Cardio eval    4-DM2  monitor fsbs/ISS    5-Chronic LBP/ spinal stenosis  c/w home meds/MS adriel     6- brief episode of AF  cardio consult    SQ lovenox for dvt ppx    prognosis guarded.

## 2022-03-12 LAB
ANION GAP SERPL CALC-SCNC: 6 MMOL/L — SIGNIFICANT CHANGE UP (ref 5–17)
BUN SERPL-MCNC: 17 MG/DL — SIGNIFICANT CHANGE UP (ref 7–23)
CALCIUM SERPL-MCNC: 8.3 MG/DL — LOW (ref 8.5–10.1)
CHLORIDE SERPL-SCNC: 106 MMOL/L — SIGNIFICANT CHANGE UP (ref 96–108)
CO2 SERPL-SCNC: 24 MMOL/L — SIGNIFICANT CHANGE UP (ref 22–31)
CREAT SERPL-MCNC: 1.45 MG/DL — HIGH (ref 0.5–1.3)
CRP SERPL-MCNC: <3 MG/L — SIGNIFICANT CHANGE UP
CULTURE RESULTS: SIGNIFICANT CHANGE UP
CULTURE RESULTS: SIGNIFICANT CHANGE UP
EGFR: 37 ML/MIN/1.73M2 — LOW
GLUCOSE BLDC GLUCOMTR-MCNC: 141 MG/DL — HIGH (ref 70–99)
GLUCOSE BLDC GLUCOMTR-MCNC: 152 MG/DL — HIGH (ref 70–99)
GLUCOSE BLDC GLUCOMTR-MCNC: 164 MG/DL — HIGH (ref 70–99)
GLUCOSE SERPL-MCNC: 132 MG/DL — HIGH (ref 70–99)
GRAM STN FLD: SIGNIFICANT CHANGE UP
HCT VFR BLD CALC: 29.4 % — LOW (ref 34.5–45)
HGB BLD-MCNC: 8.5 G/DL — LOW (ref 11.5–15.5)
MAGNESIUM SERPL-MCNC: 2.2 MG/DL — SIGNIFICANT CHANGE UP (ref 1.6–2.6)
MCHC RBC-ENTMCNC: 23.9 PG — LOW (ref 27–34)
MCHC RBC-ENTMCNC: 28.9 G/DL — LOW (ref 32–36)
MCV RBC AUTO: 82.8 FL — SIGNIFICANT CHANGE UP (ref 80–100)
NRBC # BLD: 0 /100 WBCS — SIGNIFICANT CHANGE UP (ref 0–0)
ORGANISM # SPEC MICROSCOPIC CNT: SIGNIFICANT CHANGE UP
ORGANISM # SPEC MICROSCOPIC CNT: SIGNIFICANT CHANGE UP
PLATELET # BLD AUTO: 245 K/UL — SIGNIFICANT CHANGE UP (ref 150–400)
POTASSIUM SERPL-MCNC: 3.6 MMOL/L — SIGNIFICANT CHANGE UP (ref 3.5–5.3)
POTASSIUM SERPL-SCNC: 3.6 MMOL/L — SIGNIFICANT CHANGE UP (ref 3.5–5.3)
RBC # BLD: 3.55 M/UL — LOW (ref 3.8–5.2)
RBC # FLD: 19.1 % — HIGH (ref 10.3–14.5)
SODIUM SERPL-SCNC: 136 MMOL/L — SIGNIFICANT CHANGE UP (ref 135–145)
SPECIMEN SOURCE: SIGNIFICANT CHANGE UP
SPECIMEN SOURCE: SIGNIFICANT CHANGE UP
TSH SERPL-MCNC: 2.11 UU/ML — SIGNIFICANT CHANGE UP (ref 0.36–3.74)
WBC # BLD: 6.37 K/UL — SIGNIFICANT CHANGE UP (ref 3.8–10.5)
WBC # FLD AUTO: 6.37 K/UL — SIGNIFICANT CHANGE UP (ref 3.8–10.5)

## 2022-03-12 PROCEDURE — 99233 SBSQ HOSP IP/OBS HIGH 50: CPT

## 2022-03-12 PROCEDURE — 93306 TTE W/DOPPLER COMPLETE: CPT | Mod: 26

## 2022-03-12 PROCEDURE — 99221 1ST HOSP IP/OBS SF/LOW 40: CPT

## 2022-03-12 PROCEDURE — 99232 SBSQ HOSP IP/OBS MODERATE 35: CPT

## 2022-03-12 RX ADMIN — LIDOCAINE 1 PATCH: 4 CREAM TOPICAL at 19:30

## 2022-03-12 RX ADMIN — PANTOPRAZOLE SODIUM 40 MILLIGRAM(S): 20 TABLET, DELAYED RELEASE ORAL at 06:05

## 2022-03-12 RX ADMIN — GABAPENTIN 100 MILLIGRAM(S): 400 CAPSULE ORAL at 22:35

## 2022-03-12 RX ADMIN — Medication 1 DROP(S): at 11:40

## 2022-03-12 RX ADMIN — LIDOCAINE 1 PATCH: 4 CREAM TOPICAL at 11:37

## 2022-03-12 RX ADMIN — Medication 125 MILLIGRAM(S): at 06:05

## 2022-03-12 RX ADMIN — GABAPENTIN 100 MILLIGRAM(S): 400 CAPSULE ORAL at 06:05

## 2022-03-12 RX ADMIN — Medication 100 MILLIGRAM(S): at 06:06

## 2022-03-12 RX ADMIN — MORPHINE SULFATE 60 MILLIGRAM(S): 50 CAPSULE, EXTENDED RELEASE ORAL at 06:08

## 2022-03-12 RX ADMIN — Medication 3 MILLIGRAM(S): at 22:35

## 2022-03-12 RX ADMIN — SODIUM CHLORIDE 75 MILLILITER(S): 9 INJECTION, SOLUTION INTRAVENOUS at 06:22

## 2022-03-12 RX ADMIN — Medication 1: at 11:40

## 2022-03-12 RX ADMIN — Medication 1 DROP(S): at 17:35

## 2022-03-12 RX ADMIN — Medication 1: at 07:42

## 2022-03-12 RX ADMIN — GABAPENTIN 100 MILLIGRAM(S): 400 CAPSULE ORAL at 15:18

## 2022-03-12 RX ADMIN — Medication 175 MICROGRAM(S): at 06:05

## 2022-03-12 RX ADMIN — Medication 81 MILLIGRAM(S): at 11:40

## 2022-03-12 RX ADMIN — Medication 1 DROP(S): at 23:52

## 2022-03-12 RX ADMIN — ENOXAPARIN SODIUM 40 MILLIGRAM(S): 100 INJECTION SUBCUTANEOUS at 17:38

## 2022-03-12 RX ADMIN — Medication 1 DROP(S): at 06:05

## 2022-03-12 RX ADMIN — CEFTRIAXONE 100 MILLIGRAM(S): 500 INJECTION, POWDER, FOR SOLUTION INTRAMUSCULAR; INTRAVENOUS at 07:41

## 2022-03-12 RX ADMIN — MORPHINE SULFATE 60 MILLIGRAM(S): 50 CAPSULE, EXTENDED RELEASE ORAL at 07:37

## 2022-03-12 RX ADMIN — MORPHINE SULFATE 60 MILLIGRAM(S): 50 CAPSULE, EXTENDED RELEASE ORAL at 17:31

## 2022-03-12 RX ADMIN — MORPHINE SULFATE 60 MILLIGRAM(S): 50 CAPSULE, EXTENDED RELEASE ORAL at 19:32

## 2022-03-12 NOTE — PROGRESS NOTE ADULT - ASSESSMENT
76-year-old with hypertension, type 2 diabetes, dyslipidemia, hypothyroidism, spinal stenosis and chronic back pain, anemia, chronic pancreatitis, admitted with abdominal pain and colitis. Possible AFib.  Noted troponin elevation likely on the basis of ischemic demand process.    PLAN:     Continue antibiotics for colitis management.    On DVT prevention with Lovenox.    Continue aspirin for stroke risk reduction.    Questionable atrial fibrillation on telemetry perhaps brought on by underlying colitis issues and electrolyte issues; given anemia,  and current sinus rhythm , would avoid anticoagulation at the present time.    Continue telemetry cardiac monitoring for now.   f/u labs. Updated echocardiogram ordered.

## 2022-03-12 NOTE — CONSULT NOTE ADULT - ASSESSMENT
76 y.o. F w/ Hx DM, HTN, Hypothyroidism, chronic LBP/spinal stenosis, chronic pancreatitis, anemia, MRSA infection of R knee, prior episodes of UTI, who was admitted on 3/10 with several days diffuse abdominal pain associated with nausea, vomiting and diarrhea.     Found to have left sided colitis on imaging, but currently lower suspicion for clostridioides difficile or bacterial colitis. She has had no diarrhea here and abdominal pain/tenderness improved. She has no fevers or leukocytosis. LFT's and bilirubin unrevealing. Coagulase negative staph in 1/4 blood culture bottles likely contaminant. She has had occasional episodes of dysuria not sure if really from UTI vs atrophic vaginitis.     -observe off antibiotics  -follow repeat blood cultures     Thank you for courtesy of this consult. Will sign off, please call ID if any further questions. Thank you.    Clair Barnett MD  Division of Infectious Diseases   Cell [phone] between 8am and 6pm   After 6pm and weekends please call ID service at [phone].

## 2022-03-12 NOTE — PROGRESS NOTE ADULT - ASSESSMENT
75 y/o F with PMH of dm2, htn, hld, chronic back pain/ spinal stenosis on chronic narcotic analgesics, hx chronic pancreatitis , anemia , recurrent UTIs, just completed abx course a wk ago p/w c/o N/V/D and diffuse abd pain.   CT c/w left sided colitis, adrenal nodule, renal cyst     1-Acute left colitis, less likely infectious, improved  Advance diet  Stop Abx and PO Vanco, no need for Cdiff test.   Monitor for now    2- Hypokalemia  supplement lytes, fu labs in am     3- elevated trops in setting of sepsis, (denies CP,SOB) likely demand ischemia  Cardio eval    4-DM2  monitor fsbs/ISS    5-Chronic LBP/ spinal stenosis  c/w home meds/MS morel     6- brief episode of AF  cardio consult    SQ lovenox for dvt ppx

## 2022-03-12 NOTE — PROGRESS NOTE ADULT - ASSESSMENT
HPI:  76 y.o. F w/ Hx DM, HTN, Lipids, Hypothyroid, chronic LBP/spinal stenosis, chronic pancreatitis, anemia had several recent episodes of UTI -- treated at [facility] in 12/2021, then here in 12/2021 and 1/2022. Was again treated as outpatient, completing course of ABX aboud a week ago; Now returns w/ several days of N/V/D and diffuse abdominal pain. (10 Mar 2022 16:37)  ------ As Above ------ Patient seen earlier today. Patient is a poor historian.   Patient presented with multiple loose BMs off and on over the past two weeks. Patient recently treated for a UTI. Vague abdominal pain (+) Non bloody. No one else with the same symptoms.   Last colonoscopy was a long time ago.   No BM's over night.   Left sided colitis by CT scan  / see labs     A) Left sided colitis - Better, Probably secondary to ? R/O Infectious , less likely IBD / Ischemia , Now on solid diet   B) Anemia HGB 10.0, low MCV 1) Fe/TIBC / Ferritin 2) TSH 3) ? Hemoglobin electrophoresis    HPI:  76 y.o. F w/ Hx DM, HTN, Lipids, Hypothyroid, chronic LBP/spinal stenosis, chronic pancreatitis, anemia had several recent episodes of UTI -- treated at [facility] in 12/2021, then here in 12/2021 and 1/2022. Was again treated as outpatient, completing course of ABX aboud a week ago; Now returns w/ several days of N/V/D and diffuse abdominal pain. (10 Mar 2022 16:37)  ------ As Above ------ Patient seen earlier today. Patient is a poor historian.   Patient presented with multiple loose BMs off and on over the past two weeks. Patient recently treated for a UTI. Vague abdominal pain (+) Non bloody. No one else with the same symptoms.   Last colonoscopy was a long time ago.   No BM's over night.   Left sided colitis by CT scan  / see labs     A) Left sided colitis - Better, Probably secondary to ? R/O Infectious , less likely IBD / Ischemia , Now on solid diet   B) Anemia HGB 10.0, low MCV, Low iron sat, normal ferritin in January  1) Repeat Fe/TIBC / Ferritin 2) TSH 3) Out patient EGD / colonoscopy

## 2022-03-12 NOTE — CONSULT NOTE ADULT - SUBJECTIVE AND OBJECTIVE BOX
[facility]  INFECTIOUS DISEASES - [address], [place] [address]  Tel: [phone]     Fax: [phone]  =======================================================    [facility]-[id number]  JUAN GRZEGORZ     CC: Patient is a 76y old  Female who presents with a chief complaint of Colitis (12 Mar 2022 11:33)    HPI:  76 y.o. F w/ Hx DM, HTN, Hypothyroidism, chronic LBP/spinal stenosis, chronic pancreatitis, anemia, MRSA infection of R knee, prior episodes of UTI, who was admitted on 3/9 with several days diffuse abdominal pain associated with nausea, vomiting and diarrhea. Patient also reports fall to the right side of the flank.  She still notes some right sided torso pain but thinks abdominal tenderness has improved. Denies any vomiting and has not had any episodes of diarrhea here.     Patient recently completed antibiotic course for UTI. She denies any sick contacts or recent travel. She denies eating any unusual food.      PAST MEDICAL & SURGICAL HISTORY:  Diabetes    Hypertension    Hypothyroid    Pancreatitis, chronic  last episode &gt; 10 years ago    Diverticulosis    Osteoarthritis    Spinal stenosis    MRSA (methicillin resistant Staphylococcus aureus) infection  , infected knee replacement, required multiple revisions and long term IV antibiotics via central line    H/O: hysterectomy    FH: cholecystectomy    H/O total knee replacement, bilateral    H/O right inguinal hernia repair        Social Hx:     FAMILY HISTORY:  Family history of CVA (Mother)        Allergies    ACE inhibitors (Anaphylaxis; Angioedema)    Intolerances        Antibiotics:  MEDICATIONS  (STANDING):  aspirin enteric coated 81 milliGRAM(s) Oral daily  dextrose 40% Gel 15 Gram(s) Oral once  dextrose 5%. 1000 milliLiter(s) (50 mL/Hr) IV Continuous <Continuous>  dextrose 5%. 1000 milliLiter(s) (100 mL/Hr) IV Continuous <Continuous>  dextrose 50% Injectable 25 Gram(s) IV Push once  dextrose 50% Injectable 12.5 Gram(s) IV Push once  dextrose 50% Injectable 25 Gram(s) IV Push once  enoxaparin Injectable 40 milliGRAM(s) SubCutaneous every 24 hours  gabapentin 100 milliGRAM(s) Oral three times a day  glucagon  Injectable 1 milliGRAM(s) IntraMuscular once  insulin lispro (ADMELOG) corrective regimen sliding scale   SubCutaneous three times a day before meals  levothyroxine 175 MICROGram(s) Oral daily  lidocaine   4% Patch 1 Patch Transdermal daily  melatonin 3 milliGRAM(s) Oral at bedtime  morphine ER Tablet 60 milliGRAM(s) Oral two times a day  pantoprazole    Tablet 40 milliGRAM(s) Oral before breakfast  prednisoLONE acetate 1% Suspension 1 Drop(s) Both EYES four times a day    MEDICATIONS  (PRN):  ondansetron Injectable 4 milliGRAM(s) IV Push every 8 hours PRN Nausea and/or Vomiting       REVIEW OF SYSTEMS:  CONSTITUTIONAL:  No Fever or chills  HEENT: No sore throat or runny nose.  CARDIOVASCULAR:  No chest pain or SOB.  RESPIRATORY:  No cough, shortness of breath  GASTROINTESTINAL:  see history  GENITOURINARY:  occasional dysuria  MUSCULOSKELETAL:  (+) R shoulder pain  NEUROLOGIC:  No headache, no dizziness  PSYCHIATRIC:  No disorder of thought or mood.      Physical Exam:  Vital Signs Last 24 Hrs  T(C): 36.8 (12 Mar 2022 10:11), Max: 37.1 (12 Mar 2022 00:11)  T(F): 98.2 (12 Mar 2022 10:11), Max: 98.8 (12 Mar 2022 00:11)  HR: 68 (12 Mar 2022 10:11) (68 - 82)  BP: 110/70 (12 Mar 2022 10:11) (106/70 - 110/70)  BP(mean): --  RR: 18 (12 Mar 2022 10:11) (18 - 18)  SpO2: 98% (12 Mar 2022 10:11) (96% - 98%)    GEN: NAD  HEENT: normocephalic and atraumatic.   NECK: Supple.    LUNGS: Clear to auscultation.  HEART: Regular rate and rhythm  ABDOMEN: Soft, nontender, and nondistended  EXTREMITIES: No leg edema, no knee swelling or warmth noted  NEUROLOGIC: grossly intact.  PSYCHIATRIC: Appropriate affect .      Labs:      136  |  106  |  17  ----------------------------<  132<H>  3.6   |  24  |  1.45<H>    Ca    8.3<L>      12 Mar 2022 10:11  Mg     2.2         TPro  7.9  /  Alb  3.6  /  TBili  0.6  /  DBili  x   /  AST  49<H>  /  ALT  19  /  AlkPhos  94                            8.5    6.37  )-----------( 245      ( 12 Mar 2022 10:11 )             29.4       Urinalysis Basic - ( 10 Mar 2022 15:34 )    Color: Yellow / Appearance: Clear / S.010 / pH: x  Gluc: x / Ketone: Moderate  / Bili: Negative / Urobili: Negative mg/dL   Blood: x / Protein: 100 mg/dL / Nitrite: Negative   Leuk Esterase: Negative / RBC: Negative /HPF / WBC 0-2   Sq Epi: x / Non Sq Epi: Occasional / Bacteria: Negative      LIVER FUNCTIONS - ( 11 Mar 2022 06:46 )  Alb: 3.6 g/dL / Pro: 7.9 gm/dL / ALK PHOS: 94 U/L / ALT: 19 U/L / AST: 49 U/L / GGT: x                   C-Reactive Protein, Serum: <3 mg/L (22 @ 12:49)  C-Reactive Protein, Serum: <3 mg/L (22 @ 08:48)      SARS-CoV-2 Result: NotDetec (03-10-22 @ 16:02)      RECENT CULTURES:   @ 00:43 Clean Catch Clean Catch (Midstream)     >=3 organisms. Probable collection contamination.        03-10 @ 18:27 .Blood Blood-Peripheral Blood Culture PCR    Growth in aerobic bottle: Staphylococcus hominis  Coag Negative Staphylococcus  Single set isolate, possible contaminant. Contact  Microbiology if susceptibility testing clinically  indicated.  ***Blood Panel PCR results on this specimen are available  approximately 3 hours after the Gram stain result.***  Gram stain, PCR, and/or culture results may not always  correspond due to difference in methodologies.  ************************************************************  This PCR assay was performed by multiplex PCR. This  Assay tests for 66 bacterial and resistance gene targets.  Please refer to the [facility] test directory  at [email or web address] for details.    Growth in aerobic bottle: Gram Positive Cocci in Clusters            All imaging and other data have been reviewed.      CT A/P:  FINDINGS:    LOWER CHEST: Small hiatal hernia.    Streak artifact degrades image quality limiting evaluation.    LIVER: Within normal limits.  BILE DUCTS: Pneumobilia.  GALLBLADDER: Cholecystectomy.  SPLEEN: Within normal limits.  PANCREAS: Within normal limits.  ADRENALS: 1.5 cm left adrenal nodule, stable.  KIDNEYS/URETERS:  Extensive streak artifact partly related to patient's metallic hardware   degrades image quality limiting evaluation.    No hydroureteronephrosis or perinephric fluid collection noted.  Small cyst midpole right kidney.  Small indeterminate hyperdense lesion upper pole left kidney likely   reflects hyperdense cyst as noted on prior CT exam.    BLADDER: Within normal limits.  REPRODUCTIVE ORGANS: Hysterectomy.    BOWEL:  Evaluation of the stomach is limited without distention.  Colonic diverticulosis.  There is a long segment of bowel wall thickening with submucosal edema   left colon extending from splenic flexure, descending, sigmoid colon to   rectum.  No bowel obstruction.  PERITONEUM: No ascites.    VESSELS: Atherosclerotic changes.  RETROPERITONEUM/LYMPH NODES: No lymphadenopathy.    ABDOMINAL WALL: Within normal limits.    BONES: Posterior and interbody fusion L1-S1.    IMPRESSION:    Technically limited study.    Long segment left-sided colitis extending from splenic flexure to rectum.    Other findings as discussed above.      CXR:  Lungs are clear.    No free intraperitoneal air evident.    Chest is similar to  of this year.    IMPRESSION: No acute finding or change.

## 2022-03-13 LAB
ANION GAP SERPL CALC-SCNC: 6 MMOL/L — SIGNIFICANT CHANGE UP (ref 5–17)
BUN SERPL-MCNC: 18 MG/DL — SIGNIFICANT CHANGE UP (ref 7–23)
CALCIUM SERPL-MCNC: 8.3 MG/DL — LOW (ref 8.5–10.1)
CHLORIDE SERPL-SCNC: 107 MMOL/L — SIGNIFICANT CHANGE UP (ref 96–108)
CO2 SERPL-SCNC: 23 MMOL/L — SIGNIFICANT CHANGE UP (ref 22–31)
CREAT SERPL-MCNC: 1.02 MG/DL — SIGNIFICANT CHANGE UP (ref 0.5–1.3)
CRP SERPL-MCNC: <3 MG/L — SIGNIFICANT CHANGE UP
EGFR: 57 ML/MIN/1.73M2 — LOW
FERRITIN SERPL-MCNC: 39 NG/ML — SIGNIFICANT CHANGE UP (ref 15–150)
GLUCOSE BLDC GLUCOMTR-MCNC: 116 MG/DL — HIGH (ref 70–99)
GLUCOSE BLDC GLUCOMTR-MCNC: 164 MG/DL — HIGH (ref 70–99)
GLUCOSE BLDC GLUCOMTR-MCNC: 175 MG/DL — HIGH (ref 70–99)
GLUCOSE BLDC GLUCOMTR-MCNC: 183 MG/DL — HIGH (ref 70–99)
GLUCOSE BLDC GLUCOMTR-MCNC: 190 MG/DL — HIGH (ref 70–99)
GLUCOSE SERPL-MCNC: 114 MG/DL — HIGH (ref 70–99)
HCT VFR BLD CALC: 30 % — LOW (ref 34.5–45)
HGB BLD-MCNC: 8.9 G/DL — LOW (ref 11.5–15.5)
IRON SATN MFR SERPL: 25 UG/DL — LOW (ref 30–160)
IRON SATN MFR SERPL: 7 % — LOW (ref 14–50)
MCHC RBC-ENTMCNC: 23.9 PG — LOW (ref 27–34)
MCHC RBC-ENTMCNC: 29.7 G/DL — LOW (ref 32–36)
MCV RBC AUTO: 80.4 FL — SIGNIFICANT CHANGE UP (ref 80–100)
NRBC # BLD: 0 /100 WBCS — SIGNIFICANT CHANGE UP (ref 0–0)
PLATELET # BLD AUTO: 314 K/UL — SIGNIFICANT CHANGE UP (ref 150–400)
POTASSIUM SERPL-MCNC: 4 MMOL/L — SIGNIFICANT CHANGE UP (ref 3.5–5.3)
POTASSIUM SERPL-SCNC: 4 MMOL/L — SIGNIFICANT CHANGE UP (ref 3.5–5.3)
RBC # BLD: 3.73 M/UL — LOW (ref 3.8–5.2)
RBC # FLD: 18.7 % — HIGH (ref 10.3–14.5)
SODIUM SERPL-SCNC: 136 MMOL/L — SIGNIFICANT CHANGE UP (ref 135–145)
TIBC SERPL-MCNC: 378 UG/DL — SIGNIFICANT CHANGE UP (ref 220–430)
UIBC SERPL-MCNC: 353 UG/DL — SIGNIFICANT CHANGE UP (ref 110–370)
WBC # BLD: 5.75 K/UL — SIGNIFICANT CHANGE UP (ref 3.8–10.5)
WBC # FLD AUTO: 5.75 K/UL — SIGNIFICANT CHANGE UP (ref 3.8–10.5)

## 2022-03-13 PROCEDURE — 99232 SBSQ HOSP IP/OBS MODERATE 35: CPT

## 2022-03-13 PROCEDURE — 99233 SBSQ HOSP IP/OBS HIGH 50: CPT

## 2022-03-13 RX ORDER — POLYETHYLENE GLYCOL 3350 17 G/17G
17 POWDER, FOR SOLUTION ORAL DAILY
Refills: 0 | Status: DISCONTINUED | OUTPATIENT
Start: 2022-03-13 | End: 2022-03-15

## 2022-03-13 RX ORDER — ACETAMINOPHEN 500 MG
1000 TABLET ORAL ONCE
Refills: 0 | Status: COMPLETED | OUTPATIENT
Start: 2022-03-13 | End: 2022-03-13

## 2022-03-13 RX ADMIN — LIDOCAINE 1 PATCH: 4 CREAM TOPICAL at 23:27

## 2022-03-13 RX ADMIN — Medication 3 MILLIGRAM(S): at 21:16

## 2022-03-13 RX ADMIN — Medication 1 DROP(S): at 23:25

## 2022-03-13 RX ADMIN — Medication 1 DROP(S): at 18:00

## 2022-03-13 RX ADMIN — Medication 1: at 11:20

## 2022-03-13 RX ADMIN — MORPHINE SULFATE 60 MILLIGRAM(S): 50 CAPSULE, EXTENDED RELEASE ORAL at 18:01

## 2022-03-13 RX ADMIN — Medication 175 MICROGRAM(S): at 05:35

## 2022-03-13 RX ADMIN — Medication 81 MILLIGRAM(S): at 11:21

## 2022-03-13 RX ADMIN — Medication 1000 MILLIGRAM(S): at 23:27

## 2022-03-13 RX ADMIN — GABAPENTIN 100 MILLIGRAM(S): 400 CAPSULE ORAL at 05:37

## 2022-03-13 RX ADMIN — LIDOCAINE 1 PATCH: 4 CREAM TOPICAL at 11:20

## 2022-03-13 RX ADMIN — Medication 1: at 18:01

## 2022-03-13 RX ADMIN — ENOXAPARIN SODIUM 40 MILLIGRAM(S): 100 INJECTION SUBCUTANEOUS at 18:01

## 2022-03-13 RX ADMIN — GABAPENTIN 100 MILLIGRAM(S): 400 CAPSULE ORAL at 21:16

## 2022-03-13 RX ADMIN — Medication 1 DROP(S): at 05:36

## 2022-03-13 RX ADMIN — MORPHINE SULFATE 60 MILLIGRAM(S): 50 CAPSULE, EXTENDED RELEASE ORAL at 05:35

## 2022-03-13 RX ADMIN — Medication 1 DROP(S): at 11:15

## 2022-03-13 RX ADMIN — LIDOCAINE 1 PATCH: 4 CREAM TOPICAL at 20:21

## 2022-03-13 RX ADMIN — Medication 400 MILLIGRAM(S): at 21:16

## 2022-03-13 RX ADMIN — MORPHINE SULFATE 60 MILLIGRAM(S): 50 CAPSULE, EXTENDED RELEASE ORAL at 19:00

## 2022-03-13 RX ADMIN — GABAPENTIN 100 MILLIGRAM(S): 400 CAPSULE ORAL at 14:12

## 2022-03-13 RX ADMIN — PANTOPRAZOLE SODIUM 40 MILLIGRAM(S): 20 TABLET, DELAYED RELEASE ORAL at 05:35

## 2022-03-13 NOTE — PROGRESS NOTE ADULT - ASSESSMENT
77 y/o F with PMH of dm2, htn, hld, chronic back pain/ spinal stenosis on chronic narcotic analgesics, hx chronic pancreatitis , anemia , recurrent UTIs, just completed abx course a wk ago p/w c/o N/V/D and diffuse abd pain.   CT c/w left sided colitis, adrenal nodule, renal cyst     1-Acute left colitis, less likely infectious, improved  tolerating diet  Monitor off Abx for now  Needs outpatient GI follow up for EGD/Cscope    2- Hypokalemia  supplement lytes     3- elevated trops in setting of sepsis, (denies CP,SOB) likely demand ischemia  stable, outpatient workup w/ cardio.     4-DM2  monitor fsbs/ISS    5-Chronic LBP/ spinal stenosis  c/w home meds/MS contin     6- brief episode of AFib  No need for A/C now  Might need outpatient holter    SQ lovenox for dvt ppx\    PT eval pending    D/C in AM if remained stable.

## 2022-03-13 NOTE — PROGRESS NOTE ADULT - ASSESSMENT
76-year-old with hypertension, type 2 diabetes, dyslipidemia, hypothyroidism, spinal stenosis and chronic back pain, anemia, chronic pancreatitis, admitted with abdominal pain and colitis. Possible AFib.  Noted troponin elevation likely on the basis of demand ischemia.  Echo from 3/12/22, normal LVEF 65%, mod TR, mild pulm HTN    PLAN:     Off antibiotics, repeat Blood Cx as per ID.  Advancing diet  On DVT prevention with Lovenox.    Continue aspirin for stroke risk reduction.    Questionable atrial fibrillation on telemetry perhaps brought on by underlying colitis issues and electrolyte issues; given anemia and current sinus rhythm, would avoid anticoagulation at the present time.    Can d/c telemetry.  Out patient ischemia evaluation and risk stratification when GI symptoms resolved. Will also need extended Holter to assess for presence of occult dysrhythmia.    The chart has been reviewed but the patient has not yet been examined.  Full note to follow. 76-year-old with hypertension, type 2 diabetes, dyslipidemia, hypothyroidism, spinal stenosis and chronic back pain, anemia, chronic pancreatitis, admitted with abdominal pain and colitis. Possible AFib.  Noted troponin elevation likely on the basis of demand ischemia.  Echo from 3/12/22, normal LVEF 65%, mod TR, mild pulm HTN    PLAN:     Off antibiotics, repeat Blood Cx as per ID.  Advancing diet  On DVT prevention with Lovenox.    Continue aspirin for stroke risk reduction.    Questionable atrial fibrillation on telemetry - my review of telemetry with the monitor revealed atrial tachycardia with frequent APC's. No indication for anticoagulation at the present time.    Can d/c telemetry.  Out patient ischemia evaluation and risk stratification when GI symptoms resolved. Will also need extended Holter to assess for presence of occult dysrhythmia.

## 2022-03-14 LAB
FERRITIN SERPL-MCNC: 46 NG/ML — SIGNIFICANT CHANGE UP (ref 15–150)
GLUCOSE BLDC GLUCOMTR-MCNC: 134 MG/DL — HIGH (ref 70–99)
GLUCOSE BLDC GLUCOMTR-MCNC: 147 MG/DL — HIGH (ref 70–99)
GLUCOSE BLDC GLUCOMTR-MCNC: 223 MG/DL — HIGH (ref 70–99)
GLUCOSE BLDC GLUCOMTR-MCNC: 270 MG/DL — HIGH (ref 70–99)
IRON SATN MFR SERPL: 29 UG/DL — LOW (ref 30–160)
IRON SATN MFR SERPL: 7 % — LOW (ref 14–50)
TIBC SERPL-MCNC: 393 UG/DL — SIGNIFICANT CHANGE UP (ref 220–430)
UIBC SERPL-MCNC: 364 UG/DL — SIGNIFICANT CHANGE UP (ref 110–370)

## 2022-03-14 PROCEDURE — 99232 SBSQ HOSP IP/OBS MODERATE 35: CPT

## 2022-03-14 PROCEDURE — 72100 X-RAY EXAM L-S SPINE 2/3 VWS: CPT | Mod: 26

## 2022-03-14 PROCEDURE — 73502 X-RAY EXAM HIP UNI 2-3 VIEWS: CPT | Mod: 26,RT

## 2022-03-14 RX ORDER — ACETAMINOPHEN 500 MG
650 TABLET ORAL EVERY 6 HOURS
Refills: 0 | Status: DISCONTINUED | OUTPATIENT
Start: 2022-03-14 | End: 2022-03-14

## 2022-03-14 RX ORDER — ACETAMINOPHEN 500 MG
1000 TABLET ORAL ONCE
Refills: 0 | Status: COMPLETED | OUTPATIENT
Start: 2022-03-14 | End: 2022-03-14

## 2022-03-14 RX ORDER — LACTULOSE 10 G/15ML
30 SOLUTION ORAL ONCE
Refills: 0 | Status: COMPLETED | OUTPATIENT
Start: 2022-03-14 | End: 2022-03-14

## 2022-03-14 RX ORDER — FERROUS SULFATE 325(65) MG
325 TABLET ORAL DAILY
Refills: 0 | Status: DISCONTINUED | OUTPATIENT
Start: 2022-03-14 | End: 2022-03-15

## 2022-03-14 RX ORDER — ACETAMINOPHEN 500 MG
650 TABLET ORAL EVERY 6 HOURS
Refills: 0 | Status: DISCONTINUED | OUTPATIENT
Start: 2022-03-14 | End: 2022-03-15

## 2022-03-14 RX ADMIN — GABAPENTIN 100 MILLIGRAM(S): 400 CAPSULE ORAL at 05:35

## 2022-03-14 RX ADMIN — Medication 325 MILLIGRAM(S): at 17:49

## 2022-03-14 RX ADMIN — GABAPENTIN 100 MILLIGRAM(S): 400 CAPSULE ORAL at 21:18

## 2022-03-14 RX ADMIN — GABAPENTIN 100 MILLIGRAM(S): 400 CAPSULE ORAL at 13:24

## 2022-03-14 RX ADMIN — LACTULOSE 30 GRAM(S): 10 SOLUTION ORAL at 13:49

## 2022-03-14 RX ADMIN — LIDOCAINE 1 PATCH: 4 CREAM TOPICAL at 23:07

## 2022-03-14 RX ADMIN — Medication 1 DROP(S): at 05:36

## 2022-03-14 RX ADMIN — MORPHINE SULFATE 60 MILLIGRAM(S): 50 CAPSULE, EXTENDED RELEASE ORAL at 05:35

## 2022-03-14 RX ADMIN — PANTOPRAZOLE SODIUM 40 MILLIGRAM(S): 20 TABLET, DELAYED RELEASE ORAL at 05:36

## 2022-03-14 RX ADMIN — Medication 1 DROP(S): at 17:58

## 2022-03-14 RX ADMIN — Medication 650 MILLIGRAM(S): at 22:18

## 2022-03-14 RX ADMIN — Medication 175 MICROGRAM(S): at 05:36

## 2022-03-14 RX ADMIN — Medication 400 MILLIGRAM(S): at 04:30

## 2022-03-14 RX ADMIN — ENOXAPARIN SODIUM 40 MILLIGRAM(S): 100 INJECTION SUBCUTANEOUS at 17:49

## 2022-03-14 RX ADMIN — MORPHINE SULFATE 60 MILLIGRAM(S): 50 CAPSULE, EXTENDED RELEASE ORAL at 18:25

## 2022-03-14 RX ADMIN — Medication 650 MILLIGRAM(S): at 16:43

## 2022-03-14 RX ADMIN — LIDOCAINE 1 PATCH: 4 CREAM TOPICAL at 19:35

## 2022-03-14 RX ADMIN — MORPHINE SULFATE 60 MILLIGRAM(S): 50 CAPSULE, EXTENDED RELEASE ORAL at 17:49

## 2022-03-14 RX ADMIN — LIDOCAINE 1 PATCH: 4 CREAM TOPICAL at 12:18

## 2022-03-14 RX ADMIN — Medication 81 MILLIGRAM(S): at 12:07

## 2022-03-14 RX ADMIN — Medication 650 MILLIGRAM(S): at 21:18

## 2022-03-14 RX ADMIN — Medication 1000 MILLIGRAM(S): at 05:34

## 2022-03-14 RX ADMIN — Medication 650 MILLIGRAM(S): at 15:59

## 2022-03-14 RX ADMIN — Medication 3 MILLIGRAM(S): at 21:18

## 2022-03-14 RX ADMIN — Medication 1 DROP(S): at 12:10

## 2022-03-14 RX ADMIN — Medication 3: at 12:08

## 2022-03-14 NOTE — PHYSICAL THERAPY INITIAL EVALUATION ADULT - GENERAL OBSERVATIONS, REHAB EVAL
Pt found supine, alert, oriented x 4, follow directions, on room air, c/o pain in the abdominal area and back area.

## 2022-03-14 NOTE — PHYSICAL THERAPY INITIAL EVALUATION ADULT - ADDITIONAL COMMENTS
Pt states with has a cane and a rolling walker and able to move around the house independently , has HHA 4 hrs a day and assist in household chores and errands.

## 2022-03-14 NOTE — PHYSICAL THERAPY INITIAL EVALUATION ADULT - PERTINENT HX OF CURRENT PROBLEM, REHAB EVAL
Pt is admitted with c/o pain in the abdominal area dx Acute colitis, sepsis, also c/o back pains after a fall a week ago.

## 2022-03-14 NOTE — PROGRESS NOTE ADULT - ASSESSMENT
77 y/o F with PMH of dm2, htn, hld, chronic back pain/ spinal stenosis on chronic narcotic analgesics, hx chronic pancreatitis , anemia , recurrent UTIs, just completed abx course a wk ago p/w c/o N/V/D and diffuse abd pain.   CT c/w left sided colitis, adrenal nodule, renal cyst     Acute left colitis, less likely infectious, improved  tolerating diet  Monitor off Abx for now  Needs outpatient GI follow up for EGD/Cscope    Acute on Chronic LBP    spinal stenosis  Hx of Fall last week  c/w home meds/MS contin   -will obtain xray of hip/ lower back  -IV tylenol   -PT  -pt states shes not interested in surgery if can avoid it  - outpt f/u w/ pain management     Hypokalemia  supplement lytes     elevated trops in setting of sepsis, (denies CP,SOB) likely demand ischemia  stable, outpatient workup w/ cardio.     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   CT c/w left sided colitis, adrenal nodule, renal cyst     Acute left colitis, less likely infectious, improved  tolerating diet  Monitor off Abx for now  Needs outpatient GI follow up for EGD/Cscope    Acute on Chronic LBP    spinal stenosis  Hx of Fall last week  c/w home meds/MS contin   -will obtain xray of hip/ lower back  -IV tylenol   -PT  -pt states shes not interested in surgery if can avoid it  - outpt f/u w/ pain management     Hypokalemia  supplement lytes     elevated trops in setting of sepsis, (garry CP,SOB) likely demand ischemia  stable, outpatient workup w/ cardio.     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

## 2022-03-14 NOTE — PHYSICAL THERAPY INITIAL EVALUATION ADULT - STRENGTHENING, PT EVAL
Improve strength in the UE and LE to 5/5  and be able to perform functional tasks-bed mobility, sitting, standing, transfers and ambulate in a safe manner with or without  assistive device and prevent falls.

## 2022-03-14 NOTE — PROGRESS NOTE ADULT - ASSESSMENT
76-year-old F with hypertension, type 2 diabetes, dyslipidemia, hypothyroidism, spinal stenosis and chronic back pain, anemia, chronic pancreatitis, admitted with abdominal pain and colitis. Possible AFib.  Noted troponin elevation likely on the basis of demand ischemia.  Echo from 3/12/22, normal LVEF 65%, mod TR, mild pulm HTN    PLAN:     Off antibiotics, repeat Blood Cx as per ID.  Advancing diet  On DVT prevention with Lovenox.    Continue aspirin for stroke risk reduction.    Questionable atrial fibrillation on telemetry - my review of telemetry with the monitor revealed atrial tachycardia with frequent APC's. No indication for anticoagulation at the present time.    Can d/c telemetry.  Out patient ischemia evaluation and risk stratification when GI symptoms resolved. Will also need extended Holter to assess for presence of occult dysrhythmia.   76-year-old F with hypertension, type 2 diabetes, dyslipidemia, hypothyroidism, spinal stenosis and chronic back pain, anemia, chronic pancreatitis, admitted with abdominal pain and colitis.   Possible AFib.  Noted troponin elevation likely on the basis of demand ischemia.  Echo from 3/12/22, normal LVEF 65%, mod TR, mild pulm HTN  also with c/o in mid back pain    PLAN:     Off antibiotics, tolerating solid diet, GI following   Questionable atrial fibrillation on telemetry - likely atrial tachycardia with frequent APC's. No indication for anticoagulation at the present time.  Continue aspirin for stroke risk reduction.    On DVT prevention with Lovenox.    Out patient ischemia evaluation and risk stratification when GI symptoms resolved. Will also need extended Holter to assess for presence of occult dysrhythmia.  imaging study for back pain, pt states that she fell at home last week, pain control  signing off now, please reconsult as needed

## 2022-03-14 NOTE — PROGRESS NOTE ADULT - ASSESSMENT
HPI:  76 y.o. F w/ Hx DM, HTN, Lipids, Hypothyroid, chronic LBP/spinal stenosis, chronic pancreatitis, anemia had several recent episodes of UTI -- treated at [facility] in 12/2021, then here in 12/2021 and 1/2022. Was again treated as outpatient, completing course of ABX aboud a week ago; Now returns w/ several days of N/V/D and diffuse abdominal pain. (10 Mar 2022 16:37)  ------ As Above ------ Patient seen earlier today. Patient is a poor historian.   Patient presented with multiple loose BMs off and on over the past two weeks. Patient recently treated for a UTI. Vague abdominal pain (+) Non bloody. No one else with the same symptoms.   Last colonoscopy was a long time ago.   No BM's over night.   Left sided colitis by CT scan  / see labs     A) Left sided colitis - Resolved  R/O Infectious , less likely IBD / Ischemia , Now on solid diet   B) Anemia HGB 10.0, low MCV, Low iron sat, normal ferritin in January  1) Repeat Fe/TIBC / Ferritin 2) TSH 3) Out patient EGD / colonoscopy   HPI:  76 y.o. F w/ Hx DM, HTN, Lipids, Hypothyroid, chronic LBP/spinal stenosis, chronic pancreatitis, anemia had several recent episodes of UTI -- treated at [facility] in 12/2021, then here in 12/2021 and 1/2022. Was again treated as outpatient, completing course of ABX aboud a week ago; Now returns w/ several days of N/V/D and diffuse abdominal pain. (10 Mar 2022 16:37)  ------ As Above ------ Patient seen earlier today. Patient is a poor historian.   Patient presented with multiple loose BMs off and on over the past two weeks. Patient recently treated for a UTI. Vague abdominal pain (+) Non bloody. No one else with the same symptoms.   Last colonoscopy was a long time ago.   No BM's over night.   Left sided colitis by CT scan  / see labs     A) Left sided colitis - Resolved  R/O Infectious , less likely IBD / Ischemia , Now on solid diet   B) Anemia HGB 8.9, low MCV, Low iron sat, normal ferritin in January 1) Out patient EGD / colonoscopy

## 2022-03-15 ENCOUNTER — TRANSCRIPTION ENCOUNTER (OUTPATIENT)
Age: 77
End: 2022-03-15

## 2022-03-15 VITALS
SYSTOLIC BLOOD PRESSURE: 119 MMHG | HEART RATE: 76 BPM | RESPIRATION RATE: 18 BRPM | TEMPERATURE: 99 F | DIASTOLIC BLOOD PRESSURE: 74 MMHG | OXYGEN SATURATION: 100 %

## 2022-03-15 LAB
ALBUMIN SERPL ELPH-MCNC: 3 G/DL — LOW (ref 3.3–5)
ALP SERPL-CCNC: 79 U/L — SIGNIFICANT CHANGE UP (ref 40–120)
ALT FLD-CCNC: 14 U/L — SIGNIFICANT CHANGE UP (ref 12–78)
ANION GAP SERPL CALC-SCNC: 5 MMOL/L — SIGNIFICANT CHANGE UP (ref 5–17)
AST SERPL-CCNC: 18 U/L — SIGNIFICANT CHANGE UP (ref 15–37)
BILIRUB SERPL-MCNC: 0.3 MG/DL — SIGNIFICANT CHANGE UP (ref 0.2–1.2)
BUN SERPL-MCNC: 9 MG/DL — SIGNIFICANT CHANGE UP (ref 7–23)
CALCIUM SERPL-MCNC: 8.6 MG/DL — SIGNIFICANT CHANGE UP (ref 8.5–10.1)
CHLORIDE SERPL-SCNC: 106 MMOL/L — SIGNIFICANT CHANGE UP (ref 96–108)
CO2 SERPL-SCNC: 27 MMOL/L — SIGNIFICANT CHANGE UP (ref 22–31)
CREAT SERPL-MCNC: 0.67 MG/DL — SIGNIFICANT CHANGE UP (ref 0.5–1.3)
CULTURE RESULTS: SIGNIFICANT CHANGE UP
EGFR: 90 ML/MIN/1.73M2 — SIGNIFICANT CHANGE UP
GLUCOSE BLDC GLUCOMTR-MCNC: 139 MG/DL — HIGH (ref 70–99)
GLUCOSE BLDC GLUCOMTR-MCNC: 146 MG/DL — HIGH (ref 70–99)
GLUCOSE BLDC GLUCOMTR-MCNC: 256 MG/DL — HIGH (ref 70–99)
GLUCOSE SERPL-MCNC: 127 MG/DL — HIGH (ref 70–99)
HCT VFR BLD CALC: 27.1 % — LOW (ref 34.5–45)
HGB BLD-MCNC: 8.1 G/DL — LOW (ref 11.5–15.5)
MCHC RBC-ENTMCNC: 24 PG — LOW (ref 27–34)
MCHC RBC-ENTMCNC: 29.9 G/DL — LOW (ref 32–36)
MCV RBC AUTO: 80.2 FL — SIGNIFICANT CHANGE UP (ref 80–100)
NRBC # BLD: 0 /100 WBCS — SIGNIFICANT CHANGE UP (ref 0–0)
PLATELET # BLD AUTO: 263 K/UL — SIGNIFICANT CHANGE UP (ref 150–400)
POTASSIUM SERPL-MCNC: 4.2 MMOL/L — SIGNIFICANT CHANGE UP (ref 3.5–5.3)
POTASSIUM SERPL-SCNC: 4.2 MMOL/L — SIGNIFICANT CHANGE UP (ref 3.5–5.3)
PROT SERPL-MCNC: 6.4 GM/DL — SIGNIFICANT CHANGE UP (ref 6–8.3)
RBC # BLD: 3.38 M/UL — LOW (ref 3.8–5.2)
RBC # FLD: 19.2 % — HIGH (ref 10.3–14.5)
SODIUM SERPL-SCNC: 138 MMOL/L — SIGNIFICANT CHANGE UP (ref 135–145)
SPECIMEN SOURCE: SIGNIFICANT CHANGE UP
WBC # BLD: 4.32 K/UL — SIGNIFICANT CHANGE UP (ref 3.8–10.5)
WBC # FLD AUTO: 4.32 K/UL — SIGNIFICANT CHANGE UP (ref 3.8–10.5)

## 2022-03-15 PROCEDURE — 99239 HOSP IP/OBS DSCHRG MGMT >30: CPT

## 2022-03-15 RX ORDER — FERROUS SULFATE 325(65) MG
1 TABLET ORAL
Qty: 30 | Refills: 0
Start: 2022-03-15 | End: 2022-04-13

## 2022-03-15 RX ORDER — ONDANSETRON 8 MG/1
4 TABLET, FILM COATED ORAL EVERY 6 HOURS
Refills: 0 | Status: DISCONTINUED | OUTPATIENT
Start: 2022-03-15 | End: 2022-03-15

## 2022-03-15 RX ADMIN — Medication 175 MICROGRAM(S): at 05:32

## 2022-03-15 RX ADMIN — GABAPENTIN 100 MILLIGRAM(S): 400 CAPSULE ORAL at 05:32

## 2022-03-15 RX ADMIN — Medication 650 MILLIGRAM(S): at 08:29

## 2022-03-15 RX ADMIN — Medication 650 MILLIGRAM(S): at 14:42

## 2022-03-15 RX ADMIN — Medication 1 DROP(S): at 05:32

## 2022-03-15 RX ADMIN — Medication 325 MILLIGRAM(S): at 11:15

## 2022-03-15 RX ADMIN — Medication 3: at 11:13

## 2022-03-15 RX ADMIN — Medication 1 DROP(S): at 11:15

## 2022-03-15 RX ADMIN — LIDOCAINE 1 PATCH: 4 CREAM TOPICAL at 12:16

## 2022-03-15 RX ADMIN — MORPHINE SULFATE 60 MILLIGRAM(S): 50 CAPSULE, EXTENDED RELEASE ORAL at 06:33

## 2022-03-15 RX ADMIN — GABAPENTIN 100 MILLIGRAM(S): 400 CAPSULE ORAL at 12:58

## 2022-03-15 RX ADMIN — ONDANSETRON 4 MILLIGRAM(S): 8 TABLET, FILM COATED ORAL at 13:26

## 2022-03-15 RX ADMIN — MORPHINE SULFATE 60 MILLIGRAM(S): 50 CAPSULE, EXTENDED RELEASE ORAL at 05:32

## 2022-03-15 RX ADMIN — MORPHINE SULFATE 60 MILLIGRAM(S): 50 CAPSULE, EXTENDED RELEASE ORAL at 17:16

## 2022-03-15 RX ADMIN — Medication 81 MILLIGRAM(S): at 11:13

## 2022-03-15 RX ADMIN — Medication 1 DROP(S): at 17:16

## 2022-03-15 RX ADMIN — PANTOPRAZOLE SODIUM 40 MILLIGRAM(S): 20 TABLET, DELAYED RELEASE ORAL at 08:24

## 2022-03-15 NOTE — DISCHARGE NOTE PROVIDER - PROVIDER TOKENS
PROVIDER:[TOKEN:[[id number]],FOLLOWUP:[2 weeks]],FREE:[LAST:[PCP],PHONE:[(   )    -],FAX:[(   )    -]],PROVIDER:[TOKEN:[[id number]],FOLLOWUP:[2 weeks]] PROVIDER:[TOKEN:[[id number]],FOLLOWUP:[2 weeks]],PROVIDER:[TOKEN:[[id number]],FOLLOWUP:[2 weeks]],FREE:[LAST:[PCP],PHONE:[(   )    -],FAX:[(   )    -]],PROVIDER:[TOKEN:[[id number]],FOLLOWUP:[1 month]]

## 2022-03-15 NOTE — DISCHARGE NOTE NURSING/CASE MANAGEMENT/SOCIAL WORK - NSDCVIVACCINE_GEN_ALL_CORE_FT
Tdap; 26-Aug-2015 14:33; Lakeisha Lee (DAVID); [employer]; [id number]; IntraMuscular; Deltoid Right.; 0.5 milliLiter(s); VIS (VIS Published: 09-May-2013, VIS Presented: 26-Aug-2015);

## 2022-03-15 NOTE — PROGRESS NOTE ADULT - SUBJECTIVE AND OBJECTIVE BOX
CHIEF COMPLAINT/INTERVAL HISTORY:    Patient is a 76y old  Female who presents with a chief complaint of Colitis (10 Mar 2022 16:37)      HPI:  76 y.o. F w/ Hx DM, HTN, Lipids, Hypothyroid, chronic LBP/spinal stenosis, chronic pancreatitis, anemia had several recent episodes of UTI -- treated at [facility] in 2021, then here in 2021 and 2022. Was again treated as outpatient, completing course of ABX aboud a week ago; Now returns w/ several days of N/V/D and diffuse abdominal pain. (10 Mar 2022 16:37)      SUBJECTIVE & OBJECTIVE: Pt seen and examined at bedside. Lying comfortably,  abd pain is better. no more diarrhea, denies Nausea/ vomiting. tolerating diet ok. c/o recent fall with subsequent right flank pain that is worse with movement.      Vital Signs Last 24 Hrs  T(C): 37.4 (13 Mar 2022 10:19), Max: 37.4 (12 Mar 2022 23:30)  T(F): 99.3 (13 Mar 2022 10:19), Max: 99.4 (12 Mar 2022 23:30)  HR: 80 (13 Mar 2022 10:19) (63 - 80)  BP: 128/72 (13 Mar 2022 10:19) (112/72 - 135/82)  BP(mean): --  RR: 17 (13 Mar 2022 10:19) (17 - 18)  SpO2: 98% (13 Mar 2022 10:19) (95% - 98%)        MEDICATIONS  (STANDING):  aspirin enteric coated 81 milliGRAM(s) Oral daily  cefTRIAXone   IVPB 1000 milliGRAM(s) IV Intermittent every 24 hours  dextrose 40% Gel 15 Gram(s) Oral once  dextrose 5%. 1000 milliLiter(s) (50 mL/Hr) IV Continuous <Continuous>  dextrose 5%. 1000 milliLiter(s) (100 mL/Hr) IV Continuous <Continuous>  dextrose 50% Injectable 25 Gram(s) IV Push once  dextrose 50% Injectable 12.5 Gram(s) IV Push once  dextrose 50% Injectable 25 Gram(s) IV Push once  enoxaparin Injectable 40 milliGRAM(s) SubCutaneous every 24 hours  gabapentin 100 milliGRAM(s) Oral three times a day  glucagon  Injectable 1 milliGRAM(s) IntraMuscular once  insulin lispro (ADMELOG) corrective regimen sliding scale   SubCutaneous three times a day before meals  levothyroxine 175 MICROGram(s) Oral daily  lidocaine   4% Patch 1 Patch Transdermal daily  melatonin 3 milliGRAM(s) Oral at bedtime  metroNIDAZOLE  IVPB 500 milliGRAM(s) IV Intermittent every 8 hours  morphine ER Tablet 60 milliGRAM(s) Oral two times a day  pantoprazole    Tablet 40 milliGRAM(s) Oral before breakfast  polyethylene glycol 3350 17 Gram(s) Oral daily  potassium chloride  10 mEq/100 mL IVPB 10 milliEquivalent(s) IV Intermittent every 1 hour  prednisoLONE acetate 1% Suspension 1 Drop(s) Both EYES four times a day  sodium chloride 0.9% 1000 milliLiter(s) (75 mL/Hr) IV Continuous <Continuous>  vancomycin    Solution 125 milliGRAM(s) Oral every 6 hours    MEDICATIONS  (PRN):  ondansetron Injectable 4 milliGRAM(s) IV Push every 8 hours PRN Nausea and/or Vomiting        PHYSICAL EXAM:    GENERAL: NAD,well-developed  HEAD:  Atraumatic, Normocephalic  EYES: EOMI, PERRLA, conjunctiva and sclera clear  ENMT: Moist mucous membranes  NECK: Supple  NERVOUS SYSTEM:  Alert & Oriented X3, Motor Strength 5/5 B/L upper and lower extremities  CHEST/LUNG: Clear to auscultation bilaterally; No rales, rhonchi, wheezing, or rubs  HEART: Regular rate and rhythm  ABDOMEN: Soft, soft, mild tenderness left abd, not distended,  BS +  EXTREMITIES:  no cyanosis, or edema    LABS:                        10.0   8.49  )-----------( 362      ( 11 Mar 2022 06:46 )             32.8     03-11    140  |  105  |  11  ----------------------------<  120<H>  2.9<LL>   |  24  |  1.47<H>    Ca    9.0      11 Mar 2022 06:46  Mg     2.3     -    TPro  7.9  /  Alb  3.6  /  TBili  0.6  /  DBili  x   /  AST  49<H>  /  ALT  19  /  AlkPhos  94  03-    PT/INR - ( 10 Mar 2022 13:55 )   PT: 13.1 sec;   INR: 1.10 ratio         PTT - ( 10 Mar 2022 13:55 )  PTT:26.6 sec  Urinalysis Basic - ( 10 Mar 2022 15:34 )    Color: Yellow / Appearance: Clear / S.010 / pH: x  Gluc: x / Ketone: Moderate  / Bili: Negative / Urobili: Negative mg/dL   Blood: x / Protein: 100 mg/dL / Nitrite: Negative   Leuk Esterase: Negative / RBC: Negative /HPF / WBC 0-2   Sq Epi: x / Non Sq Epi: Occasional / Bacteria: Negative        CAPILLARY BLOOD GLUCOSE      POCT Blood Glucose.: 146 mg/dL (11 Mar 2022 11:02)  POCT Blood Glucose.: 140 mg/dL (11 Mar 2022 07:55)  POCT Blood Glucose.: 207 mg/dL (10 Mar 2022 22:39)    < from: CT Abdomen and Pelvis w/ IV Cont (03.10.22 @ 15:53) >    ACC: [id number] EXAM:  CT ABDOMEN AND PELVIS IC                          PROCEDURE DATE:  03/10/2022          INTERPRETATION:  CLINICAL INFORMATION: Abdominal pain nausea and   vomiting. Treated for past week with antibiotics for urinary tract   infection.    COMPARISON: CT scan abdomen pelvis 1/10/2022.    CONTRAST/COMPLICATIONS:  IV Contrast: Omnipaque 350  95 cc  Oral Contrast: None.  Complications: None reported at time of exam completion.    PROCEDURE:  CT of the Abdomen and Pelvis was performed.  Sagittal and coronal reformats were performed.    FINDINGS:    LOWER CHEST: Small hiatal hernia.    Streak artifact degrades image quality limiting evaluation.    LIVER: Within normal limits.  BILE DUCTS: Pneumobilia.  GALLBLADDER: Cholecystectomy.  SPLEEN: Within normal limits.  PANCREAS: Within normal limits.  ADRENALS: 1.5 cm left adrenal nodule, stable.  KIDNEYS/URETERS:  Extensive streak artifact partly related to patient's metallic hardware   degrades image quality limiting evaluation.    No hydroureteronephrosis or perinephric fluid collection noted.  Small cyst midpole right kidney.  Small indeterminate hyperdense lesion upper pole left kidney likely   reflects hyperdense cyst as noted on prior CT exam.    BLADDER: Within normallimits.  REPRODUCTIVE ORGANS: Hysterectomy.    BOWEL:  Evaluation of the stomach is limited without distention.  Colonic diverticulosis.  There is a long segment of bowel wall thickening with submucosal edema   left colon extending from splenic flexure, descending, sigmoid colon to   rectum.  No bowel obstruction.  PERITONEUM: No ascites.    VESSELS: Atherosclerotic changes.  RETROPERITONEUM/LYMPH NODES: No lymphadenopathy.    ABDOMINAL WALL: Within normal limits.    BONES: Posterior and interbody fusion L1-S1.    IMPRESSION:    Technically limited study.    Long segment left-sided colitis extending from splenic flexure to rectum.    Other findings as discussed above.        --- End of Report ---            ESTEFANY JANE MD; Attending Radiologist  This document has been electronically signed. Mar 10 2022  4:21PM    < end of copied text >    
Patient is a 76y old  Female who presents with a chief complaint of Colitis (13 Mar 2022 11:54)      INTERVAL HPI/OVERNIGHT EVENTS: Pt complaining of severe right sided "side and back pain" stating at times pain is 9/10 and she cannot make herself comfortable.   Also complaining of constipation- states that miralax does not work at all, and takes lactulose at home.    MEDICATIONS  (STANDING):  acetaminophen   IVPB .. 1000 milliGRAM(s) IV Intermittent once  aspirin enteric coated 81 milliGRAM(s) Oral daily  dextrose 40% Gel 15 Gram(s) Oral once  dextrose 5%. 1000 milliLiter(s) (50 mL/Hr) IV Continuous <Continuous>  dextrose 5%. 1000 milliLiter(s) (100 mL/Hr) IV Continuous <Continuous>  dextrose 50% Injectable 25 Gram(s) IV Push once  dextrose 50% Injectable 12.5 Gram(s) IV Push once  dextrose 50% Injectable 25 Gram(s) IV Push once  enoxaparin Injectable 40 milliGRAM(s) SubCutaneous every 24 hours  gabapentin 100 milliGRAM(s) Oral three times a day  glucagon  Injectable 1 milliGRAM(s) IntraMuscular once  insulin lispro (ADMELOG) corrective regimen sliding scale   SubCutaneous three times a day before meals  lactulose Syrup 30 Gram(s) Oral once  levothyroxine 175 MICROGram(s) Oral daily  lidocaine   4% Patch 1 Patch Transdermal daily  melatonin 3 milliGRAM(s) Oral at bedtime  morphine ER Tablet 60 milliGRAM(s) Oral two times a day  pantoprazole    Tablet 40 milliGRAM(s) Oral before breakfast  prednisoLONE acetate 1% Suspension 1 Drop(s) Both EYES four times a day    MEDICATIONS  (PRN):  ondansetron Injectable 4 milliGRAM(s) IV Push every 8 hours PRN Nausea and/or Vomiting  polyethylene glycol 3350 17 Gram(s) Oral daily PRN Constipation      Allergies    ACE inhibitors (Anaphylaxis; Angioedema)    Intolerances        REVIEW OF SYSTEMS:  CONSTITUTIONAL: No fever, weight loss, or fatigue  EYES: No eye pain, visual disturbances, or discharge  ENMT:  No difficulty hearing, tinnitus, vertigo; No sinus or throat pain  NECK: No pain or stiffness  BREASTS: No pain, masses, or nipple discharge  RESPIRATORY: No cough, wheezing, chills or hemoptysis; No shortness of breath  CARDIOVASCULAR: No chest pain, palpitations, dizziness, or leg swelling  GASTROINTESTINAL: No abdominal or epigastric pain. No nausea, vomiting, or hematemesis; No diarrhea or constipation. No melena or hematochezia.  GENITOURINARY: No dysuria, frequency, hematuria, or incontinence  NEUROLOGICAL: No headaches, memory loss, loss of strength, numbness, or tremors  SKIN: No itching, burning, rashes, or lesions   LYMPH NODES: No enlarged glands  ENDOCRINE: No heat or cold intolerance; No hair loss  MUSCULOSKELETAL: +Back/ and right body Side pain   PSYCHIATRIC: No depression, anxiety, mood swings, or difficulty sleeping  HEME/LYMPH: No easy bruising, or bleeding gums  ALLERGY AND IMMUNOLOGIC: No hives or eczema    Vital Signs Last 24 Hrs  T(C): 36.7 (14 Mar 2022 12:49), Max: 36.9 (13 Mar 2022 23:27)  T(F): 98.1 (14 Mar 2022 12:49), Max: 98.5 (13 Mar 2022 23:27)  HR: 79 (14 Mar 2022 12:49) (61 - 82)  BP: 141/72 (14 Mar 2022 12:49) (138/81 - 147/69)  BP(mean): --  RR: 18 (14 Mar 2022 12:49) (17 - 18)  SpO2: 97% (14 Mar 2022 12:49) (97% - 98%)    PHYSICAL EXAM:  GENERAL: NAD, well-groomed, well-developed  HEAD:  Atraumatic, Normocephalic  EYES: EOMI, PERRLA, conjunctiva and sclera clear  ENMT: No tonsillar erythema, exudates, or enlargement; Moist mucous membranes, Good dentition, No lesions  NECK: Supple, No JVD, Normal thyroid  NERVOUS SYSTEM:  Alert & Oriented X3, Good concentration; Motor Strength 5/5 B/L upper and lower extremities; DTRs 2+ intact and symmetric  CHEST/LUNG: Clear to percussion bilaterally; No rales, rhonchi, wheezing, or rubs  HEART: Regular rate and rhythm; No murmurs, rubs, or gallops  ABDOMEN: Soft, Nontender, Nondistended; Bowel sounds present  EXTREMITIES:  2+ Peripheral Pulses, No clubbing, cyanosis, or edema  Musculoskeletal: Did not appreciate any bruising, but righ side of hip very tender and right side of lumbar back   LYMPH: No lymphadenopathy noted  SKIN: No rashes or lesions    LABS:                        8.9    5.75  )-----------( 314      ( 13 Mar 2022 07:40 )             30.0     03-13    136  |  107  |  18  ----------------------------<  114<H>  4.0   |  23  |  1.02    Ca    8.3<L>      13 Mar 2022 07:40          CAPILLARY BLOOD GLUCOSE      POCT Blood Glucose.: 270 mg/dL (14 Mar 2022 11:31)  POCT Blood Glucose.: 134 mg/dL (14 Mar 2022 07:41)  POCT Blood Glucose.: 175 mg/dL (13 Mar 2022 20:42)  POCT Blood Glucose.: 190 mg/dL (13 Mar 2022 17:55)  POCT Blood Glucose.: 183 mg/dL (13 Mar 2022 16:18)      RADIOLOGY & ADDITIONAL TESTS:    Imaging Personally Reviewed:  [ ] YES  [ ] NO    Consultant(s) Notes Reviewed:  [ ] YES  [ ] NO    Care Discussed with Consultants/Other Providers [ ] YES  [ ] NO
Patient is a 76y old  Female who presents with a chief complaint of several days of N/V/D and diffuse abdominal pain.(14 Mar 2022 13:42)    PAST MEDICAL & SURGICAL HISTORY:  Diabetes    Hypertension    Hypothyroid    anemia    Pancreatitis, chronic    Diverticulosis    Osteoarthritis    Spinal stenosis    MRSA (methicillin resistant Staphylococcus aureus) infection  1999, infected knee replacement, required multiple revisions and long term IV antibiotics via central line    H/O: hysterectomy    FH: cholecystectomy    H/O total knee replacement, bilateral    H/O right inguinal hernia repair    INTERVAL HISTORY: in no acute distress, c/o mid back pain with movement   	  MEDICATIONS:  MEDICATIONS  (STANDING):  aspirin enteric coated 81 milliGRAM(s) Oral daily  enoxaparin Injectable 40 milliGRAM(s) SubCutaneous every 24 hours  ferrous    sulfate 325 milliGRAM(s) Oral daily  gabapentin 100 milliGRAM(s) Oral three times a day  glucagon  Injectable 1 milliGRAM(s) IntraMuscular once  insulin lispro (ADMELOG) corrective regimen sliding scale   SubCutaneous three times a day before meals  levothyroxine 175 MICROGram(s) Oral daily  lidocaine   4% Patch 1 Patch Transdermal daily  melatonin 3 milliGRAM(s) Oral at bedtime  morphine ER Tablet 60 milliGRAM(s) Oral two times a day  pantoprazole    Tablet 40 milliGRAM(s) Oral before breakfast  prednisoLONE acetate 1% Suspension 1 Drop(s) Both EYES four times a day    MEDICATIONS  (PRN):  ondansetron Injectable 4 milliGRAM(s) IV Push every 8 hours PRN Nausea and/or Vomiting  polyethylene glycol 3350 17 Gram(s) Oral daily PRN Constipation    Vitals:  T(F): 98.1 (03-14-22 @ 12:49), Max: 98.5 (03-13-22 @ 23:27)  HR: 79 (03-14-22 @ 12:49) (61 - 82)  BP: 141/72 (03-14-22 @ 12:49) (138/81 - 147/69)  RR: 18 (03-14-22 @ 12:49) (17 - 18)  SpO2: 97% (03-14-22 @ 12:49) (97% - 98%)    03-13 @ 07:01  -  03-14 @ 07:00  --------------------------------------------------------  IN:    Oral Fluid: 704 mL  Total IN: 704 mL    OUT:    Voided (mL): 1950 mL  Total OUT: 1950 mL    Total NET: -1246 mL    03-14 @ 07:01  -  03-14 @ 15:07  --------------------------------------------------------  IN:    Oral Fluid: 360 mL  Total IN: 360 mL    OUT:  Total OUT: 0 mL    Total NET: 360 mL    PHYSICAL EXAM:  Neuro: Awake, responsive  CV: S1 S2 RRR  Lungs: CTABL  GI: Soft, BS +, ND, mild tenderness to lower abd  Extremities: No edema    RADIOLOGY: < from: Xray Chest 1 View-PORTABLE IMMEDIATE (Xray Chest 1 View-PORTABLE IMMEDIATE .) (03.10.22 @ 18:18) >  Lungs are clear.    No free intraperitoneal air evident.    Chest is similar to January 11 of this year.    IMPRESSION: No acute finding or change.    < end of copied text >  < from: CT Abdomen and Pelvis w/ IV Cont (03.10.22 @ 15:53) >  LOWER CHEST: Small hiatal hernia.    Streak artifact degrades image quality limiting evaluation.    LIVER: Within normal limits.  BILE DUCTS: Pneumobilia.  GALLBLADDER: Cholecystectomy.  SPLEEN: Within normal limits.  PANCREAS: Within normal limits.  ADRENALS: 1.5 cm left adrenal nodule, stable.  KIDNEYS/URETERS:  Extensive streak artifact partly related to patient's metallic hardware   degrades image quality limiting evaluation.    No hydroureteronephrosis or perinephric fluid collection noted.  Small cyst midpole right kidney.  Small indeterminate hyperdense lesion upper pole left kidney likely   reflects hyperdense cyst as noted on prior CT exam.    BLADDER: Within normallimits.  REPRODUCTIVE ORGANS: Hysterectomy.    BOWEL:  Evaluation of the stomach is limited without distention.  Colonic diverticulosis.  There is a long segment of bowel wall thickening with submucosal edema   left colon extending from splenic flexure, descending, sigmoid colon to   rectum.  No bowel obstruction.  PERITONEUM: No ascites.    VESSELS: Atherosclerotic changes.  RETROPERITONEUM/LYMPH NODES: No lymphadenopathy.    ABDOMINAL WALL: Within normal limits.    BONES: Posterior and interbody fusion L1-S1.    IMPRESSION:    Technically limited study.    Long segment left-sided colitis extending from splenic flexure to rectum.    < end of copied text >    DIAGNOSTIC TESTING:    [x ] Echocardiogram: < from: TTE Echo Complete w/o Contrast w/ Doppler (03.12.22 @ 13:22) >  Left Ventricle: The left ventricular internal cavity size is normal. Left   ventricular wall thickness is increased.  Global LV systolic function was normal. Left ventricular ejection   fraction, by visual estimation, is 65 to 70%.  Right Ventricle: Normal right ventricular size and function.  Left Atrium: The left atrium is normal in size.  Right Atrium: The right atrium is normal in size.  Pericardium: There is no evidence of pericardial effusion.  Mitral Valve: Structurally normal mitral valve, with normal leaflet   excursion.  Tricuspid Valve: Structurally normal tricuspid valve, with normal leaflet   excursion. Moderate tricuspid regurgitation is visualized. Estimated   pulmonary artery systolic pressure is 41.5 mmHg assuming a right atrial   pressure of 5 mmHg, which is consistent with mild pulmonary hypertension.  Aortic Valve: Normal trileaflet aortic valve with normal opening. Peak   transaortic gradient equals 12.5 mmHg, mean transaortic gradient equals   6.0 mmHg, the calculated aortic valve area equals 2.26 cm² by the   continuity equation consistent with normally opening aortic valve. No   evidence of aortic valve regurgitation is seen.  Pulmonic Valve: Structurally normal pulmonic valve, with normal leaflet   excursion. Mild pulmonic valve regurgitation.  Aorta: The aortic root and ascending aorta are structurally normal, with   no evidence of dilitation.  Pulmonary Artery: The main pulmonary artery is normal in size.      Summary:   1. Left ventricular ejection fraction, by visual estimation, is 65 to   70%.   2. Normal global left ventricular systolic function.   3. Increased LV wall thickness.   4. There is mild concentric left ventricular hypertrophy.   5. Moderate tricuspid regurgitation.   6. Mild pulmonic valve regurgitation.   7. Estimated pulmonary artery systolic pressure is 41.5 mmHg assuming a   right atrial pressure of 5 mmHg, which is consistent with mild pulmonary   hypertension.   8. No evidence of any vegetations.    < end of copied text >    LABS:	 	    13 Mar 2022 07:40    136    |  107    |  18     ----------------------------<  114    4.0     |  23     |  1.02   12 Mar 2022 10:11    136    |  106    |  17     ----------------------------<  132    3.6     |  24     |  1.45     Ca    8.3        13 Mar 2022 07:40                        8.9    5.75  )-----------( 314      ( 13 Mar 2022 07:40 )             30.0 ,                       8.5    6.37  )-----------( 245      ( 12 Mar 2022 10:11 )             29.4     TSH: Thyroid Stimulating Hormone, Serum: 2.110 uU/mL (03-12 @ 10:11)                
   CHIEF COMPLAINT:  Patient is a 76y old  Female who presents with a chief complaint of Colitis (11 Mar 2022 12:40)   HPI: Pleasant 76-year-old with hypertension, type 2 diabetes, dyslipidemia, hypothyroidism, spinal stenosis and chronic back pain, anemia, chronic pancreatitis, admitted with abdominal pain and colitis. Possible AFib.  ALLERGIES: ACE inhibitors (Anaphylaxis; Angioedema)  Home Medications: acetaminophen 325 mg oral tablet: 3 tab(s) orally every 8 hours, As Needed - 3) (10 Dimitrios 2022 18:54) aspirin 81 mg oral delayed release tablet: 1 tab(s) orally once a day (10 Dimitrios 2022 18:54) gabapentin 100 mg oral capsule: 1 cap(s) orally 3 times a day (10 Dimitrios 2022 18:54) melatonin 3 mg oral tablet: 1 tab(s) orally once a day (at bedtime) (05 Apr 2021 10:30) morphine 60 mg/12 hours oral tablet, extended release: 1 tab(s) orally 2 times a day (01 Apr 2021 22:29) polyethylene glycol 3350 oral powder for reconstitution: 17 gram(s) orally once a day (05 Apr 2021 10:30) prednisoLONE acetate 1% ophthalmic suspension: 1 drop(s) to each affected eye 4 times a day (05 Apr 2021 10:30)   MEDICATIONS  (STANDING): aspirin enteric coated 81 milliGRAM(s) Oral daily cefTRIAXone   IVPB 1000 milliGRAM(s) IV Intermittent every 24 hours enoxaparin Injectable 40 milliGRAM(s) SubCutaneous every 24 hours gabapentin 100 milliGRAM(s) Oral three times a day levothyroxine 175 MICROGram(s) Oral daily lidocaine   4% Patch 1 Patch Transdermal daily melatonin 3 milliGRAM(s) Oral at bedtime metroNIDAZOLE  IVPB 500 milliGRAM(s) IV Intermittent every 8 hours morphine ER Tablet 60 milliGRAM(s) Oral two times a day pantoprazole    Tablet 40 milliGRAM(s) Oral before breakfast polyethylene glycol 3350 17 Gram(s) Oral daily prednisoLONE acetate 1% Suspension 1 Drop(s) Both EYES four times a day sodium chloride 0.9% 1000 milliLiter(s) (75 mL/Hr) IV Continuous <Continuous> vancomycin    Solution 125 milliGRAM(s) Oral every 6 hours  PAST MEDICAL & SURGICAL HISTORY: Diabetes  Hypertension  Hypothyroid  Pancreatitis, chronic last episode &gt; 10 years ago  Diverticulosis  Osteoarthritis  Spinal stenosis  MRSA (methicillin resistant Staphylococcus aureus) infection 1999, infected knee replacement, required multiple revisions and long term IV antibiotics via central line  H/O: hysterectomy  FH: cholecystectomy  H/O total knee replacement, bilateral  H/O right inguinal hernia repair     FAMILY HISTORY: Family history of CVA (Mother)    SOCIAL HISTORY: non-smoker  REVIEW OF SYSTEMS: General:  No wt loss, fevers, chills, night sweats Eyes:  Good vision, no reported pain ENT:  No sore throat, pain, runny nose, dysphagia CV:  No pain, palpitations, hypo/hypertension Resp:  No dyspnea, cough, tachypnea, wheezing GI:  No pain, nausea, vomiting, diarrhea, constipation :  No pain, bleeding, incontinence, nocturia Muscle:  No pain, weakness Breast:  No pain, abscess, mass, discharge Neuro:  No weakness, tingling, memory problems Psych:  No fatigue, insomnia, mood problems, depression Endocrine:  No polyuria, polydipsia, cold/heat intolerance Heme:  No petechiae, ecchymosis, easy bruisability Skin:  No rash, edema  PHYSICAL EXAM: Vital Signs Last 24 Hrs T(C): 36.9 (12 Mar 2022 05:09), Max: 37.2 (11 Mar 2022 10:17) T(F): 98.4 (12 Mar 2022 05:09), Max: 99 (11 Mar 2022 10:17) HR: 71 (12 Mar 2022 05:09) (71 - 98) BP: 108/74 (12 Mar 2022 05:09) (106/70 - 113/74) BP(mean): -- RR: 18 (12 Mar 2022 05:09) (18 - 18) SpO2: 98% (12 Mar 2022 05:09) (95% - 98%)Tele: SR, PVCs  Constitutional: well developed, normal appearance, well groomed, well nourished, no deformities and no acute distress.  Eyes: the conjunctiva exhibited no abnormalities and the eyelids demonstrated no xanthelasmas.  HEENT: normal oral mucosa, no oral pallor and no oral cyanosis.  Neck: normal jugular venous A waves present, normal jugular venous V waves present and no jugular venous berg A waves.  Pulmonary: no respiratory distress, normal respiratory rhythm and effort, no accessory muscle use and lungs were clear to auscultation bilaterally.  Cardiovascular: heart rate and rhythm were normal, normal S1 and S2 and no murmur, gallop, rub, heave or thrill are present.  Abdomen: soft, non-tender Musculoskeletal: the gait could not be assessed.  Extremities: no clubbing of the fingernails, no localized cyanosis, no petechial hemorrhages and no ischemic changes.  Skin: normal skin color and pigmentation, no rash, no venous stasis, no skin lesions, no skin ulcer and no xanthoma was observed.  Psychiatric: oriented to person, place, and time, the affect was normal, the mood was normal and not feeling anxious.    LABORATORY:                          10.0  8.49  )-----------( 362      ( 11 Mar 2022 06:46 )            32.8    03-11  140  |  105  |  11 ----------------------------<  120<H> 2.9<LL>   |  24  |  1.47<H>  Ca    9.0      11 Mar 2022 06:46 Mg     2.3     03-11  TPro  7.9  /  Alb  3.6  /  TBili  0.6  /  DBili  x   /  AST  49<H>  /  ALT  19  /  AlkPhos  94  03-11  LIVER FUNCTIONS - ( 11 Mar 2022 06:46 ) Alb: 3.6 g/dL / Pro: 7.9 gm/dL / ALK PHOS: 94 U/L / ALT: 19 U/L / AST: 49 U/L / GGT: x         PT/INR - ( 10 Mar 2022 13:55 )   PT: 13.1 sec;   INR: 1.10 ratio   PTT - ( 10 Mar 2022 13:55 )  PTT:26.6 sec  Troponin I, High Sensitivity Result: 272.8: (03.10.22 @ 13:55) Troponin I, High Sensitivity Result: 533.7: (03.10.22 @ 21:43)  Troponin I, High Sensitivity Result: 576.1: (03.11.22 @ 06:46)   IMAGING: < from: 12 Lead ECG (03.10.22 @ 14:51) >  Sinus tachycardia Possible Left atrial enlargement Left anterior fascicular block Left ventricular hypertrophy with repolarization abnormality Abnormal ECG When compared with ECG of 11-JAN-2022 02:34, premature ventricular complexes are no longer present ST now depressed in Lateral leads  < end of copied text >  < from: TTE Echo Complete w/o Contrast w/ Doppler (04.02.21 @ 13:36) > Summary:  1. Left ventricular ejection fraction, by visual estimation, is 65 to 70%.  2. Normal global left ventricular systolic function.  3. Increased LV wall thickness.  4. There is mild concentric left ventricular hypertrophy.  5. No evidence of mitral valve regurgitation.  6. Moderate tricuspid regurgitation.  7. Mild pulmonic valve regurgitation.  8. Estimated pulmonary artery systolic pressure is 45.8 mmHg assuming a right atrial pressure of 5 mmHg, which is consistent with moderate pulmonary hypertension.  9. No evidence of any thrombus.  < end of copied text > 
CHIEF COMPLAINT/INTERVAL HISTORY:    Patient is a 76y old  Female who presents with a chief complaint of Colitis (10 Mar 2022 16:37)      HPI:  76 y.o. F w/ Hx DM, HTN, Lipids, Hypothyroid, chronic LBP/spinal stenosis, chronic pancreatitis, anemia had several recent episodes of UTI -- treated at [facility] in 2021, then here in 2021 and 2022. Was again treated as outpatient, completing course of ABX aboud a week ago; Now returns w/ several days of N/V/D and diffuse abdominal pain. (10 Mar 2022 16:37)      SUBJECTIVE & OBJECTIVE: Pt seen and examined at bedside. Lying comfortably,  abd pain is better. no more diarrhea, denies Nausea/ vomiting.       Vital Signs Last 24 Hrs  T(C): 37.2 (11 Mar 2022 10:17), Max: 38.1 (10 Mar 2022 14:20)  T(F): 99 (11 Mar 2022 10:17), Max: 100.5 (10 Mar 2022 14:20)  HR: 98 (11 Mar 2022 10:17) (98 - 165)  BP: 113/74 (11 Mar 2022 10:17) (113/74 - 192/96)  BP(mean): --  ABP: --  ABP(mean): --  RR: 18 (11 Mar 2022 10:17) (18 - 25)  SpO2: 95% (11 Mar 2022 10:17) (95% - 99%)        MEDICATIONS  (STANDING):  aspirin enteric coated 81 milliGRAM(s) Oral daily  cefTRIAXone   IVPB 1000 milliGRAM(s) IV Intermittent every 24 hours  dextrose 40% Gel 15 Gram(s) Oral once  dextrose 5%. 1000 milliLiter(s) (50 mL/Hr) IV Continuous <Continuous>  dextrose 5%. 1000 milliLiter(s) (100 mL/Hr) IV Continuous <Continuous>  dextrose 50% Injectable 25 Gram(s) IV Push once  dextrose 50% Injectable 12.5 Gram(s) IV Push once  dextrose 50% Injectable 25 Gram(s) IV Push once  enoxaparin Injectable 40 milliGRAM(s) SubCutaneous every 24 hours  gabapentin 100 milliGRAM(s) Oral three times a day  glucagon  Injectable 1 milliGRAM(s) IntraMuscular once  insulin lispro (ADMELOG) corrective regimen sliding scale   SubCutaneous three times a day before meals  levothyroxine 175 MICROGram(s) Oral daily  lidocaine   4% Patch 1 Patch Transdermal daily  melatonin 3 milliGRAM(s) Oral at bedtime  metroNIDAZOLE  IVPB 500 milliGRAM(s) IV Intermittent every 8 hours  morphine ER Tablet 60 milliGRAM(s) Oral two times a day  pantoprazole    Tablet 40 milliGRAM(s) Oral before breakfast  polyethylene glycol 3350 17 Gram(s) Oral daily  potassium chloride  10 mEq/100 mL IVPB 10 milliEquivalent(s) IV Intermittent every 1 hour  prednisoLONE acetate 1% Suspension 1 Drop(s) Both EYES four times a day  sodium chloride 0.9% 1000 milliLiter(s) (75 mL/Hr) IV Continuous <Continuous>  vancomycin    Solution 125 milliGRAM(s) Oral every 6 hours    MEDICATIONS  (PRN):  ondansetron Injectable 4 milliGRAM(s) IV Push every 8 hours PRN Nausea and/or Vomiting        PHYSICAL EXAM:    GENERAL: NAD,well-developed  HEAD:  Atraumatic, Normocephalic  EYES: EOMI, PERRLA, conjunctiva and sclera clear  ENMT: Moist mucous membranes  NECK: Supple  NERVOUS SYSTEM:  Alert & Oriented X3, Motor Strength 5/5 B/L upper and lower extremities  CHEST/LUNG: Clear to auscultation bilaterally; No rales, rhonchi, wheezing, or rubs  HEART: Regular rate and rhythm  ABDOMEN: Soft, soft, mild tenderness left abd, not distended,  BS +  EXTREMITIES:  no cyanosis, or edema    LABS:                        10.0   8.49  )-----------( 362      ( 11 Mar 2022 06:46 )             32.8     03-11    140  |  105  |  11  ----------------------------<  120<H>  2.9<LL>   |  24  |  1.47<H>    Ca    9.0      11 Mar 2022 06:46  Mg     2.3     -11    TPro  7.9  /  Alb  3.6  /  TBili  0.6  /  DBili  x   /  AST  49<H>  /  ALT  19  /  AlkPhos  94  03-11    PT/INR - ( 10 Mar 2022 13:55 )   PT: 13.1 sec;   INR: 1.10 ratio         PTT - ( 10 Mar 2022 13:55 )  PTT:26.6 sec  Urinalysis Basic - ( 10 Mar 2022 15:34 )    Color: Yellow / Appearance: Clear / S.010 / pH: x  Gluc: x / Ketone: Moderate  / Bili: Negative / Urobili: Negative mg/dL   Blood: x / Protein: 100 mg/dL / Nitrite: Negative   Leuk Esterase: Negative / RBC: Negative /HPF / WBC 0-2   Sq Epi: x / Non Sq Epi: Occasional / Bacteria: Negative        CAPILLARY BLOOD GLUCOSE      POCT Blood Glucose.: 146 mg/dL (11 Mar 2022 11:02)  POCT Blood Glucose.: 140 mg/dL (11 Mar 2022 07:55)  POCT Blood Glucose.: 207 mg/dL (10 Mar 2022 22:39)    < from: CT Abdomen and Pelvis w/ IV Cont (03.10.22 @ 15:53) >    ACC: [id number] EXAM:  CT ABDOMEN AND PELVIS IC                          PROCEDURE DATE:  03/10/2022          INTERPRETATION:  CLINICAL INFORMATION: Abdominal pain nausea and   vomiting. Treated for past week with antibiotics for urinary tract   infection.    COMPARISON: CT scan abdomen pelvis 1/10/2022.    CONTRAST/COMPLICATIONS:  IV Contrast: Omnipaque 350  95 cc  Oral Contrast: None.  Complications: None reported at time of exam completion.    PROCEDURE:  CT of the Abdomen and Pelvis was performed.  Sagittal and coronal reformats were performed.    FINDINGS:    LOWER CHEST: Small hiatal hernia.    Streak artifact degrades image quality limiting evaluation.    LIVER: Within normal limits.  BILE DUCTS: Pneumobilia.  GALLBLADDER: Cholecystectomy.  SPLEEN: Within normal limits.  PANCREAS: Within normal limits.  ADRENALS: 1.5 cm left adrenal nodule, stable.  KIDNEYS/URETERS:  Extensive streak artifact partly related to patient's metallic hardware   degrades image quality limiting evaluation.    No hydroureteronephrosis or perinephric fluid collection noted.  Small cyst midpole right kidney.  Small indeterminate hyperdense lesion upper pole left kidney likely   reflects hyperdense cyst as noted on prior CT exam.    BLADDER: Within normallimits.  REPRODUCTIVE ORGANS: Hysterectomy.    BOWEL:  Evaluation of the stomach is limited without distention.  Colonic diverticulosis.  There is a long segment of bowel wall thickening with submucosal edema   left colon extending from splenic flexure, descending, sigmoid colon to   rectum.  No bowel obstruction.  PERITONEUM: No ascites.    VESSELS: Atherosclerotic changes.  RETROPERITONEUM/LYMPH NODES: No lymphadenopathy.    ABDOMINAL WALL: Within normal limits.    BONES: Posterior and interbody fusion L1-S1.    IMPRESSION:    Technically limited study.    Long segment left-sided colitis extending from splenic flexure to rectum.    Other findings as discussed above.        --- End of Report ---            ESTEFANY JANE MD; Attending Radiologist  This document has been electronically signed. Mar 10 2022  4:21PM    < end of copied text >    
   CHIEF COMPLAINT:  Patient is a 76y old  Female who presents with a chief complaint of Colitis (11 Mar 2022 12:40)   HPI: Pleasant 76-year-old with hypertension, type 2 diabetes, dyslipidemia, hypothyroidism, spinal stenosis and chronic back pain, anemia, chronic pancreatitis, admitted with abdominal pain and colitis.  Possible AFib.  ALLERGIES: ACE inhibitors (Anaphylaxis; Angioedema)  Home Medications: acetaminophen 325 mg oral tablet: 3 tab(s) orally every 8 hours, As Needed - 3) (10 Dimitrios 2022 18:54) aspirin 81 mg oral delayed release tablet: 1 tab(s) orally once a day (10 Dimitrios 2022 18:54) gabapentin 100 mg oral capsule: 1 cap(s) orally 3 times a day (10 Dimitrios 2022 18:54) melatonin 3 mg oral tablet: 1 tab(s) orally once a day (at bedtime) (2021 10:30) morphine 60 mg/12 hours oral tablet, extended release: 1 tab(s) orally 2 times a day (2021 22:29) polyethylene glycol 3350 oral powder for reconstitution: 17 gram(s) orally once a day (2021 10:30) prednisoLONE acetate 1% ophthalmic suspension: 1 drop(s) to each affected eye 4 times a day (2021 10:30)   MEDICATIONS  (STANDING): MEDICATIONS  (STANDING): aspirin enteric coated 81 milliGRAM(s) Oral daily enoxaparin Injectable 40 milliGRAM(s) SubCutaneous every 24 hours gabapentin 100 milliGRAM(s) Oral three times a day levothyroxine 175 MICROGram(s) Oral daily lidocaine   4% Patch 1 Patch Transdermal daily melatonin 3 milliGRAM(s) Oral at bedtime morphine ER Tablet 60 milliGRAM(s) Oral two times a day pantoprazole    Tablet 40 milliGRAM(s) Oral before breakfast prednisoLONE acetate 1% Suspension 1 Drop(s) Both EYES four times a day  PAST MEDICAL & SURGICAL HISTORY: Diabetes  Hypertension  Hypothyroid  Pancreatitis, chronic last episode &gt; 10 years ago  Diverticulosis  Osteoarthritis  Spinal stenosis  MRSA (methicillin resistant Staphylococcus aureus) infection , infected knee replacement, required multiple revisions and long term IV antibiotics via central line  H/O: hysterectomy  FH: cholecystectomy  H/O total knee replacement, bilateral  H/O right inguinal hernia repair     FAMILY HISTORY: Family history of CVA (Mother)    SOCIAL HISTORY: non-smoker  PHYSICAL EXAM: Vital Signs Last 24 Hrs T(C): 37 (13 Mar 2022 05:42), Max: 37.4 (12 Mar 2022 23:30) T(F): 98.6 (13 Mar 2022 05:42), Max: 99.4 (12 Mar 2022 23:30) HR: 64 (13 Mar 2022 05:42) (64 - 76) BP: 135/82 (13 Mar 2022 05:42) (110/70 - 135/82) BP(mean): -- RR: 18 (13 Mar 2022 05:42) (18 - 18) SpO2: 98% (13 Mar 2022 05:42) (95% - 98%)  Constitutional: well developed, normal appearance, well groomed, well nourished, no deformities and no acute distress.  Eyes: the conjunctiva exhibited no abnormalities and the eyelids demonstrated no xanthelasmas.  HEENT: normal oral mucosa, no oral pallor and no oral cyanosis.  Neck: normal jugular venous A waves present, normal jugular venous V waves present and no jugular venous berg A waves.  Pulmonary: no respiratory distress, normal respiratory rhythm and effort, no accessory muscle use and lungs were clear to auscultation bilaterally.  Cardiovascular: heart rate and rhythm were normal, normal S1 and S2 and no murmur, gallop, rub, heave or thrill are present.  Abdomen: soft, non-tender Musculoskeletal: the gait could not be assessed.  Extremities: no clubbing of the fingernails, no localized cyanosis, no petechial hemorrhages and no ischemic changes.  Skin: normal skin color and pigmentation, no rash, no venous stasis, no skin lesions, no skin ulcer and no xanthoma was observed.  Psychiatric: oriented to person, place, and time, the affect was normal, the mood was normal and not feeling anxious.    LABORATORY:                       8.9   5.75  )-----------( 314      ( 13 Mar 2022 07:40 )            30.0                           10.0  8.49  )-----------( 362      ( 11 Mar 2022 06:46 )            32.8    03-13  136  |  107  |  18 ----------------------------<  114<H> 4.0   |  23  |  1.02  Ca    8.3<L>      13 Mar 2022 07:40 Mg     2.2     -  140  |  105  |  11 ----------------------------<  120<H> 2.9<LL>   |  24  |  1.47<H>  Ca    9.0      11 Mar 2022 06:46 Mg     2.3       TPro  7.9  /  Alb  3.6  /  TBili  0.6  /  DBili  x   /  AST  49<H>  /  ALT  19  /  AlkPhos  94  11  LIVER FUNCTIONS - ( 11 Mar 2022 06:46 ) Alb: 3.6 g/dL / Pro: 7.9 gm/dL / ALK PHOS: 94 U/L / ALT: 19 U/L / AST: 49 U/L / GGT: x         PT/INR - ( 10 Mar 2022 13:55 )   PT: 13.1 sec;   INR: 1.10 ratio   PTT - ( 10 Mar 2022 13:55 )  PTT:26.6 sec  Troponin I, High Sensitivity Result: 272.8: (03.10.22 @ 13:55) Troponin I, High Sensitivity Result: 533.7: (03.10.22 @ 21:43)  Troponin I, High Sensitivity Result: 576.1: (22 @ 06:46)   IMAGING: < from: 12 Lead ECG (03.10.22 @ 14:51) >  Sinus tachycardia Possible Left atrial enlargement Left anterior fascicular block Left ventricular hypertrophy with repolarization abnormality Abnormal ECG When compared with ECG of 2022 02:34, premature ventricular complexes are no longer present ST now depressed in Lateral leads  < end of copied text >   < from: TTE Echo Complete w/o Contrast w/ Doppler (22 @ 13:22) >  ACC: [id number] EXAM:  ECHO TTE WO CON COMP W DOPP                        PROCEDURE DATE:  2022      INTERPRETATION:  TRANSTHORACIC ECHOCARDIOGRAM REPORT    Patient Name:   JUAN LANTIGUA Patient Location: [facility] Rec #:  [id number]     Accession #:      [id number] Account #:                     Height:           65.0 in 165.0 cm YOB: 1945     Weight:           179.7 lb 81.50 kg Patient Age:    76 years       BSA:              1.89 m² Patient Gender: F      BP:               110/70 mmHg   Date of Exam:        3/12/2022 1:22:16 PM Sonographer:         LESLY Referring Physician: YEMI SELLERS  Procedure:     2D Echo/Doppler/Color Doppler Complete. Indications:   Sepsis, unspecified organism - A41.9 Diagnosis:     Sepsis, unspecified organism - A41.9 Study Details: Technically fair study.    2D AND M-MODE MEASUREMENTS (normal ranges within parentheses): Left                 Normal   Aorta/Left            Normal Ventricle:      Atrium: IVSd (2D):    1.17  (0.7-1.1) Aortic Root    3.59  (2.4-3.7)                cm             (2D):           cm LVPWd (2D):   1.15  (0.7-1.1) Left Atrium    4.04  (1.9-4.0)                cm             (2D):           cm LVIDd (2D):  4.64  (3.4-5.7) LA Vol Index   30.5                cm             (A4C):        ml/m² LVIDs (2D):   3.07            LA Vol Index   23.1                cm             (A2C):        ml/m² LV FS (2D):   33.7   (>25%)   LA Vol Index   27.2         %             (BP):         ml/m² Relative Wall 0.50   (<0.42)  Right Thickness                     Ventricle:                               TAPSE:          1.96 cm  LV DIASTOLIC FUNCTION: MV Peak E: 0.76 m/s Decel Time:  305 msec MV Peak A: 0.88 m/s Septal E/e'  10.4 E/A Ratio: 0.86     Lateral E/e' 7.8 Septal e'  0.1 m/s Lateral e' 0.1 m/s  SPECTRAL DOPPLER ANALYSIS (where applicable): Mitral Valve: MV P1/2 Time: 88.53 msec MV Area, PHT: 2.49 cm²  Aortic Valve: AoV Max Hi: 1.77 m/s AoV Peak P.5 mmHg AoV Mean P.0 mmHg  LVOT Vmax: 1.36 m/s LVOT VTI: 0.172 m LVOT Diameter: 2.10 cm  AoV Area, Vmax: 2.65 cm² AoV Area, VTI: 2.26 cm² AoV Area, Vmn: 2.42 cm²  Tricuspid Valve and PA/RV Systolic Pressure: TRMax Velocity: 3.02 m/s RA  Pressure: 5 mmHg RVSP/PASP: 41.5 mmHg   PHYSICIAN INTERPRETATION: Left Ventricle: The left ventricular internal cavity size is normal. Left  ventricular wall thickness is increased. Global LV systolic function was normal. Left ventricular ejection  fraction, by visual estimation, is 65 to 70%. Right Ventricle: Normal right ventricular size and function. Left Atrium: The left atrium is normal in size. Right Atrium: The right atrium is normal in size. Pericardium: There is no evidence of pericardial effusion. Mitral Valve: Structurally normal mitral valve, with normal leaflet  excursion. Tricuspid Valve: Structurally normal tricuspid valve, with normal leaflet  excursion. Moderate tricuspid regurgitation is visualized. Estimated  pulmonary artery systolic pressure is 41.5 mmHg assuming a right atrial  pressure of 5 mmHg, which is consistent with mild pulmonary hypertension. Aortic Valve: Normal trileaflet aortic valve with normal opening. Peak  transaortic gradient equals 12.5 mmHg, mean transaortic gradient equals  6.0 mmHg, the calculated aortic valve area equals 2.26 cm² by the  continuity equation consistent with normally opening aortic valve. No  evidence of aortic valve regurgitation is seen. Pulmonic Valve: Structurally normal pulmonic valve, with normal leaflet  excursion. Mild pulmonic valve regurgitation. Aorta: The aortic root and ascending aorta are structurally normal, with  no evidence of dilitation. Pulmonary Artery: The main pulmonary artery is normal in size.   Summary:  1. Left ventricular ejection fraction, by visual estimation, is 65 to 70%.  2. Normal global left ventricular systolic function.  3. Increased LV wall thickness.  4. There is mild concentric left ventricular hypertrophy.  5. Moderate tricuspid regurgitation.  6. Mild pulmonic valve regurgitation.  7. Estimated pulmonary artery systolic pressure is 41.5 mmHg assuming a right atrial pressure of 5 mmHg, which is consistent with mild pulmonary hypertension.  8. No evidence of any vegetations.   [id number] Madi Mcwilliams MD, FACC Electronically signed on 3/12/2022 at 6:31:02 PM  
CHIEF COMPLAINT/INTERVAL HISTORY:    Patient is a 76y old  Female who presents with a chief complaint of Colitis (10 Mar 2022 16:37)      HPI:  76 y.o. F w/ Hx DM, HTN, Lipids, Hypothyroid, chronic LBP/spinal stenosis, chronic pancreatitis, anemia had several recent episodes of UTI -- treated at [facility] in 2021, then here in 2021 and 2022. Was again treated as outpatient, completing course of ABX aboud a week ago; Now returns w/ several days of N/V/D and diffuse abdominal pain. (10 Mar 2022 16:37)      SUBJECTIVE & OBJECTIVE: Pt seen and examined at bedside. Lying comfortably, c/o generalized abd pain, asking for pain meds. gives hx 2 loose BM yesterday, no more diarrhea, denies Nausea/ vomiting  c/o not feeling well.   Tmax 100.5F      Vital Signs Last 24 Hrs  T(C): 37.2 (11 Mar 2022 10:17), Max: 38.1 (10 Mar 2022 14:20)  T(F): 99 (11 Mar 2022 10:17), Max: 100.5 (10 Mar 2022 14:20)  HR: 98 (11 Mar 2022 10:17) (98 - 165)  BP: 113/74 (11 Mar 2022 10:17) (113/74 - 192/96)  BP(mean): --  ABP: --  ABP(mean): --  RR: 18 (11 Mar 2022 10:17) (18 - 25)  SpO2: 95% (11 Mar 2022 10:17) (95% - 99%)        MEDICATIONS  (STANDING):  aspirin enteric coated 81 milliGRAM(s) Oral daily  cefTRIAXone   IVPB 1000 milliGRAM(s) IV Intermittent every 24 hours  dextrose 40% Gel 15 Gram(s) Oral once  dextrose 5%. 1000 milliLiter(s) (50 mL/Hr) IV Continuous <Continuous>  dextrose 5%. 1000 milliLiter(s) (100 mL/Hr) IV Continuous <Continuous>  dextrose 50% Injectable 25 Gram(s) IV Push once  dextrose 50% Injectable 12.5 Gram(s) IV Push once  dextrose 50% Injectable 25 Gram(s) IV Push once  enoxaparin Injectable 40 milliGRAM(s) SubCutaneous every 24 hours  gabapentin 100 milliGRAM(s) Oral three times a day  glucagon  Injectable 1 milliGRAM(s) IntraMuscular once  insulin lispro (ADMELOG) corrective regimen sliding scale   SubCutaneous three times a day before meals  levothyroxine 175 MICROGram(s) Oral daily  lidocaine   4% Patch 1 Patch Transdermal daily  melatonin 3 milliGRAM(s) Oral at bedtime  metroNIDAZOLE  IVPB 500 milliGRAM(s) IV Intermittent every 8 hours  morphine ER Tablet 60 milliGRAM(s) Oral two times a day  pantoprazole    Tablet 40 milliGRAM(s) Oral before breakfast  polyethylene glycol 3350 17 Gram(s) Oral daily  potassium chloride  10 mEq/100 mL IVPB 10 milliEquivalent(s) IV Intermittent every 1 hour  prednisoLONE acetate 1% Suspension 1 Drop(s) Both EYES four times a day  sodium chloride 0.9% 1000 milliLiter(s) (75 mL/Hr) IV Continuous <Continuous>  vancomycin    Solution 125 milliGRAM(s) Oral every 6 hours    MEDICATIONS  (PRN):  ondansetron Injectable 4 milliGRAM(s) IV Push every 8 hours PRN Nausea and/or Vomiting        PHYSICAL EXAM:    GENERAL: NAD,well-developed  HEAD:  Atraumatic, Normocephalic  EYES: EOMI, PERRLA, conjunctiva and sclera clear  ENMT: Moist mucous membranes  NECK: Supple  NERVOUS SYSTEM:  Alert & Oriented X3, Motor Strength 5/5 B/L upper and lower extremities  CHEST/LUNG: Clear to auscultation bilaterally; No rales, rhonchi, wheezing, or rubs  HEART: Regular rate and rhythm  ABDOMEN: Soft, soft, mild tenderness left abd, not distended,  BS +  EXTREMITIES:  no cyanosis, or edema    LABS:                        10.0   8.49  )-----------( 362      ( 11 Mar 2022 06:46 )             32.8     03-11    140  |  105  |  11  ----------------------------<  120<H>  2.9<LL>   |  24  |  1.47<H>    Ca    9.0      11 Mar 2022 06:46  Mg     2.3     03-11    TPro  7.9  /  Alb  3.6  /  TBili  0.6  /  DBili  x   /  AST  49<H>  /  ALT  19  /  AlkPhos  94  03-11    PT/INR - ( 10 Mar 2022 13:55 )   PT: 13.1 sec;   INR: 1.10 ratio         PTT - ( 10 Mar 2022 13:55 )  PTT:26.6 sec  Urinalysis Basic - ( 10 Mar 2022 15:34 )    Color: Yellow / Appearance: Clear / S.010 / pH: x  Gluc: x / Ketone: Moderate  / Bili: Negative / Urobili: Negative mg/dL   Blood: x / Protein: 100 mg/dL / Nitrite: Negative   Leuk Esterase: Negative / RBC: Negative /HPF / WBC 0-2   Sq Epi: x / Non Sq Epi: Occasional / Bacteria: Negative        CAPILLARY BLOOD GLUCOSE      POCT Blood Glucose.: 146 mg/dL (11 Mar 2022 11:02)  POCT Blood Glucose.: 140 mg/dL (11 Mar 2022 07:55)  POCT Blood Glucose.: 207 mg/dL (10 Mar 2022 22:39)    < from: CT Abdomen and Pelvis w/ IV Cont (03.10.22 @ 15:53) >    ACC: [id number] EXAM:  CT ABDOMEN AND PELVIS IC                          PROCEDURE DATE:  03/10/2022          INTERPRETATION:  CLINICAL INFORMATION: Abdominal pain nausea and   vomiting. Treated for past week with antibiotics for urinary tract   infection.    COMPARISON: CT scan abdomen pelvis 1/10/2022.    CONTRAST/COMPLICATIONS:  IV Contrast: Omnipaque 350  95 cc  Oral Contrast: None.  Complications: None reported at time of exam completion.    PROCEDURE:  CT of the Abdomen and Pelvis was performed.  Sagittal and coronal reformats were performed.    FINDINGS:    LOWER CHEST: Small hiatal hernia.    Streak artifact degrades image quality limiting evaluation.    LIVER: Within normal limits.  BILE DUCTS: Pneumobilia.  GALLBLADDER: Cholecystectomy.  SPLEEN: Within normal limits.  PANCREAS: Within normal limits.  ADRENALS: 1.5 cm left adrenal nodule, stable.  KIDNEYS/URETERS:  Extensive streak artifact partly related to patient's metallic hardware   degrades image quality limiting evaluation.    No hydroureteronephrosis or perinephric fluid collection noted.  Small cyst midpole right kidney.  Small indeterminate hyperdense lesion upper pole left kidney likely   reflects hyperdense cyst as noted on prior CT exam.    BLADDER: Within normallimits.  REPRODUCTIVE ORGANS: Hysterectomy.    BOWEL:  Evaluation of the stomach is limited without distention.  Colonic diverticulosis.  There is a long segment of bowel wall thickening with submucosal edema   left colon extending from splenic flexure, descending, sigmoid colon to   rectum.  No bowel obstruction.  PERITONEUM: No ascites.    VESSELS: Atherosclerotic changes.  RETROPERITONEUM/LYMPH NODES: No lymphadenopathy.    ABDOMINAL WALL: Within normal limits.    BONES: Posterior and interbody fusion L1-S1.    IMPRESSION:    Technically limited study.    Long segment left-sided colitis extending from splenic flexure to rectum.    Other findings as discussed above.        --- End of Report ---            ESTEFANY JANE MD; Attending Radiologist  This document has been electronically signed. Mar 10 2022  4:21PM    < end of copied text >    
Patient is a 76y old  Female who presents with a chief complaint of Colitis (12 Mar 2022 15:31)      HPI:  76 y.o. F w/ Hx DM, HTN, Lipids, Hypothyroid, chronic LBP/spinal stenosis, chronic pancreatitis, anemia had several recent episodes of UTI -- treated at [facility] in 12/2021, then here in 12/2021 and 1/2022. Was again treated as outpatient, completing course of ABX aboud a week ago; Now returns w/ several days of N/V/D and diffuse abdominal pain. (10 Mar 2022 16:37)      INTERVAL HPI/OVERNIGHT EVENTS:  Patioent complaining of lower back pain radiating around to both lower quadrants at the level of the groin. Diarrhea resolved. Now on a solid diet.  The patient denies melena, hematochezia, hematemesis, nausea, vomiting, constipation, diarrhea, or change in bowel movements     MEDICATIONS  (STANDING):  aspirin enteric coated 81 milliGRAM(s) Oral daily  dextrose 40% Gel 15 Gram(s) Oral once  dextrose 5%. 1000 milliLiter(s) (50 mL/Hr) IV Continuous <Continuous>  dextrose 5%. 1000 milliLiter(s) (100 mL/Hr) IV Continuous <Continuous>  dextrose 50% Injectable 25 Gram(s) IV Push once  dextrose 50% Injectable 12.5 Gram(s) IV Push once  dextrose 50% Injectable 25 Gram(s) IV Push once  enoxaparin Injectable 40 milliGRAM(s) SubCutaneous every 24 hours  gabapentin 100 milliGRAM(s) Oral three times a day  glucagon  Injectable 1 milliGRAM(s) IntraMuscular once  insulin lispro (ADMELOG) corrective regimen sliding scale   SubCutaneous three times a day before meals  levothyroxine 175 MICROGram(s) Oral daily  lidocaine   4% Patch 1 Patch Transdermal daily  melatonin 3 milliGRAM(s) Oral at bedtime  morphine ER Tablet 60 milliGRAM(s) Oral two times a day  pantoprazole    Tablet 40 milliGRAM(s) Oral before breakfast  prednisoLONE acetate 1% Suspension 1 Drop(s) Both EYES four times a day    MEDICATIONS  (PRN):  ondansetron Injectable 4 milliGRAM(s) IV Push every 8 hours PRN Nausea and/or Vomiting      FAMILY HISTORY:  Family history of CVA (Mother)        Allergies    ACE inhibitors (Anaphylaxis; Angioedema)    Intolerances        PMH/PSH:  Diabetes    Hypertension    Hypothyroid    Pancreatitis, chronic    Diverticulosis    Osteoarthritis    Spinal stenosis    MRSA (methicillin resistant Staphylococcus aureus) infection    H/O: hysterectomy    FH: cholecystectomy    H/O total knee replacement, bilateral    H/O right inguinal hernia repair          REVIEW OF SYSTEMS:  CONSTITUTIONAL: No fever, weight loss,   EYES: No eye pain, visual disturbances, or discharge  ENMT:  No difficulty hearing, tinnitus, vertigo; No sinus or throat pain  NECK: No pain or stiffness  BREASTS: No pain, masses, or nipple discharge  RESPIRATORY: No cough, wheezing, chills or hemoptysis; No shortness of breath  CARDIOVASCULAR: No chest pain, palpitations, dizziness, or leg swelling  GASTROINTESTINAL:  see above   GENITOURINARY: No dysuria, frequency, hematuria, or incontinence  NEUROLOGICAL: No headaches, memory loss, loss of strength, numbness, or tremors  SKIN: No itching, burning, rashes, or lesions   LYMPH NODES: No enlarged glands  ENDOCRINE: No heat or cold intolerance; No hair loss  MUSCULOSKELETAL: No joint pain or swelling; No muscle, back, or extremity pain  PSYCHIATRIC: No depression, anxiety, mood swings, or difficulty sleeping  HEME/LYMPH: No easy bruising, or bleeding gums  ALLERGY AND IMMUNOLOGIC: No hives or eczema    Vital Signs Last 24 Hrs  T(C): 36.8 (12 Mar 2022 10:11), Max: 37.1 (12 Mar 2022 00:11)  T(F): 98.2 (12 Mar 2022 10:11), Max: 98.8 (12 Mar 2022 00:11)  HR: 68 (12 Mar 2022 10:11) (68 - 78)  BP: 110/70 (12 Mar 2022 10:11) (108/74 - 110/70)  BP(mean): --  RR: 18 (12 Mar 2022 10:11) (18 - 18)  SpO2: 98% (12 Mar 2022 10:11) (96% - 98%)    PHYSICAL EXAM:  GENERAL: NAD, well-groomed, well-developed  HEAD:  Atraumatic, Normocephalic  EYES: EOMI, PERRLA, conjunctiva and sclera clear  NECK: Supple, No JVD, Normal thyroid  NERVOUS SYSTEM:  Alert & Oriented X 2, Good concentration; Motor Strength 5/5 B/L upper and lower extremities;   CHEST/LUNG: Clear to percussion bilaterally; No rales, rhonchi, wheezing, or rubs  HEART: Regular rate and rhythm; No murmurs, rubs, or gallops  ABDOMEN: Soft, Nontender, Nondistended; Bowel sounds present  EXTREMITIES:  2+ Peripheral Pulses, No clubbing, cyanosis, or edema  LYMPH: No lymphadenopathy noted  SKIN: No rashes or lesions    LAB  03-10 @ 13:55  amylase --   lipase 85                           8.5    6.37  )-----------( 245      ( 12 Mar 2022 10:11 )             29.4       CBC:  03-12 @ 10:11  WBC 6.37   Hgb 8.5   Hct 29.4   Plts 245  MCV 82.8  03-11 @ 06:46  WBC 8.49   Hgb 10.0   Hct 32.8   Plts 362  MCV 78.3  03-10 @ 13:55  WBC 7.22   Hgb 10.5   Hct 33.1   Plts 405  MCV 75.1      Chemistry:  03-12 @ 10:11  Na+ 136  K+ 3.6  Cl- 106  CO2 24  BUN 17  Cr 1.45     03-11 @ 06:46  Na+ 140  K+ 2.9  Cl- 105  CO2 24  BUN 11  Cr 1.47     03-10 @ 13:55  Na+ 137  K+ 2.4  Cl- 101  CO2 23  BUN 10  Cr 1.13         Glucose, Serum: 132 mg/dL (03-12 @ 10:11)  Glucose, Serum: 120 mg/dL (03-11 @ 06:46)  Glucose, Serum: 371 mg/dL (03-10 @ 13:55)      12 Mar 2022 10:11    136    |  106    |  17     ----------------------------<  132    3.6     |  24     |  1.45   11 Mar 2022 06:46    140    |  105    |  11     ----------------------------<  120    2.9     |  24     |  1.47   10 Mar 2022 13:55    137    |  101    |  10     ----------------------------<  371    2.4     |  23     |  1.13     Ca    8.3        12 Mar 2022 10:11  Ca    9.0        11 Mar 2022 06:46  Ca    9.9        10 Mar 2022 13:55  Mg     2.2       12 Mar 2022 10:11  Mg     2.3       11 Mar 2022 06:46    TPro  7.9    /  Alb  3.6    /  TBili  0.6    /  DBili  x      /  AST  49     /  ALT  19     /  AlkPhos  94     11 Mar 2022 06:46  TPro  9.6    /  Alb  4.4    /  TBili  0.8    /  DBili  0.2    /  AST  32     /  ALT  18     /  AlkPhos  124    10 Mar 2022 13:55              CAPILLARY BLOOD GLUCOSE      POCT Blood Glucose.: 152 mg/dL (12 Mar 2022 10:56)  POCT Blood Glucose.: 154 mg/dL (12 Mar 2022 07:34)  POCT Blood Glucose.: 173 mg/dL (11 Mar 2022 21:05)  POCT Blood Glucose.: 128 mg/dL (11 Mar 2022 16:41)      C-Reactive Protein, Serum: <3 mg/L (03-12 @ 12:49)  C-Reactive Protein, Serum: <3 mg/L (03-11 @ 08:48)      RADIOLOGY & ADDITIONAL TESTS:    Imaging Personally Reviewed:  [ ] YES  [ ] NO    Consultant(s) Notes Reviewed:  [ ] YES  [ ] NO    Care Discussed with Consultants/Other Providers [ ] YES  [ ] NO
Patient is a 76y old  Female who presents with a chief complaint of Colitis (14 Mar 2022 15:07)      HPI:  76 y.o. F w/ Hx DM, HTN, Lipids, Hypothyroid, chronic LBP/spinal stenosis, chronic pancreatitis, anemia had several recent episodes of UTI -- treated at [facility] in 12/2021, then here in 12/2021 and 1/2022. Was again treated as outpatient, completing course of ABX aboud a week ago; Now returns w/ several days of N/V/D and diffuse abdominal pain. (10 Mar 2022 16:37)      INTERVAL HPI/OVERNIGHT EVENTS:  The patient denies melena, hematochezia, hematemesis, nausea, vomiting, abdominal pain, constipation, diarrhea, or change in bowel movements Tolerating solid diet    MEDICATIONS  (STANDING):  aspirin enteric coated 81 milliGRAM(s) Oral daily  dextrose 40% Gel 15 Gram(s) Oral once  dextrose 5%. 1000 milliLiter(s) (50 mL/Hr) IV Continuous <Continuous>  dextrose 5%. 1000 milliLiter(s) (100 mL/Hr) IV Continuous <Continuous>  dextrose 50% Injectable 25 Gram(s) IV Push once  dextrose 50% Injectable 12.5 Gram(s) IV Push once  dextrose 50% Injectable 25 Gram(s) IV Push once  enoxaparin Injectable 40 milliGRAM(s) SubCutaneous every 24 hours  ferrous    sulfate 325 milliGRAM(s) Oral daily  gabapentin 100 milliGRAM(s) Oral three times a day  glucagon  Injectable 1 milliGRAM(s) IntraMuscular once  insulin lispro (ADMELOG) corrective regimen sliding scale   SubCutaneous three times a day before meals  levothyroxine 175 MICROGram(s) Oral daily  lidocaine   4% Patch 1 Patch Transdermal daily  melatonin 3 milliGRAM(s) Oral at bedtime  morphine ER Tablet 60 milliGRAM(s) Oral two times a day  pantoprazole    Tablet 40 milliGRAM(s) Oral before breakfast  prednisoLONE acetate 1% Suspension 1 Drop(s) Both EYES four times a day    MEDICATIONS  (PRN):  acetaminophen  Suppository .. 650 milliGRAM(s) Rectal every 6 hours PRN Temp greater or equal to 38C (100.4F), Moderate Pain (4 - 6)  ondansetron Injectable 4 milliGRAM(s) IV Push every 8 hours PRN Nausea and/or Vomiting  polyethylene glycol 3350 17 Gram(s) Oral daily PRN Constipation      FAMILY HISTORY:  Family history of CVA (Mother)        Allergies    ACE inhibitors (Anaphylaxis; Angioedema)    Intolerances        PMH/PSH:  Diabetes    Hypertension    Hypothyroid    Pancreatitis, chronic    Diverticulosis    Osteoarthritis    Spinal stenosis    MRSA (methicillin resistant Staphylococcus aureus) infection    H/O: hysterectomy    FH: cholecystectomy    H/O total knee replacement, bilateral    H/O right inguinal hernia repair          REVIEW OF SYSTEMS:  CONSTITUTIONAL: No fever, weight loss, or fatigue  EYES: No eye pain, visual disturbances, or discharge  ENMT:  No difficulty hearing, tinnitus, vertigo; No sinus or throat pain  NECK: No pain or stiffness  BREASTS: No pain, masses, or nipple discharge  RESPIRATORY: No cough, wheezing, chills or hemoptysis; No shortness of breath  CARDIOVASCULAR: No chest pain, palpitations, dizziness, or leg swelling  GASTROINTESTINAL: See above   GENITOURINARY: No dysuria, frequency, hematuria, or incontinence  NEUROLOGICAL: No headaches, memory loss, loss of strength, numbness, or tremors  SKIN: No itching, burning, rashes, or lesions   LYMPH NODES: No enlarged glands  ENDOCRINE: No heat or cold intolerance; No hair loss  MUSCULOSKELETAL: No joint pain or swelling; No muscle, back, or extremity pain  PSYCHIATRIC: No depression, anxiety, mood swings, or difficulty sleeping  HEME/LYMPH: No easy bruising, or bleeding gums  ALLERGY AND IMMUNOLOGIC: No hives or eczema    Vital Signs Last 24 Hrs  T(C): 36.8 (14 Mar 2022 16:08), Max: 36.9 (13 Mar 2022 23:27)  T(F): 98.2 (14 Mar 2022 16:08), Max: 98.5 (13 Mar 2022 23:27)  HR: 66 (14 Mar 2022 16:08) (61 - 82)  BP: 143/81 (14 Mar 2022 16:08) (138/81 - 147/69)  BP(mean): --  RR: 17 (14 Mar 2022 16:08) (17 - 18)  SpO2: 97% (14 Mar 2022 16:08) (97% - 98%)    PHYSICAL EXAM:  GENERAL: NAD, well-groomed, well-developed  HEAD:  Atraumatic, Normocephalic  EYES: EOMI, PERRLA, conjunctiva and sclera clear  NECK: Supple, No JVD, Normal thyroid  NERVOUS SYSTEM:  Alert & Oriented X3, Good concentration; Motor Strength 5/5 B/L upper and lower extremities;   CHEST/LUNG: Clear to percussion bilaterally; No rales, rhonchi, wheezing, or rubs  HEART: Regular rate and rhythm; No murmurs, rubs, or gallops  ABDOMEN: Soft, Nontender, Nondistended; Bowel sounds present  EXTREMITIES:  2+ Peripheral Pulses, No clubbing, cyanosis, or edema  LYMPH: No lymphadenopathy noted  SKIN: No rashes or lesions    LAB  03-10 @ 13:55  amylase --   lipase 85                           8.9    5.75  )-----------( 314      ( 13 Mar 2022 07:40 )             30.0       CBC:  03-13 @ 07:40  WBC 5.75   Hgb 8.9   Hct 30.0   Plts 314  MCV 80.4  03-12 @ 10:11  WBC 6.37   Hgb 8.5   Hct 29.4   Plts 245  MCV 82.8  03-11 @ 06:46  WBC 8.49   Hgb 10.0   Hct 32.8   Plts 362  MCV 78.3  03-10 @ 13:55  WBC 7.22   Hgb 10.5   Hct 33.1   Plts 405  MCV 75.1      Chemistry:  03-13 @ 07:40  Na+ 136  K+ 4.0  Cl- 107  CO2 23  BUN 18  Cr 1.02     03-12 @ 10:11  Na+ 136  K+ 3.6  Cl- 106  CO2 24  BUN 17  Cr 1.45     03-11 @ 06:46  Na+ 140  K+ 2.9  Cl- 105  CO2 24  BUN 11  Cr 1.47     03-10 @ 13:55  Na+ 137  K+ 2.4  Cl- 101  CO2 23  BUN 10  Cr 1.13         Glucose, Serum: 114 mg/dL (03-13 @ 07:40)  Glucose, Serum: 132 mg/dL (03-12 @ 10:11)  Glucose, Serum: 120 mg/dL (03-11 @ 06:46)  Glucose, Serum: 371 mg/dL (03-10 @ 13:55)      13 Mar 2022 07:40    136    |  107    |  18     ----------------------------<  114    4.0     |  23     |  1.02   12 Mar 2022 10:11    136    |  106    |  17     ----------------------------<  132    3.6     |  24     |  1.45   11 Mar 2022 06:46    140    |  105    |  11     ----------------------------<  120    2.9     |  24     |  1.47   10 Mar 2022 13:55    137    |  101    |  10     ----------------------------<  371    2.4     |  23     |  1.13     Ca    8.3        13 Mar 2022 07:40  Ca    8.3        12 Mar 2022 10:11  Ca    9.0        11 Mar 2022 06:46  Ca    9.9        10 Mar 2022 13:55  Mg     2.2       12 Mar 2022 10:11  Mg     2.3       11 Mar 2022 06:46    TPro  7.9    /  Alb  3.6    /  TBili  0.6    /  DBili  x      /  AST  49     /  ALT  19     /  AlkPhos  94     11 Mar 2022 06:46  TPro  9.6    /  Alb  4.4    /  TBili  0.8    /  DBili  0.2    /  AST  32     /  ALT  18     /  AlkPhos  124    10 Mar 2022 13:55              CAPILLARY BLOOD GLUCOSE      POCT Blood Glucose.: 147 mg/dL (14 Mar 2022 16:33)  POCT Blood Glucose.: 270 mg/dL (14 Mar 2022 11:31)  POCT Blood Glucose.: 134 mg/dL (14 Mar 2022 07:41)  POCT Blood Glucose.: 175 mg/dL (13 Mar 2022 20:42)      C-Reactive Protein, Serum: <3 mg/L (03-13 @ 11:08)  C-Reactive Protein, Serum: <3 mg/L (03-12 @ 12:49)  C-Reactive Protein, Serum: <3 mg/L (03-11 @ 08:48)      RADIOLOGY & ADDITIONAL TESTS:    Imaging Personally Reviewed:  [ ] YES  [ ] NO    Consultant(s) Notes Reviewed:  [ ] YES  [ ] NO    Care Discussed with Consultants/Other Providers [ ] YES  [ ] NO
Patient is a 76y old  Female who presents with a chief complaint of Colitis (15 Mar 2022 13:06)      HPI:  76 y.o. F w/ Hx DM, HTN, Lipids, Hypothyroid, chronic LBP/spinal stenosis, chronic pancreatitis, anemia had several recent episodes of UTI -- treated at [facility] in 12/2021, then here in 12/2021 and 1/2022. Was again treated as outpatient, completing course of ABX aboud a week ago; Now returns w/ several days of N/V/D and diffuse abdominal pain. (10 Mar 2022 16:37)      INTERVAL HPI/OVERNIGHT EVENTS:  Patient complaining of severe back pain. Patient also complaining of pain in lower RLQ and radiates around. Worse moving side to side, walking a while  and sitting up. The patient denies melena, hematochezia, hematemesis, nausea, vomiting,, constipation, diarrhea, or change in bowel movements. Tolerating solid diet     MEDICATIONS  (STANDING):  aspirin enteric coated 81 milliGRAM(s) Oral daily  dextrose 40% Gel 15 Gram(s) Oral once  dextrose 5%. 1000 milliLiter(s) (50 mL/Hr) IV Continuous <Continuous>  dextrose 5%. 1000 milliLiter(s) (100 mL/Hr) IV Continuous <Continuous>  dextrose 50% Injectable 25 Gram(s) IV Push once  dextrose 50% Injectable 12.5 Gram(s) IV Push once  dextrose 50% Injectable 25 Gram(s) IV Push once  enoxaparin Injectable 40 milliGRAM(s) SubCutaneous every 24 hours  ferrous    sulfate 325 milliGRAM(s) Oral daily  gabapentin 100 milliGRAM(s) Oral three times a day  glucagon  Injectable 1 milliGRAM(s) IntraMuscular once  insulin lispro (ADMELOG) corrective regimen sliding scale   SubCutaneous three times a day before meals  levothyroxine 175 MICROGram(s) Oral daily  lidocaine   4% Patch 1 Patch Transdermal daily  melatonin 3 milliGRAM(s) Oral at bedtime  morphine ER Tablet 60 milliGRAM(s) Oral two times a day  pantoprazole    Tablet 40 milliGRAM(s) Oral before breakfast  prednisoLONE acetate 1% Suspension 1 Drop(s) Both EYES four times a day    MEDICATIONS  (PRN):  acetaminophen     Tablet .. 650 milliGRAM(s) Oral every 6 hours PRN Mild Pain (1 - 3), Moderate Pain (4 - 6)  ondansetron    Tablet 4 milliGRAM(s) Oral every 6 hours PRN Nausea and/or Vomiting  polyethylene glycol 3350 17 Gram(s) Oral daily PRN Constipation      FAMILY HISTORY:  Family history of CVA (Mother)        Allergies    ACE inhibitors (Anaphylaxis; Angioedema)    Intolerances        PMH/PSH:  Diabetes    Hypertension    Hypothyroid    Pancreatitis, chronic    Diverticulosis    Osteoarthritis    Spinal stenosis    MRSA (methicillin resistant Staphylococcus aureus) infection    H/O: hysterectomy    FH: cholecystectomy    H/O total knee replacement, bilateral    H/O right inguinal hernia repair          REVIEW OF SYSTEMS:  CONSTITUTIONAL: No fever, weight loss, or fatigue  EYES: No eye pain, visual disturbances, or discharge  ENMT:  No difficulty hearing, tinnitus, vertigo; No sinus or throat pain  NECK: No pain or stiffness  BREASTS: No pain, masses, or nipple discharge  RESPIRATORY: No cough, wheezing, chills or hemoptysis; No shortness of breath  CARDIOVASCULAR: No chest pain, palpitations, dizziness, or leg swelling  GASTROINTESTINAL: See above   GENITOURINARY: No dysuria, frequency, hematuria, or incontinence  NEUROLOGICAL: No headaches, memory loss, loss of strength, numbness, or tremors  SKIN: No itching, burning, rashes, or lesions   LYMPH NODES: No enlarged glands  ENDOCRINE: No heat or cold intolerance; No hair loss  MUSCULOSKELETAL: No joint pain or swelling; No muscle,  or extremity pain  PSYCHIATRIC: No depression, anxiety, mood swings, or difficulty sleeping  HEME/LYMPH: No easy bruising, or bleeding gums  ALLERGY AND IMMUNOLOGIC: No hives or eczema    Vital Signs Last 24 Hrs  T(C): 36.6 (15 Mar 2022 11:19), Max: 36.8 (14 Mar 2022 16:08)  T(F): 97.9 (15 Mar 2022 11:19), Max: 98.3 (15 Mar 2022 05:06)  HR: 78 (15 Mar 2022 11:19) (66 - 78)  BP: 145/92 (15 Mar 2022 11:19) (136/77 - 147/82)  BP(mean): --  RR: 18 (15 Mar 2022 11:19) (17 - 18)  SpO2: 96% (15 Mar 2022 11:19) (96% - 97%)    PHYSICAL EXAM:  GENERAL: NAD, well-groomed, well-developed  HEAD:  Atraumatic, Normocephalic  EYES: EOMI, PERRLA, conjunctiva and sclera clear  ENMT: No tonsillar erythema, exudates, or enlargement; Moist mucous membranes, Good dentition, No lesions  NECK: Supple, No JVD, Normal thyroid  NERVOUS SYSTEM:  Alert & Oriented X3, Good concentration; Motor Strength 5/5 B/L upper and lower extremities;   CHEST/LUNG: Clear to percussion bilaterally; No rales, rhonchi, wheezing, or rubs  HEART: Regular rate and rhythm; No murmurs, rubs, or gallops  ABDOMEN: Soft, focal area of tenderness in groin that reproduces the patient's abdominal pain, Nondistended; Bowel sounds present  EXTREMITIES:  2+ Peripheral Pulses, No clubbing, cyanosis, or edema  LYMPH: No lymphadenopathy noted  SKIN: No rashes or lesions    LAB  03-10 @ 13:55  amylase --   lipase 85                           8.1    4.32  )-----------( 263      ( 15 Mar 2022 07:15 )             27.1       CBC:  03-15 @ 07:15  WBC 4.32   Hgb 8.1   Hct 27.1   Plts 263  MCV 80.2  03-13 @ 07:40  WBC 5.75   Hgb 8.9   Hct 30.0   Plts 314  MCV 80.4  03-12 @ 10:11  WBC 6.37   Hgb 8.5   Hct 29.4   Plts 245  MCV 82.8  03-11 @ 06:46  WBC 8.49   Hgb 10.0   Hct 32.8   Plts 362  MCV 78.3  03-10 @ 13:55  WBC 7.22   Hgb 10.5   Hct 33.1   Plts 405  MCV 75.1      Chemistry:  03-15 @ 07:15  Na+ 138  K+ 4.2  Cl- 106  CO2 27  BUN 9  Cr 0.67     03-13 @ 07:40  Na+ 136  K+ 4.0  Cl- 107  CO2 23  BUN 18  Cr 1.02     03-12 @ 10:11  Na+ 136  K+ 3.6  Cl- 106  CO2 24  BUN 17  Cr 1.45     03-11 @ 06:46  Na+ 140  K+ 2.9  Cl- 105  CO2 24  BUN 11  Cr 1.47     03-10 @ 13:55  Na+ 137  K+ 2.4  Cl- 101  CO2 23  BUN 10  Cr 1.13         Glucose, Serum: 127 mg/dL (03-15 @ 07:15)  Glucose, Serum: 114 mg/dL (03-13 @ 07:40)  Glucose, Serum: 132 mg/dL (03-12 @ 10:11)  Glucose, Serum: 120 mg/dL (03-11 @ 06:46)  Glucose, Serum: 371 mg/dL (03-10 @ 13:55)      15 Mar 2022 07:15    138    |  106    |  9      ----------------------------<  127    4.2     |  27     |  0.67   13 Mar 2022 07:40    136    |  107    |  18     ----------------------------<  114    4.0     |  23     |  1.02   12 Mar 2022 10:11    136    |  106    |  17     ----------------------------<  132    3.6     |  24     |  1.45   11 Mar 2022 06:46    140    |  105    |  11     ----------------------------<  120    2.9     |  24     |  1.47   10 Mar 2022 13:55    137    |  101    |  10     ----------------------------<  371    2.4     |  23     |  1.13     Ca    8.6        15 Mar 2022 07:15  Ca    8.3        13 Mar 2022 07:40  Ca    8.3        12 Mar 2022 10:11  Ca    9.0        11 Mar 2022 06:46  Ca    9.9        10 Mar 2022 13:55  Mg     2.2       12 Mar 2022 10:11  Mg     2.3       11 Mar 2022 06:46    TPro  6.4    /  Alb  3.0    /  TBili  0.3    /  DBili  x      /  AST  18     /  ALT  14     /  AlkPhos  79     15 Mar 2022 07:15  TPro  7.9    /  Alb  3.6    /  TBili  0.6    /  DBili  x      /  AST  49     /  ALT  19     /  AlkPhos  94     11 Mar 2022 06:46  TPro  9.6    /  Alb  4.4    /  TBili  0.8    /  DBili  0.2    /  AST  32     /  ALT  18     /  AlkPhos  124    10 Mar 2022 13:55              CAPILLARY BLOOD GLUCOSE      POCT Blood Glucose.: 256 mg/dL (15 Mar 2022 10:58)  POCT Blood Glucose.: 139 mg/dL (15 Mar 2022 08:20)  POCT Blood Glucose.: 223 mg/dL (14 Mar 2022 22:23)  POCT Blood Glucose.: 147 mg/dL (14 Mar 2022 16:33)      C-Reactive Protein, Serum: <3 mg/L (03-13 @ 11:08)  C-Reactive Protein, Serum: <3 mg/L (03-12 @ 12:49)  C-Reactive Protein, Serum: <3 mg/L (03-11 @ 08:48)      RADIOLOGY & ADDITIONAL TESTS:    Imaging Personally Reviewed:  [ ] YES  [ ] NO    Consultant(s) Notes Reviewed:  [ ] YES  [ ] NO    Care Discussed with Consultants/Other Providers [ ] YES  [ ] NO

## 2022-03-15 NOTE — DISCHARGE NOTE PROVIDER - CARE PROVIDER_API CALL
Gordon Davidson  [facility]  [address] [place] [address]  Phone: [phone]  Fax: [phone]  Follow Up Time: 2 weeks    PCP,   Phone: (   )    -  Fax: (   )    -  Follow Up Time:     Madi Mcwilliams)  Cardiology; Cardiovascular Disease; Nuclear Cardiology  [address] [place] [address]  Phone: [phone]  Fax: [phone]  Follow Up Time: 2 weeks   Gordon Davidson  [facility]  [address] [place] [address]  Phone: [phone]  Fax: [phone]  Follow Up Time: 2 weeks    Madi Mcwilliams)  Cardiology; Cardiovascular Disease; Nuclear Cardiology  [address] [place] [address]  Phone: [phone]  Fax: [phone]  Follow Up Time: 2 weeks    PCP,   Phone: (   )    -  Fax: (   )    -  Follow Up Time:     Chitra Gonsales)  Surgery  [address] [place] [address]  Phone: [phone]  Fax: [phone]  Follow Up Time: 1 month

## 2022-03-15 NOTE — DISCHARGE NOTE PROVIDER - CARE PROVIDERS DIRECT ADDRESSES
,DirectAddress_Unknown,DirectAddress_Unknown,[email or web address] ,DirectAddress_Unknown,[email or web address],DirectAddress_Unknown,[email or web address]

## 2022-03-15 NOTE — DISCHARGE NOTE NURSING/CASE MANAGEMENT/SOCIAL WORK - PATIENT PORTAL LINK FT
You can access the FollowMyHealth Patient Portal offered by [facility] by registering at the following website: [email or web address]. By joining [employer]’s FollowMyHealth portal, you will also be able to view your health information using other applications (apps) compatible with our system.

## 2022-03-15 NOTE — DISCHARGE NOTE NURSING/CASE MANAGEMENT/SOCIAL WORK - NSDCPEFALRISK_GEN_ALL_CORE
For information on Fall & Injury Prevention, visit: [email or web address] OR  [email or web address] OR  https://www.cdc.gov/steadi/patient.html

## 2022-03-15 NOTE — DISCHARGE NOTE PROVIDER - ATTENDING DISCHARGE PHYSICAL EXAMINATION:
PHYSICAL EXAM:  GENERAL: NAD, well-groomed, well-developed  HEAD:  Atraumatic, Normocephalic  EYES: EOMI, PERRLA, conjunctiva and sclera clear  ENMT: No tonsillar erythema, exudates, or enlargement; Moist mucous membranes, Good dentition, No lesions  NECK: Supple, No JVD, Normal thyroid  NERVOUS SYSTEM:  Alert & Oriented X3, Good concentration; Motor Strength 5/5 B/L upper and lower extremities; DTRs 2+ intact and symmetric  CHEST/LUNG: Clear to percussion bilaterally; No rales, rhonchi, wheezing, or rubs  HEART: Regular rate and rhythm; No murmurs, rubs, or gallops  ABDOMEN: Soft, Nontender, Nondistended; Bowel sounds present  EXTREMITIES:  2+ Peripheral Pulses, No clubbing, cyanosis, or edema  Musculoskeletal: Did not appreciate any bruising, but right side of hip mildly tender and right side of lumbar back   LYMPH: No lymphadenopathy noted  SKIN: No rashes or lesions   PHYSICAL EXAM:  GENERAL: NAD, well-groomed, well-developed  HEAD:  Atraumatic, Normocephalic  EYES: EOMI, PERRLA, conjunctiva and sclera clear  ENMT: No tonsillar erythema, exudates, or enlargement; Moist mucous membranes, Good dentition, No lesions  NECK: Supple, No JVD, Normal thyroid  NERVOUS SYSTEM:  Alert & Oriented X3, Good concentration; Motor Strength 5/5 B/L upper and lower extremities; DTRs 2+ intact and symmetric  CHEST/LUNG: Clear to percussion bilaterally; No rales, rhonchi, wheezing, or rubs  HEART: Regular rate and rhythm; No murmurs, rubs, or gallops  ABDOMEN: Soft, Nontender, Nondistended; Bowel sounds present  EXTREMITIES:  2+ Peripheral Pulses, No clubbing, cyanosis, or edema  Musculoskeletal: Did not appreciate any bruising, but right side of hip mildly tender and right side of lumbar back , mild inguinal hernia appreciated on right side  LYMPH: No lymphadenopathy noted  SKIN: No rashes or lesions

## 2022-03-15 NOTE — PROGRESS NOTE ADULT - PROVIDER SPECIALTY LIST ADULT
Cardiology
Cardiology
Gastroenterology
Hospitalist
Gastroenterology
Hospitalist
Gastroenterology
Cardiology
Hospitalist
Hospitalist

## 2022-03-15 NOTE — DISCHARGE NOTE PROVIDER - NSDCCPCAREPLAN_GEN_ALL_CORE_FT
PRINCIPAL DISCHARGE DIAGNOSIS  Diagnosis: Acute colitis  Assessment and Plan of Treatment:       SECONDARY DISCHARGE DIAGNOSES  Diagnosis: Sepsis  Assessment and Plan of Treatment:     Diagnosis: Hypokalemia  Assessment and Plan of Treatment:     Diagnosis: Elevated troponin  Assessment and Plan of Treatment:

## 2022-03-15 NOTE — DISCHARGE NOTE PROVIDER - NSDCMRMEDTOKEN_GEN_ALL_CORE_FT
acetaminophen 325 mg oral tablet: 3 tab(s) orally every 8 hours, As Needed - 3)  aspirin 81 mg oral delayed release tablet: 1 tab(s) orally once a day  docusate sodium 100 mg oral capsule: 1 cap(s) orally 3 times a day  gabapentin 100 mg oral capsule: 1 cap(s) orally 3 times a day  lactulose 10 g oral powder for reconstitution: 10 gram(s) orally once a day as needed for constipation  levoFLOXacin 500 mg oral tablet: 1 tab(s) orally once a day   levothyroxine 175 mcg (0.175 mg) oral tablet: 1 tab(s) orally once a day  lidocaine 5% topical film: Apply topically to affected area once a day   melatonin 3 mg oral tablet: 1 tab(s) orally once a day (at bedtime)  metFORMIN 1000 mg oral tablet: 1 tab(s) orally 2 times a day   morphine 60 mg/12 hours oral tablet, extended release: 1 tab(s) orally 2 times a day  pantoprazole 40 mg oral delayed release tablet: 1 tab(s) orally once a day   polyethylene glycol 3350 oral powder for reconstitution: 17 gram(s) orally once a day  prednisoLONE acetate 1% ophthalmic suspension: 1 drop(s) to each affected eye 4 times a day  simvastatin 20 mg oral tablet: 1 tab(s) orally once a day (at bedtime)   acetaminophen 325 mg oral tablet: 3 tab(s) orally every 8 hours, As Needed - 3)  aspirin 81 mg oral delayed release tablet: 1 tab(s) orally once a day  docusate sodium 100 mg oral capsule: 1 cap(s) orally 3 times a day  ferrous sulfate 325 mg (65 mg elemental iron) oral tablet: 1 tab(s) orally once a day  gabapentin 100 mg oral capsule: 1 cap(s) orally 3 times a day  lactulose 10 g oral powder for reconstitution: 10 gram(s) orally once a day as needed for constipation  levothyroxine 175 mcg (0.175 mg) oral tablet: 1 tab(s) orally once a day  lidocaine 5% topical film: Apply topically to affected area once a day   melatonin 3 mg oral tablet: 1 tab(s) orally once a day (at bedtime)  metFORMIN 1000 mg oral tablet: 1 tab(s) orally 2 times a day   morphine 60 mg/12 hours oral tablet, extended release: 1 tab(s) orally 2 times a day  pantoprazole 40 mg oral delayed release tablet: 1 tab(s) orally once a day   polyethylene glycol 3350 oral powder for reconstitution: 17 gram(s) orally once a day  prednisoLONE acetate 1% ophthalmic suspension: 1 drop(s) to each affected eye 4 times a day  simvastatin 20 mg oral tablet: 1 tab(s) orally once a day (at bedtime)

## 2022-03-15 NOTE — PROGRESS NOTE ADULT - ASSESSMENT
HPI:  76 y.o. F w/ Hx DM, HTN, Lipids, Hypothyroid, chronic LBP/spinal stenosis, chronic pancreatitis, anemia had several recent episodes of UTI -- treated at [facility] in 12/2021, then here in 12/2021 and 1/2022. Was again treated as outpatient, completing course of ABX aboud a week ago; Now returns w/ several days of N/V/D and diffuse abdominal pain. (10 Mar 2022 16:37)  ------ As Above ------ Patient seen earlier today. Patient is a poor historian.   Patient presented with multiple loose BMs off and on over the past two weeks. Patient recently treated for a UTI. Vague abdominal pain (+) Non bloody. No one else with the same symptoms.   Last colonoscopy was a long time ago.   No BM's over night.   Left sided colitis by CT scan  / see labs     A) Left sided colitis - Resolved  R/O Infectious , less likely IBD / Ischemia , Now on solid diet   B) Anemia HGB 8.1, low MCV, Low iron sat, normal ferritin in January 1) Out patient EGD / colonoscopy  C) Focal, right groin pain, reproducible on palpation - R/O hernia - surgical consult

## 2022-03-15 NOTE — DISCHARGE NOTE PROVIDER - HOSPITAL COURSE
77 y/o F with PMH of dm2, htn, hld, chronic back pain/ spinal stenosis on chronic narcotic analgesics, hx chronic pancreatitis , anemia , recurrent UTIs, just completed abx course a wk ago p/w c/o N/V/D and diffuse abd pain.   CT c/w left sided colitis, adrenal nodule, renal cyst     Acute left colitis, less likely infectious, improved  tolerating diet  Monitor off Abx for now  Needs outpatient GI follow up for EGD/scope  Follow up with GI    Acute on Chronic LBP    spinal stenosis  Hx of Fall last week  c/w home meds/MS contin   - xray of hip/ lower back- does not show any fractures  -PT- pt able to walk on her own   -pt states shes not interested in surgery if can avoid it  - outpt f/u w/ pain management     Hypokalemia  supplement lytes     elevated trops in setting of sepsis, (denies CP,SOB) likely demand ischemia  stable, outpatient workup w/ cardio.     DM2  monitor fsbs/ISS        as per my colleage-brief episode of AFib  Reviewed Tele- No Afib seen in last few days  No need for A/C now especially that pt high risk for falls with backpain   Might need outpatient holter and f/u with Cardiology     Microcytic anemia  Iron def anemia   - chronic stable at baseline  -will start pt on iron   - pt is a Orthodox, will never want a blood transfusion    Hypothyroidism  - c/w synthroid      PT eval pending---cleared to go home w home PT         77 y/o F with PMH of dm2, htn, hld, chronic back pain/ spinal stenosis on chronic narcotic analgesics, hx chronic pancreatitis , anemia , recurrent UTIs, just completed abx course a wk ago p/w c/o N/V/D and diffuse abd pain.   CT c/w left sided colitis, adrenal nodule, renal cyst     Acute left colitis, less likely infectious, improved  tolerating diet  Monitor off Abx for now  Needs outpatient GI follow up for EGD/scope  Follow up with GI    Acute on Chronic LBP    spinal stenosis  Hx of Fall last week  c/w home meds/MS contin   - xray of hip/ lower back- does not show any fractures  -PT- pt able to walk on her own   -pt states shes not interested in surgery if can avoid it  - outpt f/u w/ pain management     Hypokalemia  supplement lytes     elevated trops in setting of sepsis, (denies CP,SOB) likely demand ischemia  stable, outpatient workup w/ cardio.     DM2  monitor fsbs/ISS        Questionable Paroxysmal Afib  As per Cardiology-Questionable atrial fibrillation on telemetry - likely atrial tachycardia with frequent APC's. No indication for anticoagulation at the present time.   f/u with Cardiology     Microcytic anemia  Iron def anemia   - chronic stable at baseline  -will start pt on iron   - pt is a Evangelical, will never want a blood transfusion    Hypothyroidism  - c/w synthroid      PT eval pending---cleared to go home w home PT         75 y/o F with PMH of dm2, htn, hld, chronic back pain/ spinal stenosis on chronic narcotic analgesics, hx chronic pancreatitis , anemia , recurrent UTIs, just completed abx course a wk ago p/w c/o N/V/D and diffuse abd pain.   CT c/w left sided colitis, adrenal nodule, renal cyst     Acute left colitis, less likely infectious, improved  tolerating diet  Monitor off Abx for now  Needs outpatient GI follow up for EGD/scope  Follow up with GI    Acute on Chronic LBP    spinal stenosis  Hx of Fall last week  c/w home meds/MS contin   - xray of hip/ lower back- does not show any fractures  -PT- pt able to walk on her own   -pt states shes not interested in surgery if can avoid it  - outpt f/u w/ pain management     Hypokalemia  supplement lytes     elevated trops in setting of sepsis, (denies CP,SOB) likely demand ischemia  stable, outpatient workup w/ cardio.     DM2  monitor fsbs/ISS        Questionable Paroxysmal Afib  As per Cardiology-Questionable atrial fibrillation on telemetry - likely atrial tachycardia with frequent APC's. No indication for anticoagulation at the present time.   f/u with Cardiology     Microcytic anemia  Iron def anemia   - chronic stable at baseline  -will start pt on iron   - pt is a [employer]ism, will never want a blood transfusion    Hypothyroidism  - c/w synthroid    Inguinal Hernia   -follow up with Surgery outpatient      PT eval pending---cleared to go home w home PT

## 2022-03-17 DIAGNOSIS — K59.00 CONSTIPATION, UNSPECIFIED: ICD-10-CM

## 2022-03-17 DIAGNOSIS — I27.20 PULMONARY HYPERTENSION, UNSPECIFIED: ICD-10-CM

## 2022-03-17 DIAGNOSIS — E11.9 TYPE 2 DIABETES MELLITUS WITHOUT COMPLICATIONS: ICD-10-CM

## 2022-03-17 DIAGNOSIS — K40.90 UNILATERAL INGUINAL HERNIA, WITHOUT OBSTRUCTION OR GANGRENE, NOT SPECIFIED AS RECURRENT: ICD-10-CM

## 2022-03-17 DIAGNOSIS — Z87.892 PERSONAL HISTORY OF ANAPHYLAXIS: ICD-10-CM

## 2022-03-17 DIAGNOSIS — I24.8 OTHER FORMS OF ACUTE ISCHEMIC HEART DISEASE: ICD-10-CM

## 2022-03-17 DIAGNOSIS — Z20.822 CONTACT WITH AND (SUSPECTED) EXPOSURE TO COVID-19: ICD-10-CM

## 2022-03-17 DIAGNOSIS — D50.9 IRON DEFICIENCY ANEMIA, UNSPECIFIED: ICD-10-CM

## 2022-03-17 DIAGNOSIS — I48.0 PAROXYSMAL ATRIAL FIBRILLATION: ICD-10-CM

## 2022-03-17 DIAGNOSIS — K52.9 NONINFECTIVE GASTROENTERITIS AND COLITIS, UNSPECIFIED: ICD-10-CM

## 2022-03-17 DIAGNOSIS — E03.9 HYPOTHYROIDISM, UNSPECIFIED: ICD-10-CM

## 2022-03-17 DIAGNOSIS — E27.8 OTHER SPECIFIED DISORDERS OF ADRENAL GLAND: ICD-10-CM

## 2022-03-17 DIAGNOSIS — I10 ESSENTIAL (PRIMARY) HYPERTENSION: ICD-10-CM

## 2022-03-17 DIAGNOSIS — I07.1 RHEUMATIC TRICUSPID INSUFFICIENCY: ICD-10-CM

## 2022-03-17 DIAGNOSIS — E78.5 HYPERLIPIDEMIA, UNSPECIFIED: ICD-10-CM

## 2022-03-17 DIAGNOSIS — M48.00 SPINAL STENOSIS, SITE UNSPECIFIED: ICD-10-CM

## 2022-03-17 DIAGNOSIS — E87.6 HYPOKALEMIA: ICD-10-CM

## 2022-03-17 DIAGNOSIS — A41.9 SEPSIS, UNSPECIFIED ORGANISM: ICD-10-CM

## 2022-03-17 DIAGNOSIS — E83.42 HYPOMAGNESEMIA: ICD-10-CM

## 2022-03-17 DIAGNOSIS — I47.1 SUPRAVENTRICULAR TACHYCARDIA: ICD-10-CM

## 2022-03-17 DIAGNOSIS — Z87.440 PERSONAL HISTORY OF URINARY (TRACT) INFECTIONS: ICD-10-CM

## 2022-03-17 DIAGNOSIS — N28.1 CYST OF KIDNEY, ACQUIRED: ICD-10-CM

## 2022-03-17 DIAGNOSIS — M54.50 LOW BACK PAIN, UNSPECIFIED: ICD-10-CM

## 2022-03-17 DIAGNOSIS — Z96.653 PRESENCE OF ARTIFICIAL KNEE JOINT, BILATERAL: ICD-10-CM

## 2022-03-22 ENCOUNTER — APPOINTMENT (OUTPATIENT)
Dept: OPHTHALMOLOGY | Facility: AMBULATORY SURGERY CENTER | Age: 77
End: 2022-03-22

## 2022-04-28 ENCOUNTER — APPOINTMENT (OUTPATIENT)
Dept: CARDIOLOGY | Facility: CLINIC | Age: 77
End: 2022-04-28

## 2022-05-22 ENCOUNTER — EMERGENCY (EMERGENCY)
Facility: HOSPITAL | Age: 77
LOS: 0 days | Discharge: HOME | End: 2022-05-22
Attending: EMERGENCY MEDICINE
Payer: MEDICARE

## 2022-05-22 VITALS
OXYGEN SATURATION: 97 % | WEIGHT: 186.07 LBS | TEMPERATURE: 97 F | DIASTOLIC BLOOD PRESSURE: 85 MMHG | SYSTOLIC BLOOD PRESSURE: 141 MMHG | HEIGHT: 65 IN | RESPIRATION RATE: 16 BRPM | HEART RATE: 72 BPM

## 2022-05-22 VITALS
OXYGEN SATURATION: 98 % | HEART RATE: 84 BPM | SYSTOLIC BLOOD PRESSURE: 144 MMHG | RESPIRATION RATE: 14 BRPM | TEMPERATURE: 98 F | DIASTOLIC BLOOD PRESSURE: 59 MMHG

## 2022-05-22 DIAGNOSIS — M19.90 UNSPECIFIED OSTEOARTHRITIS, UNSPECIFIED SITE: ICD-10-CM

## 2022-05-22 DIAGNOSIS — R07.89 OTHER CHEST PAIN: ICD-10-CM

## 2022-05-22 DIAGNOSIS — E11.9 TYPE 2 DIABETES MELLITUS WITHOUT COMPLICATIONS: ICD-10-CM

## 2022-05-22 DIAGNOSIS — I10 ESSENTIAL (PRIMARY) HYPERTENSION: ICD-10-CM

## 2022-05-22 DIAGNOSIS — Z79.82 LONG TERM (CURRENT) USE OF ASPIRIN: ICD-10-CM

## 2022-05-22 DIAGNOSIS — E03.9 HYPOTHYROIDISM, UNSPECIFIED: ICD-10-CM

## 2022-05-22 DIAGNOSIS — M25.512 PAIN IN LEFT SHOULDER: ICD-10-CM

## 2022-05-22 DIAGNOSIS — Z98.89 OTHER SPECIFIED POSTPROCEDURAL STATES: Chronic | ICD-10-CM

## 2022-05-22 DIAGNOSIS — Z88.8 ALLERGY STATUS TO OTHER DRUGS, MEDICAMENTS AND BIOLOGICAL SUBSTANCES: ICD-10-CM

## 2022-05-22 DIAGNOSIS — Z82.8 FAMILY HISTORY OF OTHER DISABILITIES AND CHRONIC DISEASES LEADING TO DISABLEMENT, NOT ELSEWHERE CLASSIFIED: Chronic | ICD-10-CM

## 2022-05-22 DIAGNOSIS — Z79.84 LONG TERM (CURRENT) USE OF ORAL HYPOGLYCEMIC DRUGS: ICD-10-CM

## 2022-05-22 DIAGNOSIS — Z90.710 ACQUIRED ABSENCE OF BOTH CERVIX AND UTERUS: Chronic | ICD-10-CM

## 2022-05-22 DIAGNOSIS — R35.0 FREQUENCY OF MICTURITION: ICD-10-CM

## 2022-05-22 DIAGNOSIS — Z96.653 PRESENCE OF ARTIFICIAL KNEE JOINT, BILATERAL: Chronic | ICD-10-CM

## 2022-05-22 LAB
ALBUMIN SERPL ELPH-MCNC: 3.7 G/DL — SIGNIFICANT CHANGE UP (ref 3.3–5)
ALP SERPL-CCNC: 114 U/L — SIGNIFICANT CHANGE UP (ref 40–120)
ALT FLD-CCNC: 16 U/L — SIGNIFICANT CHANGE UP (ref 12–78)
ANION GAP SERPL CALC-SCNC: 7 MMOL/L — SIGNIFICANT CHANGE UP (ref 5–17)
APPEARANCE UR: CLEAR — SIGNIFICANT CHANGE UP
APTT BLD: 25.6 SEC — LOW (ref 27.5–35.5)
AST SERPL-CCNC: 30 U/L — SIGNIFICANT CHANGE UP (ref 15–37)
BASOPHILS # BLD AUTO: 0.02 K/UL — SIGNIFICANT CHANGE UP (ref 0–0.2)
BASOPHILS NFR BLD AUTO: 0.4 % — SIGNIFICANT CHANGE UP (ref 0–2)
BILIRUB SERPL-MCNC: 0.3 MG/DL — SIGNIFICANT CHANGE UP (ref 0.2–1.2)
BILIRUB UR-MCNC: NEGATIVE — SIGNIFICANT CHANGE UP
BUN SERPL-MCNC: 8 MG/DL — SIGNIFICANT CHANGE UP (ref 7–23)
CALCIUM SERPL-MCNC: 9.4 MG/DL — SIGNIFICANT CHANGE UP (ref 8.5–10.1)
CHLORIDE SERPL-SCNC: 101 MMOL/L — SIGNIFICANT CHANGE UP (ref 96–108)
CO2 SERPL-SCNC: 25 MMOL/L — SIGNIFICANT CHANGE UP (ref 22–31)
COLOR SPEC: YELLOW — SIGNIFICANT CHANGE UP
CREAT SERPL-MCNC: 0.75 MG/DL — SIGNIFICANT CHANGE UP (ref 0.5–1.3)
DIFF PNL FLD: NEGATIVE — SIGNIFICANT CHANGE UP
EGFR: 82 ML/MIN/1.73M2 — SIGNIFICANT CHANGE UP
EOSINOPHIL # BLD AUTO: 0.12 K/UL — SIGNIFICANT CHANGE UP (ref 0–0.5)
EOSINOPHIL NFR BLD AUTO: 2.3 % — SIGNIFICANT CHANGE UP (ref 0–6)
GLUCOSE SERPL-MCNC: 96 MG/DL — SIGNIFICANT CHANGE UP (ref 70–99)
GLUCOSE UR QL: NEGATIVE MG/DL — SIGNIFICANT CHANGE UP
HCT VFR BLD CALC: 35.1 % — SIGNIFICANT CHANGE UP (ref 34.5–45)
HGB BLD-MCNC: 10.9 G/DL — LOW (ref 11.5–15.5)
IMM GRANULOCYTES NFR BLD AUTO: 0 % — SIGNIFICANT CHANGE UP (ref 0–1.5)
INR BLD: 0.93 RATIO — SIGNIFICANT CHANGE UP (ref 0.88–1.16)
KETONES UR-MCNC: NEGATIVE — SIGNIFICANT CHANGE UP
LEUKOCYTE ESTERASE UR-ACNC: NEGATIVE — SIGNIFICANT CHANGE UP
LIDOCAIN IGE QN: 78 U/L — SIGNIFICANT CHANGE UP (ref 73–393)
LYMPHOCYTES # BLD AUTO: 1.72 K/UL — SIGNIFICANT CHANGE UP (ref 1–3.3)
LYMPHOCYTES # BLD AUTO: 33.5 % — SIGNIFICANT CHANGE UP (ref 13–44)
MCHC RBC-ENTMCNC: 26.9 PG — LOW (ref 27–34)
MCHC RBC-ENTMCNC: 31.1 G/DL — LOW (ref 32–36)
MCV RBC AUTO: 86.7 FL — SIGNIFICANT CHANGE UP (ref 80–100)
MONOCYTES # BLD AUTO: 0.47 K/UL — SIGNIFICANT CHANGE UP (ref 0–0.9)
MONOCYTES NFR BLD AUTO: 9.1 % — SIGNIFICANT CHANGE UP (ref 2–14)
NEUTROPHILS # BLD AUTO: 2.81 K/UL — SIGNIFICANT CHANGE UP (ref 1.8–7.4)
NEUTROPHILS NFR BLD AUTO: 54.7 % — SIGNIFICANT CHANGE UP (ref 43–77)
NITRITE UR-MCNC: NEGATIVE — SIGNIFICANT CHANGE UP
NRBC # BLD: 0 /100 WBCS — SIGNIFICANT CHANGE UP (ref 0–0)
NT-PROBNP SERPL-SCNC: 142 PG/ML — SIGNIFICANT CHANGE UP (ref 0–450)
PH UR: 7 — SIGNIFICANT CHANGE UP (ref 5–8)
PLATELET # BLD AUTO: 238 K/UL — SIGNIFICANT CHANGE UP (ref 150–400)
POTASSIUM SERPL-MCNC: 5.8 MMOL/L — HIGH (ref 3.5–5.3)
POTASSIUM SERPL-SCNC: 5.8 MMOL/L — HIGH (ref 3.5–5.3)
PROT SERPL-MCNC: 7.8 GM/DL — SIGNIFICANT CHANGE UP (ref 6–8.3)
PROT UR-MCNC: NEGATIVE MG/DL — SIGNIFICANT CHANGE UP
PROTHROM AB SERPL-ACNC: 11 SEC — SIGNIFICANT CHANGE UP (ref 10.5–13.4)
RBC # BLD: 4.05 M/UL — SIGNIFICANT CHANGE UP (ref 3.8–5.2)
RBC # FLD: 18.1 % — HIGH (ref 10.3–14.5)
SODIUM SERPL-SCNC: 133 MMOL/L — LOW (ref 135–145)
SP GR SPEC: 1 — LOW (ref 1.01–1.02)
TROPONIN I, HIGH SENSITIVITY RESULT: 6.1 NG/L — SIGNIFICANT CHANGE UP
UROBILINOGEN FLD QL: NEGATIVE MG/DL — SIGNIFICANT CHANGE UP
WBC # BLD: 5.14 K/UL — SIGNIFICANT CHANGE UP (ref 3.8–10.5)
WBC # FLD AUTO: 5.14 K/UL — SIGNIFICANT CHANGE UP (ref 3.8–10.5)

## 2022-05-22 PROCEDURE — 93010 ELECTROCARDIOGRAM REPORT: CPT

## 2022-05-22 PROCEDURE — 71045 X-RAY EXAM CHEST 1 VIEW: CPT | Mod: 26

## 2022-05-22 PROCEDURE — 99285 EMERGENCY DEPT VISIT HI MDM: CPT

## 2022-05-22 RX ORDER — MORPHINE SULFATE 50 MG/1
60 CAPSULE, EXTENDED RELEASE ORAL ONCE
Refills: 0 | Status: DISCONTINUED | OUTPATIENT
Start: 2022-05-22 | End: 2022-05-22

## 2022-05-22 RX ADMIN — MORPHINE SULFATE 60 MILLIGRAM(S): 50 CAPSULE, EXTENDED RELEASE ORAL at 18:37

## 2022-05-22 NOTE — ED PROVIDER NOTE - OBJECTIVE STATEMENT
Pt is a 76 year old PMH of HTN, DM, chronic back and leg pain, who presents to the ED today for left shoulder and chest pain since last night. Pain is worse with movement of left arm, and she thinks pain is from her shoulder. Pt has had increased urinary frequency starting today. Pt has an appointment with ortho tomorrow. No SOB, pleuritic pain, cough fever, or abdominal pain.

## 2022-05-22 NOTE — ED ADULT NURSE NOTE - OBJECTIVE STATEMENT
pt presents to the ED complaining of cp and occasional sob. Pt states that pain started since yesterday and radiates down left arm. Denies fever, abd pain, tachycardia, palpitations, Pt presents to the ED complaining of cp and occasional sob. Pt states that pain started since yesterday and radiates down left arm. Denies fever, abd pain, tachycardia, palpitations. Hx HTN DM, hypothyroid, OA, MRSA via central line

## 2022-05-22 NOTE — ED ADULT NURSE NOTE - CINV DISCH TEACH PARTICIP
Diagnosis: Left knee injury possible meniscus, ligaments feel intact  Image Findings: neg knee xray  Treatment: Range of motion daily as well as home excsises    Medications: Limited tylenol/ibuprofen for pain for 1-2 weeks    Follow-up: Only as needed    It was great seeing you today!    Myron Rosenthal          
Patient

## 2022-05-22 NOTE — ED PROVIDER NOTE - PROGRESS NOTE DETAILS
Pt has been chest pain free in ED. UA negative, labs wnl. Pt admits she has been drinking 5 8oz glasses of water at home. Has f/u tomorrow. Ready for d/c w daughter.

## 2022-05-22 NOTE — ED PROVIDER NOTE - PATIENT PORTAL LINK FT
You can access the FollowMyHealth Patient Portal offered by [facility] by registering at the following website: [email or web address]. By joining [facility]’s FollowMyHealth portal, you will also be able to view your health information using other applications (apps) compatible with our system.

## 2022-05-23 LAB
CULTURE RESULTS: SIGNIFICANT CHANGE UP
SPECIMEN SOURCE: SIGNIFICANT CHANGE UP

## 2022-06-01 ENCOUNTER — APPOINTMENT (OUTPATIENT)
Dept: CARDIOLOGY | Facility: CLINIC | Age: 77
End: 2022-06-01
Payer: MEDICARE

## 2022-06-01 ENCOUNTER — NON-APPOINTMENT (OUTPATIENT)
Age: 77
End: 2022-06-01

## 2022-06-01 VITALS
SYSTOLIC BLOOD PRESSURE: 128 MMHG | DIASTOLIC BLOOD PRESSURE: 66 MMHG | BODY MASS INDEX: 32.82 KG/M2 | HEART RATE: 59 BPM | HEIGHT: 65 IN | OXYGEN SATURATION: 98 % | WEIGHT: 197 LBS

## 2022-06-01 DIAGNOSIS — M75.122 COMPLETE ROTATOR CUFF TEAR OR RUPTURE OF LEFT SHOULDER, NOT SPECIFIED AS TRAUMATIC: ICD-10-CM

## 2022-06-01 DIAGNOSIS — Z79.899 HEART DISEASE, UNSPECIFIED: ICD-10-CM

## 2022-06-01 DIAGNOSIS — I51.9 HEART DISEASE, UNSPECIFIED: ICD-10-CM

## 2022-06-01 DIAGNOSIS — R07.89 OTHER CHEST PAIN: ICD-10-CM

## 2022-06-01 PROCEDURE — 99214 OFFICE O/P EST MOD 30 MIN: CPT

## 2022-06-01 PROCEDURE — 93000 ELECTROCARDIOGRAM COMPLETE: CPT

## 2022-06-01 RX ORDER — MORPHINE SULFATE 60 MG/1
60 TABLET, FILM COATED, EXTENDED RELEASE ORAL
Qty: 90 | Refills: 0 | Status: ACTIVE | COMMUNITY
Start: 2022-05-24

## 2022-06-01 RX ORDER — OXYCODONE AND ACETAMINOPHEN 10; 325 MG/1; MG/1
10-325 TABLET ORAL
Qty: 20 | Refills: 0 | Status: DISCONTINUED | COMMUNITY
Start: 2019-03-22 | End: 2022-06-01

## 2022-06-01 RX ORDER — OXYCODONE 10 MG/1
10 TABLET ORAL
Qty: 56 | Refills: 0 | Status: DISCONTINUED | COMMUNITY
Start: 2018-08-30 | End: 2022-06-01

## 2022-06-01 RX ORDER — PREDNISOLONE/GATIFLOX/NEPAFEN 1-0.5-0.1%
SUSPENSION, DROPS(FINAL DOSAGE FORM)(ML) OPHTHALMIC (EYE)
Refills: 0 | Status: ACTIVE | COMMUNITY

## 2022-06-01 RX ORDER — ASPIRIN 81 MG
81 TABLET, DELAYED RELEASE (ENTERIC COATED) ORAL DAILY
Refills: 0 | Status: ACTIVE | COMMUNITY

## 2022-06-01 RX ORDER — GABAPENTIN 800 MG/1
800 TABLET, COATED ORAL
Qty: 90 | Refills: 1 | Status: DISCONTINUED | COMMUNITY
Start: 2018-02-09 | End: 2022-06-01

## 2022-06-01 RX ORDER — PANTOPRAZOLE 40 MG/1
40 TABLET, DELAYED RELEASE ORAL DAILY
Refills: 0 | Status: ACTIVE | COMMUNITY

## 2022-06-01 RX ORDER — GABAPENTIN 600 MG/1
600 TABLET, COATED ORAL EVERY 8 HOURS
Qty: 90 | Refills: 2 | Status: DISCONTINUED | COMMUNITY
Start: 2018-08-30 | End: 2022-06-01

## 2022-06-01 NOTE — CARDIOLOGY SUMMARY
[de-identified] : 6/1/2022,Sinus Bradycardia \par -Left axis -anterior fascicular block. \par Voltage criteria for LVH (R(aVL) exceeds 1.01 mV). \par -Anterior infarct -age undetermined. \par - T-abnormality - Inferior and anterolateral ischemia. [de-identified] : 3/12/22, normal LVEF 65%, mod TR, mild pulm HTN

## 2022-06-01 NOTE — DISCUSSION/SUMMARY
[FreeTextEntry1] : 76-year-old female with atypical chest pain.  No evidence of acute myocardial infarction at the hospital.  Patient had ischemia evaluation done approximately 1 year ago and will get me the records for review.  Most likely related to musculoskeletal injury secondary to rotator cuff tear.  At this time see no evidence or indication for further ischemia evaluation.  There are no cardiac contraindications to orthopedic surgery, if needed.  Can hold aspirin for up to 7 days if needed as well.  Patient told to follow-up with us yearly for routine surveillance.

## 2022-06-01 NOTE — HISTORY OF PRESENT ILLNESS
[FreeTextEntry1] : 75 y/o F with PMH of dm2, htn, hld, chronic back pain/ spinal stenosis on chronic narcotic analgesics and gabapentin for neuropathic pain, hx chronic pancreatitis , anemia , recurrent UTIs, admitted to [facility] in 3/22 with c/o N/V/D and diffuse abd pain. CT c/w left sided colitis, adrenal nodule, renal cyst . Seen again on 5/22 in ED for atypical left shoulder pain.  Pain is unrelated to effort, exacerbated by movement of the left shoulder and appears to be related to her rotator cuff tear.  Patient is currently under evaluation by an orthopedist and has been told that she needs to have the rotator cuff repaired.  She is trying to arrange for bloodless surgery because of her belief as a [employer].  The pain has not recurred, there has been no concurrent dyspnea, diaphoresis, nausea or palpitations.\par \par She was seen in consultation in March for elevated troponins likely due to demand ischemia.  Patient states she had a cardiac evaluation done at [facility] approximately 1 year ago in anticipation of ophthalmological surgery and was told the stress test was normal.  There have been no interval changes to her EKG.\par \par Tired nurse, non-smoker, nondrinker.  Had been on beta-blockers in the past which were discontinued after she had an episode of angioedema.\par

## 2022-06-09 ENCOUNTER — NON-APPOINTMENT (OUTPATIENT)
Age: 77
End: 2022-06-09

## 2022-06-09 ENCOUNTER — APPOINTMENT (OUTPATIENT)
Dept: OPHTHALMOLOGY | Facility: CLINIC | Age: 77
End: 2022-06-09
Payer: MEDICARE

## 2022-06-09 PROCEDURE — 92136 OPHTHALMIC BIOMETRY: CPT

## 2022-06-09 PROCEDURE — 92250 FUNDUS PHOTOGRAPHY W/I&R: CPT

## 2022-06-09 PROCEDURE — 92014 COMPRE OPH EXAM EST PT 1/>: CPT

## 2022-06-09 PROCEDURE — 92286 ANT SGM IMG I&R SPECLR MIC: CPT

## 2022-06-09 PROCEDURE — 92025 CPTRIZED CORNEAL TOPOGRAPHY: CPT

## 2022-08-29 ENCOUNTER — APPOINTMENT (OUTPATIENT)
Dept: OPHTHALMOLOGY | Facility: CLINIC | Age: 77
End: 2022-08-29

## 2022-08-29 ENCOUNTER — NON-APPOINTMENT (OUTPATIENT)
Age: 77
End: 2022-08-29

## 2022-08-29 PROCEDURE — 92012 INTRM OPH EXAM EST PATIENT: CPT

## 2022-09-02 ENCOUNTER — APPOINTMENT (OUTPATIENT)
Dept: OPHTHALMOLOGY | Facility: CLINIC | Age: 77
End: 2022-09-02

## 2022-09-02 ENCOUNTER — NON-APPOINTMENT (OUTPATIENT)
Age: 77
End: 2022-09-02

## 2022-09-02 PROCEDURE — 92014 COMPRE OPH EXAM EST PT 1/>: CPT

## 2022-09-29 NOTE — H&P ADULT - OPHTHALMOLOGIC
Good evening Leny, prior patient of Kodak Hernandez, whom I know you are scheduled to meet in future, seen in UC today with chest pain, ultimately chest CT neg for acute findings. However, incidental lab findings of significant leukocytosis and lymphocytosis; no hx hematologic disease. Lab sent to Pathology for further evaluation, and we will contact pt with results and likely hematology consult. Wanted to update you with these new findings. Thanks - AT
negative

## 2022-10-03 RX ORDER — OFLOXACIN 0.3 %
1 DROPS OPHTHALMIC (EYE)
Refills: 0 | Status: DISCONTINUED | OUTPATIENT
Start: 2022-10-05 | End: 2022-10-05

## 2022-10-04 NOTE — ASU PATIENT PROFILE, ADULT - NSICDXPASTSURGICALHX_GEN_ALL_CORE_FT
PAST SURGICAL HISTORY:  FH: cholecystectomy     H/O right inguinal hernia repair     H/O total knee replacement, bilateral     H/O: hysterectomy     History of corneal transplant right    S/P cataract surgery right

## 2022-10-04 NOTE — ASU PATIENT PROFILE, ADULT - FALL HARM RISK - HARM RISK INTERVENTIONS
Communicate Risk of Fall with Harm to all staff/Reinforce activity limits and safety measures with patient and family/Tailored Fall Risk Interventions/Visual Cue: Yellow wristband and red socks/Bed in lowest position, wheels locked, appropriate side rails in place/Call bell, personal items and telephone in reach/Instruct patient to call for assistance before getting out of bed or chair/Non-slip footwear when patient is out of bed/[place] to call system/Physically safe environment - no spills, clutter or unnecessary equipment/Purposeful Proactive Rounding/Room/bathroom lighting operational, light cord in reach Assistance with ambulation/Assistance OOB with selected safe patient handling equipment/Communicate Risk of Fall with Harm to all staff/Discuss with provider need for PT consult/Monitor gait and stability/Provide patient with walking aids - walker, cane, crutches/Reinforce activity limits and safety measures with patient and family/Tailored Fall Risk Interventions/Visual Cue: Yellow wristband and red socks/Bed in lowest position, wheels locked, appropriate side rails in place/Call bell, personal items and telephone in reach/Instruct patient to call for assistance before getting out of bed or chair/Non-slip footwear when patient is out of bed/[place] to call system/Physically safe environment - no spills, clutter or unnecessary equipment/Purposeful Proactive Rounding/Room/bathroom lighting operational, light cord in reach

## 2022-10-04 NOTE — ASU PATIENT PROFILE, ADULT - NS PREOP UNDERSTANDS INFO
No solid food, dairy, candy or gum after midnight, water is allowed before 08:30am tomorrow. Reminded to come with photo ID/insurance card. No jewelries/contact lens/valuable. No smoking/alcohol drinking/recreational drug use today. Escort must be 18yrs or older and must have photo ID. Call back number and address provided./yes

## 2022-10-04 NOTE — ASU PATIENT PROFILE, ADULT - NSICDXPASTMEDICALHX_GEN_ALL_CORE_FT
PAST MEDICAL HISTORY:  Acid reflux     Cataract     Diabetes     Diverticulosis     Hypertension     Hypothyroid     MRSA (methicillin resistant Staphylococcus aureus) infection 1999, infected knee replacement, required multiple revisions and long term IV antibiotics via central line    Osteoarthritis     Pancreatitis, chronic last episode > 10 years ago    Spinal stenosis

## 2022-10-05 ENCOUNTER — OUTPATIENT (OUTPATIENT)
Dept: OUTPATIENT SERVICES | Facility: HOSPITAL | Age: 77
LOS: 1 days | Discharge: HOME | End: 2022-10-05

## 2022-10-05 ENCOUNTER — NON-APPOINTMENT (OUTPATIENT)
Age: 77
End: 2022-10-05

## 2022-10-05 ENCOUNTER — TRANSCRIPTION ENCOUNTER (OUTPATIENT)
Age: 77
End: 2022-10-05

## 2022-10-05 ENCOUNTER — APPOINTMENT (OUTPATIENT)
Dept: OPHTHALMOLOGY | Facility: AMBULATORY SURGERY CENTER | Age: 77
End: 2022-10-05

## 2022-10-05 VITALS
OXYGEN SATURATION: 98 % | TEMPERATURE: 98 F | RESPIRATION RATE: 16 BRPM | SYSTOLIC BLOOD PRESSURE: 132 MMHG | DIASTOLIC BLOOD PRESSURE: 69 MMHG | HEART RATE: 74 BPM | WEIGHT: 187.83 LBS | HEIGHT: 65 IN

## 2022-10-05 VITALS
HEART RATE: 77 BPM | OXYGEN SATURATION: 97 % | DIASTOLIC BLOOD PRESSURE: 53 MMHG | SYSTOLIC BLOOD PRESSURE: 127 MMHG | TEMPERATURE: 98 F | RESPIRATION RATE: 16 BRPM

## 2022-10-05 DIAGNOSIS — Z96.653 PRESENCE OF ARTIFICIAL KNEE JOINT, BILATERAL: Chronic | ICD-10-CM

## 2022-10-05 DIAGNOSIS — Z90.710 ACQUIRED ABSENCE OF BOTH CERVIX AND UTERUS: Chronic | ICD-10-CM

## 2022-10-05 DIAGNOSIS — Z98.89 OTHER SPECIFIED POSTPROCEDURAL STATES: Chronic | ICD-10-CM

## 2022-10-05 DIAGNOSIS — Z94.7 CORNEAL TRANSPLANT STATUS: Chronic | ICD-10-CM

## 2022-10-05 DIAGNOSIS — Z82.8 FAMILY HISTORY OF OTHER DISABILITIES AND CHRONIC DISEASES LEADING TO DISABLEMENT, NOT ELSEWHERE CLASSIFIED: Chronic | ICD-10-CM

## 2022-10-05 DIAGNOSIS — Z98.49 CATARACT EXTRACTION STATUS, UNSPECIFIED EYE: Chronic | ICD-10-CM

## 2022-10-05 LAB
GLUCOSE BLDC GLUCOMTR-MCNC: 108 MG/DL — HIGH (ref 70–99)
GLUCOSE BLDC GLUCOMTR-MCNC: 150 MG/DL — HIGH (ref 70–99)
SARS-COV-2 RNA SPEC QL NAA+PROBE: NEGATIVE — SIGNIFICANT CHANGE UP

## 2022-10-05 PROCEDURE — 66710 CILIARY TRANSSLERAL THERAPY: CPT | Mod: RT

## 2022-10-05 RX ORDER — CYCLOPENTOLATE HYDROCHLORIDE 10 MG/ML
1 SOLUTION/ DROPS OPHTHALMIC ONCE
Refills: 0 | Status: DISCONTINUED | OUTPATIENT
Start: 2022-10-05 | End: 2022-10-05

## 2022-10-05 RX ORDER — OFLOXACIN 0.3 %
1 DROPS OPHTHALMIC (EYE)
Refills: 0 | Status: DISCONTINUED | OUTPATIENT
Start: 2022-10-05 | End: 2022-10-05

## 2022-10-05 RX ORDER — ACETAMINOPHEN 500 MG
650 TABLET ORAL ONCE
Refills: 0 | Status: DISCONTINUED | OUTPATIENT
Start: 2022-10-05 | End: 2022-10-05

## 2022-10-05 RX ORDER — CYCLOPENTOLATE HYDROCHLORIDE 10 MG/ML
1 SOLUTION/ DROPS OPHTHALMIC ONCE
Refills: 0 | Status: COMPLETED | OUTPATIENT
Start: 2022-10-05 | End: 2022-10-05

## 2022-10-05 RX ADMIN — CYCLOPENTOLATE HYDROCHLORIDE 1 DROP(S): 10 SOLUTION/ DROPS OPHTHALMIC at 10:41

## 2022-10-05 RX ADMIN — Medication 1 DROP(S): at 10:41

## 2022-10-05 NOTE — OPERATIVE REPORT - OPERATIVE RPOSRT DETAILS
Patient Name: JUAN LANTIGUA    Medical Record Number: [id number]    Date of Surgery: October 5, 2022    Operating Surgeon: Rolando Curtis MD    Assistant Surgeon: None    Anesthesia: MAC, retrobulbar anesthesia    Preoperative Diagnosis: Glaucoma, Right eye    Postoperative Diagnosis: Glaucoma, Right eye    Operative Procedure: Transscleral Cyclophotocoagulation, Right eye    Complication: None    Specimen: None    ESTIMATED BLOOD LOSS: <1 cc    Patient condition: Stable    Procedure: Prior to the procedure, all risks, benefits and alternatives were discussed with the patient, including but not limited to infection, bleeding, retinal detachment, increase or decrease in intraocular pressure, corneal edema, corneal decompensation, ptosis, diplopia, loss of vision, no improvement of vision, need for second surgery, macular edema, intraocular inflammation, etc. All questions were answered and the patient wished to proceed with the surgery. Informed consent was obtained.    The patient was brought to the operating room and placed under MAC and retrobulbar anesthesia. Tetracaine was instilled and an eyelid speculum was placed in the right eye. Diode laser was set with a power of 1250 mW and duration of 4 seconds. 23 shots of laser were applied on the inferior limbal area using G probe, avoiding the 3 and 9 o’clock positions. A pop sound was heard 3 times during the procedure. Subconjunctival injection of dexamethasone was performed. Eyelid speculum was removed, and the eye was closed and patched. The patient left the operating room in an excellent condition.

## 2022-10-05 NOTE — ASU DISCHARGE PLAN (ADULT/PEDIATRIC) - NS MD DC FALL RISK RISK
For information on Fall & Injury Prevention, visit: [email or web address] OR  [email or web address] OR  https://www.cdc.gov/steadi/patient.html

## 2022-10-05 NOTE — PRE-ANESTHESIA EVALUATION ADULT - NSPREOPDXFT_GEN_ALL_CORE
Patient would like communication of their results via:        Cell Phone:   Telephone Information:   Mobile [phone]     Okay to leave a message containing results? Yes     right eye glaucoma

## 2022-10-05 NOTE — ASU PREOP CHECKLIST - HEART RATE (BEATS/MIN)
Chief Complaint   Patient presents with   • Annual Exam       HPI:  Joslyn is a 59 y.o.  female who presents for annual exam. Generally the patient is feeling good.  Her only concern is that she is had an increase in yellow vaginal discharge and a foul fishy odor.  She has been sexually active with the same partner for decades.  Denies any pelvic pain or dysuria.  Denies vaginal itching.  Regarding her health maintenance:   Hysterectomy 2005 - no ovaries.  Uses vaginal estrogen about once weekly which helps with vaginal lubrication and elasticity.  Mammogram 8/2019.  Denies breast pain.  Bone density test:N\A   Colonoscopy and endoscopy are up-to-date.  Last Lab: due for follow up   Last Td:current   Influenza vaccination:current   Pneumococcal vaccination:not applicable   shigrix is due  Hx STD''s: no   Regular exercise: yes      meds:   Current Outpatient Medications   Medication Sig Dispense Refill   • atorvastatin (LIPITOR) 20 MG Tab Take 20 mg by mouth.     • metroNIDAZOLE (FLAGYL) 500 MG Tab Take 1 Tab by mouth 2 times a day. 14 Tab 0   • acyclovir (ZOVIRAX) 800 MG Tab Take 1 Tab by mouth 2 times a day as needed. 60 Tab 2   • omeprazole (PRILOSEC) 40 MG delayed-release capsule Take 1 Capsule by mouth every day.     • amLODIPine (NORVASC) 2.5 MG Tab   5   • estradiol (ESTRACE) 0.1 MG/GM vaginal cream      • levothyroxine (SYNTHROID) 25 MCG Tab   3   • zolpidem (AMBIEN) 10 MG Tab   2   • fluticasone (FLONASE) 50 MCG/ACT nasal spray Spray 1 Spray in nose 2 times a day. 1 Bottle 0   • Multiple Vitamin (MULTIVITAMIN PO) Take 1 Tab by mouth every day.     • CALCIUM PO Take 1,200 mg by mouth every day.       No current facility-administered medications for this visit.        Allergies: No Known Allergies    family:   Family History   Problem Relation Age of Onset   • Cancer Mother         breast   • Breast Cancer Mother    • Cancer Maternal Aunt         breast       social hx:   Social History     Socioeconomic  History   • Marital status:      Spouse name: Not on file   • Number of children: Not on file   • Years of education: Not on file   • Highest education level: Not on file   Occupational History   • Not on file   Social Needs   • Financial resource strain: Not on file   • Food insecurity     Worry: Not on file     Inability: Not on file   • Transportation needs     Medical: Not on file     Non-medical: Not on file   Tobacco Use   • Smoking status: Former Smoker     Packs/day: 0.30     Years: 10.00     Pack years: 3.00     Types: Cigarettes     Last attempt to quit: 1995     Years since quittin.1   • Smokeless tobacco: Never Used   Substance and Sexual Activity   • Alcohol use: No     Comment: rare   • Drug use: No   • Sexual activity: Not on file     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    Lifestyle   • Physical activity     Days per week: Not on file     Minutes per session: Not on file   • Stress: Not on file   Relationships   • Social connections     Talks on phone: Not on file     Gets together: Not on file     Attends Sabianist service: Not on file     Active member of club or organization: Not on file     Attends meetings of clubs or organizations: Not on file     Relationship status: Not on file   • Intimate partner violence     Fear of current or ex partner: Not on file     Emotionally abused: Not on file     Physically abused: Not on file     Forced sexual activity: Not on file   Other Topics Concern   • Not on file   Social History Narrative   • Not on file       ROS:  No fever, chills, sweats.   No polydipsia, polyuria, temperature intolerance, significant weight changes   No visual changes, blurred vision.  No chest pain, palpitations, peripheral swelling   No chronic cough, shortness of breath, dyspnea with exertion.   No dysphagia, odynophagia, black or bloody stools.   No abdominal pain, nausea, persistent diarrhea, constipation   No dysuria, hematuria, incontinence.  "Denies nocturia  No rash, pruritis, pigment changes.   No focal weakness, syncope, headache, confusion, persistent numbness.   All other systems are reviewed and negative.    PHYSICAL EXAMINATION:  /70 (BP Location: Left arm, Patient Position: Sitting, BP Cuff Size: Large adult)   Pulse 78   Temp 36.9 °C (98.5 °F)   Resp 16   Ht 1.676 m (5' 6\")   Wt 77.6 kg (171 lb)   SpO2 95%   General appearance:healthy, well developed, well nourished  Psych: alert, no distress, cooperative  Eyes: EOM's normal, pupils equal, round, reactive to light  ENT: Ears: external ears normal to inspection and palpation, TM's clear, Nose/Sinuses: nose shows no deformity, asymmetry, or inflammation  Neck: no asymmetry, masses, or scars, no adenopathy, thyroid normal to palpation  Lungs:chest symmetric with normal A/P diameter, no chest deformities noted, normal respiratory rate and rhythm  Cardiovascular:regular rate and rhythm, S1 normal  Abdomen: umbilicus normal, no masses palpable, no organomegaly  Musculoskeletal:ROM of all joints is normal, no evidence of joint instability  Lymphatic: None significantly enlarged  Skin: no rash, no edema  Neuro: mental status intact, cranial nerves 2-12 intact      ASSESSMENT/PLAN:  1.annual physical exam: HCM:    Encourage monthly self breast exam  Encourage daily exercise for at least 30 minutes  Mammogram and colonoscopy are up-to-date.  Recommend 1500 mg Calcium with 600 units vit d daily.    She plans on doing healthy tracks, encouraged added on CBC, CMP and A1c.  She was given Shingrix No. 1 today, encouraged her to return in 2 to 6 months for Shingrix No. 2.  The patient counseled regarding immunizations, what the patient is being immunized against, possible side effects, and the importance of immunization.   Declined a pelvic exam.  Based on symptoms, suspect bacterial vaginosis and this was discussed with her.  She was given the choice of vaginal clindamycin or oral medication for BV " 74 and chose oral medication.  She does not drink alcohol but I reminded her to completely avoid any type of alcohol with metronidazole as it could cause violent nausea/vomiting.  She will notify me in 1 week if her symptoms persist.

## 2022-10-06 ENCOUNTER — NON-APPOINTMENT (OUTPATIENT)
Age: 77
End: 2022-10-06

## 2022-10-06 ENCOUNTER — APPOINTMENT (OUTPATIENT)
Dept: OPHTHALMOLOGY | Facility: CLINIC | Age: 77
End: 2022-10-06

## 2022-10-06 PROBLEM — H26.9 UNSPECIFIED CATARACT: Chronic | Status: ACTIVE | Noted: 2022-10-04

## 2022-10-06 PROCEDURE — 99024 POSTOP FOLLOW-UP VISIT: CPT

## 2022-10-13 PROBLEM — I51.9: Status: RESOLVED | Noted: 2018-02-01 | Resolved: 2022-06-01

## 2022-10-24 ENCOUNTER — APPOINTMENT (OUTPATIENT)
Dept: OPHTHALMOLOGY | Facility: CLINIC | Age: 77
End: 2022-10-24

## 2022-10-24 ENCOUNTER — NON-APPOINTMENT (OUTPATIENT)
Age: 77
End: 2022-10-24

## 2022-10-24 PROCEDURE — 99024 POSTOP FOLLOW-UP VISIT: CPT

## 2022-11-03 NOTE — H&P ADULT - CRANIAL NERVE
Pre-Surgery Instructions:   Medication Instructions   • ALPRAZolam ER (XANAX XR) 2 MG 24 hr tablet Uses PRN- OK to take day of surgery   • baclofen 10 mg tablet Uses PRN- OK to take day of surgery   • calcitriol (ROCALTROL) 0 5 MCG capsule Uses PRN- OK to take day of surgery   • Calcium Carb-Cholecalciferol (Calcium 500+D) 500-400 MG-UNIT TABS Hold day of surgery  • desvenlafaxine succinate (PRISTIQ) 50 mg 24 hr tablet Hold day of surgery  • doxepin (SINEquan) 25 mg capsule Take night before MRI   • fenofibrate 160 MG tablet Take day of surgery  • ferrous sulfate 325 (65 Fe) mg tablet Hold day of surgery  • ibuprofen (MOTRIN) 600 mg tablet Uses PRN- OK to take day of surgery   • levothyroxine 150 mcg tablet Take day of surgery  • magnesium Oxide (MAG-OX) 400 mg TABS Hold day of surgery  • omeprazole (PriLOSEC) 40 MG capsule Take day of surgery  • oxyCODONE-acetaminophen (PERCOCET)  mg per tablet PRN may take day of MRI   • potassium chloride (K-DUR,KLOR-CON) 20 mEq tablet Hold day of surgery  • Potassium Chloride ER 20 MEQ TBCR Hold day of surgery   • pramipexole (MIRAPEX) 0 5 mg tablet Take night before surgery   • pregabalin (LYRICA) 100 mg capsule Take day of surgery  intact

## 2022-11-15 NOTE — CONSULT NOTE ADULT - CONSULT REQUESTED DATE/TIME
03-Apr-2021 15:03 Samples Given: Arazlo lotion Render In Strict Bullet Format?: No Detail Level: Zone Continue Regimen: fluorouracil 5 % cream tops of hands and feet

## 2022-11-21 ENCOUNTER — NON-APPOINTMENT (OUTPATIENT)
Age: 77
End: 2022-11-21

## 2022-11-21 ENCOUNTER — APPOINTMENT (OUTPATIENT)
Dept: OPHTHALMOLOGY | Facility: CLINIC | Age: 77
End: 2022-11-21

## 2022-11-21 PROCEDURE — 99024 POSTOP FOLLOW-UP VISIT: CPT

## 2023-01-23 ENCOUNTER — APPOINTMENT (OUTPATIENT)
Dept: OPHTHALMOLOGY | Facility: CLINIC | Age: 78
End: 2023-01-23
Payer: MEDICARE

## 2023-01-23 ENCOUNTER — NON-APPOINTMENT (OUTPATIENT)
Age: 78
End: 2023-01-23

## 2023-01-23 PROCEDURE — 76514 ECHO EXAM OF EYE THICKNESS: CPT

## 2023-01-23 PROCEDURE — 92133 CPTRZD OPH DX IMG PST SGM ON: CPT

## 2023-01-23 PROCEDURE — 92012 INTRM OPH EXAM EST PATIENT: CPT

## 2023-01-24 ENCOUNTER — APPOINTMENT (OUTPATIENT)
Dept: ORTHOPEDIC SURGERY | Facility: CLINIC | Age: 78
End: 2023-01-24
Payer: MEDICARE

## 2023-01-24 VITALS — BODY MASS INDEX: 31.99 KG/M2 | HEIGHT: 65 IN | WEIGHT: 192 LBS

## 2023-01-24 DIAGNOSIS — M54.12 RADICULOPATHY, CERVICAL REGION: ICD-10-CM

## 2023-01-24 DIAGNOSIS — M19.011 PRIMARY OSTEOARTHRITIS, RIGHT SHOULDER: ICD-10-CM

## 2023-01-24 DIAGNOSIS — M50.90 CERVICAL DISC DISORDER, UNSPECIFIED, UNSPECIFIED CERVICAL REGION: ICD-10-CM

## 2023-01-24 DIAGNOSIS — M47.22 OTHER SPONDYLOSIS WITH RADICULOPATHY, CERVICAL REGION: ICD-10-CM

## 2023-01-24 DIAGNOSIS — M19.012 PRIMARY OSTEOARTHRITIS, LEFT SHOULDER: ICD-10-CM

## 2023-01-24 PROCEDURE — 73030 X-RAY EXAM OF SHOULDER: CPT | Mod: 50

## 2023-01-24 PROCEDURE — 72040 X-RAY EXAM NECK SPINE 2-3 VW: CPT

## 2023-01-24 PROCEDURE — 99204 OFFICE O/P NEW MOD 45 MIN: CPT | Mod: 25

## 2023-01-24 RX ORDER — MELOXICAM 15 MG/1
15 TABLET ORAL
Qty: 30 | Refills: 1 | Status: ACTIVE | COMMUNITY
Start: 2023-01-24 | End: 1900-01-01

## 2023-01-24 NOTE — HISTORY OF PRESENT ILLNESS
[10] : 10 [Dull/Aching] : dull/aching [Throbbing] : throbbing [Tingling] : tingling [Constant] : constant [Leisure] : leisure [Sleep] : sleep [Nothing helps with pain getting better] : Nothing helps with pain getting better [de-identified] : Pt. is a 77 year old female who presents for evaluation of both shoulders. Denies trauma. Symptoms x years. She reports pain with reaching/overhead activity. She notes numbness/tingling in both hands. Pain can radiate down both arms. No recent treatment. No previous injury/problem with either shoulder reported. She had CSI times 3 L last one month ago without help and had elevated BS [] : no [FreeTextEntry1] : both shoulders [FreeTextEntry5] : JUAN is a 77 year old F who is here today for new injury of both shoulders. NKI. Chronic pain for years, has been getting worse since November. Patient reports numbness in ands. Pain radiates down arms.  [FreeTextEntry7] : down both arms [de-identified] : mri

## 2023-01-24 NOTE — ASSESSMENT
[FreeTextEntry1] : poss MRIs to plan for TSAs\par Patient allowed to gently start resuming activities. \par Discussed change to medication prescription and usage. \par Bracing options discussed with patient. \par Activity modification as needed\par Discussed poss future surgery, pt deciding\par 01/24/2023 \par \par  RE:  JUAN LANTIGUA \par \par Acct #- [id number] \par \par \par Attention:  Nurse Reviewer /Medical Director\par \par I am writing this letter as a medical necessity for PT program.\par Patient has tried analgesics, non-steroid anti-inflammatory agents, \par hot or cold compresses,injections of corticosteroids, etc)  which in combination or by themselves has not worked.\par Based on my patient's condition, I strongly believe that the PT is medically needed.\par  \par Thank you for your time and consideration.   \par \par

## 2023-01-24 NOTE — PHYSICAL EXAM
[Straightening consistent with spasm] : Straightening consistent with spasm [Facet arthropathy] : Facet arthropathy [Disc space narrowing] : Disc space narrowing [3___] : external rotation 3[unfilled]/5 [Bilateral] : shoulder bilaterally [Degenerative change] : Degenerative change [] : no ecchymosis [FreeTextEntry1] : R > L [TWNoteComboBox7] : active forward flexion 90 degrees [TWNoteComboBox4] : passive forward flexion 120 degrees [de-identified] : external rotation 0 degrees

## 2023-02-06 ENCOUNTER — NON-APPOINTMENT (OUTPATIENT)
Age: 78
End: 2023-02-06

## 2023-02-06 ENCOUNTER — APPOINTMENT (OUTPATIENT)
Dept: OPHTHALMOLOGY | Facility: CLINIC | Age: 78
End: 2023-02-06
Payer: MEDICARE

## 2023-02-06 PROCEDURE — 92012 INTRM OPH EXAM EST PATIENT: CPT

## 2023-02-13 ENCOUNTER — APPOINTMENT (OUTPATIENT)
Dept: ORTHOPEDIC SURGERY | Facility: CLINIC | Age: 78
End: 2023-02-13
Payer: MEDICARE

## 2023-02-13 VITALS — HEIGHT: 65 IN | WEIGHT: 192 LBS | BODY MASS INDEX: 31.99 KG/M2

## 2023-02-13 DIAGNOSIS — S80.01XA CONTUSION OF RIGHT KNEE, INITIAL ENCOUNTER: ICD-10-CM

## 2023-02-13 PROCEDURE — 73562 X-RAY EXAM OF KNEE 3: CPT | Mod: RT

## 2023-02-13 PROCEDURE — 99213 OFFICE O/P EST LOW 20 MIN: CPT

## 2023-02-13 NOTE — PHYSICAL EXAM
[5___] : hamstring 5[unfilled]/5 [] : no calf tenderness [Right] : right knee [AP] : anteroposterior [Lateral] : lateral [Oden] : skyline [Components well fixed, in good position] : Components well fixed, in good position [TWNoteComboBox7] : flexion 105 degrees [de-identified] : extension 0 degrees

## 2023-02-13 NOTE — ASSESSMENT
[FreeTextEntry1] : ice\par  use cane\par  activity as tolerates\par if no resolution then see TKA specialist \par she is feeling better today No

## 2023-02-13 NOTE — HISTORY OF PRESENT ILLNESS
[10] : 10 [7] : 7 [Dull/Aching] : dull/aching [Radiating] : radiating [Throbbing] : throbbing [Tingling] : tingling [Nothing helps with pain getting better] : Nothing helps with pain getting better [de-identified] : 77 year old female with pain in the right knee, she fell on 2/7/23 in the house, concrete surface, landed onto the lateral aspect of the right knee. Pain with walking, she had swelling which is improving. Ambulating wit a cane and did not require an assistive device prior to the fall.  She has been icing and staying off her feet without much help.  She had 4 prior surgeries on the right knee, she had a primary TKA, developed MRSA and had subsequent I and D, spacer, reimplantation, IV anbx. Last surgery was performed in 2012 and she was doing relatively well until this episode. Surgeries by DR Ruiz [] : no [FreeTextEntry1] : right knee  [FreeTextEntry6] : numbness in the feet  [FreeTextEntry7] : to the feet

## 2023-03-06 ENCOUNTER — NON-APPOINTMENT (OUTPATIENT)
Age: 78
End: 2023-03-06

## 2023-03-06 ENCOUNTER — APPOINTMENT (OUTPATIENT)
Dept: OPHTHALMOLOGY | Facility: CLINIC | Age: 78
End: 2023-03-06
Payer: MEDICARE

## 2023-03-06 PROCEDURE — 92012 INTRM OPH EXAM EST PATIENT: CPT

## 2023-03-06 PROCEDURE — 92136 OPHTHALMIC BIOMETRY: CPT

## 2023-03-15 NOTE — H&P PST ADULT - NEGATIVE CARDIOVASCULAR SYMPTOMS
How Severe Is Your Condition?: mild
no chest pain/no dyspnea on exertion/no peripheral edema/no palpitations

## 2023-04-24 ENCOUNTER — APPOINTMENT (OUTPATIENT)
Dept: OPHTHALMOLOGY | Facility: CLINIC | Age: 78
End: 2023-04-24
Payer: MEDICARE

## 2023-04-24 ENCOUNTER — NON-APPOINTMENT (OUTPATIENT)
Age: 78
End: 2023-04-24

## 2023-04-24 PROCEDURE — 92012 INTRM OPH EXAM EST PATIENT: CPT

## 2023-06-05 NOTE — ASU PATIENT PROFILE, ADULT - NSICDXPASTSURGICALHX_GEN_ALL_CORE_FT
PAST SURGICAL HISTORY:  FH: cholecystectomy     H/O right inguinal hernia repair     H/O total knee replacement, bilateral     H/O: hysterectomy     History of corneal transplant right    S/P cataract surgery right    S/P eye surgery

## 2023-06-05 NOTE — ASU PATIENT PROFILE, ADULT - NS PREOP UNDERSTANDS INFO
No solid food, dairy, candy or gum after midnight, water is allowed before 10:30am tomorrow. Reminded to come with photo ID/insurance card. No jewelries/contact lens/valuable. No smoking/alcohol drinking/recreational drug use today. Escort must be 18yrs or older and must have photo ID. Call back number and address provided./yes

## 2023-06-06 ENCOUNTER — APPOINTMENT (OUTPATIENT)
Dept: OPHTHALMOLOGY | Facility: AMBULATORY SURGERY CENTER | Age: 78
End: 2023-06-06

## 2023-06-06 ENCOUNTER — NON-APPOINTMENT (OUTPATIENT)
Age: 78
End: 2023-06-06

## 2023-06-06 ENCOUNTER — TRANSCRIPTION ENCOUNTER (OUTPATIENT)
Age: 78
End: 2023-06-06

## 2023-06-06 ENCOUNTER — OUTPATIENT (OUTPATIENT)
Dept: OUTPATIENT SERVICES | Facility: HOSPITAL | Age: 78
LOS: 1 days | Discharge: HOME | End: 2023-06-06
Payer: MEDICARE

## 2023-06-06 VITALS
RESPIRATION RATE: 16 BRPM | HEART RATE: 68 BPM | OXYGEN SATURATION: 98 % | SYSTOLIC BLOOD PRESSURE: 142 MMHG | TEMPERATURE: 98 F | DIASTOLIC BLOOD PRESSURE: 64 MMHG

## 2023-06-06 VITALS
HEIGHT: 65 IN | WEIGHT: 195.55 LBS | TEMPERATURE: 98 F | RESPIRATION RATE: 16 BRPM | HEART RATE: 71 BPM | OXYGEN SATURATION: 98 % | SYSTOLIC BLOOD PRESSURE: 141 MMHG | DIASTOLIC BLOOD PRESSURE: 75 MMHG

## 2023-06-06 DIAGNOSIS — Z98.49 CATARACT EXTRACTION STATUS, UNSPECIFIED EYE: Chronic | ICD-10-CM

## 2023-06-06 DIAGNOSIS — Z94.7 CORNEAL TRANSPLANT STATUS: Chronic | ICD-10-CM

## 2023-06-06 DIAGNOSIS — Z82.8 FAMILY HISTORY OF OTHER DISABILITIES AND CHRONIC DISEASES LEADING TO DISABLEMENT, NOT ELSEWHERE CLASSIFIED: Chronic | ICD-10-CM

## 2023-06-06 DIAGNOSIS — Z90.710 ACQUIRED ABSENCE OF BOTH CERVIX AND UTERUS: Chronic | ICD-10-CM

## 2023-06-06 DIAGNOSIS — Z96.653 PRESENCE OF ARTIFICIAL KNEE JOINT, BILATERAL: Chronic | ICD-10-CM

## 2023-06-06 DIAGNOSIS — Z98.890 OTHER SPECIFIED POSTPROCEDURAL STATES: Chronic | ICD-10-CM

## 2023-06-06 DIAGNOSIS — Z98.89 OTHER SPECIFIED POSTPROCEDURAL STATES: Chronic | ICD-10-CM

## 2023-06-06 PROCEDURE — 66984 XCAPSL CTRC RMVL W/O ECP: CPT | Mod: LT

## 2023-06-06 DEVICE — LENS IOL TECNIS EYHANCE DIB00 22.0D
Type: IMPLANTABLE DEVICE | Site: LEFT (LEFT EYE) | Status: NON-FUNCTIONAL
Removed: 2023-06-06

## 2023-06-06 RX ORDER — OFLOXACIN 0.3 %
1 DROPS OPHTHALMIC (EYE)
Refills: 0 | Status: COMPLETED | OUTPATIENT
Start: 2023-06-06 | End: 2023-06-06

## 2023-06-06 RX ORDER — ROSUVASTATIN CALCIUM 5 MG/1
1 TABLET ORAL
Qty: 0 | Refills: 0 | DISCHARGE

## 2023-06-06 RX ORDER — METFORMIN HYDROCHLORIDE 850 MG/1
1 TABLET ORAL
Qty: 0 | Refills: 0 | DISCHARGE

## 2023-06-06 RX ORDER — LEVOTHYROXINE SODIUM 125 MCG
1 TABLET ORAL
Qty: 0 | Refills: 0 | DISCHARGE

## 2023-06-06 RX ORDER — TROPICAMIDE 1 %
1 DROPS OPHTHALMIC (EYE)
Refills: 0 | Status: COMPLETED | OUTPATIENT
Start: 2023-06-06 | End: 2023-06-06

## 2023-06-06 RX ORDER — ASPIRIN/CALCIUM CARB/MAGNESIUM 324 MG
1 TABLET ORAL
Qty: 0 | Refills: 0 | DISCHARGE

## 2023-06-06 RX ORDER — PHENYLEPHRINE HCL 2.5 %
1 DROPS OPHTHALMIC (EYE)
Refills: 0 | Status: COMPLETED | OUTPATIENT
Start: 2023-06-06 | End: 2023-06-06

## 2023-06-06 RX ORDER — AMLODIPINE BESYLATE 2.5 MG/1
1 TABLET ORAL
Qty: 0 | Refills: 0 | DISCHARGE

## 2023-06-06 RX ORDER — MORPHINE SULFATE 50 MG/1
1 CAPSULE, EXTENDED RELEASE ORAL
Qty: 0 | Refills: 0 | DISCHARGE

## 2023-06-06 RX ORDER — METOPROLOL TARTRATE 50 MG
1 TABLET ORAL
Qty: 0 | Refills: 0 | DISCHARGE

## 2023-06-06 RX ADMIN — Medication 1 DROP(S): at 12:31

## 2023-06-06 RX ADMIN — Medication 1 DROP(S): at 12:14

## 2023-06-06 RX ADMIN — Medication 1 DROP(S): at 12:02

## 2023-06-06 RX ADMIN — Medication 1 DROP(S): at 12:15

## 2023-06-06 RX ADMIN — Medication 1 DROP(S): at 12:01

## 2023-06-06 NOTE — OPERATIVE REPORT - OPERATIVE RPOSRT DETAILS
PATIENT:  JUAN LANTIGUA	    ACCOUNT  NUMBER:  [id number]     OPERATING SURGEON:  Dr. Ranjith Linda	    ASSISTANT SURGEON:  [  Suyeon Yu MD   ]    DATE OF SURGERY:  [  6/6/23   ]	    ANESTHESIA:  MAC/TOPICAL	    ESTIMATED BLOOD LOSS: < 1mL	    COMPLICATIONS:  [   None  ]		    PREOPERATIVE DIAGNOSIS:  Cataract,  [  LEFT    ] eye    POSTOPERATIVE DIAGNOSIS:  Cataract, [ LEFT  ] eye    OPERATIVE PROCEDURE:  Phacoemulsification with insertion  Of posterior chamber intraocular lens [   LEFT  ] eye    LENS IMPLANT [  DIB00 22D   ]    PROCEDURE:	The patient was brought into the operating room and placed supine on the operating room table. After uneventful induction of neuroleptic anesthesia, lidocaine gel was instilled into the operative eye achieving sufficient anesthesia.  The patient was then prepped and draped in the usual sterile fashion.  A wire lid speculum was then inserted into the operative eye giving a wide palpebral fissure.      A kathy knife was used to perform paracenteses through clear cornea at the 12 and 6 o’clock limbal positions.  The anterior chamber was irrigated with lidocaine 1% nonpreserved.  Viscoelastic was then used to maintain the anterior chamber.  A keratome was used to create a clear corneal incision just inside the temporal limbus.  A bent 25 gauge needle was then used to initiate an anterior capsulorhexis, which was completed with the use of capsulorhexis forceps.  Balanced salt solution was used to hydrodissect the nucleus.  The [employer] phacoemulsification unit was then used to completely phacoemulsify the nucleus, following which an aspirating handpiece was used to aspirate all cortical remnants from the capsular bag.  Viscoelastic was again used to reform the anterior chamber and capsular bag.      A new foldable posterior chamber intraocular lens was brought into the surgical field.  It was folded and directly injected into the capsular bag following which it was centered and secure.  All viscoelastic was then aspirated from the anterior segment using an aspirating handpiece.  All wounds were checked for leaks and there were none. Tobradex drops were used to irrigate the surface of the eye.  The lid speculum was removed from the eye and a shield placed over the eye.      The patient tolerated the procedure well and went to the recovery area in good condition.      					       PATIENT:  JUAN LANTIGUA	    ACCOUNT  NUMBER:  [id number]     OPERATING SURGEON:  Dr. Ranjith Linda	    ASSISTANT SURGEON:  [  Suyeon Yu MD   ]    DATE OF SURGERY:  [  6/6/23   ]	    ANESTHESIA:  MAC/TOPICAL	    ESTIMATED BLOOD LOSS: < 1mL	    COMPLICATIONS:  [   None  ]		    PREOPERATIVE DIAGNOSIS:  Cataract,  [  LEFT    ] eye.    POSTOPERATIVE DIAGNOSIS:  Cataract, [ LEFT  ] eye    OPERATIVE PROCEDURE:  Phacoemulsification with insertion  Of posterior chamber intraocular lens [   LEFT  ] eye    LENS IMPLANT [  DIB00 22D   ]    PROCEDURE:	The patient was brought into the operating room and placed supine on the operating room table. After uneventful induction of neuroleptic anesthesia, lidocaine gel was instilled into the operative eye achieving sufficient anesthesia.  The patient was then prepped and draped in the usual sterile fashion.  A wire lid speculum was then inserted into the operative eye giving a wide palpebral fissure.      A kathy knife was used to perform paracenteses through clear cornea at the 12 and 6 o’clock limbal positions.  The anterior chamber was irrigated with lidocaine 1% nonpreserved.  Viscoelastic was then used to maintain the anterior chamber.  A keratome was used to create a clear corneal incision just inside the temporal limbus.  A bent 25 gauge needle was then used to initiate an anterior capsulorhexis, which was completed with the use of capsulorhexis forceps.  Balanced salt solution was used to hydrodissect the nucleus.  The [employer] phacoemulsification unit was then used to completely phacoemulsify the nucleus, following which an aspirating handpiece was used to aspirate all cortical remnants from the capsular bag.  Viscoelastic was again used to reform the anterior chamber and capsular bag.      A new foldable posterior chamber intraocular lens was brought into the surgical field.  It was folded and directly injected into the capsular bag following which it was centered and secure.  All viscoelastic was then aspirated from the anterior segment using an aspirating handpiece.  All wounds were checked for leaks and there were none. Tobradex drops were used to irrigate the surface of the eye.  The lid speculum was removed from the eye and a shield placed over the eye.      The patient tolerated the procedure well and went to the recovery area in good condition.

## 2023-06-06 NOTE — ASU DISCHARGE PLAN (ADULT/PEDIATRIC) - NS MD DC FALL RISK RISK
For information on Fall & Injury Prevention, visit: [email or web address] OR  [email or web address] OR  https://www.cdc.gov/steadi/patient.html

## 2023-06-07 ENCOUNTER — NON-APPOINTMENT (OUTPATIENT)
Age: 78
End: 2023-06-07

## 2023-06-07 ENCOUNTER — APPOINTMENT (OUTPATIENT)
Dept: OPHTHALMOLOGY | Facility: CLINIC | Age: 78
End: 2023-06-07
Payer: MEDICARE

## 2023-06-07 PROCEDURE — 99024 POSTOP FOLLOW-UP VISIT: CPT

## 2023-06-14 ENCOUNTER — NON-APPOINTMENT (OUTPATIENT)
Age: 78
End: 2023-06-14

## 2023-06-14 ENCOUNTER — APPOINTMENT (OUTPATIENT)
Dept: OPHTHALMOLOGY | Facility: CLINIC | Age: 78
End: 2023-06-14
Payer: MEDICARE

## 2023-06-14 PROCEDURE — 99024 POSTOP FOLLOW-UP VISIT: CPT

## 2023-07-05 ENCOUNTER — APPOINTMENT (OUTPATIENT)
Dept: OPHTHALMOLOGY | Facility: CLINIC | Age: 78
End: 2023-07-05
Payer: MEDICARE

## 2023-07-05 ENCOUNTER — NON-APPOINTMENT (OUTPATIENT)
Age: 78
End: 2023-07-05

## 2023-07-05 PROCEDURE — 99024 POSTOP FOLLOW-UP VISIT: CPT

## 2023-07-12 NOTE — ED ADULT TRIAGE NOTE - RESPIRATORY RATE (BREATHS/MIN)
20 Doxycycline Counseling:  Patient counseled regarding possible photosensitivity and increased risk for sunburn.  Patient instructed to avoid sunlight, if possible.  When exposed to sunlight, patients should wear protective clothing, sunglasses, and sunscreen.  The patient was instructed to call the office immediately if the following severe adverse effects occur:  hearing changes, easy bruising/bleeding, severe headache, or vision changes.  The patient verbalized understanding of the proper use and possible adverse effects of doxycycline.  All of the patient's questions and concerns were addressed.

## 2023-07-12 NOTE — DISCHARGE NOTE NURSING/CASE MANAGEMENT/SOCIAL WORK - NSDCPETBCESMAN_GEN_ALL_CORE
If you are a smoker, it is important for your health to stop smoking. Please be aware that second hand smoke is also harmful. Slit Excision Additional Text (Leave Blank If You Do Not Want): A linear line was drawn on the skin overlying the lesion. An incision was made slowly until the lesion was visualized.  Once visualized, the lesion was removed with blunt dissection.

## 2023-07-24 ENCOUNTER — NON-APPOINTMENT (OUTPATIENT)
Age: 78
End: 2023-07-24

## 2023-07-24 ENCOUNTER — APPOINTMENT (OUTPATIENT)
Dept: OPHTHALMOLOGY | Facility: CLINIC | Age: 78
End: 2023-07-24
Payer: MEDICARE

## 2023-07-24 PROCEDURE — 92133 CPTRZD OPH DX IMG PST SGM ON: CPT

## 2023-07-24 PROCEDURE — 92014 COMPRE OPH EXAM EST PT 1/>: CPT | Mod: 24

## 2023-08-02 ENCOUNTER — APPOINTMENT (OUTPATIENT)
Dept: OPHTHALMOLOGY | Facility: CLINIC | Age: 78
End: 2023-08-02
Payer: MEDICARE

## 2023-08-02 ENCOUNTER — NON-APPOINTMENT (OUTPATIENT)
Age: 78
End: 2023-08-02

## 2023-08-02 PROCEDURE — 99024 POSTOP FOLLOW-UP VISIT: CPT

## 2023-08-02 PROCEDURE — 92025 CPTRIZED CORNEAL TOPOGRAPHY: CPT

## 2023-09-08 NOTE — CONSULT NOTE ADULT - ASSESSMENT
(PRINIVIL;ZESTRIL) 5 MG tablet Take 1 tablet by mouth daily 90 tablet 3    nitroGLYCERIN (NITROSTAT) 0.4 MG SL tablet Place 1 tablet under the tongue every 5 minutes as needed for Chest pain up to max of 3 total doses. If no relief after 1 dose, call 911. 25 tablet 3    isosorbide mononitrate (IMDUR) 30 MG extended release tablet Take 1 tablet by mouth daily 90 tablet 3    pantoprazole (PROTONIX) 40 MG tablet TAKE 1 TABLET BY MOUTH  TWICE DAILY 180 tablet 0    Multiple Vitamins-Minerals (ONE-A-DAY 50 PLUS PO) Take by mouth      aspirin 81 MG tablet Take 1 tablet by mouth daily       No current facility-administered medications for this visit. Past Surgical History:   Procedure Laterality Date    APPENDECTOMY      BACK SURGERY      X 2 FOR HERNIATED DISCS    CARDIOVASCULAR STRESS TEST  3 26 2010    Mild chest discomfort induced by IV Lexiscan that resolved spontaneously. No arrhythmias. No EKG changes to suggest ischemia. [address]    COLONOSCOPY  2013    CORONARY ANGIOPLASTY      X 2 STENTS    DIAGNOSTIC CARDIAC CATH LAB PROCEDURE  2 22 2010    Successful drug-eluting stent x 2 of mid LAD. LV borderline normal LV function. EF 50-55%. No wall motion abnormalities detected and no MR. Normal hemodynamics. Coronaries - diffuse left anterior descending (LAD) disease with proximal 50% and distal around 70%. ELBOW SURGERY  1980    right    KNEE SURGERY  1980    left    LYMPH NODE BIOPSY  2011    neck    SHOULDER ARTHROSCOPY      [address] [facility], [facility]    UPPER GASTROINTESTINAL ENDOSCOPY  2013    US THYROID CYST ASPIRATION AND OR INJECTION  2/23/2021    US THYROID CYST ASPIRATION AND OR INJECTION 2/23/2021 [facility] ULTRASOUND       Review of Systems   Constitutional: Negative. HENT: Negative. Respiratory: Negative. Cardiovascular: Negative. Gastrointestinal: Negative. Musculoskeletal: Negative.     Skin:         Wart left middle finger     All other systems reviewed and are diabetes   Hypothyroidism

## 2023-09-13 ENCOUNTER — APPOINTMENT (OUTPATIENT)
Dept: OPHTHALMOLOGY | Facility: CLINIC | Age: 78
End: 2023-09-13
Payer: MEDICARE

## 2023-09-13 ENCOUNTER — NON-APPOINTMENT (OUTPATIENT)
Age: 78
End: 2023-09-13

## 2023-09-13 PROCEDURE — 99212 OFFICE O/P EST SF 10 MIN: CPT

## 2023-09-21 NOTE — ED ADULT NURSE NOTE - NSFALLRSKOUTCOME_ED_ALL_ED
Abdomen , soft, nontender, nondistended , no guarding or rigidity , no masses palpable , normal bowel sounds , Liver and Spleen,  no hepatosplenomegaly , liver nontender
Fall Risk

## 2023-10-01 PROBLEM — M54.16 LUMBAR RADICULAR PAIN: Status: ACTIVE | Noted: 2017-08-31

## 2023-11-07 NOTE — H&P PST ADULT - PROBLEM/PLAN-4
Where Do You Want The Question To Include Opioid Counseling Located?: Case Summary Tab DISPLAY PLAN FREE TEXT

## 2023-11-08 ENCOUNTER — APPOINTMENT (OUTPATIENT)
Dept: OPHTHALMOLOGY | Facility: CLINIC | Age: 78
End: 2023-11-08
Payer: MEDICARE

## 2023-11-08 ENCOUNTER — NON-APPOINTMENT (OUTPATIENT)
Age: 78
End: 2023-11-08

## 2023-11-08 PROCEDURE — 92012 INTRM OPH EXAM EST PATIENT: CPT

## 2023-12-07 NOTE — PATIENT PROFILE ADULT - FUNCTIONAL ASSESSMENT - DAILY ACTIVITY 5.
Progress Note:Cardiology  Rafita Byrd 1964, 59 y.o. male MRN: [id number]    Unit/Bed#: -01 Encounter: [id number]  Attending Physician: Lyle Post MD   Primary Care Provider: No primary care provider on file.   Date admitted to hospital: 12/4/2023  Length of stay: 3         Acute on chronic combined systolic and diastolic CHF (congestive heart failure) (HCC)  Assessment & Plan  Wt Readings from Last 3 Encounters:   12/05/23 (!) 155 kg (342 lb)     Patient presents with shortness of breath and swelling  .  Visibly volume overloaded on exam.  Echo 12/5/2023 shows EF 30% with mild LV dilation, severe RV/RA dilation with septal bowing consistent with pressure overload.  Patient should have JERO evaluation.  Will continue to diurese with IV furosemide 80 mg BID.  Telemetry due to aggressive diuresis.  Strict I's/O's, standing daily weights, sodium/fluid restriction.  Optimize electrolytes for K+ >4, Mag >2.  See discussion under cardiomyopathy.     Cardiomyopathy (HCC)  Assessment & Plan  Known cardiomyopathy with EF 15-20% at some point?  Reported as non-ischemic. No coronary calcifications seen on CT.  He denies ever having a cath.  Echo 11/2/22 with EF 30-34%.  Echocardiogram today shows EF 30% with LV and RV dysfunction.  Will continue with aggressive diuresis and then optimize GDMT as BP allows.  Transitioned from metoprolol tartrate to metoprolol succinate. Increase today to 75 mg BID.  Add losartan back at 25 mg daily.  Can consider addition of Jardiance but will need case management to price check.   Should follow up with primary cardiologist for ICD. This was discussed but never done.    Hypomagnesemia  Assessment & Plan  Replete for Mag > 2    Positive blood culture  Assessment & Plan  No fevers or evidence of sepsis. Management as per primary team.      Non-healing wound of lower extremity  Assessment & Plan  Follows with [facility] vascular and wound care  Patient has evidence of  "right sided heart failure. Will continue to diuresis.  Would benefit from JERO evaluation.    Hypertension  Assessment & Plan  BP is stable  Resume losartan 25 mg daily and will monitor    * Acute respiratory failure (HCC)  Assessment & Plan  Secondary to volume overload.  Improving with diuresis. Down to 1 L NC today.        Subjective:   Patient seen and examined. He continues to feel better every day. Urinating vigorously with IV furosemide. No chest pain, lightheadedness. He does notice heart \"pounding\" when exerting himself. HR up to 150's with ambulation overnight and received a PRN dose of Lopressor 5 mg IV.    Review of Systems   Constitutional: Negative.   HENT: Negative.     Cardiovascular:  Positive for dyspnea on exertion and leg swelling. Negative for chest pain, irregular heartbeat, near-syncope, orthopnea and palpitations.   Respiratory:  Negative for cough and snoring.    Endocrine: Negative.    Skin: Negative.    Musculoskeletal: Negative.    Gastrointestinal: Negative.    Genitourinary: Negative.    Neurological: Negative.    Psychiatric/Behavioral: Negative.           Objective:     Vitals: Blood pressure 109/83, pulse (!) 120, temperature (!) 97 °F (36.1 °C), resp. rate 19, height 6' (1.829 m), weight (!) 147 kg (324 lb 1.6 oz), SpO2 95 %., Body mass index is 43.96 kg/m².,     Orthostatic Blood Pressures      Flowsheet Row Most Recent Value   Blood Pressure 109/83 filed at 12/07/2023 1057   Patient Position - Orthostatic VS Lying filed at 12/06/2023 2342            Physical Exam  Vitals and nursing note reviewed.   Constitutional:       General: He is not in acute distress.     Appearance: He is well-developed. He is obese.   HENT:      Head: Normocephalic and atraumatic.   Eyes:      Conjunctiva/sclera: Conjunctivae normal.   Cardiovascular:      Rate and Rhythm: Regular rhythm. Tachycardia present.      Heart sounds: No murmur heard.  Pulmonary:      Effort: Pulmonary effort is normal. No " respiratory distress.      Breath sounds: Normal breath sounds.      Comments: Breathing comfortably on 1 L NC  Abdominal:      Palpations: Abdomen is soft.      Tenderness: There is no abdominal tenderness.   Musculoskeletal:         General: No swelling.      Cervical back: Neck supple.      Right lower leg: 3+ Edema present.      Left lower leg: 3+ Edema present.   Skin:     General: Skin is warm and dry.      Capillary Refill: Capillary refill takes less than 2 seconds.   Neurological:      Mental Status: He is alert.   Psychiatric:         Mood and Affect: Mood normal.            Intake/Output Summary (Last 24 hours) at 12/7/2023 1231  Last data filed at 12/7/2023 1201  Gross per 24 hour   Intake 1770 ml   Output 3450 ml   Net -1680 ml       Weight (last 2 days)       Date/Time Weight    12/07/23 0600 147 (324.1)    12/07/23 0300 147 (324.8)    12/06/23 0600 156 (343.2)    12/05/23 0830 155 (342)    12/05/23 0600 155 (342.82)    12/05/23 0526 155 (342.82)               Medications:      Current Facility-Administered Medications:     acetaminophen (TYLENOL) tablet 650 mg, 650 mg, Oral, Q6H PRN, Ines Tom PA-C, 650 mg at 12/06/23 0130    ceFAZolin (ANCEF) IVPB (premix in dextrose) 2,000 mg 50 mL, 2,000 mg, Intravenous, Q6H, Elvia Campuzano PA-C    docusate sodium (COLACE) capsule 100 mg, 100 mg, Oral, BID, Lyle Post MD, 100 mg at 12/07/23 0814    fluticasone (FLONASE) 50 mcg/act nasal spray 1 spray, 1 spray, Each Nare, Daily, Ines Tom PA-C, 1 spray at 12/07/23 0821    furosemide (LASIX) injection 80 mg, 80 mg, Intravenous, BID (diuretic), AKANKSHA Tavarez, 80 mg at 12/07/23 0813    guaiFENesin (MUCINEX) 12 hr tablet 600 mg, 600 mg, Oral, Q12H ANTON, Ines Tom PA-C, 600 mg at 12/07/23 0814    heparin (porcine) subcutaneous injection 5,000 Units, 5,000 Units, Subcutaneous, Q8H ANTON, Ines Tom PA-C, 5,000 Units at 12/07/23 0544    hydrOXYzine HCL (ATARAX) tablet 25 mg, 25 mg, Oral, Q6H  PRN, Ines Tom PA-C, 25 mg at 12/07/23 1039    insulin lispro (HumaLOG) 100 units/mL subcutaneous injection 1-5 Units, 1-5 Units, Subcutaneous, TID AC, 1 Units at 12/06/23 1609 **AND** Fingerstick Glucose (POCT), , , 4x Daily AC and at bedtime, Ines Tom PA-C    insulin lispro (HumaLOG) 100 units/mL subcutaneous injection 1-5 Units, 1-5 Units, Subcutaneous, HS, Ines Tom PA-C    loratadine (CLARITIN) tablet 10 mg, 10 mg, Oral, Daily, Ines Tom PA-C, 10 mg at 12/07/23 0814    losartan (COZAAR) tablet 25 mg, 25 mg, Oral, Daily, AKANKSHA Tavarez, 25 mg at 12/07/23 1212    magnesium Oxide (MAG-OX) tablet 800 mg, 800 mg, Oral, BID, Ines Tom PA-C, 800 mg at 12/07/23 0814    metoprolol (LOPRESSOR) injection 5 mg, 5 mg, Intravenous, Q6H PRN, AKANKSHA Tavarez    metoprolol succinate (TOPROL-XL) 24 hr tablet 75 mg, 75 mg, Oral, BID, AKANKSHA Tavarez    morphine injection 2 mg, 2 mg, Intravenous, Q6H PRN, Ines Tom PA-C, 2 mg at 12/07/23 0709    pantoprazole (PROTONIX) EC tablet 40 mg, 40 mg, Oral, BID AC, Ines Tom PA-C, 40 mg at 12/07/23 0543    pentoxifylline (TRENtal) ER tablet 400 mg, 400 mg, Oral, BID, Ines Tom PA-C, 400 mg at 12/07/23 0814    potassium chloride (K-DUR,KLOR-CON) CR tablet 40 mEq, 40 mEq, Oral, BID, Ines Tom PA-C, 40 mEq at 12/07/23 0813    senna (SENOKOT) tablet 17.2 mg, 2 tablet, Oral, HS, Melissa Lara DO, 17.2 mg at 12/06/23 2144    traMADol (ULTRAM) tablet 50 mg, 50 mg, Oral, Q6H PRN, Ines Tom PA-C, 50 mg at 12/07/23 1150     Labs & Results:        Results from last 7 days   Lab Units 12/07/23  0901 12/06/23  0828 12/05/23  0521   WBC Thousand/uL 7.55 6.57 9.84   HEMOGLOBIN g/dL 12.0 10.9* 10.8*   HEMATOCRIT % 38.7 35.6* 34.6*   PLATELETS Thousands/uL 175 150 160         Results from last 7 days   Lab Units 12/07/23  0901 12/06/23  1429 12/06/23  0828 12/05/23  1449 12/05/23  0521 12/04/23  1114   POTASSIUM mmol/L 3.8 3.3* 3.2*    < > 3.4* 5.5*   CHLORIDE mmol/L 99 99 100   < > 100 101   CO2 mmol/L 29 31 30   < > 29 27   BUN mg/dL 19 14 15   < > 16 13   CREATININE mg/dL 1.16 1.04 1.07   < > 1.37* 1.18   CALCIUM mg/dL 8.9 8.4 8.3*   < > 8.1* 8.7   ALK PHOS U/L  --   --   --   --  80 104   ALT U/L  --   --   --   --  12 19   AST U/L  --   --   --   --  22 59*    < > = values in this interval not displayed.     Results from last 7 days   Lab Units 12/04/23  1114   INR  1.01   PTT seconds 37     Results from last 7 days   Lab Units 12/07/23  0901 12/06/23  1429 12/06/23  0828   MAGNESIUM mg/dL 2.0 1.8* 1.6*     Results from last 7 days   Lab Units 12/04/23  1114   BNP pg/mL 556*      Telemetry: sinus tachycardia, rate 's, briefly to 150's with ambulation only once    Counseling / Coordination of Care  Total floor / unit time spent today 25 minutes.  Greater than 50% of total time was spent with the patient and / or family counseling and / or coordination of care.  A description of the counseling / coordination of care: Discussed case with Elvia Campuzano PA-C.         3 = A little assistance

## 2023-12-21 ENCOUNTER — APPOINTMENT (OUTPATIENT)
Dept: CARDIOLOGY | Facility: CLINIC | Age: 78
End: 2023-12-21

## 2023-12-22 ENCOUNTER — APPOINTMENT (OUTPATIENT)
Dept: OPHTHALMOLOGY | Facility: CLINIC | Age: 78
End: 2023-12-22
Payer: MEDICARE

## 2023-12-22 ENCOUNTER — NON-APPOINTMENT (OUTPATIENT)
Age: 78
End: 2023-12-22

## 2023-12-22 PROCEDURE — 92012 INTRM OPH EXAM EST PATIENT: CPT

## 2024-01-22 ENCOUNTER — NON-APPOINTMENT (OUTPATIENT)
Age: 79
End: 2024-01-22

## 2024-01-22 ENCOUNTER — APPOINTMENT (OUTPATIENT)
Dept: OPHTHALMOLOGY | Facility: CLINIC | Age: 79
End: 2024-01-22
Payer: MEDICARE

## 2024-01-22 PROCEDURE — 92014 COMPRE OPH EXAM EST PT 1/>: CPT

## 2024-01-22 PROCEDURE — 92133 CPTRZD OPH DX IMG PST SGM ON: CPT

## 2024-01-24 NOTE — ED ADULT TRIAGE NOTE - STATUS:
See triage notes. Pt  requesting Covid test, stating \"My sister just called me and told me that she caught Covid\"     Pt is alert and oriented x 4, RR even and unlabored, skin is warm and dry. Assesment completed and pt updated on plan of care.       Emergency Department Nursing Plan of Care       The Nursing Plan of Care is developed from the Nursing assessment and Emergency Department Attending provider initial evaluation.  The plan of care may be reviewed in the “ED Provider note”.    The Plan of Care was developed with the following considerations:   Patient / Family readiness to learn indicated by:verbalized understanding  Persons(s) to be included in education: patient  Barriers to Learning/Limitations:None    Signed     Daryl Chaidez RN    1/24/2024   4:37 PM    [employer]

## 2024-02-05 ENCOUNTER — NON-APPOINTMENT (OUTPATIENT)
Age: 79
End: 2024-02-05

## 2024-02-05 ENCOUNTER — APPOINTMENT (OUTPATIENT)
Dept: OPHTHALMOLOGY | Facility: CLINIC | Age: 79
End: 2024-02-05
Payer: MEDICARE

## 2024-02-05 PROCEDURE — 92012 INTRM OPH EXAM EST PATIENT: CPT | Mod: 25

## 2024-03-07 NOTE — PATIENT PROFILE ADULT. - ABILITY TO HEAR (WITH HEARING AID OR HEARING APPLIANCE IF NORMALLY USED):
.     REASON FOR CONSULTATION:   On Eliquis.  Needs hip surgery  Provide an opinion on any further workup or treatment                             REQUESTING PHYSICIAN: Warren Pizarro MD  RECORDS OBTAINED:  Records of the patient's history including those obtained from the referring provider were reviewed and summarized in detail.    HISTORY OF PRESENT ILLNESS:  The patient is a 70 y.o. year old female  who is here for follow-up with the above-mentioned history.    Patient last saw PCP on 10/16/2020, Dr. Pizarro.  It was for subsequent Medicare wellness visit.  He noted a history of pulmonary embolism for which the patient has been on long-term anticoagulation.  Patient considering right hip replacement due to intractable pain.  He noted the surgeon was not sure if she would require an IVC filter prior to surgery since she has a history of PE.  Patient not sure if she is comfortable with the idea of an IVC filter.  Patient previously saw Dr. Bernstein, who was formerly in our practice.    Patient states this was her only clotting event.  She does have a significant family history of clotting.    After 6 months of therapeutic Eliquis, she has been on prophylactic Eliquis, 2.5 mg twice per day.    She states she rarely misses a dose.  She states she has not had any indication to hold Eliquis for procedure since beginning Eliquis.    Past Medical History:   Diagnosis Date   • Acute cystitis without hematuria 4/10/2020   • Acute right ankle pain 3/3/2020   • Adrenal nodule (CMS/HCC)    • BPPV (benign paroxysmal positional vertigo) 5/10/2016   • Cervical stenosis of spine    • DVT, lower extremity (CMS/HCC)     right   • GERD without esophagitis 5/10/2016   • H/O varicose vein stripping    • History of stress incontinence    • Hyperplastic colon polyp 2013   • Hypertension    • PONV (postoperative nausea and vomiting)    • Pregnancy     , miscarrige x1   • Pulmonary emboli (CMS/HCC)     bilateral extensive PE with  saddle emboli.Negative hypercoagulable state w/u   • Pulmonary embolism with acute cor pulmonale (CMS/HCC) 6/26/2017   • Urge incontinence 5/10/2016   • Vitamin D deficiency 5/10/2016     Past Surgical History:   Procedure Laterality Date   • CARDIAC CATHETERIZATION N/A 11/10/2016    Procedure: Right Heart Cath;  Surgeon: Johnna Agrawal MD;  Location:  [facility];  Service:    • CARDIAC CATHETERIZATION N/A 11/10/2016    Procedure: Thrombolytic Therapy;  Surgeon: Johnna Agrawal MD;  Location:  [facility];  Service:    • CATARACT EXTRACTION Bilateral    • COLONOSCOPY W/ BIOPSIES  11/2013    hyperplastic polyp, Dr.Russel Lewis   • DILATATION AND CURETTAGE     • EPIDURAL BLOCK     • HEMORROIDECTOMY  2013   • INTERVENTIONAL RADIOLOGY PROCEDURE Bilateral 11/10/2016    Procedure: Sp Sm[facility] Cath Place Pulmonary Art;  Surgeon: Johnna Agrawal MD;  Location:  [facility];  Service:    • TONSILLECTOMY     • TOTAL LAPAROSCOPIC HYSTERECTOMY  2001   • VEIN SURGERY Left [id number]    leg,    • VENTRAL HERNIA REPAIR  1987       MEDICATIONS    Current Outpatient Medications:   •  apixaban (ELIQUIS) 2.5 MG tablet tablet, Take 1 tablet by mouth Every 12 (Twelve) Hours., Disp: 180 tablet, Rfl: 3  •  B Complex-Biotin-FA (B-100 COMPLEX PO), Take 1 tablet by mouth Daily., Disp: , Rfl:   •  CALCIUM PO, Take 600 mg by mouth Daily., Disp: , Rfl:   •  chlorthalidone (HYGROTON) 25 MG tablet, Take 1 tablet by mouth Daily., Disp: 90 tablet, Rfl: 1  •  Cholecalciferol (VITAMIN D3) 2000 UNITS tablet, Take 1 tablet by mouth Daily., Disp: , Rfl:   •  ciclopirox (PENLAC) 8 % solution, , Disp: , Rfl:   •  diazePAM (Valium) 5 MG tablet, Take 1 tablet by mouth 2 (Two) Times a Day As Needed for Anxiety., Disp: 20 tablet, Rfl: 1  •  diclofenac (VOLTAREN) 1 % gel gel, Apply 4 g topically to the appropriate area as directed 4 (Four) Times a Day As Needed (pain)., Disp: 1 tube, Rfl: 2  •   famotidine (PEPCID) 10 MG tablet, Take 20 mg by mouth Every Night., Disp: , Rfl:   •  losartan (COZAAR) 100 MG tablet, Take 1 tablet by mouth Daily., Disp: 90 tablet, Rfl: 3  •  Magnesium 250 MG tablet, Take 2 tablets by mouth Daily., Disp: , Rfl:   •  Multiple Vitamins-Minerals (MULTIVITAMIN PO), Take 1 tablet by mouth Daily., Disp: , Rfl:   •  CRANBERRY PO, Take 1 tablet by mouth Daily., Disp: , Rfl:     ALLERGIES:     Allergies   Allergen Reactions   • Ciprocinonide [Fluocinolone] Other (See Comments)     Achilles tendonitis   • Suprax [Cefixime] Diarrhea   • Contrast Dye Rash     Rash many years ago when shell-fish derived contrast dye was used       SOCIAL HISTORY:       Social History     Socioeconomic History   • Marital status:      Spouse name: Yasir   • Number of children: Not on file   • Years of education: College   • Highest education level: Not on file   Occupational History   • Occupation:      Employer: RETIRED     Comment: [employer]   Tobacco Use   • Smoking status: Former Smoker     Packs/day: 0.50     Years: 10.00     Pack years: 5.00     Types: Cigarettes     Quit date:      Years since quittin.0   • Smokeless tobacco: Never Used   Substance and Sexual Activity   • Alcohol use: Yes     Alcohol/week: 2.0 standard drinks     Types: 2 Glasses of wine per week   • Drug use: No   • Sexual activity: Defer   Social History Narrative    LIVES WITH SPOUSE         FAMILY HISTORY:  Family History   Problem Relation Age of Onset   • Macular degeneration Mother    • Deep vein thrombosis Mother    • Hypertension Mother    • Skin cancer Mother    • Heart failure Father    • Deep vein thrombosis Father    • Cancer Father    • Hypertension Father    • Heart disease Father    • Chiari malformation Sister    • Heart disease Maternal Grandfather    • Heart disease Paternal Grandfather    • Deep vein thrombosis Sister    • Heart failure Sister    • Pancreatic cancer Sister    •  "Thyroid disease Daughter    • Hypertension Brother        REVIEW OF SYSTEMS:  Review of Systems   Constitutional: Negative for activity change.   HENT: Negative for nosebleeds and trouble swallowing.    Respiratory: Negative for shortness of breath and wheezing.    Cardiovascular: Negative for chest pain and palpitations.   Gastrointestinal: Negative for constipation, diarrhea and nausea.   Genitourinary: Negative for dysuria and hematuria.   Musculoskeletal: Negative for arthralgias and myalgias.   Skin: Negative for rash and wound.   Neurological: Negative for seizures and syncope.   Hematological: Negative for adenopathy. Does not bruise/bleed easily.   Psychiatric/Behavioral: Negative for confusion.              Vitals:    01/19/21 1311   BP: 109/71   Pulse: 98   Resp: 16   Temp: 97.1 °F (36.2 °C)   TempSrc: Skin   SpO2: 99%   Weight: 78.4 kg (172 lb 14.4 oz)   Height: 175.3 cm (69.02\")   PainSc: 0-No pain     Current Status 1/19/2021   ECOG score 0      PHYSICAL EXAM:        CONSTITUTIONAL:  Vital signs reviewed.  No distress, looks comfortable.  EYES:  Conjunctiva and lids unremarkable.  PERRLA  EARS,NOSE,MOUTH,THROAT:  Ears and nose appear unremarkable.  Lips, teeth, gums appear unremarkable.  RESPIRATORY:  Normal respiratory effort.  Lungs clear to auscultation bilaterally.  CARDIOVASCULAR:  Normal S1, S2.  No murmurs rubs or gallops.  No significant lower extremity edema.  GASTROINTESTINAL: Abdomen appears unremarkable.  Nontender.  No hepatomegaly.  No splenomegaly.  LYMPHATIC:  No cervical, supraclavicular, axillary lymphadenopathy.  SKIN:  Warm.  No rashes.  PSYCHIATRIC:  Normal judgment and insight.  Normal mood and affect.       RECENT LABS:        WBC   Date Value Ref Range Status   01/19/2021 6.96 3.40 - 10.80 10*3/mm3 Final   10/09/2020 6.27 3.40 - 10.80 10*3/mm3 Final   10/07/2019 5.85 3.40 - 10.80 10*3/mm3 Final   02/18/2019 6.8 3.4 - 10.8 x10E3/uL Final   10/19/2018 7.0 3.4 - 10.8 x10E3/uL Final "   09/28/2018 6.60 4.00 - 10.00 10*3/mm3 Final   04/17/2018 6.4 3.4 - 10.8 x10E3/uL Final   09/21/2017 6.59 4.00 - 10.00 10*3/mm3 Final   01/12/2017 6.44 4.00 - 10.00 10*3/mm3 Final   11/14/2016 5.80 4.50 - 10.70 10*3/mm3 Final   11/13/2016 7.72 4.50 - 10.70 10*3/mm3 Final   11/12/2016 8.68 4.50 - 10.70 10*3/mm3 Final   11/11/2016 9.34 4.50 - 10.70 10*3/mm3 Final   11/11/2016 9.05 4.50 - 10.70 10*3/mm3 Final   11/10/2016 6.72 4.50 - 10.70 10*3/mm3 Final   11/09/2016 11.03 (H) 4.50 - 10.70 10*3/mm3 Final   10/25/2016 8.4 3.4 - 10.8 x10E3/uL Final     Hemoglobin   Date Value Ref Range Status   01/19/2021 13.0 12.0 - 15.9 g/dL Final   10/09/2020 12.4 12.0 - 15.9 g/dL Final   10/07/2019 12.4 12.0 - 15.9 g/dL Final   02/18/2019 13.4 11.1 - 15.9 g/dL Final   10/19/2018 13.2 11.1 - 15.9 g/dL Final   09/28/2018 13.9 11.5 - 14.9 g/dL Final   04/17/2018 13.8 11.1 - 15.9 g/dL Final   09/21/2017 13.5 11.5 - 14.9 g/dL Final   01/12/2017 13.3 11.5 - 14.9 g/dL Final   11/14/2016 10.2 (L) 11.9 - 15.5 g/dL Final   11/13/2016 10.3 (L) 11.9 - 15.5 g/dL Final   11/12/2016 10.1 (L) 11.9 - 15.5 g/dL Final   11/11/2016 10.6 (L) 11.9 - 15.5 g/dL Final   11/11/2016 9.6 (L) 11.9 - 15.5 g/dL Final   11/10/2016 12.0 11.9 - 15.5 g/dL Final   11/09/2016 13.2 11.9 - 15.5 g/dL Final   10/25/2016 13.0 11.1 - 15.9 g/dL Final     Platelets   Date Value Ref Range Status   01/19/2021 247 140 - 450 10*3/mm3 Final   10/09/2020 266 140 - 450 10*3/mm3 Final   10/07/2019 215 140 - 450 10*3/mm3 Final   02/18/2019 233 150 - 379 x10E3/uL Final   10/19/2018 248 150 - 379 x10E3/uL Final   09/28/2018 222 150 - 375 10*3/mm3 Final   04/17/2018 219 150 - 379 x10E3/uL Final   09/21/2017 223 150 - 375 10*3/mm3 Final   01/12/2017 195 150 - 375 10*3/mm3 Final   11/14/2016 166 140 - 500 10*3/mm3 Final   11/13/2016 135 (L) 140 - 500 10*3/mm3 Final   11/12/2016 106 (L) 140 - 500 10*3/mm3 Final   11/11/2016 108 (L) 140 - 500 10*3/mm3 Final   11/11/2016 95 (L) 140 - 500  10*3/mm3 Final   11/10/2016 134 (L) 140 - 500 10*3/mm3 Final   11/09/2016 148 140 - 500 10*3/mm3 Final   10/25/2016 267 150 - 379 x10E3/uL Final       Assessment/Plan   Saddle embolus of pulmonary artery without acute cor pulmonale, unspecified chronicity (CMS/HCC)        Juany Carlin   *Extensive bilateral pulmonary emboli including saddle embolus on CT 11/9/2016.    *Acute right distal femoral, popliteal, posterior tibial, peroneal, gastrocnemius/soleal DVT on 11/9/2016.    *Anticoagulation.  On anticoagulation since the PE/DVT.  · On Eliquis.    *Family history of clotting  · Dad, DVT, around age 46  · Mom, possible DVT, around age 25  · Brother, PE, around age 75  · Sister, sudden death, no autopsy, around age 53    *Considering right hip replacement due to intractable pain.    *Surgical considerations  · Orthopedic surgeon brought up the idea of IVC filter.  Patient unsure if she wanted to have this done.  · I discussed this case with Dr. Charlie Wyman of vascular surgery.  We both agree, guidelines for IVC filter are typically to place a filter if acute clot within the past 3 to 4 weeks and anticoagulation needs to be held.  Typically if a clot occurred more than 4 weeks ago, IVC filters are not used.  · However, because of the extensive nature of the prior DVT/PE, he wants to have a discussion with the patient about an IVC filter.  · Notably the patient has done a lot of reading about IVC filters and states she very much prefers not to have an IVC filter.  · I would recommend holding Eliquis 48 hours prior to hip replacement.  After surgery, restart Eliquis as soon as okay with surgeon.      Plan  · Referral to vascular surgery  · No follow-up here.  · No clear indication from a hematology standpoint for IVC filter.  However, I do think she would benefit from a direct discussion with vascular surgery regarding the pros and cons of an IVC filter.      62 minutes.  Same day.  Researched guidelines on IVC filter.   Discussed with Dr. Charlie Wyman.  Reviewed records from Dr. Bernstein and Dr. Pizarro      Thank you very much Dr. Pizarro for the opportunity to help out with Ms. Carlin.                    show Unable to assess hearing

## 2024-04-04 ENCOUNTER — EMERGENCY (EMERGENCY)
Facility: HOSPITAL | Age: 79
LOS: 0 days | Discharge: HOME | End: 2024-04-05
Attending: STUDENT IN AN ORGANIZED HEALTH CARE EDUCATION/TRAINING PROGRAM | Admitting: HOSPITALIST
Payer: MEDICARE

## 2024-04-04 VITALS
DIASTOLIC BLOOD PRESSURE: 82 MMHG | SYSTOLIC BLOOD PRESSURE: 146 MMHG | OXYGEN SATURATION: 95 % | HEIGHT: 66 IN | TEMPERATURE: 99 F | HEART RATE: 92 BPM | RESPIRATION RATE: 17 BRPM | WEIGHT: 190.04 LBS

## 2024-04-04 DIAGNOSIS — E03.9 HYPOTHYROIDISM, UNSPECIFIED: ICD-10-CM

## 2024-04-04 DIAGNOSIS — Z90.710 ACQUIRED ABSENCE OF BOTH CERVIX AND UTERUS: Chronic | ICD-10-CM

## 2024-04-04 DIAGNOSIS — Z98.49 CATARACT EXTRACTION STATUS, UNSPECIFIED EYE: Chronic | ICD-10-CM

## 2024-04-04 DIAGNOSIS — Z87.440 PERSONAL HISTORY OF URINARY (TRACT) INFECTIONS: ICD-10-CM

## 2024-04-04 DIAGNOSIS — Z96.653 PRESENCE OF ARTIFICIAL KNEE JOINT, BILATERAL: ICD-10-CM

## 2024-04-04 DIAGNOSIS — Z98.89 OTHER SPECIFIED POSTPROCEDURAL STATES: Chronic | ICD-10-CM

## 2024-04-04 DIAGNOSIS — E78.00 PURE HYPERCHOLESTEROLEMIA, UNSPECIFIED: ICD-10-CM

## 2024-04-04 DIAGNOSIS — E83.42 HYPOMAGNESEMIA: ICD-10-CM

## 2024-04-04 DIAGNOSIS — R07.9 CHEST PAIN, UNSPECIFIED: ICD-10-CM

## 2024-04-04 DIAGNOSIS — Z98.890 OTHER SPECIFIED POSTPROCEDURAL STATES: Chronic | ICD-10-CM

## 2024-04-04 DIAGNOSIS — E11.9 TYPE 2 DIABETES MELLITUS WITHOUT COMPLICATIONS: ICD-10-CM

## 2024-04-04 DIAGNOSIS — I10 ESSENTIAL (PRIMARY) HYPERTENSION: ICD-10-CM

## 2024-04-04 DIAGNOSIS — R07.89 OTHER CHEST PAIN: ICD-10-CM

## 2024-04-04 DIAGNOSIS — M54.9 DORSALGIA, UNSPECIFIED: ICD-10-CM

## 2024-04-04 DIAGNOSIS — Z82.8 FAMILY HISTORY OF OTHER DISABILITIES AND CHRONIC DISEASES LEADING TO DISABLEMENT, NOT ELSEWHERE CLASSIFIED: Chronic | ICD-10-CM

## 2024-04-04 DIAGNOSIS — Z88.8 ALLERGY STATUS TO OTHER DRUGS, MEDICAMENTS AND BIOLOGICAL SUBSTANCES: ICD-10-CM

## 2024-04-04 DIAGNOSIS — Z94.7 CORNEAL TRANSPLANT STATUS: Chronic | ICD-10-CM

## 2024-04-04 DIAGNOSIS — G89.29 OTHER CHRONIC PAIN: ICD-10-CM

## 2024-04-04 DIAGNOSIS — Z96.653 PRESENCE OF ARTIFICIAL KNEE JOINT, BILATERAL: Chronic | ICD-10-CM

## 2024-04-04 LAB
ALBUMIN SERPL ELPH-MCNC: 3.6 G/DL — SIGNIFICANT CHANGE UP (ref 3.3–5)
ALP SERPL-CCNC: 94 U/L — SIGNIFICANT CHANGE UP (ref 40–120)
ALT FLD-CCNC: 17 U/L — SIGNIFICANT CHANGE UP (ref 12–78)
ANION GAP SERPL CALC-SCNC: 6 MMOL/L — SIGNIFICANT CHANGE UP (ref 5–17)
APTT BLD: 22.6 SEC — LOW (ref 24.5–35.6)
AST SERPL-CCNC: 26 U/L — SIGNIFICANT CHANGE UP (ref 15–37)
BASOPHILS # BLD AUTO: 0.01 K/UL — SIGNIFICANT CHANGE UP (ref 0–0.2)
BASOPHILS NFR BLD AUTO: 0.2 % — SIGNIFICANT CHANGE UP (ref 0–2)
BILIRUB SERPL-MCNC: 0.5 MG/DL — SIGNIFICANT CHANGE UP (ref 0.2–1.2)
BUN SERPL-MCNC: 10 MG/DL — SIGNIFICANT CHANGE UP (ref 7–23)
CALCIUM SERPL-MCNC: 9.5 MG/DL — SIGNIFICANT CHANGE UP (ref 8.5–10.1)
CHLORIDE SERPL-SCNC: 105 MMOL/L — SIGNIFICANT CHANGE UP (ref 96–108)
CO2 SERPL-SCNC: 24 MMOL/L — SIGNIFICANT CHANGE UP (ref 22–31)
CREAT SERPL-MCNC: 0.72 MG/DL — SIGNIFICANT CHANGE UP (ref 0.5–1.3)
EGFR: 85 ML/MIN/1.73M2 — SIGNIFICANT CHANGE UP
EOSINOPHIL # BLD AUTO: 0.08 K/UL — SIGNIFICANT CHANGE UP (ref 0–0.5)
EOSINOPHIL NFR BLD AUTO: 1.4 % — SIGNIFICANT CHANGE UP (ref 0–6)
GLUCOSE SERPL-MCNC: 133 MG/DL — HIGH (ref 70–99)
HCT VFR BLD CALC: 37 % — SIGNIFICANT CHANGE UP (ref 34.5–45)
HGB BLD-MCNC: 12.2 G/DL — SIGNIFICANT CHANGE UP (ref 11.5–15.5)
IMM GRANULOCYTES NFR BLD AUTO: 0.2 % — SIGNIFICANT CHANGE UP (ref 0–0.9)
INR BLD: 0.85 RATIO — SIGNIFICANT CHANGE UP (ref 0.85–1.18)
LYMPHOCYTES # BLD AUTO: 1.91 K/UL — SIGNIFICANT CHANGE UP (ref 1–3.3)
LYMPHOCYTES # BLD AUTO: 34.3 % — SIGNIFICANT CHANGE UP (ref 13–44)
MCHC RBC-ENTMCNC: 29.3 PG — SIGNIFICANT CHANGE UP (ref 27–34)
MCHC RBC-ENTMCNC: 33 G/DL — SIGNIFICANT CHANGE UP (ref 32–36)
MCV RBC AUTO: 88.7 FL — SIGNIFICANT CHANGE UP (ref 80–100)
MONOCYTES # BLD AUTO: 0.44 K/UL — SIGNIFICANT CHANGE UP (ref 0–0.9)
MONOCYTES NFR BLD AUTO: 7.9 % — SIGNIFICANT CHANGE UP (ref 2–14)
NEUTROPHILS # BLD AUTO: 3.12 K/UL — SIGNIFICANT CHANGE UP (ref 1.8–7.4)
NEUTROPHILS NFR BLD AUTO: 56 % — SIGNIFICANT CHANGE UP (ref 43–77)
NRBC # BLD: 0 /100 WBCS — SIGNIFICANT CHANGE UP (ref 0–0)
PLATELET # BLD AUTO: 274 K/UL — SIGNIFICANT CHANGE UP (ref 150–400)
POTASSIUM SERPL-MCNC: 5.1 MMOL/L — SIGNIFICANT CHANGE UP (ref 3.5–5.3)
POTASSIUM SERPL-SCNC: 5.1 MMOL/L — SIGNIFICANT CHANGE UP (ref 3.5–5.3)
PROT SERPL-MCNC: 8.1 GM/DL — SIGNIFICANT CHANGE UP (ref 6–8.3)
PROTHROM AB SERPL-ACNC: 10.2 SEC — SIGNIFICANT CHANGE UP (ref 9.5–13)
RBC # BLD: 4.17 M/UL — SIGNIFICANT CHANGE UP (ref 3.8–5.2)
RBC # FLD: 15 % — HIGH (ref 10.3–14.5)
SODIUM SERPL-SCNC: 135 MMOL/L — SIGNIFICANT CHANGE UP (ref 135–145)
WBC # BLD: 5.57 K/UL — SIGNIFICANT CHANGE UP (ref 3.8–10.5)
WBC # FLD AUTO: 5.57 K/UL — SIGNIFICANT CHANGE UP (ref 3.8–10.5)

## 2024-04-04 PROCEDURE — 71045 X-RAY EXAM CHEST 1 VIEW: CPT | Mod: 26

## 2024-04-04 PROCEDURE — 93010 ELECTROCARDIOGRAM REPORT: CPT

## 2024-04-04 PROCEDURE — 99285 EMERGENCY DEPT VISIT HI MDM: CPT

## 2024-04-04 PROCEDURE — 71275 CT ANGIOGRAPHY CHEST: CPT | Mod: 26,MC

## 2024-04-04 RX ORDER — ACETAMINOPHEN 500 MG
1000 TABLET ORAL ONCE
Refills: 0 | Status: DISCONTINUED | OUTPATIENT
Start: 2024-04-04 | End: 2024-04-04

## 2024-04-04 RX ORDER — LIDOCAINE 4 G/100G
1 CREAM TOPICAL ONCE
Refills: 0 | Status: COMPLETED | OUTPATIENT
Start: 2024-04-04 | End: 2024-04-04

## 2024-04-04 RX ORDER — TRAMADOL HYDROCHLORIDE 50 MG/1
25 TABLET ORAL ONCE
Refills: 0 | Status: DISCONTINUED | OUTPATIENT
Start: 2024-04-04 | End: 2024-04-04

## 2024-04-04 RX ORDER — MAGNESIUM SULFATE 500 MG/ML
2 VIAL (ML) INJECTION ONCE
Refills: 0 | Status: COMPLETED | OUTPATIENT
Start: 2024-04-04 | End: 2024-04-04

## 2024-04-04 RX ORDER — ASPIRIN/CALCIUM CARB/MAGNESIUM 324 MG
162 TABLET ORAL ONCE
Refills: 0 | Status: COMPLETED | OUTPATIENT
Start: 2024-04-04 | End: 2024-04-04

## 2024-04-04 RX ORDER — MORPHINE SULFATE 50 MG/1
2 CAPSULE, EXTENDED RELEASE ORAL ONCE
Refills: 0 | Status: DISCONTINUED | OUTPATIENT
Start: 2024-04-04 | End: 2024-04-04

## 2024-04-04 RX ORDER — ACETAMINOPHEN 500 MG
975 TABLET ORAL ONCE
Refills: 0 | Status: COMPLETED | OUTPATIENT
Start: 2024-04-04 | End: 2024-04-04

## 2024-04-04 RX ADMIN — TRAMADOL HYDROCHLORIDE 25 MILLIGRAM(S): 50 TABLET ORAL at 22:00

## 2024-04-04 RX ADMIN — Medication 975 MILLIGRAM(S): at 22:00

## 2024-04-04 RX ADMIN — Medication 25 GRAM(S): at 23:38

## 2024-04-04 RX ADMIN — TRAMADOL HYDROCHLORIDE 25 MILLIGRAM(S): 50 TABLET ORAL at 21:11

## 2024-04-04 RX ADMIN — Medication 975 MILLIGRAM(S): at 21:12

## 2024-04-04 RX ADMIN — MORPHINE SULFATE 2 MILLIGRAM(S): 50 CAPSULE, EXTENDED RELEASE ORAL at 23:37

## 2024-04-04 RX ADMIN — LIDOCAINE 1 PATCH: 4 CREAM TOPICAL at 21:11

## 2024-04-04 NOTE — ED PROVIDER NOTE - OBJECTIVE STATEMENT
79 y/o F with PMH of dm2, htn, hld, chronic back pain/ spinal stenosis on gabapentin for neuropathic pain, hx chronic pancreatitis , anemia , recurrent UTIs, chronic left shoulder pain, seen in 2022 by cardiology for elevated troponins, last cardiac work up per notes 2021 at [facility] with reported normal stress test per cardiology notes at the time, who presents for evaluation of left sided chest pain, onset about 3pm, heaviness , with dull radiation to the left arm and travel to the left neck, without radiation to the back or associated nausea, vomiting or sweating. Pt noted sob at the time of symptom occurrence. Pt is minimally mobile due to chronic back pain which is worsening. She reports she does have a left rotator cuff issue that is chronic but her pain today was not reproduced with movement. Denies recent trauma . has not seen Dr Mcwilliams of cardiology in 'a while'.

## 2024-04-04 NOTE — ED PROVIDER NOTE - PHYSICAL EXAMINATION
Gen: aox3, nad,   Head: NCAT  ENT: Airway patent, moist mucous membranes, nasal passageways clear,   Cardiac: Normal rate, normal rhythm, no murmurs   Respiratory: Lungs CTA B/L  Gastrointestinal: Abdomen soft, nontender, nondistended, no rebound, no guarding  MSK: No gross abnormalities, FROM of all four extremities, no edema  HEME: Extremities warm, pulses intact and symmetrical in all four extremities  Skin: No rashes, no lesions  Neuro: No gross neurologic deficits

## 2024-04-04 NOTE — ED ADULT TRIAGE NOTE - CHIEF COMPLAINT QUOTE
chest pain with radiation to left arm since 3PM , pain decrease after taking asa 81mg, pt has spinal surgery last year hx DM/HTN

## 2024-04-04 NOTE — ED PROVIDER NOTE - CLINICAL SUMMARY MEDICAL DECISION MAKING FREE TEXT BOX
79 y/o F with PMH of dm2, htn, hld, chronic back pain/ spinal stenosis on gabapentin for neuropathic pain, hx chronic pancreatitis , anemia , recurrent UTIs, chronic left shoulder pain, seen in 2022 by cardiology for elevated troponins, last cardiac work up per notes 2021 at [facility] with reported normal stress test per cardiology notes at the time, who presents for evaluation of left sided chest pain, onset about 3pm, heaviness , with dull radiation to the left arm and travel to the left neck, without radiation to the back or associated nausea, vomiting or sweating. Pt noted sob at the time of symptom occurrence. Pt is minimally mobile due to chronic back pain which is worsening. She reports she does have a left rotator cuff issue that is chronic but her pain today was not reproduced with movement. Denies recent trauma . has not seen Dr Mcwilliams of cardiology in 'a while'.  - pt with elevated heart score, chest pain may be msk related but pt states was not reproducible, intermittent chest discomfort, pt reports took asa at home with some mild relief, ddimer rule out PE, consider obs for cardiology eval/ serial cardiac enzymes given symptom hx and elevated heart score

## 2024-04-05 ENCOUNTER — TRANSCRIPTION ENCOUNTER (OUTPATIENT)
Age: 79
End: 2024-04-05

## 2024-04-05 ENCOUNTER — RESULT REVIEW (OUTPATIENT)
Age: 79
End: 2024-04-05

## 2024-04-05 VITALS
RESPIRATION RATE: 18 BRPM | WEIGHT: 178.57 LBS | HEART RATE: 75 BPM | OXYGEN SATURATION: 96 % | SYSTOLIC BLOOD PRESSURE: 141 MMHG | TEMPERATURE: 98 F | DIASTOLIC BLOOD PRESSURE: 84 MMHG

## 2024-04-05 DIAGNOSIS — I10 ESSENTIAL (PRIMARY) HYPERTENSION: ICD-10-CM

## 2024-04-05 DIAGNOSIS — E11.65 TYPE 2 DIABETES MELLITUS WITH HYPERGLYCEMIA: ICD-10-CM

## 2024-04-05 DIAGNOSIS — E78.5 HYPERLIPIDEMIA, UNSPECIFIED: ICD-10-CM

## 2024-04-05 DIAGNOSIS — E83.42 HYPOMAGNESEMIA: ICD-10-CM

## 2024-04-05 DIAGNOSIS — E03.9 HYPOTHYROIDISM, UNSPECIFIED: ICD-10-CM

## 2024-04-05 DIAGNOSIS — R07.9 CHEST PAIN, UNSPECIFIED: ICD-10-CM

## 2024-04-05 LAB
CHOLEST SERPL-MCNC: 144 MG/DL — SIGNIFICANT CHANGE UP
GLUCOSE BLDC GLUCOMTR-MCNC: 142 MG/DL — HIGH (ref 70–99)
GLUCOSE BLDC GLUCOMTR-MCNC: 221 MG/DL — HIGH (ref 70–99)
GLUCOSE BLDC GLUCOMTR-MCNC: 225 MG/DL — HIGH (ref 70–99)
HDLC SERPL-MCNC: 81 MG/DL — SIGNIFICANT CHANGE UP
LIPID PNL WITH DIRECT LDL SERPL: 45 MG/DL — SIGNIFICANT CHANGE UP
MAGNESIUM SERPL-MCNC: 1.9 MG/DL — SIGNIFICANT CHANGE UP (ref 1.6–2.6)
NON HDL CHOLESTEROL: 63 MG/DL — SIGNIFICANT CHANGE UP
TRIGL SERPL-MCNC: 97 MG/DL — SIGNIFICANT CHANGE UP
TROPONIN I, HIGH SENSITIVITY RESULT: 11.5 NG/L — SIGNIFICANT CHANGE UP
TROPONIN I, HIGH SENSITIVITY RESULT: 12.1 NG/L — SIGNIFICANT CHANGE UP
TSH SERPL-MCNC: 0.14 UIU/ML — LOW (ref 0.36–3.74)

## 2024-04-05 PROCEDURE — 93306 TTE W/DOPPLER COMPLETE: CPT | Mod: 26

## 2024-04-05 PROCEDURE — 99282 EMERGENCY DEPT VISIT SF MDM: CPT

## 2024-04-05 RX ORDER — LANOLIN ALCOHOL/MO/W.PET/CERES
3 CREAM (GRAM) TOPICAL AT BEDTIME
Refills: 0 | Status: DISCONTINUED | OUTPATIENT
Start: 2024-04-05 | End: 2024-04-05

## 2024-04-05 RX ORDER — METOPROLOL TARTRATE 50 MG
25 TABLET ORAL
Refills: 0 | Status: DISCONTINUED | OUTPATIENT
Start: 2024-04-05 | End: 2024-04-05

## 2024-04-05 RX ORDER — DEXTROSE 10 % IN WATER 10 %
125 INTRAVENOUS SOLUTION INTRAVENOUS ONCE
Refills: 0 | Status: DISCONTINUED | OUTPATIENT
Start: 2024-04-05 | End: 2024-04-05

## 2024-04-05 RX ORDER — MORPHINE SULFATE 50 MG/1
15 CAPSULE, EXTENDED RELEASE ORAL
Qty: 0 | Refills: 0 | DISCHARGE

## 2024-04-05 RX ORDER — ATORVASTATIN CALCIUM 80 MG/1
20 TABLET, FILM COATED ORAL AT BEDTIME
Refills: 0 | Status: DISCONTINUED | OUTPATIENT
Start: 2024-04-05 | End: 2024-04-05

## 2024-04-05 RX ORDER — OMEPRAZOLE 10 MG/1
1 CAPSULE, DELAYED RELEASE ORAL
Refills: 0 | DISCHARGE

## 2024-04-05 RX ORDER — IBUPROFEN 200 MG
600 TABLET ORAL ONCE
Refills: 0 | Status: DISCONTINUED | OUTPATIENT
Start: 2024-04-05 | End: 2024-04-05

## 2024-04-05 RX ORDER — GLUCAGON INJECTION, SOLUTION 0.5 MG/.1ML
1 INJECTION, SOLUTION SUBCUTANEOUS ONCE
Refills: 0 | Status: DISCONTINUED | OUTPATIENT
Start: 2024-04-05 | End: 2024-04-05

## 2024-04-05 RX ORDER — DEXTROSE 50 % IN WATER 50 %
15 SYRINGE (ML) INTRAVENOUS ONCE
Refills: 0 | Status: DISCONTINUED | OUTPATIENT
Start: 2024-04-05 | End: 2024-04-05

## 2024-04-05 RX ORDER — METOPROLOL TARTRATE 50 MG
1 TABLET ORAL
Refills: 0 | DISCHARGE

## 2024-04-05 RX ORDER — ASPIRIN/CALCIUM CARB/MAGNESIUM 324 MG
81 TABLET ORAL DAILY
Refills: 0 | Status: DISCONTINUED | OUTPATIENT
Start: 2024-04-05 | End: 2024-04-05

## 2024-04-05 RX ORDER — LEVOTHYROXINE SODIUM 125 MCG
1 TABLET ORAL
Refills: 0 | DISCHARGE

## 2024-04-05 RX ORDER — LEVOTHYROXINE SODIUM 125 MCG
175 TABLET ORAL DAILY
Refills: 0 | Status: DISCONTINUED | OUTPATIENT
Start: 2024-04-05 | End: 2024-04-05

## 2024-04-05 RX ORDER — ACETAMINOPHEN 500 MG
650 TABLET ORAL EVERY 6 HOURS
Refills: 0 | Status: DISCONTINUED | OUTPATIENT
Start: 2024-04-05 | End: 2024-04-05

## 2024-04-05 RX ORDER — DEXTROSE 50 % IN WATER 50 %
25 SYRINGE (ML) INTRAVENOUS ONCE
Refills: 0 | Status: DISCONTINUED | OUTPATIENT
Start: 2024-04-05 | End: 2024-04-05

## 2024-04-05 RX ORDER — SODIUM CHLORIDE 9 MG/ML
1000 INJECTION, SOLUTION INTRAVENOUS
Refills: 0 | Status: DISCONTINUED | OUTPATIENT
Start: 2024-04-05 | End: 2024-04-05

## 2024-04-05 RX ORDER — GABAPENTIN 400 MG/1
1 CAPSULE ORAL
Qty: 0 | Refills: 0 | DISCHARGE

## 2024-04-05 RX ORDER — ACETAMINOPHEN 500 MG
2 TABLET ORAL
Qty: 0 | Refills: 0 | DISCHARGE
Start: 2024-04-05

## 2024-04-05 RX ORDER — ONDANSETRON 8 MG/1
4 TABLET, FILM COATED ORAL EVERY 8 HOURS
Refills: 0 | Status: DISCONTINUED | OUTPATIENT
Start: 2024-04-05 | End: 2024-04-05

## 2024-04-05 RX ORDER — AMLODIPINE BESYLATE 2.5 MG/1
10 TABLET ORAL DAILY
Refills: 0 | Status: DISCONTINUED | OUTPATIENT
Start: 2024-04-05 | End: 2024-04-05

## 2024-04-05 RX ORDER — DEXTROSE 50 % IN WATER 50 %
12.5 SYRINGE (ML) INTRAVENOUS ONCE
Refills: 0 | Status: DISCONTINUED | OUTPATIENT
Start: 2024-04-05 | End: 2024-04-05

## 2024-04-05 RX ORDER — INSULIN LISPRO 100/ML
VIAL (ML) SUBCUTANEOUS
Refills: 0 | Status: DISCONTINUED | OUTPATIENT
Start: 2024-04-05 | End: 2024-04-05

## 2024-04-05 RX ORDER — FAMOTIDINE 10 MG/ML
75 INJECTION INTRAVENOUS
Qty: 0 | Refills: 0 | DISCHARGE

## 2024-04-05 RX ORDER — METOCLOPRAMIDE HCL 10 MG
10 TABLET ORAL
Qty: 0 | Refills: 0 | DISCHARGE

## 2024-04-05 RX ADMIN — MORPHINE SULFATE 2 MILLIGRAM(S): 50 CAPSULE, EXTENDED RELEASE ORAL at 00:30

## 2024-04-05 RX ADMIN — Medication 25 MILLIGRAM(S): at 03:38

## 2024-04-05 RX ADMIN — Medication 650 MILLIGRAM(S): at 06:45

## 2024-04-05 RX ADMIN — Medication 81 MILLIGRAM(S): at 11:40

## 2024-04-05 RX ADMIN — Medication 2: at 11:40

## 2024-04-05 RX ADMIN — LIDOCAINE 1 PATCH: 4 CREAM TOPICAL at 08:13

## 2024-04-05 RX ADMIN — AMLODIPINE BESYLATE 10 MILLIGRAM(S): 2.5 TABLET ORAL at 03:38

## 2024-04-05 RX ADMIN — Medication 175 MICROGRAM(S): at 06:15

## 2024-04-05 RX ADMIN — Medication 162 MILLIGRAM(S): at 00:31

## 2024-04-05 RX ADMIN — LIDOCAINE 1 PATCH: 4 CREAM TOPICAL at 09:14

## 2024-04-05 NOTE — DISCHARGE NOTE NURSING/CASE MANAGEMENT/SOCIAL WORK - PATIENT PORTAL LINK FT
You can access the FollowMyHealth Patient Portal offered by [facility] by registering at the following website: [email or web address]. By joining [employer]’s FollowMyHealth portal, you will also be able to view your health information using other applications (apps) compatible with our system.

## 2024-04-05 NOTE — PATIENT PROFILE ADULT - VISION (WITH CORRECTIVE LENSES IF THE PATIENT USUALLY WEARS THEM):
can't see in right eye/Partially impaired: cannot see medication labels or newsprint, but can see obstacles in path, and the surrounding layout; can count fingers at arm's length

## 2024-04-05 NOTE — CHART NOTE - NSCHARTNOTEFT_GEN_A_CORE
Confidential Drug Utilization Report  Search Terms: Meche Dowling, 1945Search Date: 04/05/2024 03:12:32 AM  Searching on behalf of: Myself  The Drug Utilization Report below displays all of the controlled substance prescriptions, if any, that your patient has filled in the last twelve months. The information displayed on this report is compiled from pharmacy submissions to the Department, and accurately reflects the information as submitted by the pharmacies.    This report was requested by: Karon Mills | Reference #: [id number]    Practitioner Count: 0  Pharmacy Count: 0  Current Opioid Prescriptions: 0  Current Benzodiazepine Prescriptions: 0  Current Stimulant Prescriptions: 0      Patient Demographic Information (PDI)       PDI	First Name	Last Name	Birth Date	Gender	Street Address	[facility]	Zip Code  [place]	Meche Dowling	1945	Female	[address] [address]    Prescription Information      PDI Filter:    PDI	My Rx	Current Rx	Drug Type	Rx Written	Rx Dispensed	Drug	Quantity	Days Supply  A	N	N	O	11/14/2023	11/15/2023	hydrocodone-acetaminophen 5-325 mg tablet	60	30  Prescriber Name Naila Murcia  Prescriber TATA # [id number]  Payment Method Insurance  Dispens[facility]  A	N	N	O	10/02/2023	10/02/2023	hydrocodone-acetaminophen 5-325 mg tablet	60	30  Prescriber Name Clair Hanna  Prescriber TATA # [id number]  Payment Method Insurance  [facility]  A	N	N	O	08/22/2023	08/23/2023	oxycodone-acetaminophen 5-325 mg tablet	14	7  Prescriber Name Swetha Yee  Prescriber TATA # [id number]  Payment Method Insurance  Dis[facility]  A	N	N	O	08/08/2023	08/09/2023	hydrocodone-acetaminophen 5-325 mg tablet	14	7  Prescriber Name Clair Hanna  Prescriber TATA # [id number]  Payment Method Insurance  Dispens[facility]  A	N	N	O	05/08/2023	05/15/2023	morphine sulf er 15 mg tablet	90	30  Prescriber Name Samantha Schulte  Prescriber TATA # [id number]  Payment Method Insurance  Dis[facility]    * - Details of Drug Type : O = Opioid, B = Benzodiazepine, S = Stimulant    * - Drugs marked with an asterisk are compound drugs. If the compound drug is made up of more than one controlled substance, then each controlled substance will be a separate row in the table.

## 2024-04-05 NOTE — DISCHARGE NOTE PROVIDER - NSDCMRMEDTOKEN_GEN_ALL_CORE_FT
acetaminophen 325 mg oral tablet: 2 tab(s) orally every 6 hours As needed Temp greater or equal to 38C (100.4F), Mild Pain (1 - 3)  amLODIPine 10 mg oral tablet: 1 tab(s) orally once a day  levothyroxine 175 mcg (0.175 mg) oral tablet: 1 tab(s) orally once a day  metFORMIN 1000 mg oral tablet: 1 tab(s) orally 2 times a day   metoprolol tartrate 25 mg oral tablet: 1 tab(s) orally 2 times a day  rosuvastatin 5 mg oral tablet: 1 tab(s) orally once a day

## 2024-04-05 NOTE — H&P ADULT - NSICDXPASTMEDICALHX_GEN_ALL_CORE_FT
PAST MEDICAL HISTORY:  Cataract     Chronic back pain     Diabetes mellitus type II, non insulin dependent     HLD (hyperlipidemia)     Hypertension     Hypothyroidism

## 2024-04-05 NOTE — DISCHARGE NOTE NURSING/CASE MANAGEMENT/SOCIAL WORK - NSDCVIVACCINE_GEN_ALL_CORE_FT
Tdap; 26-Aug-2015 14:33; Lakeisha Lee (DAVID); [employer]; [id number]; IntraMuscular; Deltoid Right.; 0.5 milliLiter(s); VIS (VIS Published: 09-May-2013, VIS Presented: 26-Aug-2015);

## 2024-04-05 NOTE — H&P ADULT - ASSESSMENT
Chest pain, r/o ACS  Complaints   EKG NSR, LAD, LVH, troponin x 2 negative, D-dimer 329  CTA chest without saddle PE  Prior echo (on 3/12/22) with LV EF 65-70%, increased LV wall thickness, mild concentric LVH, moderate TR, mild pHTN  S/p  mg PO, acetaminophen 975 mg PO, lidocaine patch, morphine 2 mg IV, and tramadol 25 mg PO in the ED  F/u serial troponins, echocardiogram  Telemetry  Consult cardiology - please call in AM    Hypomagnesemia  Mag 1.5 on admission  S/p magnesium 2 g IV in the ED  F/u repeat Mag in AM       153/79. Labs grossly unremarkable except blood glucose 221   Chest pain, r/o ACS  Complaints   EKG NSR, LAD, LVH, troponin x 2 negative, D-dimer 329  CTA chest without saddle PE  Prior echo (on 3/12/22) with LV EF 65-70%, increased LV wall thickness, mild concentric LVH, moderate TR, mild pHTN  S/p  mg PO, acetaminophen 975 mg PO, lidocaine patch, morphine 2 mg IV, and tramadol 25 mg PO in the ED  F/u lipid panel, TSH, A1c for risk stratification  F/u serial troponins, echocardiogram  Telemetry  Consult cardiology - please call in AM    Hypomagnesemia  Mag 1.5 on admission  S/p magnesium 2 g IV in the ED  F/u repeat Mag in AM       153/79. Labs grossly unremarkable except blood glucose 221 Meche Dowling is a 78 year old female with PMHx of HTN, HLD, NIDDM2, hypothyroidism, and chronic back pain (previously on narcotics) who presented to the ED on 4/4/24 for complaints of chest pain and admitted for ACS r/o.    Chest pain, r/o ACS  Complaints of left sided chest pain with radiation to left wrist, back of neck, and left jaw, described as heaviness and tightness, duration 10-15 minutes  Associated shortness of breath, chest pain slightly improved with taking ASA at home  EKG personally reviewed; NSR, LAD, LVH, troponin x 2 negative, D-dimer 329  CTA chest without saddle PE  Prior echo (on 3/12/22) with LV EF 65-70%, increased LV wall thickness, mild concentric LVH, moderate TR, mild pHTN  S/p  mg PO, acetaminophen 975 mg PO, lidocaine patch, morphine 2 mg IV, and tramadol 25 mg PO in the ED  ASA 81 mg started, PTA rosuvastatin reordered as atorvastatin  F/u lipid panel, TSH, A1c for risk stratification  F/u serial troponins, echocardiogram  Telemetry  Consult cardiology - please call in AM    Hypomagnesemia  Mag 1.5 on admission  S/p magnesium 2 g IV in the ED  F/u repeat Mag in AM  Telemetry to monitor for arrhythmias      Chronic medical conditions:  HTN: /79 on admission, PTA amlodipine 10 mg, metoprolol tartrate 25 mg BID  HLD: PTA rosuvastatin 5 mg reordered as atorvastatin 10 mg given rosuvastatin not on formulary  NIDDM2 with hyperglycemia: last known A1c 6.1 (on 3/11/22), blood glucose 221 on admission, POC qac and qhs, PTA metformin 1000 mg BID held, low dose SSI qac started, blood glucose goal < 180, f/u A1c  Hypothyroidism: PTA levothyroxine 175 mcg    Medication reconciliation completed using med list provided by patient.

## 2024-04-05 NOTE — DISCHARGE NOTE PROVIDER - NSDCCPCAREPLAN_GEN_ALL_CORE_FT
PRINCIPAL DISCHARGE DIAGNOSIS  Diagnosis: Chest pain  Assessment and Plan of Treatment: HOME CARE INSTRUCTIONS  Only take over-the-counter and prescription medicines as directed by your caregiver.   Stay active or increase your exercise as directed by your caregiver.   Limit strenuous activity as directed by your caregiver.   Limit heavy lifting as directed by your caregiver.  Maintain a healthy weight.  Learn about and eat heart-healthy foods.   Do not smoke.   SEEK IMMEDIATE MEDICAL CARE IF:  You experience the following symptoms:  Chest, neck, deep shoulder, or arm pain or discomfort that lasts more than a few minutes.   Chest, neck, deep shoulder, or arm pain or discomfort that goes away and comes back, repeatedly.   Heavy sweating with discomfort, without a noticeable cause.   Shortness of breath or difficulty breathing.  Angina that does not get better after a few minutes of rest or after taking sublingual nitroglycerin.  These can all be symptoms of a heart attack, which is a medical emergency! Get medical help at once. Call your local emergency service ([phone]) immediately. Do not  drive yourself to the hospital and do not  wait to for your symptoms to go away.

## 2024-04-05 NOTE — ED CLERICAL - NS ED CLERK UNITS
OBS RR 40, + suprasternal and intercostal and subcostal retractions, 94% on room air, coarse throughout.

## 2024-04-05 NOTE — H&P ADULT - NSHPLABSRESULTS_GEN_ALL_CORE
12.2   5.57  )-----------( 274      ( 04 Apr 2024 20:20 )             37.0   04-04    135  |  105  |  10  ----------------------------<  133<H>  5.1   |  24  |  0.72    Ca    9.5      04 Apr 2024 21:00  Mg     1.5     04-04    TPro  8.1  /  Alb  3.6  /  TBili  0.5  /  DBili  x   /  AST  26  /  ALT  17  /  AlkPhos  94  04-04    CTA chest PE protocol with IV contrast 4/4/24  FINDINGS:  LUNGS AND AIRWAYS: Patent central airways.  Lungs are clear.  PLEURA: No pleural effusion.  MEDIASTINUM AND KIRSTEN: No lymphadenopathy.  VESSELS: Pulmonary arterial opacification is suboptimal. There is no saddle pulmonary embolism. Lobar, segmental, subsegmental branches cannot be assessed. The main pulmonary artery is dilated, measuring 3.5 cm, suggesting pulmonary arterial hypertension. Atherosclerotic calcification of the thoracic aorta and coronary arteries.  HEART: Heart size is enlarged.. No pericardial effusion.  CHEST WALL AND LOWER NECK: Within normal limits.  VISUALIZED UPPER ABDOMEN: Small hiatal hernia. Pneumobilia.  BONES: Chronic degenerative changes of the spine.  Partial visualization of spinal hardware in place in the lumbar spine.    IMPRESSION:  Nondiagnostic examination for pulmonary embolism. No saddle pulmonary embolism. The main, lobar, segmental and subsegmental branches cannot be assessed. If clinical concern persists, repeat CT angiogram, leg Doppler, or CT scan may be obtained.    No focal pulmonary opacity or pleural effusion.  Mild cardiomegaly.

## 2024-04-05 NOTE — H&P ADULT - NSHPPHYSICALEXAM_GEN_ALL_CORE
T(C): 36.7 (04-04-24 @ 23:18), Max: 37 (04-04-24 @ 19:37)  HR: 81 (04-04-24 @ 23:18) (81 - 92)  BP: 153/79 (04-04-24 @ 23:18) (146/82 - 153/79)  RR: 15 (04-04-24 @ 23:18) (15 - 17)  SpO2: 99% (04-04-24 @ 23:18) (95% - 99%)    CONSTITUTIONAL: Well groomed, no apparent distress  EYES: PERRLA and symmetric, EOMI  ENMT: Oral mucosa with moist membranes  RESP: No respiratory distress, no use of accessory muscles; CTA b/l  CV: RRR, left sided chest pain reproducible with palpation  GI: Soft, NT, ND

## 2024-04-05 NOTE — PATIENT PROFILE ADULT - FALL HARM RISK - TYPE OF ASSESSMENT
Victor Manuel Cortez is a 45 year old male presenting with back pain since Wednesday 10/09/19, pain is getting worse daily. Today pt can barely walk. Pain rate is 7/10. Took OTC aleve last night without relief. Today pt didn't take anything.     Medication verified and med list updated  Denies known Latex allergy or symptoms of Latex sensitivity  Primary Care Physician verified    
Victor Manuel Cortez is a 45 year old male presenting with 3 days of right paralumbar region with no radicular pains into the legs. Pt works for [employer] and prior to that spent 23 years as [employer] for [employer] [place]. No prior back injury history noted.   Patient Active Problem List   Diagnosis   • Seasonal allergies      Past Medical History:   Diagnosis Date   • Seasonal allergies 12/17/2018      Pt has tried 2 aleves on two seperate occasions with not much help. No urinary sxs noted.     General:   alert, in no acute distress  Extremeties:   Lower ext's:  DTRs (deep tendon reflexes) +2 and bilaterally symmetric, motor 5/5 all groups, SLR (straight leg raise) negative bilaterally. Normal gait to include in tandem.   Back:   no CVA  tenderness, normal curvature, no pain to palpation along lumbar spine and paralumbar muscular pain to palpation on the left side.   Pt is able to flex forward and almost reach his toes.     ASSESSMENT: Acute left-sided low back pain without sciatica  (primary encounter diagnosis)  Comment: UA is clear. SOmewhat less pain post the Toradol injectin.   Plan: URINALYSIS WITH MICRO EXAM W/O C/S, ketorolac         (TORADOL) injection 60 mg, URINALYSIS WITH         MICRO EXAM W/O C/S, naproxen (NAPROSYN) 500 MG         tablet, metaxalone (SKELAXIN) 800 MG tablet        meds as noted. See avs.      
Admission

## 2024-04-05 NOTE — H&P ADULT - HISTORY OF PRESENT ILLNESS
Meche Dowling is a 78 year old female with PMHx of       In the ED, VSS except BP as elevated as 153/79. Labs grossly unremarkable except blood glucose 221, mag 1.5, and D-dimer 329. Troponin x 2 negative. EKG NSR, LAD, LVH. CTA chest without saddle PE. Received  mg PO, acetaminophen 975 mg PO, magnesium 2 g IV, lidocaine patch, morphine 2 mg IV, and tramadol 25 mg PO. Meche Dowling is a 78 year old female with PMHx of HTN, HLD, NIDDM2, hypothyroidism, and chronic back pain (previously on narcotics) who presented to the ED on 4/4/24 for complaints of chest pain.    Patient reports she started having left sided chest pain around 3 PM while laying in bed. Described as a heaviness and tightness sensation that lasted for 10-15 minutes. Radiated to left wrist, back of neck, and left jaw. Pleuritic and positional in nature described as exacerbated with laying down and improving with sitting up. Associated shortness of breath. Denies diaphoresis, nausea, or vomiting. Took an aspirin, sat up in bed, and felt slightly better. Denies trauma to the chest. Severity of pain was 8/10, now down to 6/10. Of note, she has had similar chest pain 5-6 years ago and received extensive cardiac work up at [place], all of which reportedly returned negative. Baseline functional status is ambulates with walker and overall independent with ADLs but requires some assistance. Has an aide who comes 4 hours per day, 5 days per week. Lives at home alone.    In the ED, VSS except BP as elevated as 153/79. Labs grossly unremarkable except blood glucose 221, mag 1.5, and D-dimer 329. Troponin x 2 negative. EKG NSR, LAD, LVH. CTA chest without saddle PE. Received  mg PO, acetaminophen 975 mg PO, magnesium 2 g IV, lidocaine patch, morphine 2 mg IV, and tramadol 25 mg PO.

## 2024-04-05 NOTE — DISCHARGE NOTE PROVIDER - HOSPITAL COURSE
78 year old female with PMHx of HTN, HLD, NIDDM2, hypothyroidism, and chronic back pain (previously on narcotics) who presented to the ED on 4/4/24 for complaints of chest pain.    Admitted for ACS work up.    Troponin neg x 3.    #TTE Echo Complete w/o Contrast w/ Doppler Summary:   1. Normal global left ventricular systolic function.   2. No evidence of mitral valve regurgitation.   3. Moderate tricuspid regurgitation.   4. Estimated pulmonary artery systolic pressure is 45.8 mmHg assuming a   right atrial pressure of 3 mmHg, which is consistent with moderate   pulmonary hypertension.    #CT Angio Chest PE Protocol w/ IV Cont IMPRESSION:  Nondiagnostic examination for pulmonary embolism. No saddle pulmonary   embolism. The main, lobar, segmental and subsegmental branches cannot be   assessed. If clinical concern persists, repeat CT angiogram, leg Doppler,   or CT scan may be obtained.    No focal pulmonary opacity or pleural effusion.  Mild cardiomegaly.    Case discussed with attending.  Given patient's improved clinical status and current hemodynamic stability, the decision was made for discharge.    This is a summary of the patients hospitalization.  For full hospital course, please see medical record.

## 2024-04-06 LAB
A1C WITH ESTIMATED AVERAGE GLUCOSE RESULT: 7.7 % — HIGH (ref 4–5.6)
ESTIMATED AVERAGE GLUCOSE: 174 MG/DL — HIGH (ref 68–114)

## 2024-04-10 NOTE — DISCHARGE NOTE ADULT - NS AS DC FOLLOWUP STROKE INST
PAST SURGICAL HISTORY:  No significant past surgical history     
Stroke (includes: TIA/SAH/ICH/Ischemic Stroke)

## 2024-04-12 NOTE — H&P ADULT - RESPIRATORY AND THORAX
[FreeTextEntry1] : Please get an EEG Please get labwork Continue levetiracetam 500 mg every 12 hours  Home physical therapy Return to clinic in six months to see Dr. Kang Cruz   Seizure Safety:   Wear a helmet when biking or horseback riding No unsupervised swimming Showers rather than baths - no bath alone - risk of drowning  Adjust the temperature on hot water heater to avoid scalding If uncontrolled seizures, use microwave rather than stovetop Dont lock door in bathroom, bedroom or on places  If fall off bed with seizures, try sleeping with mattress on the floor  Use soft or padded furniture  Avoid high ladders  Take medication as prescribed Follow driving regulations of people with epilepsy.  Please visit [employer]ile[employer] : https://www.epilepsy.com/
details…

## 2024-05-06 ENCOUNTER — NON-APPOINTMENT (OUTPATIENT)
Age: 79
End: 2024-05-06

## 2024-05-06 ENCOUNTER — APPOINTMENT (OUTPATIENT)
Dept: OPHTHALMOLOGY | Facility: CLINIC | Age: 79
End: 2024-05-06
Payer: MEDICARE

## 2024-05-06 PROBLEM — E03.9 HYPOTHYROIDISM, UNSPECIFIED: Chronic | Status: ACTIVE | Noted: 2024-04-05

## 2024-05-06 PROBLEM — E11.9 TYPE 2 DIABETES MELLITUS WITHOUT COMPLICATIONS: Chronic | Status: ACTIVE | Noted: 2024-04-05

## 2024-05-06 PROBLEM — E78.5 HYPERLIPIDEMIA, UNSPECIFIED: Chronic | Status: ACTIVE | Noted: 2024-04-05

## 2024-05-06 PROBLEM — M54.9 DORSALGIA, UNSPECIFIED: Chronic | Status: ACTIVE | Noted: 2024-04-05

## 2024-05-06 PROCEDURE — 92012 INTRM OPH EXAM EST PATIENT: CPT

## 2024-05-19 NOTE — PATIENT PROFILE ADULT - NSPROGENOTHERPROVIDER_GEN_A_NUR
[FreeTextEntry1] : Aline Serra is a 30 F who was admitted to [facility] 4/2023 with syncope and found to have ITP. Platelet count responded to high dose dexamethasone x 4 days. Has had multiple relapses.  Brief response to rituximab (completed 7/2023), relapsed again in 10/2023.  Did well with a prolonged course of prednisone, but relapsed after prednisone was dc'ed (in late 3/2024).  Now again on prednisone.   Plan: 1. relapsed immune thrombocytopenia --decrease current prednisone dose 40 mg to 30 mg po daily. repeat CBC on or around 5/20/24 at [facility]. If platelet count stable - will reduce prednisone to 20 mg po daily at that time. --discussed long term management of ITP.  We could try to taper prednisone to lowest dose and continue indefinitely, but that carries risk of long term steroid toxicity.  Discussed TPO agonists - good choice to spare exposure to steroids.  however these drugs are not proven to be safe during pregnancy, which could pose a challenge in the future if/when she wants to conceive.  Furthermore, no guarantee of successful treatment with TPO agonist; and lastly, this would be long term/indefinite therapy as well.  Final option is to pursue splenectomy, which may offer her a chance for durable remission. --she will follow up with Dr Cordero for discusssion about splenectomy. 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 --return precautions reviewed - if any bleeding, pre-syncope, spontaneous bruising, etc - ER.  2. Persistent nausea/Slight LFT elevation Possibly 2/2 steroid induced gastritis or ucler. Denies alcohol use, tylenol, or new medications - Trial Pantoprazole 40mg BID for 14 days then 40mg daily after - Will order for abdominal ultrasound complete - Repeat LFTs in 1-2 weeks  3. Easy Gum Bleeding Easy gum bleeding noted for past several months. No change in gum bleeding with normal platelet counts.  - Discussed evaluation with dentist and continue to monitor. No other signs or symptoms of bleeding.  Return to clinic TBD. CBC with diff in 2 weeks. Will call to discuss results. Patient moving home at end of the month to [place] with family. none

## 2024-08-22 ENCOUNTER — APPOINTMENT (OUTPATIENT)
Dept: OPHTHALMOLOGY | Facility: CLINIC | Age: 79
End: 2024-08-22

## 2024-08-26 ENCOUNTER — APPOINTMENT (OUTPATIENT)
Dept: OPHTHALMOLOGY | Facility: CLINIC | Age: 79
End: 2024-08-26
Payer: MEDICARE

## 2024-08-26 ENCOUNTER — NON-APPOINTMENT (OUTPATIENT)
Age: 79
End: 2024-08-26

## 2024-08-26 PROCEDURE — 92012 INTRM OPH EXAM EST PATIENT: CPT

## 2024-12-04 NOTE — PHYSICAL THERAPY INITIAL EVALUATION ADULT - PHYSICAL ASSIST/NONPHYSICAL ASSIST: SCOOT/BRIDGE, REHAB EVAL
Left voicemail message on 12-4-24 to schedule labs prior to 12-9-24 appt with Lety Croft PA-C   set-up required/verbal cues/1 person assist

## 2024-12-23 ENCOUNTER — APPOINTMENT (OUTPATIENT)
Dept: OPHTHALMOLOGY | Facility: CLINIC | Age: 79
End: 2024-12-23
Payer: MEDICARE

## 2024-12-23 ENCOUNTER — NON-APPOINTMENT (OUTPATIENT)
Age: 79
End: 2024-12-23

## 2024-12-23 PROCEDURE — 92012 INTRM OPH EXAM EST PATIENT: CPT

## 2025-01-23 ENCOUNTER — EMERGENCY (EMERGENCY)
Facility: HOSPITAL | Age: 80
LOS: 0 days | Discharge: HOME | End: 2025-01-24
Attending: STUDENT IN AN ORGANIZED HEALTH CARE EDUCATION/TRAINING PROGRAM
Payer: MEDICARE

## 2025-01-23 VITALS
OXYGEN SATURATION: 97 % | TEMPERATURE: 98 F | HEIGHT: 65 IN | WEIGHT: 186.95 LBS | SYSTOLIC BLOOD PRESSURE: 136 MMHG | HEART RATE: 91 BPM | RESPIRATION RATE: 18 BRPM | DIASTOLIC BLOOD PRESSURE: 77 MMHG

## 2025-01-23 DIAGNOSIS — Z90.710 ACQUIRED ABSENCE OF BOTH CERVIX AND UTERUS: Chronic | ICD-10-CM

## 2025-01-23 DIAGNOSIS — Z94.7 CORNEAL TRANSPLANT STATUS: Chronic | ICD-10-CM

## 2025-01-23 DIAGNOSIS — R07.9 CHEST PAIN, UNSPECIFIED: ICD-10-CM

## 2025-01-23 DIAGNOSIS — Z98.890 OTHER SPECIFIED POSTPROCEDURAL STATES: Chronic | ICD-10-CM

## 2025-01-23 DIAGNOSIS — E78.5 HYPERLIPIDEMIA, UNSPECIFIED: ICD-10-CM

## 2025-01-23 DIAGNOSIS — Z96.653 PRESENCE OF ARTIFICIAL KNEE JOINT, BILATERAL: Chronic | ICD-10-CM

## 2025-01-23 DIAGNOSIS — E11.9 TYPE 2 DIABETES MELLITUS WITHOUT COMPLICATIONS: ICD-10-CM

## 2025-01-23 DIAGNOSIS — Z98.89 OTHER SPECIFIED POSTPROCEDURAL STATES: Chronic | ICD-10-CM

## 2025-01-23 DIAGNOSIS — M54.2 CERVICALGIA: ICD-10-CM

## 2025-01-23 DIAGNOSIS — G89.29 OTHER CHRONIC PAIN: ICD-10-CM

## 2025-01-23 DIAGNOSIS — Z98.49 CATARACT EXTRACTION STATUS, UNSPECIFIED EYE: Chronic | ICD-10-CM

## 2025-01-23 DIAGNOSIS — I10 ESSENTIAL (PRIMARY) HYPERTENSION: ICD-10-CM

## 2025-01-23 DIAGNOSIS — E03.9 HYPOTHYROIDISM, UNSPECIFIED: ICD-10-CM

## 2025-01-23 DIAGNOSIS — Z82.8 FAMILY HISTORY OF OTHER DISABILITIES AND CHRONIC DISEASES LEADING TO DISABLEMENT, NOT ELSEWHERE CLASSIFIED: Chronic | ICD-10-CM

## 2025-01-23 PROCEDURE — 99285 EMERGENCY DEPT VISIT HI MDM: CPT

## 2025-01-23 PROCEDURE — 93010 ELECTROCARDIOGRAM REPORT: CPT

## 2025-01-23 NOTE — ED PROVIDER NOTE - CLINICAL SUMMARY MEDICAL DECISION MAKING FREE TEXT BOX
79 F pmh HTN, HLD, hypothyroid, chronic back pain presenting to the ED for L neck, chest pain x 1 day    concern for MSK pain, viral URI, r/o ACS  labs  EKG  CXR 79 F pmh HTN, HLD, hypothyroid, chronic back pain presenting to the ED for L neck, chest pain x 1 day    concern for MSK pain, viral URI, r/o ACS  labs  EKG  CXR    labs, imaging and EKG nonactionable  patient's pain alleviated with meds  will d/c home w/ return precautions and outpatient pmd follow-up

## 2025-01-23 NOTE — ED PROVIDER NOTE - PATIENT PORTAL LINK FT
You can access the FollowMyHealth Patient Portal offered by [facility] by registering at the following website: [email or web address]. By joining [email or web address]’s FollowMyHealth portal, you will also be able to view your health information using other applications (apps) compatible with our system.

## 2025-01-23 NOTE — ED PROVIDER NOTE - NSFOLLOWUPINSTRUCTIONS_ED_ALL_ED_FT
Neck/Back Pain    Neck/back pain is very common in adults. The cause of your pain may not be known and may include strain of muscles or ligaments, degeneration of the spinal disks, or arthritis. Occasionally the pain may radiate down your leg(s). Over-the-counter medicines to reduce pain and inflammation are often the most helpful. Stretching and remaining active frequently helps the healing process.     SEEK IMMEDIATE MEDICAL CARE IF YOU HAVE ANY OF THE FOLLOWING SYMPTOMS: bowel or bladder control problems, unusual weakness or numbness in your arms or legs, nausea or vomiting, abdominal pain, fever, dizziness/lightheadedness.

## 2025-01-23 NOTE — ED PROVIDER NOTE - OBJECTIVE STATEMENT
79 F pmh HTN, HLD, DM presenting to the ED for L neck, chest and body pain x 1 day. Pt saw her primary doctor this morning and symptoms began later in the afternoon. Mild alleviation with home pain ill.

## 2025-01-23 NOTE — ED PROVIDER NOTE - NS ED ROS FT
General: Denies fever, chills  HEENT: Denies sensory changes, sore throat  Neck: + neck pain  Resp: Denies coughing, SOB  Cardiovascular: + chest pain  GI: Denies nausea, vomiting, abdominal pain, diarrhea, constipation, blood in stool  : Denies dysuria, hematuria, frequency, incontinence  MSK: + back pain  Neuro: Denies HA, dizziness, numbness, weakness  Skin: Denies rashes.

## 2025-01-23 NOTE — ED ADULT NURSE NOTE - NSFALLUNIVINTERV_ED_ALL_ED
Bed/Stretcher in lowest position, wheels locked, appropriate side rails in place/Call bell, personal items and telephone in reach/Instruct patient to call for assistance before getting out of bed/chair/stretcher/Non-slip footwear applied when patient is off stretcher/Archie to call system/Physically safe environment - no spills, clutter or unnecessary equipment/Purposeful proactive rounding/Room/bathroom lighting operational, light cord in reach

## 2025-01-23 NOTE — ED ADULT NURSE NOTE - OBJECTIVE STATEMENT
0 1 Pt AAOx4. 79 year old female with past medical history of htn, hypothyroidism. dm, hld, blind (right eye); presents to ED with complaint of left sided neck pain, left sided chest pain, and headache that began around 3pm. Denies radiation of pain, jaw pain, back pain, shortness of breath, dyspnea, diaphoresis, nausea, vomiting, diarrhea, constipation, dizziness, weakness, headache, fever, chills, abdominal pain, hematuria, dysuria, hematochezia, dyschezia. Respirations equal and unlabored, no acute distress noted at this time. Patient placed on cardiac and SpO2 monitor at bedside. Pt took baby aspirin.

## 2025-01-23 NOTE — ED ADULT TRIAGE NOTE - CHIEF COMPLAINT QUOTE
Patient c/o headache, left sided chest and neck pain x today. Denies n/v/d/f. Denies trauma. Pmh htn, hypothyroidism. dm, hld, blind (right eye).

## 2025-01-23 NOTE — ED PROVIDER NOTE - PHYSICAL EXAMINATION
General: Well appearing elderly female in no acute distress  HEENT: Normocephalic, atraumatic. Moist mucous membranes. Oropharynx clear. No lymphadenopathy.  Eyes: No scleral icterus. EOMI. DALJIT.  Neck:. Soft and supple. Full ROM without pain. No midline tenderness  Cardiac: Regular rate and regular rhythm. No murmurs, rubs, gallops. Peripheral pulses 2+ and symmetric. No LE edema.  Resp: Lungs CTAB. Speaking in full sentences.  Abd: Soft, non-tender, non-distended. No guarding or rebound.  Back: Spine midline and non-tender. No CVA tenderness.    Skin: No rashes, abrasions, or lacerations.  Neuro: AO x 3. Moves all extremities symmetrically. Motor strength and sensation grossly intact.

## 2025-01-24 VITALS
RESPIRATION RATE: 18 BRPM | DIASTOLIC BLOOD PRESSURE: 70 MMHG | OXYGEN SATURATION: 97 % | HEART RATE: 78 BPM | SYSTOLIC BLOOD PRESSURE: 155 MMHG | TEMPERATURE: 98 F

## 2025-01-24 LAB
ALBUMIN SERPL ELPH-MCNC: 4.2 G/DL — SIGNIFICANT CHANGE UP (ref 3.3–5)
ALP SERPL-CCNC: 77 U/L — SIGNIFICANT CHANGE UP (ref 40–120)
ALT FLD-CCNC: 17 U/L — SIGNIFICANT CHANGE UP (ref 12–78)
ANION GAP SERPL CALC-SCNC: 5 MMOL/L — SIGNIFICANT CHANGE UP (ref 5–17)
AST SERPL-CCNC: 18 U/L — SIGNIFICANT CHANGE UP (ref 15–37)
BASOPHILS # BLD AUTO: 0.03 K/UL — SIGNIFICANT CHANGE UP (ref 0–0.2)
BASOPHILS NFR BLD AUTO: 0.5 % — SIGNIFICANT CHANGE UP (ref 0–2)
BILIRUB SERPL-MCNC: 0.4 MG/DL — SIGNIFICANT CHANGE UP (ref 0.2–1.2)
BUN SERPL-MCNC: 13 MG/DL — SIGNIFICANT CHANGE UP (ref 7–23)
CALCIUM SERPL-MCNC: 9.3 MG/DL — SIGNIFICANT CHANGE UP (ref 8.5–10.1)
CHLORIDE SERPL-SCNC: 108 MMOL/L — SIGNIFICANT CHANGE UP (ref 96–108)
CO2 SERPL-SCNC: 27 MMOL/L — SIGNIFICANT CHANGE UP (ref 22–31)
CREAT SERPL-MCNC: 0.83 MG/DL — SIGNIFICANT CHANGE UP (ref 0.5–1.3)
D DIMER BLD IA.RAPID-MCNC: 193 NG/ML DDU — SIGNIFICANT CHANGE UP
EGFR: 71 ML/MIN/1.73M2 — SIGNIFICANT CHANGE UP
EOSINOPHIL # BLD AUTO: 0.13 K/UL — SIGNIFICANT CHANGE UP (ref 0–0.5)
EOSINOPHIL NFR BLD AUTO: 2.1 % — SIGNIFICANT CHANGE UP (ref 0–6)
FLUAV AG NPH QL: SIGNIFICANT CHANGE UP
FLUBV AG NPH QL: SIGNIFICANT CHANGE UP
GLUCOSE SERPL-MCNC: 105 MG/DL — HIGH (ref 70–99)
HCT VFR BLD CALC: 35.2 % — SIGNIFICANT CHANGE UP (ref 34.5–45)
HGB BLD-MCNC: 11.7 G/DL — SIGNIFICANT CHANGE UP (ref 11.5–15.5)
IMM GRANULOCYTES NFR BLD AUTO: 0 % — SIGNIFICANT CHANGE UP (ref 0–0.9)
LYMPHOCYTES # BLD AUTO: 2.84 K/UL — SIGNIFICANT CHANGE UP (ref 1–3.3)
LYMPHOCYTES # BLD AUTO: 45.4 % — HIGH (ref 13–44)
MCHC RBC-ENTMCNC: 30.4 PG — SIGNIFICANT CHANGE UP (ref 27–34)
MCHC RBC-ENTMCNC: 33.2 G/DL — SIGNIFICANT CHANGE UP (ref 32–36)
MCV RBC AUTO: 91.4 FL — SIGNIFICANT CHANGE UP (ref 80–100)
MONOCYTES # BLD AUTO: 0.46 K/UL — SIGNIFICANT CHANGE UP (ref 0–0.9)
MONOCYTES NFR BLD AUTO: 7.4 % — SIGNIFICANT CHANGE UP (ref 2–14)
NEUTROPHILS # BLD AUTO: 2.79 K/UL — SIGNIFICANT CHANGE UP (ref 1.8–7.4)
NEUTROPHILS NFR BLD AUTO: 44.6 % — SIGNIFICANT CHANGE UP (ref 43–77)
NRBC # BLD: 0 /100 WBCS — SIGNIFICANT CHANGE UP (ref 0–0)
PLATELET # BLD AUTO: 249 K/UL — SIGNIFICANT CHANGE UP (ref 150–400)
POTASSIUM SERPL-MCNC: 4.2 MMOL/L — SIGNIFICANT CHANGE UP (ref 3.5–5.3)
POTASSIUM SERPL-SCNC: 4.2 MMOL/L — SIGNIFICANT CHANGE UP (ref 3.5–5.3)
PROT SERPL-MCNC: 7.7 GM/DL — SIGNIFICANT CHANGE UP (ref 6–8.3)
RBC # BLD: 3.85 M/UL — SIGNIFICANT CHANGE UP (ref 3.8–5.2)
RBC # FLD: 14 % — SIGNIFICANT CHANGE UP (ref 10.3–14.5)
RSV RNA NPH QL NAA+NON-PROBE: SIGNIFICANT CHANGE UP
SARS-COV-2 RNA SPEC QL NAA+PROBE: SIGNIFICANT CHANGE UP
SODIUM SERPL-SCNC: 140 MMOL/L — SIGNIFICANT CHANGE UP (ref 135–145)
TROPONIN I, HIGH SENSITIVITY RESULT: 13.8 NG/L — SIGNIFICANT CHANGE UP
WBC # BLD: 6.25 K/UL — SIGNIFICANT CHANGE UP (ref 3.8–10.5)
WBC # FLD AUTO: 6.25 K/UL — SIGNIFICANT CHANGE UP (ref 3.8–10.5)

## 2025-01-24 PROCEDURE — 71045 X-RAY EXAM CHEST 1 VIEW: CPT | Mod: 26

## 2025-01-24 RX ORDER — LIDOCAINE 50 MG/G
1 OINTMENT TOPICAL ONCE
Refills: 0 | Status: COMPLETED | OUTPATIENT
Start: 2025-01-24 | End: 2025-01-24

## 2025-01-24 RX ORDER — KETOROLAC TROMETHAMINE 30 MG/ML
15 INJECTION INTRAMUSCULAR; INTRAVENOUS ONCE
Refills: 0 | Status: DISCONTINUED | OUTPATIENT
Start: 2025-01-24 | End: 2025-01-24

## 2025-01-24 RX ORDER — OXYCODONE AND ACETAMINOPHEN 5; 325 MG/1; MG/1
1 TABLET ORAL ONCE
Refills: 0 | Status: DISCONTINUED | OUTPATIENT
Start: 2025-01-24 | End: 2025-01-24

## 2025-01-24 RX ADMIN — KETOROLAC TROMETHAMINE 15 MILLIGRAM(S): 30 INJECTION INTRAMUSCULAR; INTRAVENOUS at 01:40

## 2025-01-24 RX ADMIN — KETOROLAC TROMETHAMINE 15 MILLIGRAM(S): 30 INJECTION INTRAMUSCULAR; INTRAVENOUS at 01:25

## 2025-01-24 RX ADMIN — LIDOCAINE 1 PATCH: 50 OINTMENT TOPICAL at 02:11

## 2025-03-24 NOTE — DISCHARGE NOTE NURSING/CASE MANAGEMENT/SOCIAL WORK - FLU SEASON?
Take Mucinex according to label instructions.  Follow-up with your doctor next available appointment.  
Yes...

## 2025-04-21 ENCOUNTER — APPOINTMENT (OUTPATIENT)
Dept: OPHTHALMOLOGY | Facility: CLINIC | Age: 80
End: 2025-04-21

## 2025-06-02 ENCOUNTER — APPOINTMENT (OUTPATIENT)
Dept: OPHTHALMOLOGY | Facility: CLINIC | Age: 80
End: 2025-06-02
Payer: MEDICARE

## 2025-06-02 ENCOUNTER — NON-APPOINTMENT (OUTPATIENT)
Age: 80
End: 2025-06-02

## 2025-06-02 PROCEDURE — 92014 COMPRE OPH EXAM EST PT 1/>: CPT

## 2025-06-02 PROCEDURE — 92250 FUNDUS PHOTOGRAPHY W/I&R: CPT

## 2025-06-14 ENCOUNTER — EMERGENCY (EMERGENCY)
Facility: HOSPITAL | Age: 80
LOS: 0 days | Discharge: HOME | End: 2025-06-14
Attending: STUDENT IN AN ORGANIZED HEALTH CARE EDUCATION/TRAINING PROGRAM
Payer: MEDICARE

## 2025-06-14 VITALS
DIASTOLIC BLOOD PRESSURE: 77 MMHG | TEMPERATURE: 98 F | RESPIRATION RATE: 18 BRPM | HEART RATE: 77 BPM | HEIGHT: 66 IN | WEIGHT: 186.95 LBS | OXYGEN SATURATION: 98 % | SYSTOLIC BLOOD PRESSURE: 134 MMHG

## 2025-06-14 VITALS
HEART RATE: 71 BPM | TEMPERATURE: 98 F | DIASTOLIC BLOOD PRESSURE: 74 MMHG | SYSTOLIC BLOOD PRESSURE: 164 MMHG | OXYGEN SATURATION: 96 % | RESPIRATION RATE: 19 BRPM

## 2025-06-14 DIAGNOSIS — Z96.653 PRESENCE OF ARTIFICIAL KNEE JOINT, BILATERAL: Chronic | ICD-10-CM

## 2025-06-14 DIAGNOSIS — G89.29 OTHER CHRONIC PAIN: ICD-10-CM

## 2025-06-14 DIAGNOSIS — Z88.8 ALLERGY STATUS TO OTHER DRUGS, MEDICAMENTS AND BIOLOGICAL SUBSTANCES: ICD-10-CM

## 2025-06-14 DIAGNOSIS — Z90.49 ACQUIRED ABSENCE OF OTHER SPECIFIED PARTS OF DIGESTIVE TRACT: ICD-10-CM

## 2025-06-14 DIAGNOSIS — R35.0 FREQUENCY OF MICTURITION: ICD-10-CM

## 2025-06-14 DIAGNOSIS — Z94.7 CORNEAL TRANSPLANT STATUS: Chronic | ICD-10-CM

## 2025-06-14 DIAGNOSIS — R10.9 UNSPECIFIED ABDOMINAL PAIN: ICD-10-CM

## 2025-06-14 DIAGNOSIS — Z98.49 CATARACT EXTRACTION STATUS, UNSPECIFIED EYE: Chronic | ICD-10-CM

## 2025-06-14 DIAGNOSIS — Z90.710 ACQUIRED ABSENCE OF BOTH CERVIX AND UTERUS: Chronic | ICD-10-CM

## 2025-06-14 DIAGNOSIS — Z98.890 OTHER SPECIFIED POSTPROCEDURAL STATES: Chronic | ICD-10-CM

## 2025-06-14 DIAGNOSIS — Z98.89 OTHER SPECIFIED POSTPROCEDURAL STATES: Chronic | ICD-10-CM

## 2025-06-14 DIAGNOSIS — Z82.8 FAMILY HISTORY OF OTHER DISABILITIES AND CHRONIC DISEASES LEADING TO DISABLEMENT, NOT ELSEWHERE CLASSIFIED: Chronic | ICD-10-CM

## 2025-06-14 DIAGNOSIS — Z98.890 OTHER SPECIFIED POSTPROCEDURAL STATES: ICD-10-CM

## 2025-06-14 LAB
ANION GAP SERPL CALC-SCNC: 6 MMOL/L — SIGNIFICANT CHANGE UP (ref 5–17)
APPEARANCE UR: CLEAR — SIGNIFICANT CHANGE UP
BASOPHILS # BLD AUTO: 0.02 K/UL — SIGNIFICANT CHANGE UP (ref 0–0.2)
BASOPHILS NFR BLD AUTO: 0.4 % — SIGNIFICANT CHANGE UP (ref 0–2)
BILIRUB UR-MCNC: NEGATIVE — SIGNIFICANT CHANGE UP
BUN SERPL-MCNC: 14 MG/DL — SIGNIFICANT CHANGE UP (ref 7–23)
CALCIUM SERPL-MCNC: 9 MG/DL — SIGNIFICANT CHANGE UP (ref 8.5–10.1)
CHLORIDE SERPL-SCNC: 104 MMOL/L — SIGNIFICANT CHANGE UP (ref 96–108)
CO2 SERPL-SCNC: 26 MMOL/L — SIGNIFICANT CHANGE UP (ref 22–31)
COLOR SPEC: YELLOW — SIGNIFICANT CHANGE UP
CREAT SERPL-MCNC: 0.76 MG/DL — SIGNIFICANT CHANGE UP (ref 0.5–1.3)
DIFF PNL FLD: NEGATIVE — SIGNIFICANT CHANGE UP
EGFR: 79 ML/MIN/1.73M2 — SIGNIFICANT CHANGE UP
EGFR: 79 ML/MIN/1.73M2 — SIGNIFICANT CHANGE UP
EOSINOPHIL # BLD AUTO: 0.16 K/UL — SIGNIFICANT CHANGE UP (ref 0–0.5)
EOSINOPHIL NFR BLD AUTO: 2.8 % — SIGNIFICANT CHANGE UP (ref 0–6)
GLUCOSE SERPL-MCNC: 87 MG/DL — SIGNIFICANT CHANGE UP (ref 70–99)
GLUCOSE UR QL: NEGATIVE MG/DL — SIGNIFICANT CHANGE UP
HCT VFR BLD CALC: 38.4 % — SIGNIFICANT CHANGE UP (ref 34.5–45)
HGB BLD-MCNC: 12.4 G/DL — SIGNIFICANT CHANGE UP (ref 11.5–15.5)
IMM GRANULOCYTES NFR BLD AUTO: 0.4 % — SIGNIFICANT CHANGE UP (ref 0–0.9)
KETONES UR QL: NEGATIVE MG/DL — SIGNIFICANT CHANGE UP
LEUKOCYTE ESTERASE UR-ACNC: NEGATIVE — SIGNIFICANT CHANGE UP
LYMPHOCYTES # BLD AUTO: 1.69 K/UL — SIGNIFICANT CHANGE UP (ref 1–3.3)
LYMPHOCYTES # BLD AUTO: 29.6 % — SIGNIFICANT CHANGE UP (ref 13–44)
MCHC RBC-ENTMCNC: 29.8 PG — SIGNIFICANT CHANGE UP (ref 27–34)
MCHC RBC-ENTMCNC: 32.3 G/DL — SIGNIFICANT CHANGE UP (ref 32–36)
MCV RBC AUTO: 92.3 FL — SIGNIFICANT CHANGE UP (ref 80–100)
MONOCYTES # BLD AUTO: 0.45 K/UL — SIGNIFICANT CHANGE UP (ref 0–0.9)
MONOCYTES NFR BLD AUTO: 7.9 % — SIGNIFICANT CHANGE UP (ref 2–14)
NEUTROPHILS # BLD AUTO: 3.37 K/UL — SIGNIFICANT CHANGE UP (ref 1.8–7.4)
NEUTROPHILS NFR BLD AUTO: 58.9 % — SIGNIFICANT CHANGE UP (ref 43–77)
NITRITE UR-MCNC: NEGATIVE — SIGNIFICANT CHANGE UP
NRBC BLD AUTO-RTO: 0 /100 WBCS — SIGNIFICANT CHANGE UP (ref 0–0)
PH UR: 7 — SIGNIFICANT CHANGE UP (ref 5–8)
PLATELET # BLD AUTO: 311 K/UL — SIGNIFICANT CHANGE UP (ref 150–400)
POTASSIUM SERPL-MCNC: 4.4 MMOL/L — SIGNIFICANT CHANGE UP (ref 3.5–5.3)
POTASSIUM SERPL-SCNC: 4.4 MMOL/L — SIGNIFICANT CHANGE UP (ref 3.5–5.3)
PROT UR-MCNC: NEGATIVE MG/DL — SIGNIFICANT CHANGE UP
RBC # BLD: 4.16 M/UL — SIGNIFICANT CHANGE UP (ref 3.8–5.2)
RBC # FLD: 14.9 % — HIGH (ref 10.3–14.5)
SODIUM SERPL-SCNC: 136 MMOL/L — SIGNIFICANT CHANGE UP (ref 135–145)
SP GR SPEC: 1.01 — SIGNIFICANT CHANGE UP (ref 1–1.03)
UROBILINOGEN FLD QL: 0.2 MG/DL — SIGNIFICANT CHANGE UP (ref 0.2–1)
WBC # BLD: 5.71 K/UL — SIGNIFICANT CHANGE UP (ref 3.8–10.5)
WBC # FLD AUTO: 5.71 K/UL — SIGNIFICANT CHANGE UP (ref 3.8–10.5)

## 2025-06-14 PROCEDURE — 99285 EMERGENCY DEPT VISIT HI MDM: CPT

## 2025-06-14 PROCEDURE — 93010 ELECTROCARDIOGRAM REPORT: CPT

## 2025-06-14 PROCEDURE — 74176 CT ABD & PELVIS W/O CONTRAST: CPT | Mod: 26

## 2025-06-14 RX ORDER — METHOCARBAMOL 500 MG/1
1500 TABLET, FILM COATED ORAL ONCE
Refills: 0 | Status: COMPLETED | OUTPATIENT
Start: 2025-06-14 | End: 2025-06-14

## 2025-06-14 RX ORDER — KETOROLAC TROMETHAMINE 30 MG/ML
15 INJECTION, SOLUTION INTRAMUSCULAR; INTRAVENOUS ONCE
Refills: 0 | Status: DISCONTINUED | OUTPATIENT
Start: 2025-06-14 | End: 2025-06-14

## 2025-06-14 RX ADMIN — METHOCARBAMOL 1500 MILLIGRAM(S): 500 TABLET, FILM COATED ORAL at 11:28

## 2025-06-14 RX ADMIN — Medication 2 MILLIGRAM(S): at 14:55

## 2025-06-14 RX ADMIN — KETOROLAC TROMETHAMINE 15 MILLIGRAM(S): 30 INJECTION, SOLUTION INTRAMUSCULAR; INTRAVENOUS at 11:30

## 2025-06-14 RX ADMIN — Medication 4 MILLIGRAM(S): at 13:06

## 2025-07-14 ENCOUNTER — NON-APPOINTMENT (OUTPATIENT)
Age: 80
End: 2025-07-14

## 2025-07-14 ENCOUNTER — APPOINTMENT (OUTPATIENT)
Dept: OPHTHALMOLOGY | Facility: CLINIC | Age: 80
End: 2025-07-14
Payer: MEDICARE

## 2025-07-14 PROCEDURE — 92014 COMPRE OPH EXAM EST PT 1/>: CPT

## 2025-07-14 PROCEDURE — 92133 CPTRZD OPH DX IMG PST SGM ON: CPT

## (undated) DEVICE — DRSG EYE PAD OVAL STERILE

## (undated) DEVICE — NDL RETROBULBAR VISITEC 25X1.5

## (undated) DEVICE — GLV 7.5 PROTEXIS (WHITE)

## (undated) DEVICE — NDL FILTER TW NOKOR 19GA 1.5"

## (undated) DEVICE — PACK ANTERIOR SEGMENT

## (undated) DEVICE — DRAPE MEDIUM SHEET 44" X 70"

## (undated) DEVICE — GLV 8 PROTEXIS (WHITE)

## (undated) DEVICE — Device

## (undated) DEVICE — WARMING BLANKET LOWER ADULT

## (undated) DEVICE — KIT CENTURION ANTERIOR

## (undated) DEVICE — CATARACT KIT

## (undated) DEVICE — DRAPE MICROSCOPE KNOB COVER SMALL (2 PCS)

## (undated) DEVICE — VENODYNE/SCD SLEEVE CALF MEDIUM

## (undated) DEVICE — VISITEC 4X4

## (undated) DEVICE — DRSG TEGADERM 2.5X3"

## (undated) DEVICE — PACK CENTURION 2.4MM

## (undated) DEVICE — SPECIMEN CONTAINER 100ML

## (undated) DEVICE — CANNULA IRR ANT CHAMBER 30G

## (undated) DEVICE — SUT NYLON 10-0 12" CU-5

## (undated) DEVICE — APPLICATOR COTTON TIP 3" STERILE

## (undated) DEVICE — CANNULA ANT CHMBR 27GX22MM

## (undated) DEVICE — SYR LUER LOK 3CC

## (undated) DEVICE — SOL IRR BAL SALT 500ML